# Patient Record
Sex: FEMALE | Race: WHITE | URBAN - METROPOLITAN AREA
[De-identification: names, ages, dates, MRNs, and addresses within clinical notes are randomized per-mention and may not be internally consistent; named-entity substitution may affect disease eponyms.]

---

## 2017-06-05 ENCOUNTER — INPATIENT (INPATIENT)
Facility: HOSPITAL | Age: 82
LOS: 3 days | Discharge: NURSING, REHAB OR LONG-TERM CARE | End: 2017-06-09
Attending: SPECIALIST

## 2017-06-05 DIAGNOSIS — R07.9 CHEST PAIN, UNSPECIFIED: ICD-10-CM

## 2017-06-05 DIAGNOSIS — S72.90XA UNSPECIFIED FRACTURE OF UNSPECIFIED FEMUR, INITIAL ENCOUNTER FOR CLOSED FRACTURE: ICD-10-CM

## 2017-06-09 ENCOUNTER — INPATIENT (INPATIENT)
Facility: HOSPITAL | Age: 82
LOS: 11 days | Discharge: HOME HEALTH | End: 2017-06-21
Attending: PHYSICAL MEDICINE & REHABILITATION

## 2017-06-09 DIAGNOSIS — S72.90XA UNSPECIFIED FRACTURE OF UNSPECIFIED FEMUR, INITIAL ENCOUNTER FOR CLOSED FRACTURE: ICD-10-CM

## 2017-06-09 DIAGNOSIS — R07.9 CHEST PAIN, UNSPECIFIED: ICD-10-CM

## 2017-06-28 DIAGNOSIS — H40.9 UNSPECIFIED GLAUCOMA: ICD-10-CM

## 2017-06-28 DIAGNOSIS — E03.9 HYPOTHYROIDISM, UNSPECIFIED: ICD-10-CM

## 2017-06-28 DIAGNOSIS — D62 ACUTE POSTHEMORRHAGIC ANEMIA: ICD-10-CM
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
## 2017-06-28 DIAGNOSIS — I12.9 HYPERTENSIVE CHRONIC KIDNEY DISEASE WITH STAGE 1 THROUGH STAGE 4 CHRONIC KIDNEY DISEASE, OR UNSPECIFIED CHRONIC KIDNEY DISEASE: ICD-10-CM

## 2017-06-28 DIAGNOSIS — R50.82 POSTPROCEDURAL FEVER: ICD-10-CM
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
## 2017-06-28 DIAGNOSIS — G89.11 ACUTE PAIN DUE TO TRAUMA: ICD-10-CM

## 2017-06-28 DIAGNOSIS — K21.9 GASTRO-ESOPHAGEAL REFLUX DISEASE WITHOUT ESOPHAGITIS: ICD-10-CM

## 2017-06-28 DIAGNOSIS — I35.0 NONRHEUMATIC AORTIC (VALVE) STENOSIS: ICD-10-CM
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
## 2017-06-28 DIAGNOSIS — S72.041D DISPLACED FRACTURE OF BASE OF NECK OF RIGHT FEMUR, SUBSEQUENT ENCOUNTER FOR CLOSED FRACTURE WITH ROUTINE HEALING: ICD-10-CM

## 2017-06-28 DIAGNOSIS — S72.041S: ICD-10-CM

## 2017-06-28 DIAGNOSIS — S72.141A DISPLACED INTERTROCHANTERIC FRACTURE OF RIGHT FEMUR, INITIAL ENCOUNTER FOR CLOSED FRACTURE: ICD-10-CM

## 2017-06-28 DIAGNOSIS — E78.5 HYPERLIPIDEMIA, UNSPECIFIED: ICD-10-CM

## 2017-06-28 DIAGNOSIS — Z85.3 PERSONAL HISTORY OF MALIGNANT NEOPLASM OF BREAST: ICD-10-CM

## 2017-06-28 DIAGNOSIS — T84.194A OTHER MECHANICAL COMPLICATION OF INTERNAL FIXATION DEVICE OF RIGHT FEMUR, INITIAL ENCOUNTER: ICD-10-CM

## 2017-06-28 DIAGNOSIS — Z96.641 PRESENCE OF RIGHT ARTIFICIAL HIP JOINT: ICD-10-CM

## 2017-06-28 DIAGNOSIS — E83.52 HYPERCALCEMIA: ICD-10-CM

## 2017-06-28 DIAGNOSIS — D64.9 ANEMIA, UNSPECIFIED: ICD-10-CM

## 2017-06-28 DIAGNOSIS — R00.2 PALPITATIONS: ICD-10-CM

## 2017-06-28 DIAGNOSIS — Z51.5 ENCOUNTER FOR PALLIATIVE CARE: ICD-10-CM

## 2017-06-28 DIAGNOSIS — H91.90 UNSPECIFIED HEARING LOSS, UNSPECIFIED EAR: ICD-10-CM

## 2017-06-28 DIAGNOSIS — N18.9 CHRONIC KIDNEY DISEASE, UNSPECIFIED: ICD-10-CM

## 2017-06-28 DIAGNOSIS — Z51.89 ENCOUNTER FOR OTHER SPECIFIED AFTERCARE: ICD-10-CM

## 2017-06-28 DIAGNOSIS — N17.9 ACUTE KIDNEY FAILURE, UNSPECIFIED: ICD-10-CM

## 2017-06-28 DIAGNOSIS — K59.00 CONSTIPATION, UNSPECIFIED: ICD-10-CM
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
## 2018-02-07 ENCOUNTER — EMERGENCY (EMERGENCY)
Facility: HOSPITAL | Age: 83
LOS: 1 days | Discharge: HOME | End: 2018-02-09
Attending: EMERGENCY MEDICINE | Admitting: SPECIALIST

## 2018-02-07 ENCOUNTER — TRANSCRIPTION ENCOUNTER (OUTPATIENT)
Age: 83
End: 2018-02-07

## 2018-02-07 VITALS
SYSTOLIC BLOOD PRESSURE: 134 MMHG | OXYGEN SATURATION: 97 % | DIASTOLIC BLOOD PRESSURE: 60 MMHG | RESPIRATION RATE: 20 BRPM | HEART RATE: 62 BPM | TEMPERATURE: 99 F

## 2018-02-07 DIAGNOSIS — Z96.641 PRESENCE OF RIGHT ARTIFICIAL HIP JOINT: Chronic | ICD-10-CM

## 2018-02-07 LAB
ALBUMIN SERPL ELPH-MCNC: 3.6 G/DL — SIGNIFICANT CHANGE UP (ref 3–5.5)
ALP SERPL-CCNC: 61 U/L — SIGNIFICANT CHANGE UP (ref 30–115)
ALT FLD-CCNC: 10 U/L — SIGNIFICANT CHANGE UP (ref 0–41)
ANION GAP SERPL CALC-SCNC: 9 MMOL/L — SIGNIFICANT CHANGE UP (ref 7–14)
APTT BLD: 28.8 SEC — SIGNIFICANT CHANGE UP (ref 27–39.2)
AST SERPL-CCNC: 19 U/L — SIGNIFICANT CHANGE UP (ref 0–41)
BASOPHILS # BLD AUTO: 0.05 K/UL — SIGNIFICANT CHANGE UP (ref 0–0.2)
BASOPHILS NFR BLD AUTO: 0.9 % — SIGNIFICANT CHANGE UP (ref 0–1)
BILIRUB SERPL-MCNC: 0.6 MG/DL — SIGNIFICANT CHANGE UP (ref 0.2–1.2)
BUN SERPL-MCNC: 31 MG/DL — HIGH (ref 10–20)
CALCIUM SERPL-MCNC: 9.9 MG/DL — SIGNIFICANT CHANGE UP (ref 8.5–10.1)
CHLORIDE SERPL-SCNC: 110 MMOL/L — SIGNIFICANT CHANGE UP (ref 98–110)
CK MB BLD-MCNC: 2 % — SIGNIFICANT CHANGE UP (ref 0–4)
CK MB CFR SERPL CALC: 1.6 NG/ML — SIGNIFICANT CHANGE UP (ref 0.6–6.3)
CK SERPL-CCNC: 73 U/L — SIGNIFICANT CHANGE UP (ref 0–225)
CO2 SERPL-SCNC: 24 MMOL/L — SIGNIFICANT CHANGE UP (ref 17–32)
CREAT SERPL-MCNC: 1.5 MG/DL — SIGNIFICANT CHANGE UP (ref 0.7–1.5)
EOSINOPHIL # BLD AUTO: 0.37 K/UL — SIGNIFICANT CHANGE UP (ref 0–0.7)
EOSINOPHIL NFR BLD AUTO: 6.5 % — SIGNIFICANT CHANGE UP (ref 0–8)
GLUCOSE SERPL-MCNC: 131 MG/DL — HIGH (ref 70–110)
HCT VFR BLD CALC: 31 % — LOW (ref 37–47)
HGB BLD-MCNC: 10.1 G/DL — LOW (ref 14–18)
IMM GRANULOCYTES NFR BLD AUTO: 0.5 % — HIGH (ref 0.1–0.3)
INR BLD: 1.15 RATIO — SIGNIFICANT CHANGE UP (ref 0.65–1.3)
LYMPHOCYTES # BLD AUTO: 1.66 K/UL — SIGNIFICANT CHANGE UP (ref 1.2–3.4)
LYMPHOCYTES # BLD AUTO: 29 % — SIGNIFICANT CHANGE UP (ref 20.5–51.1)
MCHC RBC-ENTMCNC: 28.8 PG — SIGNIFICANT CHANGE UP (ref 27–31)
MCHC RBC-ENTMCNC: 32.6 G/DL — SIGNIFICANT CHANGE UP (ref 32–37)
MCV RBC AUTO: 88.3 FL — SIGNIFICANT CHANGE UP (ref 81–91)
MONOCYTES # BLD AUTO: 0.38 K/UL — SIGNIFICANT CHANGE UP (ref 0.1–0.6)
MONOCYTES NFR BLD AUTO: 6.6 % — SIGNIFICANT CHANGE UP (ref 1.7–9.3)
NEUTROPHILS # BLD AUTO: 3.24 K/UL — SIGNIFICANT CHANGE UP (ref 1.4–6.5)
NEUTROPHILS NFR BLD AUTO: 56.5 % — SIGNIFICANT CHANGE UP (ref 42.2–75.2)
NRBC # BLD: 0 /100 WBCS — SIGNIFICANT CHANGE UP (ref 0–0)
PLATELET # BLD AUTO: 157 K/UL — SIGNIFICANT CHANGE UP (ref 130–400)
POTASSIUM SERPL-MCNC: 4.5 MMOL/L — SIGNIFICANT CHANGE UP (ref 3.5–5)
POTASSIUM SERPL-SCNC: 4.5 MMOL/L — SIGNIFICANT CHANGE UP (ref 3.5–5)
PROT SERPL-MCNC: 6.8 G/DL — SIGNIFICANT CHANGE UP (ref 6–8)
PROTHROM AB SERPL-ACNC: 12.5 SEC — SIGNIFICANT CHANGE UP (ref 9.95–12.87)
RBC # BLD: 3.51 M/UL — LOW (ref 4.2–5.4)
RBC # FLD: 13.8 % — SIGNIFICANT CHANGE UP (ref 11.5–14.5)
SODIUM SERPL-SCNC: 143 MMOL/L — SIGNIFICANT CHANGE UP (ref 135–146)
TROPONIN I SERPL-MCNC: <0.02 NG/ML — SIGNIFICANT CHANGE UP (ref 0–0.05)
WBC # BLD: 5.73 K/UL — SIGNIFICANT CHANGE UP (ref 4.8–10.8)
WBC # FLD AUTO: 5.73 K/UL — SIGNIFICANT CHANGE UP (ref 4.8–10.8)

## 2018-02-07 RX ORDER — SODIUM CHLORIDE 9 MG/ML
3 INJECTION INTRAMUSCULAR; INTRAVENOUS; SUBCUTANEOUS ONCE
Qty: 0 | Refills: 0 | Status: COMPLETED | OUTPATIENT
Start: 2018-02-07 | End: 2018-02-07

## 2018-02-07 RX ORDER — ASPIRIN/CALCIUM CARB/MAGNESIUM 324 MG
325 TABLET ORAL ONCE
Qty: 0 | Refills: 0 | Status: COMPLETED | OUTPATIENT
Start: 2018-02-07 | End: 2018-02-07

## 2018-02-07 RX ADMIN — SODIUM CHLORIDE 3 MILLILITER(S): 9 INJECTION INTRAMUSCULAR; INTRAVENOUS; SUBCUTANEOUS at 18:30

## 2018-02-07 RX ADMIN — Medication 325 MILLIGRAM(S): at 18:30

## 2018-02-07 NOTE — ED CDU PROVIDER INITIAL DAY NOTE - ATTENDING CONTRIBUTION TO CARE
seen with PA agree with above, lungs- clear, abdomen- soft no tenderness to any region, neuro- non focal, s1s2 no gallops or murmurs, full workup pending will continue to re-evaluate

## 2018-02-07 NOTE — ED PROVIDER NOTE - PMH
Chronic primary angle-closure glaucoma of left eye, severe stage    Exudative age-related macular degeneration of left eye with inactive choroidal neovascularization    High cholesterol    Hypertension, unspecified type

## 2018-02-07 NOTE — ED PROVIDER NOTE - OBJECTIVE STATEMENT
89 yo female with PMH HTN, HLD, hypothyroidism, breast CA, glaucoma presents c/o intermittent left sided chest pain . Pt denies any radiation of sx.  No SOB, palpitations, dizziness or weakness. Denies any N/V/D or abdominal pain.  Sx not worse with palpation or inspiration. No fevers, chill, cough or URI sx.

## 2018-02-07 NOTE — ED PROVIDER NOTE - PHYSICAL EXAMINATION
VITAL SIGNS: noted  CONSTITUTIONAL: Well-developed; well-nourished; in no acute distress  HEAD: Normocephalic; atraumatic  EYES: PERRL, EOM intact; conjunctiva and sclera clear  ENT: No nasal discharge; airway clear. MMM  NECK: Supple; non tender. No anterior cervical lymphadenopathy noted  CARD: S1, S2 normal; no murmurs, gallops, or rubs. Regular rate and rhythm  CHEST: no chest wall tenderness  RESP: CTAB/L, no wheezes, rales or rhonchi  ABD: Normal bowel sounds; soft; non-distended; non-tender; no hepatosplenomegaly. No CVA tenderness  EXT: Normal ROM. No clubbing, cyanosis or edema. Distal pulses intact  NEURO: Alert, oriented. Grossly unremarkable. No focal deficits  SKIN: Skin exam is warm and dry, no acute rash  MS: No midline spinal tenderness

## 2018-02-07 NOTE — ED CDU PROVIDER INITIAL DAY NOTE - OBJECTIVE STATEMENT
89y/o female with pmh of htn, hld, hypothyroid, mvp, pt. c/o mild left sided cp since yesterday. cp is intermittent. denies fever, chills, cough, vomiting. pmd dr. Cooper, cardio dr. Bruno

## 2018-02-07 NOTE — ED CDU PROVIDER INITIAL DAY NOTE - PROGRESS NOTE DETAILS
trop x 2 negative, will continue to reassess. pt resting in obs without complaints; pt with ce/ekg wnl x 2; pending cardiology consult in am with Dr. Bruno;

## 2018-02-07 NOTE — ED PROVIDER NOTE - MEDICAL DECISION MAKING DETAILS
labs and cxr result discussed, given CP and risk factors recommended EDOU for serial enzymes and further monitoring and workup and pt/family agreed

## 2018-02-08 LAB
CK MB CFR SERPL CALC: 1.8 NG/ML — SIGNIFICANT CHANGE UP (ref 0.6–6.3)
CK SERPL-CCNC: 55 U/L — SIGNIFICANT CHANGE UP (ref 0–225)
TROPONIN I SERPL-MCNC: <0.02 NG/ML — SIGNIFICANT CHANGE UP (ref 0–0.05)

## 2018-02-08 RX ORDER — LATANOPROST 0.05 MG/ML
1 SOLUTION/ DROPS OPHTHALMIC; TOPICAL AT BEDTIME
Qty: 0 | Refills: 0 | Status: DISCONTINUED | OUTPATIENT
Start: 2018-02-08 | End: 2018-02-09

## 2018-02-08 NOTE — ED CDU PROVIDER SUBSEQUENT DAY NOTE - ATTENDING CONTRIBUTION TO CARE
See clinical course and progress notes
Zena with PA agree with above, lungs, clear , abdomen- soft no tenderness to any region, neuor- non focal, full workup in progress will continue to re-evaluate
seen with PA , remains stable will continue to re-evaluate
Remains in no distress with stable vitals full workup pending will continue to re-evaluate
See progress notes and clinical course

## 2018-02-08 NOTE — ED CDU PROVIDER SUBSEQUENT DAY NOTE - PROGRESS NOTE DETAILS
received signout from Ranjeet Morataya pt in obs for chest pain - ce/ekg negative x 2; pending cardiology consult Dr. Bruno in am;

## 2018-02-08 NOTE — ED CDU PROVIDER SUBSEQUENT DAY NOTE - MEDICAL DECISION MAKING DETAILS
Full workup pending will continue to re-evaluate
See clinical course and progress notes
full workup pending will continue to re-evaluate
See progress notes and clinical course
full workup in progress will continue to re-evaluate

## 2018-02-08 NOTE — ED CDU PROVIDER SUBSEQUENT DAY NOTE - HISTORY
Pt seen at bedside, NAD. Arrived overnight, received from CRYSTAL Teresa. Pt presenting for left sided chest pain. Cardiologist - Dr Bruno. Cardiac enzymes negative x 2.. Will contact Dr Bruno for further management/disposition.

## 2018-02-08 NOTE — ED CDU PROVIDER SUBSEQUENT DAY NOTE - NS ED ATTENDING STATEMENT MOD
I have personally performed a face to face diagnostic evaluation on this patient. I have reviewed the ACP note and agree with the history, exam and plan of care, except as noted.

## 2018-02-08 NOTE — ED CDU PROVIDER SUBSEQUENT DAY NOTE - NS_EDPROVIDERDISPOUSERTYPE_ED_A_ED
Attending Attestation (For Attendings USE Only)...

## 2018-02-08 NOTE — ED CDU PROVIDER SUBSEQUENT DAY NOTE - PROGRESS NOTE DETAILS
unable to get in touch with Kusum Bruno, still gets intermittent chest pains, will admitt to telemetry

## 2018-02-09 VITALS
RESPIRATION RATE: 18 BRPM | HEART RATE: 69 BPM | SYSTOLIC BLOOD PRESSURE: 131 MMHG | OXYGEN SATURATION: 98 % | DIASTOLIC BLOOD PRESSURE: 60 MMHG | TEMPERATURE: 96 F

## 2018-02-09 RX ADMIN — LATANOPROST 1 DROP(S): 0.05 SOLUTION/ DROPS OPHTHALMIC; TOPICAL at 01:34

## 2018-02-09 NOTE — ED ADULT NURSE REASSESSMENT NOTE - NS ED NURSE REASSESS COMMENT FT1
Pt assessed A.O times 4 Vs stable  denies no chest pain did eat 100% no SOB no N.V ambulate steady ,pt is seen evaluate by Ed attending clear to go home NAD noted at this time .
Patient report received from previous RN, patient at this time is resting in bed with no acute distress, waiting for further disposition from provider, currently in NSR, safety and comfort measure maintained, will continue to watch and assess.
Pt remains stable with no complaint, needs anticipated & assistance rendered
Pt assessed at 0800 AM A/O times 4 Vs stable denies no chest pain no SOB no N.V ambulate steady with assistance , safety precaution on progress   pt is seen evaluate by Ed attending NAD noted did eat 70% of lunch on going nursing observation

## 2018-02-09 NOTE — ED CDU PROVIDER DISPOSITION NOTE - CLINICAL COURSE
89yo woman h/o HTN, HLD was placed in CDU due to an episode of chest pain PTA. Pt has been under a lot of stress lately due to family issues; this episode was brief, pressurelike and nonexertional. Pt was eval in the ED with EKG, labs with cardiac enzymes, and CXR which were unremarkable. She was monitored in CDU without incident, repeat EKG and enzymes ok. VS, exam as noted, pt well appearing and comfortable on my eval, lungs CTA, CVS1S2 RRR abd soft, NT, no peripheral edema. Ambulatory around ED without difficulty and without sx. Spoke with Dr Starr, covering for Dr Bruno; pt to f/u with him within a few days. Pt and family amenable to plan.

## 2018-02-09 NOTE — ED ADULT NURSE REASSESSMENT NOTE - COMFORT CARE
assisted to bathroom
assisted to bathroom
assisted to bathroom/repositioned
side rails up/plan of care explained/warm blanket provided/wait time explained/assisted to bathroom/darkened lights

## 2018-02-16 DIAGNOSIS — R07.9 CHEST PAIN, UNSPECIFIED: ICD-10-CM

## 2018-02-16 DIAGNOSIS — I10 ESSENTIAL (PRIMARY) HYPERTENSION: ICD-10-CM

## 2018-02-16 DIAGNOSIS — R07.89 OTHER CHEST PAIN: ICD-10-CM

## 2018-02-16 DIAGNOSIS — E78.00 PURE HYPERCHOLESTEROLEMIA, UNSPECIFIED: ICD-10-CM

## 2018-04-16 ENCOUNTER — TRANSCRIPTION ENCOUNTER (OUTPATIENT)
Age: 83
End: 2018-04-16

## 2018-06-19 ENCOUNTER — TRANSCRIPTION ENCOUNTER (OUTPATIENT)
Age: 83
End: 2018-06-19

## 2018-12-19 ENCOUNTER — EMERGENCY (EMERGENCY)
Facility: HOSPITAL | Age: 83
LOS: 0 days | Discharge: HOME | End: 2018-12-19
Attending: EMERGENCY MEDICINE | Admitting: EMERGENCY MEDICINE

## 2018-12-19 VITALS
DIASTOLIC BLOOD PRESSURE: 67 MMHG | SYSTOLIC BLOOD PRESSURE: 154 MMHG | TEMPERATURE: 97 F | OXYGEN SATURATION: 97 % | RESPIRATION RATE: 18 BRPM | HEART RATE: 60 BPM

## 2018-12-19 VITALS
HEART RATE: 70 BPM | OXYGEN SATURATION: 98 % | RESPIRATION RATE: 18 BRPM | TEMPERATURE: 97 F | DIASTOLIC BLOOD PRESSURE: 64 MMHG | SYSTOLIC BLOOD PRESSURE: 139 MMHG

## 2018-12-19 DIAGNOSIS — I10 ESSENTIAL (PRIMARY) HYPERTENSION: ICD-10-CM

## 2018-12-19 DIAGNOSIS — Z79.52 LONG TERM (CURRENT) USE OF SYSTEMIC STEROIDS: ICD-10-CM

## 2018-12-19 DIAGNOSIS — S09.90XA UNSPECIFIED INJURY OF HEAD, INITIAL ENCOUNTER: ICD-10-CM
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
## 2018-12-19 DIAGNOSIS — E03.9 HYPOTHYROIDISM, UNSPECIFIED: ICD-10-CM
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
## 2018-12-19 DIAGNOSIS — Z79.891 LONG TERM (CURRENT) USE OF OPIATE ANALGESIC: ICD-10-CM

## 2018-12-19 DIAGNOSIS — R07.81 PLEURODYNIA: ICD-10-CM

## 2018-12-19 DIAGNOSIS — Z79.84 LONG TERM (CURRENT) USE OF ORAL HYPOGLYCEMIC DRUGS: ICD-10-CM

## 2018-12-19 DIAGNOSIS — Z96.641 PRESENCE OF RIGHT ARTIFICIAL HIP JOINT: Chronic | ICD-10-CM

## 2018-12-19 PROBLEM — H40.2223 CHRONIC ANGLE-CLOSURE GLAUCOMA, LEFT EYE, SEVERE STAGE: Chronic | Status: ACTIVE | Noted: 2018-02-07

## 2018-12-19 PROBLEM — H35.3222 EXUDATIVE AGE-RELATED MACULAR DEGENERATION, LEFT EYE, WITH INACTIVE CHOROIDAL NEOVASCULARIZATION: Chronic | Status: ACTIVE | Noted: 2018-02-07

## 2018-12-19 PROBLEM — E78.00 PURE HYPERCHOLESTEROLEMIA, UNSPECIFIED: Chronic | Status: ACTIVE | Noted: 2018-02-07

## 2018-12-19 LAB
ALBUMIN SERPL ELPH-MCNC: 4.3 G/DL — SIGNIFICANT CHANGE UP (ref 3.5–5.2)
ALP SERPL-CCNC: 67 U/L — SIGNIFICANT CHANGE UP (ref 30–115)
ALT FLD-CCNC: 8 U/L — SIGNIFICANT CHANGE UP (ref 0–41)
ANION GAP SERPL CALC-SCNC: 15 MMOL/L — HIGH (ref 7–14)
AST SERPL-CCNC: 22 U/L — SIGNIFICANT CHANGE UP (ref 0–41)
BASOPHILS # BLD AUTO: 0.06 K/UL — SIGNIFICANT CHANGE UP (ref 0–0.2)
BASOPHILS NFR BLD AUTO: 1 % — SIGNIFICANT CHANGE UP (ref 0–1)
BILIRUB SERPL-MCNC: 0.4 MG/DL — SIGNIFICANT CHANGE UP (ref 0.2–1.2)
BUN SERPL-MCNC: 31 MG/DL — HIGH (ref 10–20)
CALCIUM SERPL-MCNC: 10 MG/DL — SIGNIFICANT CHANGE UP (ref 8.5–10.1)
CHLORIDE SERPL-SCNC: 101 MMOL/L — SIGNIFICANT CHANGE UP (ref 98–110)
CO2 SERPL-SCNC: 24 MMOL/L — SIGNIFICANT CHANGE UP (ref 17–32)
CREAT SERPL-MCNC: 1.5 MG/DL — SIGNIFICANT CHANGE UP (ref 0.7–1.5)
EOSINOPHIL # BLD AUTO: 0.28 K/UL — SIGNIFICANT CHANGE UP (ref 0–0.7)
EOSINOPHIL NFR BLD AUTO: 4.6 % — SIGNIFICANT CHANGE UP (ref 0–8)
GLUCOSE SERPL-MCNC: 113 MG/DL — HIGH (ref 70–99)
HCT VFR BLD CALC: 33.6 % — LOW (ref 37–47)
HGB BLD-MCNC: 10.9 G/DL — LOW (ref 12–16)
IMM GRANULOCYTES NFR BLD AUTO: 0.3 % — SIGNIFICANT CHANGE UP (ref 0.1–0.3)
LYMPHOCYTES # BLD AUTO: 1.32 K/UL — SIGNIFICANT CHANGE UP (ref 1.2–3.4)
LYMPHOCYTES # BLD AUTO: 21.5 % — SIGNIFICANT CHANGE UP (ref 20.5–51.1)
MCHC RBC-ENTMCNC: 28.6 PG — SIGNIFICANT CHANGE UP (ref 27–31)
MCHC RBC-ENTMCNC: 32.4 G/DL — SIGNIFICANT CHANGE UP (ref 32–37)
MCV RBC AUTO: 88.2 FL — SIGNIFICANT CHANGE UP (ref 81–99)
MONOCYTES # BLD AUTO: 0.45 K/UL — SIGNIFICANT CHANGE UP (ref 0.1–0.6)
MONOCYTES NFR BLD AUTO: 7.3 % — SIGNIFICANT CHANGE UP (ref 1.7–9.3)
NEUTROPHILS # BLD AUTO: 4 K/UL — SIGNIFICANT CHANGE UP (ref 1.4–6.5)
NEUTROPHILS NFR BLD AUTO: 65.3 % — SIGNIFICANT CHANGE UP (ref 42.2–75.2)
NRBC # BLD: 0 /100 WBCS — SIGNIFICANT CHANGE UP (ref 0–0)
PLATELET # BLD AUTO: 162 K/UL — SIGNIFICANT CHANGE UP (ref 130–400)
POTASSIUM SERPL-MCNC: 4.6 MMOL/L — SIGNIFICANT CHANGE UP (ref 3.5–5)
POTASSIUM SERPL-SCNC: 4.6 MMOL/L — SIGNIFICANT CHANGE UP (ref 3.5–5)
PROT SERPL-MCNC: 7.7 G/DL — SIGNIFICANT CHANGE UP (ref 6–8)
RBC # BLD: 3.81 M/UL — LOW (ref 4.2–5.4)
RBC # FLD: 13.8 % — SIGNIFICANT CHANGE UP (ref 11.5–14.5)
SODIUM SERPL-SCNC: 140 MMOL/L — SIGNIFICANT CHANGE UP (ref 135–146)
WBC # BLD: 6.13 K/UL — SIGNIFICANT CHANGE UP (ref 4.8–10.8)
WBC # FLD AUTO: 6.13 K/UL — SIGNIFICANT CHANGE UP (ref 4.8–10.8)

## 2018-12-19 NOTE — ED ADULT NURSE NOTE - NSIMPLEMENTINTERV_GEN_ALL_ED
Implemented All Fall with Harm Risk Interventions:  [place] to call system. Call bell, personal items and telephone within reach. Instruct patient to call for assistance. Room bathroom lighting operational. Non-slip footwear when patient is off stretcher. Physically safe environment: no spills, clutter or unnecessary equipment. Stretcher in lowest position, wheels locked, appropriate side rails in place. Provide visual cue, wrist band, yellow gown, etc. Monitor gait and stability. Monitor for mental status changes and reorient to person, place, and time. Review medications for side effects contributing to fall risk. Reinforce activity limits and safety measures with patient and family. Provide visual clues: red socks.

## 2018-12-19 NOTE — ED ADULT NURSE REASSESSMENT NOTE - NS ED NURSE REASSESS COMMENT FT1
pt axoxo4. grandaughter at bedside. pt has right sided hematoma. pt denying pain at this time. awaiting ct results rn will continue to monitor.

## 2018-12-19 NOTE — ED PROVIDER NOTE - PHYSICAL EXAMINATION
Physical Exam    Vital Signs: I have reviewed the initial vital signs.  Constitutional: well-nourished, appears stated age, no acute distress  Eyes: Conjunctiva pink, Sclera clear, PERRLA, EOMI. EOM without pain  Cardiovascular: S1 and S2, regular rate, regular rhythm, well-perfused extremities, radial pulses equal and 2+  Respiratory: unlabored respiratory effort, clear to auscultation bilaterally no wheezing, rales and rhonchi  Gastrointestinal: soft, non-tender abdomen, no pulsatile mass, normal bowl sounds  Musculoskeletal: supple neck, no lower extremity edema, no midline tenderness, from of extremites x 4, pelvis is stable, no point ttp over b/l ribs, no ac joint ttp b/l, no cspine ttp, neck from.   Integumentary: warm, dry, no rash. Ecchymosis and swelling to right eyebrow without laceration.   Neurologic: awake, alert, cranial nerves II-XII grossly intact, extremities’ motor and sensory functions grossly intact. Normal gait.

## 2018-12-19 NOTE — ED PROVIDER NOTE - ATTENDING CONTRIBUTION TO CARE
89 y/o female with h/o htn, hld, hypothryoidism, in ER for eval s/p fall. Pt states she slipped while going out to get mail, fell onto R side, hit R side of head, R chest wall.  no LOC. pt ambulatory at scene.  [place] [facility] [place] to [place] for eval.  + ha.  + swelling/ecchymosis to R jennifer-orbital area.  no change in vision.  no neck pain.  no sob.  + pain to R lat chest wall.  no abd pain.  no back pain.  no extremity injury.  pe - nad, nc, + R periorbital ecchymosis, no bony tenderness, eomi, perrl, op - clear, no c-spine tenderness, cta b/l, no w/r/r, rrr, + tenderness to R lat chest wall, no crepitations, abd- soft, nt/nd, nabs, pelvis stable and nt, from x 4, A&O x 3, no focal neuro deficits.  -to check ct scans and re-eval.

## 2018-12-19 NOTE — ED PROVIDER NOTE - NS ED ROS FT
Constitutional: (-) fever, (-) chills  Eyes: (-) visual changes  Cardiovascular: (-) chest pain, (-) syncope  Respiratory: (-) cough, (-) shortness of breath, (-) dyspnea,   Gastrointestinal: (-) vomiting, (-) diarrhea, (-)nausea,  Musculoskeletal: (-) neck pain, (-) back pain, (-) joint pain, (+) right side of rib pain  Integumentary: (-) rash, (+) edema, (+) bruises,  Neurological: (-) headache (-) dizziness, (-) tingling, (-)numbness, (-) LOC  Peripheral Vascular: (-) pain while walking, (-) leg swelling,

## 2018-12-19 NOTE — ED PROVIDER NOTE - MEDICAL DECISION MAKING DETAILS
pt in ER for eval after fall - R periorbital swelling, R later CW pain.  CT head/c-spine/facial bones/chest/abd  - no acute traumatic injury.  pt d/c'd home and to f/u with pmd.  told to return to ER if she feels worse or for any other new/concerning symptoms.  pt understands and agrees with plan.

## 2018-12-19 NOTE — ED ADULT NURSE NOTE - OBJECTIVE STATEMENT
patient states she tripped and fell while walking to [employer]. hit right side of head by eye. denies any dizziness. denies loc.

## 2018-12-19 NOTE — ED PROVIDER NOTE - OBJECTIVE STATEMENT
89 yo female, HTN, HLD, hypothyroidism, 89 yo female, HTN, HLD, hypothyroidism, presents to the ED for evaluation of fall. Pt reports she was walking to get mail, had mechanical trip and fall, landed on right side of thorax and hit right side of head. Pt reports pain and swelling to right eyebrow and right rib pain. [place] [facility] [place] 89 [place], HTN, HLD, hypothyroidism, presents to the ED for evaluation of fall. Pt reports she was walking to get mail, had mechanical trip and fall, landed on right side of thorax and hit right side of head. Pt reports pain and swelling to right eyebrow and right rib pain. [place] [facility] [place] and was advised to come to ED for further evaluation. Denies fever, chills, chest pain, sob, cough, dyspnea, lower extremity pain or swelling, nvd, loc, visual changes, abd pain, pleuritic pain, inability to ambulate.

## 2018-12-22 ENCOUNTER — TRANSCRIPTION ENCOUNTER (OUTPATIENT)
Age: 83
End: 2018-12-22

## 2019-04-29 ENCOUNTER — TRANSCRIPTION ENCOUNTER (OUTPATIENT)
Age: 84
End: 2019-04-29

## 2019-05-21 ENCOUNTER — APPOINTMENT (OUTPATIENT)
Dept: VASCULAR SURGERY | Facility: CLINIC | Age: 84
End: 2019-05-21
Payer: MEDICARE

## 2019-05-21 VITALS
WEIGHT: 142 LBS | SYSTOLIC BLOOD PRESSURE: 140 MMHG | DIASTOLIC BLOOD PRESSURE: 70 MMHG | HEIGHT: 58 IN | BODY MASS INDEX: 29.81 KG/M2

## 2019-05-21 DIAGNOSIS — H35.30 UNSPECIFIED MACULAR DEGENERATION: ICD-10-CM

## 2019-05-21 DIAGNOSIS — Z85.3 PERSONAL HISTORY OF MALIGNANT NEOPLASM OF BREAST: ICD-10-CM

## 2019-05-21 DIAGNOSIS — H40.9 UNSPECIFIED GLAUCOMA: ICD-10-CM

## 2019-05-21 DIAGNOSIS — K21.9 GASTRO-ESOPHAGEAL REFLUX DISEASE W/OUT ESOPHAGITIS: ICD-10-CM

## 2019-05-21 DIAGNOSIS — I65.23 OCCLUSION AND STENOSIS OF BILATERAL CAROTID ARTERIES: ICD-10-CM

## 2019-05-21 PROCEDURE — 99213 OFFICE O/P EST LOW 20 MIN: CPT

## 2019-05-21 PROCEDURE — 99203 OFFICE O/P NEW LOW 30 MIN: CPT

## 2019-05-21 PROCEDURE — 93880 EXTRACRANIAL BILAT STUDY: CPT

## 2019-05-21 NOTE — DATA REVIEWED
[FreeTextEntry1] : I performed a carotid duplex which was medically necessary to evaluate for carotid artery stenosis. It showed 50% bilateral ICA stenosis.\par

## 2019-05-21 NOTE — ASSESSMENT
[FreeTextEntry1] : 89 y/o female with h/o carotid artery disease presents for evaluation, denies any CVA or TIA symptoms. I performed a carotid duplex which was medically necessary to evaluate for carotid artery stenosis. It showed 50% bilateral ICA stenosis.\par I have informed her of the test results and reassured her. No vascular surgical intervention is needed.

## 2019-05-21 NOTE — HISTORY OF PRESENT ILLNESS
[FreeTextEntry1] : 89 y/o female with h/o carotid artery disease presents for evaluation, denies any CVA or TIA symptoms.

## 2019-07-25 ENCOUNTER — INPATIENT (INPATIENT)
Facility: HOSPITAL | Age: 84
LOS: 4 days | Discharge: NURSING, REHAB OR LONG-TERM CARE | End: 2019-07-30
Attending: SPECIALIST | Admitting: SPECIALIST
Payer: MEDICARE

## 2019-07-25 VITALS
DIASTOLIC BLOOD PRESSURE: 71 MMHG | RESPIRATION RATE: 19 BRPM | HEART RATE: 77 BPM | TEMPERATURE: 98 F | SYSTOLIC BLOOD PRESSURE: 164 MMHG | OXYGEN SATURATION: 96 %

## 2019-07-25 DIAGNOSIS — Z96.641 PRESENCE OF RIGHT ARTIFICIAL HIP JOINT: Chronic | ICD-10-CM

## 2019-07-25 LAB
ALBUMIN SERPL ELPH-MCNC: 4.3 G/DL — SIGNIFICANT CHANGE UP (ref 3.5–5.2)
ALP SERPL-CCNC: 61 U/L — SIGNIFICANT CHANGE UP (ref 30–115)
ALT FLD-CCNC: 9 U/L — SIGNIFICANT CHANGE UP (ref 0–41)
ANION GAP SERPL CALC-SCNC: 14 MMOL/L — SIGNIFICANT CHANGE UP (ref 7–14)
APPEARANCE UR: CLEAR — SIGNIFICANT CHANGE UP
APTT BLD: 32.2 SEC — SIGNIFICANT CHANGE UP (ref 27–39.2)
AST SERPL-CCNC: 23 U/L — SIGNIFICANT CHANGE UP (ref 0–41)
BACTERIA # UR AUTO: NEGATIVE — SIGNIFICANT CHANGE UP
BASOPHILS # BLD AUTO: 0.04 K/UL — SIGNIFICANT CHANGE UP (ref 0–0.2)
BASOPHILS NFR BLD AUTO: 0.7 % — SIGNIFICANT CHANGE UP (ref 0–1)
BILIRUB SERPL-MCNC: 0.6 MG/DL — SIGNIFICANT CHANGE UP (ref 0.2–1.2)
BILIRUB UR-MCNC: NEGATIVE — SIGNIFICANT CHANGE UP
BUN SERPL-MCNC: 27 MG/DL — HIGH (ref 10–20)
CALCIUM SERPL-MCNC: 10.3 MG/DL — HIGH (ref 8.5–10.1)
CHLORIDE SERPL-SCNC: 103 MMOL/L — SIGNIFICANT CHANGE UP (ref 98–110)
CO2 SERPL-SCNC: 22 MMOL/L — SIGNIFICANT CHANGE UP (ref 17–32)
COLOR SPEC: SIGNIFICANT CHANGE UP
CREAT SERPL-MCNC: 1.3 MG/DL — SIGNIFICANT CHANGE UP (ref 0.7–1.5)
DIFF PNL FLD: ABNORMAL
EOSINOPHIL # BLD AUTO: 0.23 K/UL — SIGNIFICANT CHANGE UP (ref 0–0.7)
EOSINOPHIL NFR BLD AUTO: 4.1 % — SIGNIFICANT CHANGE UP (ref 0–8)
EPI CELLS # UR: 1 /HPF — SIGNIFICANT CHANGE UP (ref 0–5)
GLUCOSE SERPL-MCNC: 103 MG/DL — HIGH (ref 70–99)
GLUCOSE UR QL: NEGATIVE — SIGNIFICANT CHANGE UP
HCT VFR BLD CALC: 33.2 % — LOW (ref 37–47)
HCT VFR BLD CALC: 34.2 % — LOW (ref 37–47)
HGB BLD-MCNC: 10.9 G/DL — LOW (ref 12–16)
HGB BLD-MCNC: 11.1 G/DL — LOW (ref 12–16)
HYALINE CASTS # UR AUTO: 1 /LPF — SIGNIFICANT CHANGE UP (ref 0–7)
IMM GRANULOCYTES NFR BLD AUTO: 1.4 % — HIGH (ref 0.1–0.3)
INR BLD: 1.15 RATIO — SIGNIFICANT CHANGE UP (ref 0.65–1.3)
KETONES UR-MCNC: NEGATIVE — SIGNIFICANT CHANGE UP
LACTATE SERPL-SCNC: 0.7 MMOL/L — SIGNIFICANT CHANGE UP (ref 0.5–2.2)
LEUKOCYTE ESTERASE UR-ACNC: NEGATIVE — SIGNIFICANT CHANGE UP
LIDOCAIN IGE QN: 36 U/L — SIGNIFICANT CHANGE UP (ref 7–60)
LYMPHOCYTES # BLD AUTO: 0.99 K/UL — LOW (ref 1.2–3.4)
LYMPHOCYTES # BLD AUTO: 17.7 % — LOW (ref 20.5–51.1)
MCHC RBC-ENTMCNC: 28.8 PG — SIGNIFICANT CHANGE UP (ref 27–31)
MCHC RBC-ENTMCNC: 28.8 PG — SIGNIFICANT CHANGE UP (ref 27–31)
MCHC RBC-ENTMCNC: 32.5 G/DL — SIGNIFICANT CHANGE UP (ref 32–37)
MCHC RBC-ENTMCNC: 32.8 G/DL — SIGNIFICANT CHANGE UP (ref 32–37)
MCV RBC AUTO: 87.8 FL — SIGNIFICANT CHANGE UP (ref 81–99)
MCV RBC AUTO: 88.8 FL — SIGNIFICANT CHANGE UP (ref 81–99)
MONOCYTES # BLD AUTO: 0.37 K/UL — SIGNIFICANT CHANGE UP (ref 0.1–0.6)
MONOCYTES NFR BLD AUTO: 6.6 % — SIGNIFICANT CHANGE UP (ref 1.7–9.3)
NEUTROPHILS # BLD AUTO: 3.87 K/UL — SIGNIFICANT CHANGE UP (ref 1.4–6.5)
NEUTROPHILS NFR BLD AUTO: 69.5 % — SIGNIFICANT CHANGE UP (ref 42.2–75.2)
NITRITE UR-MCNC: NEGATIVE — SIGNIFICANT CHANGE UP
NRBC # BLD: 0 /100 WBCS — SIGNIFICANT CHANGE UP (ref 0–0)
NRBC # BLD: 0 /100 WBCS — SIGNIFICANT CHANGE UP (ref 0–0)
PH UR: 6 — SIGNIFICANT CHANGE UP (ref 5–8)
PLATELET # BLD AUTO: 133 K/UL — SIGNIFICANT CHANGE UP (ref 130–400)
PLATELET # BLD AUTO: 134 K/UL — SIGNIFICANT CHANGE UP (ref 130–400)
POTASSIUM SERPL-MCNC: 4.5 MMOL/L — SIGNIFICANT CHANGE UP (ref 3.5–5)
POTASSIUM SERPL-SCNC: 4.5 MMOL/L — SIGNIFICANT CHANGE UP (ref 3.5–5)
PROT SERPL-MCNC: 8 G/DL — SIGNIFICANT CHANGE UP (ref 6–8)
PROT UR-MCNC: SIGNIFICANT CHANGE UP
PROTHROM AB SERPL-ACNC: 13.2 SEC — HIGH (ref 9.95–12.87)
RBC # BLD: 3.78 M/UL — LOW (ref 4.2–5.4)
RBC # BLD: 3.85 M/UL — LOW (ref 4.2–5.4)
RBC # FLD: 14 % — SIGNIFICANT CHANGE UP (ref 11.5–14.5)
RBC # FLD: 14 % — SIGNIFICANT CHANGE UP (ref 11.5–14.5)
RBC CASTS # UR COMP ASSIST: 49 /HPF — HIGH (ref 0–4)
SODIUM SERPL-SCNC: 139 MMOL/L — SIGNIFICANT CHANGE UP (ref 135–146)
SP GR SPEC: 1.01 — SIGNIFICANT CHANGE UP (ref 1.01–1.02)
UROBILINOGEN FLD QL: SIGNIFICANT CHANGE UP
WBC # BLD: 5.58 K/UL — SIGNIFICANT CHANGE UP (ref 4.8–10.8)
WBC # BLD: 7.77 K/UL — SIGNIFICANT CHANGE UP (ref 4.8–10.8)
WBC # FLD AUTO: 5.58 K/UL — SIGNIFICANT CHANGE UP (ref 4.8–10.8)
WBC # FLD AUTO: 7.77 K/UL — SIGNIFICANT CHANGE UP (ref 4.8–10.8)
WBC UR QL: 1 /HPF — SIGNIFICANT CHANGE UP (ref 0–5)

## 2019-07-25 PROCEDURE — 74176 CT ABD & PELVIS W/O CONTRAST: CPT | Mod: 26,59

## 2019-07-25 PROCEDURE — 73070 X-RAY EXAM OF ELBOW: CPT | Mod: 26,LT

## 2019-07-25 PROCEDURE — 99285 EMERGENCY DEPT VISIT HI MDM: CPT

## 2019-07-25 PROCEDURE — 73030 X-RAY EXAM OF SHOULDER: CPT | Mod: 26,LT

## 2019-07-25 PROCEDURE — 72125 CT NECK SPINE W/O DYE: CPT | Mod: 26

## 2019-07-25 PROCEDURE — 99223 1ST HOSP IP/OBS HIGH 75: CPT

## 2019-07-25 PROCEDURE — 71260 CT THORAX DX C+: CPT | Mod: 26

## 2019-07-25 PROCEDURE — 72170 X-RAY EXAM OF PELVIS: CPT | Mod: 26

## 2019-07-25 PROCEDURE — 73110 X-RAY EXAM OF WRIST: CPT | Mod: 26,LT

## 2019-07-25 PROCEDURE — 73130 X-RAY EXAM OF HAND: CPT | Mod: 26,LT

## 2019-07-25 PROCEDURE — 74177 CT ABD & PELVIS W/CONTRAST: CPT | Mod: 26

## 2019-07-25 PROCEDURE — 71045 X-RAY EXAM CHEST 1 VIEW: CPT | Mod: 26

## 2019-07-25 PROCEDURE — 93010 ELECTROCARDIOGRAM REPORT: CPT

## 2019-07-25 PROCEDURE — 70450 CT HEAD/BRAIN W/O DYE: CPT | Mod: 26

## 2019-07-25 PROCEDURE — 73590 X-RAY EXAM OF LOWER LEG: CPT | Mod: 26,LT

## 2019-07-25 RX ORDER — OXYCODONE HYDROCHLORIDE 5 MG/1
5 TABLET ORAL EVERY 6 HOURS
Refills: 0 | Status: DISCONTINUED | OUTPATIENT
Start: 2019-07-25 | End: 2019-07-30

## 2019-07-25 RX ORDER — AMLODIPINE BESYLATE 2.5 MG/1
5 TABLET ORAL DAILY
Refills: 0 | Status: DISCONTINUED | OUTPATIENT
Start: 2019-07-25 | End: 2019-07-30

## 2019-07-25 RX ORDER — SIMVASTATIN 20 MG/1
20 TABLET, FILM COATED ORAL AT BEDTIME
Refills: 0 | Status: DISCONTINUED | OUTPATIENT
Start: 2019-07-25 | End: 2019-07-30

## 2019-07-25 RX ORDER — LISINOPRIL 2.5 MG/1
20 TABLET ORAL DAILY
Refills: 0 | Status: DISCONTINUED | OUTPATIENT
Start: 2019-07-25 | End: 2019-07-30

## 2019-07-25 RX ORDER — FLUOROMETHOLONE 1 MG/ML
1 SOLUTION/ DROPS OPHTHALMIC
Refills: 0 | Status: DISCONTINUED | OUTPATIENT
Start: 2019-07-25 | End: 2019-07-26

## 2019-07-25 RX ORDER — CHLORHEXIDINE GLUCONATE 213 G/1000ML
1 SOLUTION TOPICAL
Refills: 0 | Status: DISCONTINUED | OUTPATIENT
Start: 2019-07-25 | End: 2019-07-30

## 2019-07-25 RX ORDER — SODIUM CHLORIDE 9 MG/ML
1000 INJECTION INTRAMUSCULAR; INTRAVENOUS; SUBCUTANEOUS
Refills: 0 | Status: DISCONTINUED | OUTPATIENT
Start: 2019-07-25 | End: 2019-07-26

## 2019-07-25 RX ORDER — TETANUS TOXOID, REDUCED DIPHTHERIA TOXOID AND ACELLULAR PERTUSSIS VACCINE, ADSORBED 5; 2.5; 8; 8; 2.5 [IU]/.5ML; [IU]/.5ML; UG/.5ML; UG/.5ML; UG/.5ML
0.5 SUSPENSION INTRAMUSCULAR ONCE
Refills: 0 | Status: COMPLETED | OUTPATIENT
Start: 2019-07-25 | End: 2019-07-25

## 2019-07-25 RX ORDER — LATANOPROST 0.05 MG/ML
1 SOLUTION/ DROPS OPHTHALMIC; TOPICAL AT BEDTIME
Refills: 0 | Status: DISCONTINUED | OUTPATIENT
Start: 2019-07-25 | End: 2019-07-30

## 2019-07-25 RX ORDER — LEVOTHYROXINE SODIUM 125 MCG
75 TABLET ORAL DAILY
Refills: 0 | Status: DISCONTINUED | OUTPATIENT
Start: 2019-07-25 | End: 2019-07-30

## 2019-07-25 RX ORDER — ACETAMINOPHEN 500 MG
650 TABLET ORAL EVERY 6 HOURS
Refills: 0 | Status: DISCONTINUED | OUTPATIENT
Start: 2019-07-25 | End: 2019-07-30

## 2019-07-25 RX ORDER — PANTOPRAZOLE SODIUM 20 MG/1
40 TABLET, DELAYED RELEASE ORAL
Refills: 0 | Status: DISCONTINUED | OUTPATIENT
Start: 2019-07-25 | End: 2019-07-30

## 2019-07-25 RX ADMIN — TETANUS TOXOID, REDUCED DIPHTHERIA TOXOID AND ACELLULAR PERTUSSIS VACCINE, ADSORBED 0.5 MILLILITER(S): 5; 2.5; 8; 8; 2.5 SUSPENSION INTRAMUSCULAR at 16:09

## 2019-07-25 NOTE — H&P ADULT - NSHPPHYSICALEXAM_GEN_ALL_CORE
Primary Survey:    A - airway intact  B - bilateral breath sounds and good chest rise  C - palpable pulses in all extremities  D - GCS 15 on arrival, MICHAEL  Exposure obtained    Vital Signs Last 24 Hrs  T(C): 36.8 (25 Jul 2019 13:54), Max: 36.8 (25 Jul 2019 13:54)  T(F): 98.2 (25 Jul 2019 13:54), Max: 98.2 (25 Jul 2019 13:54)  HR: 77 (25 Jul 2019 13:54) (77 - 77)  BP: 164/71 (25 Jul 2019 13:54) (164/71 - 164/71)  BP(mean): --  RR: 19 (25 Jul 2019 13:54) (19 - 19)  SpO2: 96% (25 Jul 2019 13:54) (96% - 96%)    Secondary Survey:   General: NAD  HEENT: Normocephalic, atraumatic, EOMI, PEERLA. no scalp lacerations   Neck: Soft, midline trachea. no cspine tenderness  Chest: No chest wall tenderness. or subq  emphysema   Cardiac: S1, S2, RRR  Respiratory: Bilateral breath sounds, clear and equal bilaterally  Abdomen: Soft, non-distended, non-tender, no rebound,   Groin: Normal appearing, pelvis stable   Ext: palp radial b/l UE, b/l DP palp in Lower Extrem, TTP over L hip. Abrasions to L elbow.   Back: no TTP, no palpable runoff/stepoff/deformity

## 2019-07-25 NOTE — ED PROVIDER NOTE - NS ED ROS FT
CONSTITUTIONAL: (-) fevers, (-) chills  EYES: (-) vision changes, (-) blurry vision, (-) double vision, (-) eye pain, (-) eye redness  NECK: (-) neck pain, (-) neck stiffness  CARDIO: (-) chest pain, (-) palpitations, (-) edema  PULM: (-) cough, (-) sputum, (-) shortness of breath, (-) wheezing, (-) hemoptysis, (-) stridor  GI: see HPI, (-) nausea, (-) vomiting, (-) diarrhea, (-) constipation  : (-) hematuria, (-) incontinence, (-) difficulty urinating, (-) urinary retention, (-) flank pain  MSK: see HPI  SKIN: +abrasion to left forearm, (-) rashes, (-) pallor  NEURO: see HPI    *all other systems negative except as documented above and in the HPI*

## 2019-07-25 NOTE — H&P ADULT - NSHPLABSRESULTS_GEN_ALL_CORE
LABS  CBC (07-25 @ 14:55)                              11.1<L>                         5.58    )----------------(  133        69.5  % Neutrophils, 17.7<L>% Lymphocytes, ANC: 3.87                                34.2<L>    BMP (07-25 @ 14:55)             139     |  103     |  27<H> 		Ca++ --      Ca 10.3<H>             ---------------------------------( 103<H>		Mg --                 4.5     |  22      |  1.3   			Ph --        LFTs (07-25 @ 14:55)      TPro 8.0 / Alb 4.3 / TBili 0.6 / DBili -- / AST 23 / ALT 9 / AlkPhos 61    Coags (07-25 @ 14:55)  aPTT 32.2 / INR 1.15 / PT 13.20<H>      ABG (07-25 @ 14:55)      /  /  /  /  / %     Lactate:  0.7    --------------------------------------------------------------------------------------------  IMAGING:   CT Abdomen and Pelvis w/ IV Cont (07.25.19 @ 15:59) >  IMPRESSION:  1.  Comminuted displaced fracture of the superior pubic ramus.   Nondisplaced fracture of the inferior pubic ramus.   2.  Early focal hematoma along the left inner pelvic sidewall with   surrounding fat stranding and thickening of the left obturator internus.  3.  Thickening of the left urinary bladder wall. CT cystogram is   recommended to assess for bladder rupture.  4.  No acute traumatic abnormality within the chest.    CT Cervical Spine No Cont (07.25.19 @ 15:59) >  Impression:  Diffuse osteopenia. No evidence of acute cervical spine injury.    CT Head No Cont (07.25.19 @ 15:58) >  Impression:    No mass effect or intracranial hemorrhage.   Chronic microvascular ischemic changes. LABS  CBC (07-25 @ 14:55)                              11.1<L>                         5.58    )----------------(  133        69.5  % Neutrophils, 17.7<L>% Lymphocytes, ANC: 3.87                                34.2<L>    BMP (07-25 @ 14:55)             139     |  103     |  27<H> 		Ca++ --      Ca 10.3<H>             ---------------------------------( 103<H>		Mg --                 4.5     |  22      |  1.3   			Ph --        LFTs (07-25 @ 14:55)      TPro 8.0 / Alb 4.3 / TBili 0.6 / DBili -- / AST 23 / ALT 9 / AlkPhos 61    Coags (07-25 @ 14:55)  aPTT 32.2 / INR 1.15 / PT 13.20<H>      ABG (07-25 @ 14:55)      /  /  /  /  / %     Lactate:  0.7    --------------------------------------------------------------------------------------------  IMAGING:   CT Abdomen and Pelvis w/ IV Cont (07.25.19 @ 15:59) >  IMPRESSION:  1.  Comminuted displaced fracture of the superior pubic ramus.   Nondisplaced fracture of the inferior pubic ramus.   2.  Early focal hematoma along the left inner pelvic sidewall with   surrounding fat stranding and thickening of the left obturator internus.  3.  Thickening of the left urinary bladder wall. CT cystogram is   recommended to assess for bladder rupture.  4.  No acute traumatic abnormality within the chest.    CT Cervical Spine No Cont (07.25.19 @ 15:59) >  Impression:  Diffuse osteopenia. No evidence of acute cervical spine injury.    CT Head No Cont (07.25.19 @ 15:58) >  Impression:    No mass effect or intracranial hemorrhage.   Chronic microvascular ischemic changes.    Xray Wrist 3 Views, Left (07.25.19 @ 15:35) >  Interpretation:  No evidence of acute fracture or dislocation.   Degenerative changes involving first metacarpal carpal joint.   Additional degenerative changes involving the radiocarpal joint is seen.  Soft tissues are unremarkable.    Xray Pelvis AP only (07.25.19 @ 15:34) >  impression:  1.  Acute mildly displaced left superior and inferior pubic ramus   fracture. Limited evaluation of the sacroiliac joints.  2.  Stable findings of hardware overlying right proximal femur.  3.  Degenerative changes of bilateral hip joints.  4.  Vascular calcifications.  5.  Degenerative changes of lower lumbar spine.    cxr  Impression:    No radiographic evidence of acute cardiopulmonary disease. LABS  CBC (07-25 @ 14:55)                              11.1<L>                         5.58    )----------------(  133        69.5  % Neutrophils, 17.7<L>% Lymphocytes, ANC: 3.87                                34.2<L>    BMP (07-25 @ 14:55)             139     |  103     |  27<H> 		Ca++ --      Ca 10.3<H>             ---------------------------------( 103<H>		Mg --                 4.5     |  22      |  1.3   			Ph --        LFTs (07-25 @ 14:55)      TPro 8.0 / Alb 4.3 / TBili 0.6 / DBili -- / AST 23 / ALT 9 / AlkPhos 61    Coags (07-25 @ 14:55)  aPTT 32.2 / INR 1.15 / PT 13.20<H>      ABG (07-25 @ 14:55)      /  /  /  /  / %     Lactate:  0.7    --------------------------------------------------------------------------------------------  IMAGING:   CT Abdomen and Pelvis w/ IV Cont (07.25.19 @ 15:59) >  IMPRESSION:  1.  Comminuted displaced fracture of the superior pubic ramus.   Nondisplaced fracture of the inferior pubic ramus.   2.  Early focal hematoma along the left inner pelvic sidewall with   surrounding fat stranding and thickening of the left obturator internus.  3.  Thickening of the left urinary bladder wall. CT cystogram is   recommended to assess for bladder rupture.  4.  No acute traumatic abnormality within the chest.    CT Cervical Spine No Cont (07.25.19 @ 15:59) >  Impression:  Diffuse osteopenia. No evidence of acute cervical spine injury.    CT Head No Cont (07.25.19 @ 15:58) >  Impression:    No mass effect or intracranial hemorrhage.   Chronic microvascular ischemic changes.    Xray Wrist 3 Views, Left (07.25.19 @ 15:35) >  Interpretation:  No evidence of acute fracture or dislocation.   Degenerative changes involving first metacarpal carpal joint.   Additional degenerative changes involving the radiocarpal joint is seen.  Soft tissues are unremarkable.    Xray Pelvis AP only (07.25.19 @ 15:34) >  impression:  1.  Acute mildly displaced left superior and inferior pubic ramus   fracture. Limited evaluation of the sacroiliac joints.  2.  Stable findings of hardware overlying right proximal femur.  3.  Degenerative changes of bilateral hip joints.  4.  Vascular calcifications.  5.  Degenerative changes of lower lumbar spine.    cxr  Impression:    No radiographic evidence of acute cardiopulmonary disease.    CT Abdomen and Pelvis No Cont (07.25.19 @ 19:54) >  IMPRESSION:  Contrast distends the urinary bladder adequately. No extraluminal   contrast to suggest bladder rupture.  No change in size of hematoma along the left inner pelvic sidewall.  Redemonstrated comminuted fractures of the left superior and inferior   pubic rami.  The remainder of the radiologic findings are unchanged.

## 2019-07-25 NOTE — ED PROVIDER NOTE - CARE PLAN
Principal Discharge DX:	Fracture of pubic ramus  Secondary Diagnosis:	Fall  Secondary Diagnosis:	Hematoma Principal Discharge DX:	Fracture of pubic ramus  Secondary Diagnosis:	Fall  Secondary Diagnosis:	Hematoma  Secondary Diagnosis:	Fracture of humerus, lateral condyle, closed

## 2019-07-25 NOTE — H&P ADULT - NSICDXPASTMEDICALHX_GEN_ALL_CORE_FT
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

## 2019-07-25 NOTE — ED PROVIDER NOTE - CLINICAL SUMMARY MEDICAL DECISION MAKING FREE TEXT BOX
89 y/o f PMHx  presents to ED s/p tripped and fall on the street, falling onto her L side. - HT, -LOC,  + ASA. Patient was a trauma alert.  Patient was found to have a left superior and inferior pubic ramus fracture, along with a focal hematoma along the left inner pelvic sidewall and Thickening of the left urinary bladder wall. ED work up reviewed.  Patient had a CT cystogram performed to evaluate for bladder injury.  Patient was evaluated multiple times by trauma.  Will admit to trauma floor pam Dr. Bobby. 89 y/o f PMHx  presents to ED s/p tripped and fall on the street, falling onto her L side. - HT, -LOC,  + ASA. Patient was a trauma alert.  Patient was found to have a left superior and inferior pubic ramus fracture, along with a focal hematoma along the left inner pelvic sidewall and Thickening of the left urinary bladder wall. ED work up reviewed.  Patient had a CT cystogram performed to evaluate for bladder injury, which did not show any bladder injury.  Patient was evaluated multiple times by trauma.  X-ray of elbow shows non-displaced lateral epicondyle fracture. Other x-rays negative for fracture other than pelvis. Will admit to trauma floor under Dr. Bobby.

## 2019-07-25 NOTE — CONSULT NOTE ADULT - ASSESSMENT
ASSESSMENT:  89F, s/p mechanical trip and fall, -HT, -LOC, on ASA. Presents w/ L arm pain w/ abrasion, and L hip pain.   - PAN scan   - full set of labs   - CXR  - Pelvic xray   - LUE xray

## 2019-07-25 NOTE — H&P ADULT - ASSESSMENT
89 y/o f PMHx  presents to ED s/p starla and fall on the street, falling onto her L side. - HT, -LOC,  + ASA. found to have superior and inferior pubic ramus fracture, focal hematoma along the left inner pelvic sidewall with and Thickening of the left urinary bladder wall.     Plan:   f/u ct cystogram 89 y/o f PMHx  presents to ED s/p starla and fall on the street, falling onto her L side. - HT, -LOC,  + ASA. found to have left superior and inferior pubic ramus fracture, focal hematoma along the left inner pelvic sidewall and Thickening of the left urinary bladder wall.     Plan:   f/u ct cystogram 89 y/o f PMHx  presents to ED s/p starla and fall on the street, falling onto her L side. - HT, -LOC,  + ASA. found to have left superior and inferior pubic ramus fracture, focal hematoma along the left inner pelvic sidewall and Thickening of the left urinary bladder wall.     Plan:   f/u orthopedics consult   trend hemoglobin  Admit to trauma surgery, possible SICU  Pt/rehab  Incentive spirometry 89 y/o f PMHx  presents to ED s/p tripped and fall on the street, falling onto her L side. - HT, -LOC,  + ASA. found to have left superior and inferior pubic ramus fracture, focal hematoma along the left inner pelvic sidewall and Thickening of the left urinary bladder wall.     Plan:   f/u orthopedics consult   trend hemoglobin  Admit to trauma surgery, possible SICU  Pt/rehab  Incentive spirometry      Senior Trauma Resident Note  Airway intact  Bilateral Breath Sounds  Palpable pulses in 4 ext  GCS 15, PERRL, MICHAEL  hemodynamically stable  No Subq emphysema, abdominal tenderness,  or pelvic instability   Ct findings as above  admit to trauma team, repeat Hb stable 11.1 -> 10.9, CT cysto show no bladder injury  pain control, PT/ Rehab  Plan as above d/w Dr. Bobby

## 2019-07-25 NOTE — CONSULT NOTE ADULT - SUBJECTIVE AND OBJECTIVE BOX
TRAUMA ACTIVATION LEVEL:  TRAUMA ALERT    MECHANISM OF INJURY:      [] Blunt  	[] MVC	[X] Fall	[] Pedestrian Struck	[] Motorcycle   [] Assault   [] Bicycle collision  [] Sports injury     [] Penetrating  	[] Gun Shot Wound 		[] Stab Wound    GCS: 15 	E: 4	V: 5	M: 6    HPI:  89F, PMHx hypothyroidism, hyperlipidemia, Osteoporosis, HTN, presents to ED s/p fall. Pt states she tripped and fell on the street, falling onto her L side. Denies HT, denies LOC, only on ASA. Pt presents w/ L arm, L hip/leg pain. Otherwise, no CP, SOB, HA, neck pain, abdominal pain.     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  Chronic primary angle-closure glaucoma of left eye, severe stage  High cholesterol  Hypertension, unspecified type  History of hip replacement, total, right      Allergies    No Known Allergies    Intolerances        Home Medications:  amlodipine-benazepril 5 mg-20 mg oral capsule: 1 cap(s) orally once a day (08 Feb 2018 19:39)  Combigan 0.2%-0.5% ophthalmic solution: 2 drop(s) to left eye every 12 hours (08 Feb 2018 19:39)  fluorometholone 0.1% ophthalmic suspension: 1 drop(s) to each affected eye 4 times a day (08 Feb 2018 19:39)  latanoprost 0.005% ophthalmic solution: 1 drop(s) to each affected eye once a day (in the evening) (08 Feb 2018 19:39)  levothyroxine 75 mcg (0.075 mg) oral tablet: 1 tab(s) orally once a day (08 Feb 2018 19:39)  pantoprazole 40 mg oral delayed release tablet: 1 tab(s) orally once a day (08 Feb 2018 19:39)  simvastatin 20 mg oral tablet: 1 tab(s) orally once a day (at bedtime) (08 Feb 2018 19:39)      ROS: 10-system review is otherwise negative except HPI above.      Primary Survey:    A - airway intact  B - bilateral breath sounds and good chest rise  C - palpable pulses in all extremities  D - GCS 15 on arrival, MICHAEL  Exposure obtained    Vital Signs Last 24 Hrs  T(C): 36.8 (25 Jul 2019 13:54), Max: 36.8 (25 Jul 2019 13:54)  T(F): 98.2 (25 Jul 2019 13:54), Max: 98.2 (25 Jul 2019 13:54)  HR: 77 (25 Jul 2019 13:54) (77 - 77)  BP: 164/71 (25 Jul 2019 13:54) (164/71 - 164/71)  BP(mean): --  RR: 19 (25 Jul 2019 13:54) (19 - 19)  SpO2: 96% (25 Jul 2019 13:54) (96% - 96%)    Secondary Survey:   General: NAD  HEENT: Normocephalic, atraumatic, EOMI, PEERLA. no scalp lacerations   Neck: Soft, midline trachea. no cspine tenderness  Chest: No chest wall tenderness. or subq  emphysema   Cardiac: S1, S2, RRR  Respiratory: Bilateral breath sounds, clear and equal bilaterally  Abdomen: Soft, non-distended, non-tender, no rebound,   Groin: Normal appearing, pelvis stable   Ext: palp radial b/l UE, b/l DP palp in Lower Extrem, TTP over L hip. Abrasions to L elbow.   Back: no TTP, no palpable runoff/stepoff/deformity      FAST    Procedures:    LABS:  Labs:  CAPILLARY BLOOD GLUCOSE                              11.1   5.58  )-----------( 133      ( 25 Jul 2019 14:55 )             34.2       Auto Immature Granulocyte %: 1.4 % (07-25-19 @ 14:55)  Auto Neutrophil %: 69.5 % (07-25-19 @ 14:55)            LFTs:         Coags:                RADIOLOGY & ADDITIONAL STUDIES:    pending     ---------------------------------------------------------------------------------------

## 2019-07-25 NOTE — ED ADULT NURSE REASSESSMENT NOTE - NS ED NURSE REASSESS COMMENT FT1
Pt received from CT scan in no acute distress, A&Ox4, breathing unlabored, breath sounds clear bilateral. Byrnes in place and urine sent to lab. Pt denies chest pain, sob, nausea, and vomiting. Reports pain to left leg 3/10 while resting and reports more pain with movement. Extremity pulse present, cap refill wnl, and pt able to move all extremities. Pt seen by ortho, pending x-rays and disposition. Will continue to monitor.

## 2019-07-25 NOTE — ED PROVIDER NOTE - ATTENDING CONTRIBUTION TO CARE
89 y/o F PMHx Hypothyroidism, Hyperlipidemia, Osteoporosis, HTN, macular degeneration and glaucoma of the left eye presents to ED s/p mechanical trip and fall on the street.  Patient fell onto her L side and denies any head trauma.  No LOC,  Patient takes aspirin daily. She is c/o L arm, L hip/leg pain. Denies any CP, SOB, HA, neck pain, abdominal pain.     On exam, VS reviewed.  Trauma alert called by ED team on arrival.  Patient seen and evaluated.  Primary survey intact. GCS 15. VS reviewed. Large bore IV placed. Portable CXR shows no acute traumatic pathology. Pelvis x-rays show pubic ramus fractures.  Secondary survey shows abrasions and tenderness to elbow, hand, leg.  Will send appropriate labs and getting appropriate trauma imaging.  Will follow up work up and recommendations from trauma.

## 2019-07-25 NOTE — ED PROVIDER NOTE - PHYSICAL EXAMINATION
VITALS:  I have reviewed the initial vital signs.  GENERAL: Well-developed, well-nourished, in no acute distress. Accompanied by son.  HEENT: NC/AT. Sclera clear. No conjunctival injection. EOMI, PERRLA. Mucous membranes moist.  NECK: supple w FROM. No paraspinal muscle ttp. No midline cervical spinous tenderness, step offs, or deformity.  CARDIO: RRR, nl S1 and S2. No murmurs, rubs, or gallops. No peripheral edema. 2+ radial pulses bilaterally.  PULM: Normal effort. No tachypnea or retractions. CTA b/l without wheezes, rales, or rhonchi.  MSK: +left lateral rib ttp along ribs 10-12. No step off or deformity. No flail chest. No midline thoracic or lumbar spinous tenderness, step offs, or deformity. +left hip ttp. pelvis stable. +ecchymosis to dorsal aspect of left hand with +snuff box ttp.   GI: Abdomen soft and non-distended. +left abdominal ttp. No rebound or guarding.  SKIN: Warm, dry. Capillary refill <2 seconds.  NEURO: A&Ox3. Speech clear. CN II-XII intact. 5/5 strength to upper and lower extremities b/l. Sensation intact and equal throughout.

## 2019-07-25 NOTE — CONSULT NOTE ADULT - ATTENDING COMMENTS
pelvic fracture - mode get ct cysto   Assessment and plan above were modified and discussed with residents, physician assistants, and nurses.

## 2019-07-25 NOTE — ED PROVIDER NOTE - PROGRESS NOTE DETAILS
AFBIO: trauma alert called, trauma at bedside evalauting patient. FABIO: trauma alert called, trauma at bedside evaluating patient. FABIO: call to ortho regarding +left inferior/superior pubic rami fx on xr. they will come evaluate patient in the ED. trauma aware of pelvic fx. AS PER RADIOLOGY CT CYSTOGRAM RECOMMENDED, HEMATOMA AROUND BLADDER. FABIO: patient resting comfortably, wakes easily to voice. abd soft, nondistended, nontender throughout. informed of results, plan to obtain ct cystogram. PT SIGNED OUT TO DR. MORAN, FOLLOW UP CT CYSTOGRAM, TRAUMA CONSULTATION, REASSESS AND DISPO. Pt stable, pending CT reads

## 2019-07-25 NOTE — ED PROVIDER NOTE - OBJECTIVE STATEMENT
89 year old female w hx of HTN, HLD, hypothyroidism presents to the ED with son for evaluation of constant, moderate left sided rib and hip pain s/p ground level mechanical fall around 12:30 89 year old female w hx of HTN, HLD, hypothyroidism, NOT on anticoagulation presents to the ED with son for evaluation of constant, moderate left sided rib and hip pain s/p ground level mechanical fall around 12:30 PM today. Patient was walking with son on street, tripped on uneven sidewalk and fell to the ground. +head injury. No LOC. 89 year old female w hx of HTN, HLD, hypothyroidism, NOT on anticoagulation presents to the ED with son for evaluation of constant, moderate left sided rib and hip pain s/p ground level mechanical fall around 12:30 PM today. Patient was walking with son on street, tripped on uneven sidewalk and fell to the ground. +head injury. No LOC. Has been unable to ambulate or bear weight on left leg since fall. Also endorses pain to left hand, left ribs, and left abdomen. Denies headaches, vision changes, speech changes, neck pain/stiffness, chest pain, shortness of breath, n/v, extremity weakness/paresthesias/numbness.

## 2019-07-25 NOTE — H&P ADULT - HISTORY OF PRESENT ILLNESS
89F, PMHx hypothyroidism, hyperlipidemia, Osteoporosis, HTN, macular degeneration and glaucoma of the left eye presents to ED s/p fall. She states she tripped and fell on the street, falling onto her L side. - HT, -LOC,  + ASA. She is c/o L arm, L hip/leg pain. Denies any CP, SOB, HA, neck pain, abdominal pain.

## 2019-07-26 LAB
ANION GAP SERPL CALC-SCNC: 13 MMOL/L — SIGNIFICANT CHANGE UP (ref 7–14)
BUN SERPL-MCNC: 29 MG/DL — HIGH (ref 10–20)
CALCIUM SERPL-MCNC: 9.2 MG/DL — SIGNIFICANT CHANGE UP (ref 8.5–10.1)
CHLORIDE SERPL-SCNC: 107 MMOL/L — SIGNIFICANT CHANGE UP (ref 98–110)
CO2 SERPL-SCNC: 22 MMOL/L — SIGNIFICANT CHANGE UP (ref 17–32)
CREAT SERPL-MCNC: 1.8 MG/DL — HIGH (ref 0.7–1.5)
GLUCOSE SERPL-MCNC: 109 MG/DL — HIGH (ref 70–99)
HCT VFR BLD CALC: 27.9 % — LOW (ref 37–47)
HCT VFR BLD CALC: 28.7 % — LOW (ref 37–47)
HCT VFR BLD CALC: 29 % — LOW (ref 37–47)
HGB BLD-MCNC: 8.9 G/DL — LOW (ref 12–16)
HGB BLD-MCNC: 9.3 G/DL — LOW (ref 12–16)
HGB BLD-MCNC: 9.5 G/DL — LOW (ref 12–16)
MAGNESIUM SERPL-MCNC: 1.8 MG/DL — SIGNIFICANT CHANGE UP (ref 1.8–2.4)
MCHC RBC-ENTMCNC: 28.5 PG — SIGNIFICANT CHANGE UP (ref 27–31)
MCHC RBC-ENTMCNC: 28.6 PG — SIGNIFICANT CHANGE UP (ref 27–31)
MCHC RBC-ENTMCNC: 29.2 PG — SIGNIFICANT CHANGE UP (ref 27–31)
MCHC RBC-ENTMCNC: 31.9 G/DL — LOW (ref 32–37)
MCHC RBC-ENTMCNC: 32.1 G/DL — SIGNIFICANT CHANGE UP (ref 32–37)
MCHC RBC-ENTMCNC: 33.1 G/DL — SIGNIFICANT CHANGE UP (ref 32–37)
MCV RBC AUTO: 88.3 FL — SIGNIFICANT CHANGE UP (ref 81–99)
MCV RBC AUTO: 89 FL — SIGNIFICANT CHANGE UP (ref 81–99)
MCV RBC AUTO: 89.7 FL — SIGNIFICANT CHANGE UP (ref 81–99)
NRBC # BLD: 0 /100 WBCS — SIGNIFICANT CHANGE UP (ref 0–0)
PHOSPHATE SERPL-MCNC: 3.7 MG/DL — SIGNIFICANT CHANGE UP (ref 2.1–4.9)
PLATELET # BLD AUTO: 126 K/UL — LOW (ref 130–400)
PLATELET # BLD AUTO: 127 K/UL — LOW (ref 130–400)
PLATELET # BLD AUTO: 139 K/UL — SIGNIFICANT CHANGE UP (ref 130–400)
POTASSIUM SERPL-MCNC: 4.3 MMOL/L — SIGNIFICANT CHANGE UP (ref 3.5–5)
POTASSIUM SERPL-SCNC: 4.3 MMOL/L — SIGNIFICANT CHANGE UP (ref 3.5–5)
RBC # BLD: 3.11 M/UL — LOW (ref 4.2–5.4)
RBC # BLD: 3.25 M/UL — LOW (ref 4.2–5.4)
RBC # BLD: 3.26 M/UL — LOW (ref 4.2–5.4)
RBC # FLD: 14 % — SIGNIFICANT CHANGE UP (ref 11.5–14.5)
RBC # FLD: 14 % — SIGNIFICANT CHANGE UP (ref 11.5–14.5)
RBC # FLD: 14.2 % — SIGNIFICANT CHANGE UP (ref 11.5–14.5)
SODIUM SERPL-SCNC: 142 MMOL/L — SIGNIFICANT CHANGE UP (ref 135–146)
WBC # BLD: 5.74 K/UL — SIGNIFICANT CHANGE UP (ref 4.8–10.8)
WBC # BLD: 5.78 K/UL — SIGNIFICANT CHANGE UP (ref 4.8–10.8)
WBC # BLD: 6 K/UL — SIGNIFICANT CHANGE UP (ref 4.8–10.8)
WBC # FLD AUTO: 5.74 K/UL — SIGNIFICANT CHANGE UP (ref 4.8–10.8)
WBC # FLD AUTO: 5.78 K/UL — SIGNIFICANT CHANGE UP (ref 4.8–10.8)
WBC # FLD AUTO: 6 K/UL — SIGNIFICANT CHANGE UP (ref 4.8–10.8)

## 2019-07-26 PROCEDURE — 73562 X-RAY EXAM OF KNEE 3: CPT | Mod: 26,50

## 2019-07-26 PROCEDURE — 99232 SBSQ HOSP IP/OBS MODERATE 35: CPT

## 2019-07-26 PROCEDURE — 73552 X-RAY EXAM OF FEMUR 2/>: CPT | Mod: 26,RT

## 2019-07-26 RX ORDER — HEPARIN SODIUM 5000 [USP'U]/ML
5000 INJECTION INTRAVENOUS; SUBCUTANEOUS EVERY 8 HOURS
Refills: 0 | Status: DISCONTINUED | OUTPATIENT
Start: 2019-07-26 | End: 2019-07-30

## 2019-07-26 RX ORDER — FLUOROMETHOLONE 1 MG/ML
1 SOLUTION/ DROPS OPHTHALMIC DAILY
Refills: 0 | Status: DISCONTINUED | OUTPATIENT
Start: 2019-07-26 | End: 2019-07-30

## 2019-07-26 RX ADMIN — SIMVASTATIN 20 MILLIGRAM(S): 20 TABLET, FILM COATED ORAL at 21:28

## 2019-07-26 RX ADMIN — Medication 650 MILLIGRAM(S): at 13:12

## 2019-07-26 RX ADMIN — HEPARIN SODIUM 5000 UNIT(S): 5000 INJECTION INTRAVENOUS; SUBCUTANEOUS at 13:12

## 2019-07-26 RX ADMIN — Medication 650 MILLIGRAM(S): at 23:43

## 2019-07-26 RX ADMIN — SODIUM CHLORIDE 100 MILLILITER(S): 9 INJECTION INTRAMUSCULAR; INTRAVENOUS; SUBCUTANEOUS at 01:40

## 2019-07-26 RX ADMIN — FLUOROMETHOLONE 1 DROP(S): 1 SOLUTION/ DROPS OPHTHALMIC at 06:20

## 2019-07-26 RX ADMIN — PANTOPRAZOLE SODIUM 40 MILLIGRAM(S): 20 TABLET, DELAYED RELEASE ORAL at 06:20

## 2019-07-26 RX ADMIN — Medication 650 MILLIGRAM(S): at 17:51

## 2019-07-26 RX ADMIN — AMLODIPINE BESYLATE 5 MILLIGRAM(S): 2.5 TABLET ORAL at 06:21

## 2019-07-26 RX ADMIN — OXYCODONE HYDROCHLORIDE 5 MILLIGRAM(S): 5 TABLET ORAL at 01:37

## 2019-07-26 RX ADMIN — LATANOPROST 1 DROP(S): 0.05 SOLUTION/ DROPS OPHTHALMIC; TOPICAL at 21:28

## 2019-07-26 RX ADMIN — Medication 75 MICROGRAM(S): at 06:21

## 2019-07-26 RX ADMIN — FLUOROMETHOLONE 1 DROP(S): 1 SOLUTION/ DROPS OPHTHALMIC at 13:11

## 2019-07-26 RX ADMIN — Medication 650 MILLIGRAM(S): at 06:21

## 2019-07-26 RX ADMIN — HEPARIN SODIUM 5000 UNIT(S): 5000 INJECTION INTRAVENOUS; SUBCUTANEOUS at 21:28

## 2019-07-26 RX ADMIN — LISINOPRIL 20 MILLIGRAM(S): 2.5 TABLET ORAL at 06:20

## 2019-07-26 RX ADMIN — CHLORHEXIDINE GLUCONATE 1 APPLICATION(S): 213 SOLUTION TOPICAL at 06:22

## 2019-07-26 NOTE — CONSULT NOTE ADULT - SUBJECTIVE AND OBJECTIVE BOX
ORTHO ED CONSULT  ZONIA ELE    89F community ambulator with cane presents with deep pelvic pain. The patient states she was walking outside when she fell onto her left side. Complaining of pain main localized to her pelvis, and left elbow. Denies pain or injury to rest of extremity. Denies head trauma or LOC.    PMHx + PSx:  Hypothyroidism, HLD, Osteoporosis, HTN, Macular degeneration   R Femur Fx - 2013, treated with IMN, Periprosthetic IT Fx- Revision IMN- 2017  L Prox Humerus Fx- 2013  Endorses frequent falls    ALL :  NKDA    Social Hx:  Denies alcohol or tobacco use    PE :   LUE :  Abrasion, superficial elbow, posterior   Mild TTP elbow  NTTP rest of extremity  FAROM elbow and wrist without pain, mild pain with left shoulder ROM  AIN PIN U motor intact  SILT R U M Ax  2+ radial pulse    RUE :   No open skin or wounds  NTTP extremity  FAROM extremity  AIN PIN U motor intact  SILT R U M Ax  2+ Radial pulse    B/L LE:  No open skin; left hip, femur incisions healed  No pain with log roll or axial loading  TTP pelvis  TTP left knee and proximal tibia  NTTP rest of extremity  Able to SLR  EHL TA GS motor intact  SILT distally  Foot warm and perfused    Imaging reviewed: L superior and inferior pubic rami fx, healed L proximal humerus fx; pending right femur, left knee/femur XRs    A/P  89F with left pubic rami fractures:  - No acute ortho intervention at this time  - F/U B/L Femur, L Knee XRs  - Bed rest for now, final weight bearing recommendations to follow after imaging  - Medical management per primary team  - Ortho to continue following ORTHO ED CONSULT  LEANDROLUDWIN ELE    89F community ambulator with cane presents with deep pelvic pain. The patient states she was walking outside when she fell onto her left side. Complaining of pain main localized to her pelvis, and left elbow. Denies pain or injury to rest of extremity. Denies head trauma or LOC.    PMHx + PSx:  Hypothyroidism, HLD, Osteoporosis, HTN, Macular degeneration   R Femur Fx - 2013, treated with IMN, Periprosthetic IT Fx- Revision IMN- 2017  L Prox Humerus Fx- 2013  Endorses frequent falls    ALL :  NKDA    Social Hx:  Denies alcohol or tobacco use    PE :   LUE :  Abrasion, superficial elbow, posterior   Mild TTP elbow  NTTP rest of extremity  FAROM elbow and wrist without pain, mild pain with left shoulder ROM  AIN PIN U motor intact  SILT R U M Ax  2+ radial pulse    RUE :   No open skin or wounds  NTTP extremity  FAROM extremity  AIN PIN U motor intact  SILT R U M Ax  2+ Radial pulse    B/L LE:  No open skin; left hip, femur incisions healed  No pain with log roll or axial loading  TTP pelvis  TTP left knee and proximal tibia  NTTP rest of extremity  Able to SLR  EHL TA GS motor intact  SILT distally  Foot warm and perfused    Imaging reviewed: L superior and inferior pubic rami fx, healed L proximal humerus fx; pending right femur, left knee/femur XRs    A/P  89F with left pubic rami fractures:  - No acute ortho intervention at this time  - F/U B/L Femur, L Knee XRs  - Bed rest for now, final weight bearing recommendations to follow after imaging  - Medical management per primary team  - Ortho to continue following   pt seen and examined   with team  some groin tenderness and tender on left elbow  xrays pelvis   left sup/inf rami fxs    left elbow fx to medial epicondlye  both non op   ace wrap to elbow   WBAT both UE  WBAT to pelvic fx with walker  follow as out patient

## 2019-07-27 LAB
ANION GAP SERPL CALC-SCNC: 10 MMOL/L — SIGNIFICANT CHANGE UP (ref 7–14)
ANION GAP SERPL CALC-SCNC: 12 MMOL/L — SIGNIFICANT CHANGE UP (ref 7–14)
BASOPHILS # BLD AUTO: 0.04 K/UL — SIGNIFICANT CHANGE UP (ref 0–0.2)
BASOPHILS NFR BLD AUTO: 0.6 % — SIGNIFICANT CHANGE UP (ref 0–1)
BUN SERPL-MCNC: 30 MG/DL — HIGH (ref 10–20)
BUN SERPL-MCNC: 33 MG/DL — HIGH (ref 10–20)
CALCIUM SERPL-MCNC: 8.5 MG/DL — SIGNIFICANT CHANGE UP (ref 8.5–10.1)
CALCIUM SERPL-MCNC: 9.1 MG/DL — SIGNIFICANT CHANGE UP (ref 8.5–10.1)
CHLORIDE SERPL-SCNC: 103 MMOL/L — SIGNIFICANT CHANGE UP (ref 98–110)
CHLORIDE SERPL-SCNC: 106 MMOL/L — SIGNIFICANT CHANGE UP (ref 98–110)
CO2 SERPL-SCNC: 23 MMOL/L — SIGNIFICANT CHANGE UP (ref 17–32)
CO2 SERPL-SCNC: 23 MMOL/L — SIGNIFICANT CHANGE UP (ref 17–32)
CREAT SERPL-MCNC: 1.6 MG/DL — HIGH (ref 0.7–1.5)
CREAT SERPL-MCNC: 1.8 MG/DL — HIGH (ref 0.7–1.5)
EOSINOPHIL # BLD AUTO: 0.34 K/UL — SIGNIFICANT CHANGE UP (ref 0–0.7)
EOSINOPHIL NFR BLD AUTO: 4.8 % — SIGNIFICANT CHANGE UP (ref 0–8)
GLUCOSE SERPL-MCNC: 115 MG/DL — HIGH (ref 70–99)
GLUCOSE SERPL-MCNC: 166 MG/DL — HIGH (ref 70–99)
HCT VFR BLD CALC: 26.2 % — LOW (ref 37–47)
HGB BLD-MCNC: 8.6 G/DL — LOW (ref 12–16)
IMM GRANULOCYTES NFR BLD AUTO: 0.4 % — HIGH (ref 0.1–0.3)
LYMPHOCYTES # BLD AUTO: 1.29 K/UL — SIGNIFICANT CHANGE UP (ref 1.2–3.4)
LYMPHOCYTES # BLD AUTO: 18.1 % — LOW (ref 20.5–51.1)
MAGNESIUM SERPL-MCNC: 1.6 MG/DL — LOW (ref 1.8–2.4)
MCHC RBC-ENTMCNC: 29.3 PG — SIGNIFICANT CHANGE UP (ref 27–31)
MCHC RBC-ENTMCNC: 32.8 G/DL — SIGNIFICANT CHANGE UP (ref 32–37)
MCV RBC AUTO: 89.1 FL — SIGNIFICANT CHANGE UP (ref 81–99)
MONOCYTES # BLD AUTO: 0.64 K/UL — HIGH (ref 0.1–0.6)
MONOCYTES NFR BLD AUTO: 9 % — SIGNIFICANT CHANGE UP (ref 1.7–9.3)
NEUTROPHILS # BLD AUTO: 4.8 K/UL — SIGNIFICANT CHANGE UP (ref 1.4–6.5)
NEUTROPHILS NFR BLD AUTO: 67.1 % — SIGNIFICANT CHANGE UP (ref 42.2–75.2)
NRBC # BLD: 0 /100 WBCS — SIGNIFICANT CHANGE UP (ref 0–0)
PHOSPHATE SERPL-MCNC: 2.8 MG/DL — SIGNIFICANT CHANGE UP (ref 2.1–4.9)
PLATELET # BLD AUTO: 122 K/UL — LOW (ref 130–400)
POTASSIUM SERPL-MCNC: 3.6 MMOL/L — SIGNIFICANT CHANGE UP (ref 3.5–5)
POTASSIUM SERPL-MCNC: 3.9 MMOL/L — SIGNIFICANT CHANGE UP (ref 3.5–5)
POTASSIUM SERPL-SCNC: 3.6 MMOL/L — SIGNIFICANT CHANGE UP (ref 3.5–5)
POTASSIUM SERPL-SCNC: 3.9 MMOL/L — SIGNIFICANT CHANGE UP (ref 3.5–5)
RBC # BLD: 2.94 M/UL — LOW (ref 4.2–5.4)
RBC # FLD: 14.2 % — SIGNIFICANT CHANGE UP (ref 11.5–14.5)
SODIUM SERPL-SCNC: 138 MMOL/L — SIGNIFICANT CHANGE UP (ref 135–146)
SODIUM SERPL-SCNC: 139 MMOL/L — SIGNIFICANT CHANGE UP (ref 135–146)
WBC # BLD: 7.14 K/UL — SIGNIFICANT CHANGE UP (ref 4.8–10.8)
WBC # FLD AUTO: 7.14 K/UL — SIGNIFICANT CHANGE UP (ref 4.8–10.8)

## 2019-07-27 PROCEDURE — 99233 SBSQ HOSP IP/OBS HIGH 50: CPT

## 2019-07-27 RX ORDER — SODIUM CHLORIDE 9 MG/ML
1000 INJECTION, SOLUTION INTRAVENOUS
Refills: 0 | Status: DISCONTINUED | OUTPATIENT
Start: 2019-07-27 | End: 2019-07-28

## 2019-07-27 RX ORDER — SODIUM CHLORIDE 9 MG/ML
500 INJECTION, SOLUTION INTRAVENOUS ONCE
Refills: 0 | Status: COMPLETED | OUTPATIENT
Start: 2019-07-27 | End: 2019-07-27

## 2019-07-27 RX ORDER — DOCUSATE SODIUM 100 MG
100 CAPSULE ORAL DAILY
Refills: 0 | Status: DISCONTINUED | OUTPATIENT
Start: 2019-07-27 | End: 2019-07-30

## 2019-07-27 RX ORDER — SENNA PLUS 8.6 MG/1
2 TABLET ORAL AT BEDTIME
Refills: 0 | Status: DISCONTINUED | OUTPATIENT
Start: 2019-07-27 | End: 2019-07-30

## 2019-07-27 RX ADMIN — SENNA PLUS 2 TABLET(S): 8.6 TABLET ORAL at 21:13

## 2019-07-27 RX ADMIN — Medication 650 MILLIGRAM(S): at 05:26

## 2019-07-27 RX ADMIN — Medication 650 MILLIGRAM(S): at 23:48

## 2019-07-27 RX ADMIN — FLUOROMETHOLONE 1 DROP(S): 1 SOLUTION/ DROPS OPHTHALMIC at 12:45

## 2019-07-27 RX ADMIN — HEPARIN SODIUM 5000 UNIT(S): 5000 INJECTION INTRAVENOUS; SUBCUTANEOUS at 14:09

## 2019-07-27 RX ADMIN — HEPARIN SODIUM 5000 UNIT(S): 5000 INJECTION INTRAVENOUS; SUBCUTANEOUS at 21:13

## 2019-07-27 RX ADMIN — PANTOPRAZOLE SODIUM 40 MILLIGRAM(S): 20 TABLET, DELAYED RELEASE ORAL at 05:25

## 2019-07-27 RX ADMIN — SODIUM CHLORIDE 75 MILLILITER(S): 9 INJECTION, SOLUTION INTRAVENOUS at 12:42

## 2019-07-27 RX ADMIN — LATANOPROST 1 DROP(S): 0.05 SOLUTION/ DROPS OPHTHALMIC; TOPICAL at 21:13

## 2019-07-27 RX ADMIN — Medication 75 MICROGRAM(S): at 05:25

## 2019-07-27 RX ADMIN — LISINOPRIL 20 MILLIGRAM(S): 2.5 TABLET ORAL at 05:25

## 2019-07-27 RX ADMIN — CHLORHEXIDINE GLUCONATE 1 APPLICATION(S): 213 SOLUTION TOPICAL at 05:25

## 2019-07-27 RX ADMIN — Medication 650 MILLIGRAM(S): at 12:45

## 2019-07-27 RX ADMIN — SODIUM CHLORIDE 500 MILLILITER(S): 9 INJECTION, SOLUTION INTRAVENOUS at 12:44

## 2019-07-27 RX ADMIN — Medication 650 MILLIGRAM(S): at 17:06

## 2019-07-27 RX ADMIN — HEPARIN SODIUM 5000 UNIT(S): 5000 INJECTION INTRAVENOUS; SUBCUTANEOUS at 05:25

## 2019-07-27 RX ADMIN — AMLODIPINE BESYLATE 5 MILLIGRAM(S): 2.5 TABLET ORAL at 05:25

## 2019-07-27 RX ADMIN — SIMVASTATIN 20 MILLIGRAM(S): 20 TABLET, FILM COATED ORAL at 21:13

## 2019-07-27 RX ADMIN — Medication 650 MILLIGRAM(S): at 05:25

## 2019-07-27 RX ADMIN — Medication 650 MILLIGRAM(S): at 13:10

## 2019-07-27 RX ADMIN — Medication 650 MILLIGRAM(S): at 17:09

## 2019-07-27 RX ADMIN — Medication 100 MILLIGRAM(S): at 12:45

## 2019-07-28 LAB
ANION GAP SERPL CALC-SCNC: 13 MMOL/L — SIGNIFICANT CHANGE UP (ref 7–14)
ANION GAP SERPL CALC-SCNC: 9 MMOL/L — SIGNIFICANT CHANGE UP (ref 7–14)
BASOPHILS # BLD AUTO: 0.04 K/UL — SIGNIFICANT CHANGE UP (ref 0–0.2)
BASOPHILS # BLD AUTO: 0.04 K/UL — SIGNIFICANT CHANGE UP (ref 0–0.2)
BASOPHILS NFR BLD AUTO: 0.6 % — SIGNIFICANT CHANGE UP (ref 0–1)
BASOPHILS NFR BLD AUTO: 0.6 % — SIGNIFICANT CHANGE UP (ref 0–1)
BUN SERPL-MCNC: 24 MG/DL — HIGH (ref 10–20)
BUN SERPL-MCNC: 27 MG/DL — HIGH (ref 10–20)
CALCIUM SERPL-MCNC: 8.9 MG/DL — SIGNIFICANT CHANGE UP (ref 8.5–10.1)
CALCIUM SERPL-MCNC: 9 MG/DL — SIGNIFICANT CHANGE UP (ref 8.5–10.1)
CHLORIDE SERPL-SCNC: 104 MMOL/L — SIGNIFICANT CHANGE UP (ref 98–110)
CHLORIDE SERPL-SCNC: 105 MMOL/L — SIGNIFICANT CHANGE UP (ref 98–110)
CO2 SERPL-SCNC: 23 MMOL/L — SIGNIFICANT CHANGE UP (ref 17–32)
CO2 SERPL-SCNC: 25 MMOL/L — SIGNIFICANT CHANGE UP (ref 17–32)
CREAT SERPL-MCNC: 1.4 MG/DL — SIGNIFICANT CHANGE UP (ref 0.7–1.5)
CREAT SERPL-MCNC: 1.6 MG/DL — HIGH (ref 0.7–1.5)
EOSINOPHIL # BLD AUTO: 0.27 K/UL — SIGNIFICANT CHANGE UP (ref 0–0.7)
EOSINOPHIL # BLD AUTO: 0.31 K/UL — SIGNIFICANT CHANGE UP (ref 0–0.7)
EOSINOPHIL NFR BLD AUTO: 4.2 % — SIGNIFICANT CHANGE UP (ref 0–8)
EOSINOPHIL NFR BLD AUTO: 4.3 % — SIGNIFICANT CHANGE UP (ref 0–8)
GLUCOSE SERPL-MCNC: 119 MG/DL — HIGH (ref 70–99)
GLUCOSE SERPL-MCNC: 135 MG/DL — HIGH (ref 70–99)
HCT VFR BLD CALC: 27 % — LOW (ref 37–47)
HCT VFR BLD CALC: 27.4 % — LOW (ref 37–47)
HGB BLD-MCNC: 8.8 G/DL — LOW (ref 12–16)
HGB BLD-MCNC: 8.8 G/DL — LOW (ref 12–16)
IMM GRANULOCYTES NFR BLD AUTO: 0.6 % — HIGH (ref 0.1–0.3)
IMM GRANULOCYTES NFR BLD AUTO: 0.8 % — HIGH (ref 0.1–0.3)
LYMPHOCYTES # BLD AUTO: 1.04 K/UL — LOW (ref 1.2–3.4)
LYMPHOCYTES # BLD AUTO: 1.08 K/UL — LOW (ref 1.2–3.4)
LYMPHOCYTES # BLD AUTO: 14.3 % — LOW (ref 20.5–51.1)
LYMPHOCYTES # BLD AUTO: 16.8 % — LOW (ref 20.5–51.1)
MAGNESIUM SERPL-MCNC: 2.2 MG/DL — SIGNIFICANT CHANGE UP (ref 1.8–2.4)
MCHC RBC-ENTMCNC: 28.8 PG — SIGNIFICANT CHANGE UP (ref 27–31)
MCHC RBC-ENTMCNC: 28.9 PG — SIGNIFICANT CHANGE UP (ref 27–31)
MCHC RBC-ENTMCNC: 32.1 G/DL — SIGNIFICANT CHANGE UP (ref 32–37)
MCHC RBC-ENTMCNC: 32.6 G/DL — SIGNIFICANT CHANGE UP (ref 32–37)
MCV RBC AUTO: 88.8 FL — SIGNIFICANT CHANGE UP (ref 81–99)
MCV RBC AUTO: 89.5 FL — SIGNIFICANT CHANGE UP (ref 81–99)
MONOCYTES # BLD AUTO: 0.54 K/UL — SIGNIFICANT CHANGE UP (ref 0.1–0.6)
MONOCYTES # BLD AUTO: 0.66 K/UL — HIGH (ref 0.1–0.6)
MONOCYTES NFR BLD AUTO: 8.4 % — SIGNIFICANT CHANGE UP (ref 1.7–9.3)
MONOCYTES NFR BLD AUTO: 9.1 % — SIGNIFICANT CHANGE UP (ref 1.7–9.3)
NEUTROPHILS # BLD AUTO: 4.46 K/UL — SIGNIFICANT CHANGE UP (ref 1.4–6.5)
NEUTROPHILS # BLD AUTO: 5.17 K/UL — SIGNIFICANT CHANGE UP (ref 1.4–6.5)
NEUTROPHILS NFR BLD AUTO: 69.2 % — SIGNIFICANT CHANGE UP (ref 42.2–75.2)
NEUTROPHILS NFR BLD AUTO: 71.1 % — SIGNIFICANT CHANGE UP (ref 42.2–75.2)
NRBC # BLD: 0 /100 WBCS — SIGNIFICANT CHANGE UP (ref 0–0)
NRBC # BLD: 0 /100 WBCS — SIGNIFICANT CHANGE UP (ref 0–0)
PHOSPHATE SERPL-MCNC: 2.9 MG/DL — SIGNIFICANT CHANGE UP (ref 2.1–4.9)
PLATELET # BLD AUTO: 123 K/UL — LOW (ref 130–400)
PLATELET # BLD AUTO: 130 K/UL — SIGNIFICANT CHANGE UP (ref 130–400)
POTASSIUM SERPL-MCNC: 3.6 MMOL/L — SIGNIFICANT CHANGE UP (ref 3.5–5)
POTASSIUM SERPL-MCNC: 4.1 MMOL/L — SIGNIFICANT CHANGE UP (ref 3.5–5)
POTASSIUM SERPL-SCNC: 3.6 MMOL/L — SIGNIFICANT CHANGE UP (ref 3.5–5)
POTASSIUM SERPL-SCNC: 4.1 MMOL/L — SIGNIFICANT CHANGE UP (ref 3.5–5)
RBC # BLD: 3.04 M/UL — LOW (ref 4.2–5.4)
RBC # BLD: 3.06 M/UL — LOW (ref 4.2–5.4)
RBC # FLD: 14.1 % — SIGNIFICANT CHANGE UP (ref 11.5–14.5)
RBC # FLD: 14.3 % — SIGNIFICANT CHANGE UP (ref 11.5–14.5)
SODIUM SERPL-SCNC: 139 MMOL/L — SIGNIFICANT CHANGE UP (ref 135–146)
SODIUM SERPL-SCNC: 140 MMOL/L — SIGNIFICANT CHANGE UP (ref 135–146)
WBC # BLD: 6.44 K/UL — SIGNIFICANT CHANGE UP (ref 4.8–10.8)
WBC # BLD: 7.26 K/UL — SIGNIFICANT CHANGE UP (ref 4.8–10.8)
WBC # FLD AUTO: 6.44 K/UL — SIGNIFICANT CHANGE UP (ref 4.8–10.8)
WBC # FLD AUTO: 7.26 K/UL — SIGNIFICANT CHANGE UP (ref 4.8–10.8)

## 2019-07-28 PROCEDURE — 99233 SBSQ HOSP IP/OBS HIGH 50: CPT

## 2019-07-28 RX ORDER — LANOLIN ALCOHOL/MO/W.PET/CERES
1 CREAM (GRAM) TOPICAL AT BEDTIME
Refills: 0 | Status: DISCONTINUED | OUTPATIENT
Start: 2019-07-28 | End: 2019-07-30

## 2019-07-28 RX ORDER — MAGNESIUM SULFATE 500 MG/ML
2 VIAL (ML) INJECTION ONCE
Refills: 0 | Status: COMPLETED | OUTPATIENT
Start: 2019-07-28 | End: 2019-07-28

## 2019-07-28 RX ADMIN — Medication 75 MICROGRAM(S): at 05:27

## 2019-07-28 RX ADMIN — Medication 650 MILLIGRAM(S): at 17:03

## 2019-07-28 RX ADMIN — SENNA PLUS 2 TABLET(S): 8.6 TABLET ORAL at 21:11

## 2019-07-28 RX ADMIN — HEPARIN SODIUM 5000 UNIT(S): 5000 INJECTION INTRAVENOUS; SUBCUTANEOUS at 21:10

## 2019-07-28 RX ADMIN — Medication 650 MILLIGRAM(S): at 05:27

## 2019-07-28 RX ADMIN — FLUOROMETHOLONE 1 DROP(S): 1 SOLUTION/ DROPS OPHTHALMIC at 12:45

## 2019-07-28 RX ADMIN — Medication 100 MILLIGRAM(S): at 12:55

## 2019-07-28 RX ADMIN — AMLODIPINE BESYLATE 5 MILLIGRAM(S): 2.5 TABLET ORAL at 05:27

## 2019-07-28 RX ADMIN — Medication 50 GRAM(S): at 05:38

## 2019-07-28 RX ADMIN — Medication 650 MILLIGRAM(S): at 06:40

## 2019-07-28 RX ADMIN — Medication 650 MILLIGRAM(S): at 23:19

## 2019-07-28 RX ADMIN — Medication 650 MILLIGRAM(S): at 00:30

## 2019-07-28 RX ADMIN — Medication 650 MILLIGRAM(S): at 17:04

## 2019-07-28 RX ADMIN — PANTOPRAZOLE SODIUM 40 MILLIGRAM(S): 20 TABLET, DELAYED RELEASE ORAL at 05:27

## 2019-07-28 RX ADMIN — LISINOPRIL 20 MILLIGRAM(S): 2.5 TABLET ORAL at 05:27

## 2019-07-28 RX ADMIN — LATANOPROST 1 DROP(S): 0.05 SOLUTION/ DROPS OPHTHALMIC; TOPICAL at 21:10

## 2019-07-28 RX ADMIN — SIMVASTATIN 20 MILLIGRAM(S): 20 TABLET, FILM COATED ORAL at 21:11

## 2019-07-28 RX ADMIN — Medication 650 MILLIGRAM(S): at 12:55

## 2019-07-28 RX ADMIN — Medication 650 MILLIGRAM(S): at 12:54

## 2019-07-28 RX ADMIN — HEPARIN SODIUM 5000 UNIT(S): 5000 INJECTION INTRAVENOUS; SUBCUTANEOUS at 13:29

## 2019-07-28 RX ADMIN — HEPARIN SODIUM 5000 UNIT(S): 5000 INJECTION INTRAVENOUS; SUBCUTANEOUS at 05:29

## 2019-07-28 RX ADMIN — Medication 1 MILLIGRAM(S): at 21:11

## 2019-07-29 PROCEDURE — 99233 SBSQ HOSP IP/OBS HIGH 50: CPT

## 2019-07-29 RX ORDER — SODIUM CHLORIDE 9 MG/ML
500 INJECTION, SOLUTION INTRAVENOUS ONCE
Refills: 0 | Status: COMPLETED | OUTPATIENT
Start: 2019-07-29 | End: 2019-07-29

## 2019-07-29 RX ADMIN — LATANOPROST 1 DROP(S): 0.05 SOLUTION/ DROPS OPHTHALMIC; TOPICAL at 21:23

## 2019-07-29 RX ADMIN — HEPARIN SODIUM 5000 UNIT(S): 5000 INJECTION INTRAVENOUS; SUBCUTANEOUS at 21:23

## 2019-07-29 RX ADMIN — Medication 650 MILLIGRAM(S): at 13:33

## 2019-07-29 RX ADMIN — Medication 1 MILLIGRAM(S): at 21:23

## 2019-07-29 RX ADMIN — LISINOPRIL 20 MILLIGRAM(S): 2.5 TABLET ORAL at 05:32

## 2019-07-29 RX ADMIN — HEPARIN SODIUM 5000 UNIT(S): 5000 INJECTION INTRAVENOUS; SUBCUTANEOUS at 13:34

## 2019-07-29 RX ADMIN — AMLODIPINE BESYLATE 5 MILLIGRAM(S): 2.5 TABLET ORAL at 05:32

## 2019-07-29 RX ADMIN — Medication 75 MICROGRAM(S): at 05:32

## 2019-07-29 RX ADMIN — Medication 100 MILLIGRAM(S): at 13:35

## 2019-07-29 RX ADMIN — Medication 650 MILLIGRAM(S): at 05:32

## 2019-07-29 RX ADMIN — FLUOROMETHOLONE 1 DROP(S): 1 SOLUTION/ DROPS OPHTHALMIC at 13:36

## 2019-07-29 RX ADMIN — Medication 650 MILLIGRAM(S): at 13:36

## 2019-07-29 RX ADMIN — SODIUM CHLORIDE 500 MILLILITER(S): 9 INJECTION, SOLUTION INTRAVENOUS at 13:39

## 2019-07-29 RX ADMIN — HEPARIN SODIUM 5000 UNIT(S): 5000 INJECTION INTRAVENOUS; SUBCUTANEOUS at 05:33

## 2019-07-29 RX ADMIN — SIMVASTATIN 20 MILLIGRAM(S): 20 TABLET, FILM COATED ORAL at 21:23

## 2019-07-29 RX ADMIN — Medication 650 MILLIGRAM(S): at 19:13

## 2019-07-29 RX ADMIN — SENNA PLUS 2 TABLET(S): 8.6 TABLET ORAL at 21:23

## 2019-07-29 RX ADMIN — PANTOPRAZOLE SODIUM 40 MILLIGRAM(S): 20 TABLET, DELAYED RELEASE ORAL at 05:33

## 2019-07-29 NOTE — CONSULT NOTE ADULT - ASSESSMENT
IMPRESSION: Rehab of multitrauma - left sup/inf pubic rami fx                                                         left pelvic hematoma                                                         left lateral epicondyle fx    PRECAUTIONS: [  ] Cardiac  [  ] Respiratory  [  ] Seizures [  ] Contact Isolation  [  ] Droplet Isolation  [  ] Other    Weight Bearing Status: wbat     RECOMMENDATION:    Out of Bed to Chair     DVT/Decubiti Prophylaxis    REHAB PLAN:     [ x  ] Bedside P/T 3-5 times a week   [   ]   Bedside O/T  2-3 times a week             [   ] No Rehab Therapy Indicated                   [   ]  Speech Therapy   Conditioning/ROM                                    ADL  Bed Mobility                                               Conditioning/ROM  Transfers                                                     Bed Mobility  Sitting /Standing Balance                         Transfers                                        Gait Training                                               Sitting/Standing Balance  Stair Training [   ]Applicable                    Home equipment Eval                                                                        Splinting  [   ] Only      GOALS:   ADL   [   ]   Independent                    Transfers  [ x  ] Independent                          Ambulation  [ x  ] Independent     [  x  ] With device                            [   ]  CG                                                         [   ]  CG                                                                  [   ] CG                            [    ] Min A                                                   [   ] Min A                                                              [   ] Min  A          DISCHARGE PLAN:   [   ]  Good candidate for Intensive Rehabilitation/Hospital based                                             Will tolerate 3hrs Intensive Rehab Daily                                       [    ]  Short Term Rehab in Skilled Nursing Facility                                       [    ]  Home with Outpatient or  services                                         [ x   ]  Possible Candidate for Intensive Hospital based Rehab

## 2019-07-29 NOTE — CONSULT NOTE ADULT - SUBJECTIVE AND OBJECTIVE BOX
HPI:  89F, PMHx hypothyroidism, hyperlipidemia, Osteoporosis, HTN, macular degeneration and glaucoma of the left eye presents to ED s/p fall. She states she tripped and fell on the street, falling onto her L side. - HT, -LOC,  + ASA. She is c/o L arm, L hip/leg pain. Denies any CP, SOB, HA, neck pain, abdominal pain. (25 Jul 2019 19:02). 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 650 milliGRAM(s) Oral every 6 hours  amLODIPine   Tablet 5 milliGRAM(s) Oral daily  chlorhexidine 4% Liquid 1 Application(s) Topical <User Schedule>  docusate sodium 100 milliGRAM(s) Oral daily  fluorometholone 0.1% Suspension 1 Drop(s) Both EYES daily  heparin  Injectable 5000 Unit(s) SubCutaneous every 8 hours  latanoprost 0.005% Ophthalmic Solution 1 Drop(s) Both EYES at bedtime  levothyroxine 75 MICROGram(s) Oral daily  lisinopril 20 milliGRAM(s) Oral daily  melatonin 1 milliGRAM(s) Oral at bedtime  pantoprazole    Tablet 40 milliGRAM(s) Oral before breakfast  senna 2 Tablet(s) Oral at bedtime  simvastatin 20 milliGRAM(s) Oral at bedtime    MEDICATIONS  (PRN):  oxyCODONE    IR 5 milliGRAM(s) Oral every 6 hours PRN Moderate Pain (4 - 6)      FAMILY HISTORY:      Allergies    No Known Allergies    Intolerances        SOCIAL HISTORY:    [  ] Etoh  [  ] Smoking  [  ] Substance abuse     Home Environment:  [  ] Home Alone  [ x ] Lives with Family  [  ] Home Health Aid    Dwelling:  [  ] Apartment  [x  ] Private House  [  ] Adult Home  [  ] Skilled Nursing Facility      [  ] Short Term  [  ] Long Term  [x  ] Stairs       Elevator [  ]    FUNCTIONAL STATUS PTA: (Check all that apply)  Ambulation: [ x  ]Independent    [  ] Dependent     [  ] Non-Ambulatory  Assistive Device: [ x ] SA Cane  [  ]  Q Cane  [  ] Walker  [  ]  Wheelchair  ADL : [x  ] Independent  [  ]  Dependent       Vital Signs Last 24 Hrs  T(C): 35.8 (29 Jul 2019 07:30), Max: 37.1 (28 Jul 2019 15:39)  T(F): 96.4 (29 Jul 2019 07:30), Max: 98.8 (28 Jul 2019 15:39)  HR: 75 (29 Jul 2019 07:30) (70 - 75)  BP: 118/53 (29 Jul 2019 07:30) (102/50 - 120/59)  BP(mean): --  RR: 18 (29 Jul 2019 07:30) (18 - 18)  SpO2: --      PHYSICAL EXAM: Alert & Oriented X3  GENERAL: NAD, well-groomed, well-developed  HEAD:  Atraumatic, Normocephalic  CHEST/LUNG: Clear   HEART: S1S2+  ABDOMEN: Soft, Nontender  EXTREMITIES:  no calf tenderness    NERVOUS SYSTEM:  Cranial Nerves 2-12 intact [  ] Abnormal  [  ]  ROM: WFL all extremities [  ]  Abnormal [ x ]limited lle  Motor Strength: WFL all extremities  [  ]  Abnormal [x  ]limited lle  Sensation: intact to light touch [x  ] Abnormal [  ]  Reflexes: Symmetric [  ]  Abnormal [  ]    FUNCTIONAL STATUS:  Bed Mobility: Independent [  ]  Supervision [  ]  Needs Assistance [ x ]  N/A [  ]  Transfers: Independent [  ]  Supervision [  ]  Needs Assistance [x  ]  N/A [  ]   Ambulation: Independent [  ]  Supervision [  ]  Needs Assistance [  ]  N/A [  ]  ADL: Independent [  ] Requires Assistance [  ] N/A [  ]      LABS:                        8.8    7.26  )-----------( 130      ( 28 Jul 2019 22:00 )             27.4     07-28    140  |  104  |  27<H>  ----------------------------<  135<H>  4.1   |  23  |  1.6<H>    Ca    8.9      28 Jul 2019 22:00  Phos  2.9     07-28  Mg     2.2     07-28            RADIOLOGY & ADDITIONAL STUDIES:    Assesment:

## 2019-07-29 NOTE — PROGRESS NOTE ADULT - ATTENDING COMMENTS
awaiting ortho eval   discharge planning
ortho eval for pelvic fracture  Assessment and plan above were modified and discussed with residents, physician assistants, and nurses.
pelvic fracture   awaiting ortho mobility recommendations  pain control   discharge planning
stable pelvic fracture   upper extremity fracture   stable overnight awaiting discharge planning , ortho and PT input
slow trending Hb   rising creat   continue gentle hydration   pain control   trend cbc   discharge planning

## 2019-07-29 NOTE — PROGRESS NOTE ADULT - ASSESSMENT
89 y/o female s/p mechanical fall - HT, -LOC, +ASA with negative PAN scan     Plan:  Bed rest for now, final weight bearing recommendations to follow after imaging  ORTHO: ace wrap to elbow   WBAT both UE  WBAT to pelvic fx with walker  follow as out patient
89 y/o female s/p mechanical fall - HT, -LOC, +ASA with negative PAN scan     Plan:   - f/u final reads of extremity x-rays   - f/u orto recommendations
Assessment:  89 y/o female s/p mechanical fall - HT, -LOC, +ASA L sup/inf pubic rami fx, L inner pelvis hematoma, L lateral epicondyle fx    Plan:  -f/u home eye drops  -trend Cr  -500cc bolus  -consult medicine  -f/u PT/rehab
Assessment:  89 y/o female s/p mechanical fall - HT, -LOC, +ASA L sup/inf pubic rami fx, L inner pelvis hematoma, L lateral epicondyle fx    Plan:  -trend Cr  -f/u PT/rehab  -f/u SW
Assessment:  89 y/o female s/p mechanical fall - HT, -LOC, +ASA L sup/inf pubic rami fx, L inner pelvis hematoma, L lateral epicondyle fx.    Plan:  -trend Cr: 1.8>1.6>1.4>1.6  -trend Hb: 8.6>8.8>8.8  -f/u PT/rehab, SW

## 2019-07-29 NOTE — PHYSICAL THERAPY INITIAL EVALUATION ADULT - GENERAL OBSERVATIONS, REHAB EVAL
9:30-9:55; Pt encountered in b/s chair; GI Hill present to assist pt to bathroom. Primafit removed. Pt agreeable to PT.

## 2019-07-30 ENCOUNTER — TRANSCRIPTION ENCOUNTER (OUTPATIENT)
Age: 84
End: 2019-07-30

## 2019-07-30 ENCOUNTER — INPATIENT (INPATIENT)
Facility: HOSPITAL | Age: 84
LOS: 15 days | Discharge: HOME HEALTH | End: 2019-08-15
Attending: PHYSICAL MEDICINE & REHABILITATION | Admitting: PHYSICAL MEDICINE & REHABILITATION
Payer: MEDICARE

## 2019-07-30 VITALS
RESPIRATION RATE: 18 BRPM | DIASTOLIC BLOOD PRESSURE: 57 MMHG | HEART RATE: 94 BPM | SYSTOLIC BLOOD PRESSURE: 118 MMHG | TEMPERATURE: 98 F

## 2019-07-30 DIAGNOSIS — Z96.641 PRESENCE OF RIGHT ARTIFICIAL HIP JOINT: Chronic | ICD-10-CM

## 2019-07-30 LAB
APPEARANCE UR: ABNORMAL
BACTERIA # UR AUTO: ABNORMAL
BILIRUB UR-MCNC: NEGATIVE — SIGNIFICANT CHANGE UP
COLOR SPEC: ABNORMAL
CREAT ?TM UR-MCNC: 181 MG/DL — SIGNIFICANT CHANGE UP
DIFF PNL FLD: NEGATIVE — SIGNIFICANT CHANGE UP
EPI CELLS # UR: 1 /HPF — SIGNIFICANT CHANGE UP (ref 0–5)
GLUCOSE UR QL: NEGATIVE — SIGNIFICANT CHANGE UP
HYALINE CASTS # UR AUTO: 0 /LPF — SIGNIFICANT CHANGE UP (ref 0–7)
KETONES UR-MCNC: NEGATIVE — SIGNIFICANT CHANGE UP
LEUKOCYTE ESTERASE UR-ACNC: ABNORMAL
NITRITE UR-MCNC: NEGATIVE — SIGNIFICANT CHANGE UP
PH UR: 8 — SIGNIFICANT CHANGE UP (ref 5–8)
PROT UR-MCNC: ABNORMAL
RBC CASTS # UR COMP ASSIST: 1 /HPF — SIGNIFICANT CHANGE UP (ref 0–4)
SODIUM UR-SCNC: 58 MMOL/L — SIGNIFICANT CHANGE UP
SP GR SPEC: 1.02 — SIGNIFICANT CHANGE UP (ref 1.01–1.02)
UROBILINOGEN FLD QL: SIGNIFICANT CHANGE UP
WBC UR QL: 119 /HPF — HIGH (ref 0–5)

## 2019-07-30 PROCEDURE — 93010 ELECTROCARDIOGRAM REPORT: CPT

## 2019-07-30 RX ORDER — TRAMADOL HYDROCHLORIDE 50 MG/1
50 TABLET ORAL EVERY 6 HOURS
Refills: 0 | Status: DISCONTINUED | OUTPATIENT
Start: 2019-07-30 | End: 2019-08-03

## 2019-07-30 RX ORDER — ACETAMINOPHEN 500 MG
650 TABLET ORAL EVERY 6 HOURS
Refills: 0 | Status: DISCONTINUED | OUTPATIENT
Start: 2019-07-30 | End: 2019-08-02

## 2019-07-30 RX ORDER — FLUOROMETHOLONE 1 MG/ML
1 SOLUTION/ DROPS OPHTHALMIC
Refills: 0 | Status: DISCONTINUED | OUTPATIENT
Start: 2019-07-30 | End: 2019-07-31

## 2019-07-30 RX ORDER — MAGNESIUM HYDROXIDE 400 MG/1
30 TABLET, CHEWABLE ORAL DAILY
Refills: 0 | Status: DISCONTINUED | OUTPATIENT
Start: 2019-07-30 | End: 2019-08-15

## 2019-07-30 RX ORDER — PANTOPRAZOLE SODIUM 20 MG/1
40 TABLET, DELAYED RELEASE ORAL
Refills: 0 | Status: DISCONTINUED | OUTPATIENT
Start: 2019-07-30 | End: 2019-08-15

## 2019-07-30 RX ORDER — TIMOLOL 0.5 %
1 DROPS OPHTHALMIC (EYE) EVERY 12 HOURS
Refills: 0 | Status: DISCONTINUED | OUTPATIENT
Start: 2019-07-30 | End: 2019-07-30

## 2019-07-30 RX ORDER — HEPARIN SODIUM 5000 [USP'U]/ML
5000 INJECTION INTRAVENOUS; SUBCUTANEOUS EVERY 8 HOURS
Refills: 0 | Status: DISCONTINUED | OUTPATIENT
Start: 2019-07-30 | End: 2019-08-15

## 2019-07-30 RX ORDER — AMLODIPINE BESYLATE 2.5 MG/1
5 TABLET ORAL DAILY
Refills: 0 | Status: DISCONTINUED | OUTPATIENT
Start: 2019-07-30 | End: 2019-08-15

## 2019-07-30 RX ORDER — LATANOPROST 0.05 MG/ML
1 SOLUTION/ DROPS OPHTHALMIC; TOPICAL AT BEDTIME
Refills: 0 | Status: DISCONTINUED | OUTPATIENT
Start: 2019-07-30 | End: 2019-08-15

## 2019-07-30 RX ORDER — SENNA PLUS 8.6 MG/1
1 TABLET ORAL DAILY
Refills: 0 | Status: DISCONTINUED | OUTPATIENT
Start: 2019-07-30 | End: 2019-07-31

## 2019-07-30 RX ORDER — DOCUSATE SODIUM 100 MG
100 CAPSULE ORAL THREE TIMES A DAY
Refills: 0 | Status: DISCONTINUED | OUTPATIENT
Start: 2019-07-30 | End: 2019-07-31

## 2019-07-30 RX ORDER — SIMVASTATIN 20 MG/1
20 TABLET, FILM COATED ORAL AT BEDTIME
Refills: 0 | Status: DISCONTINUED | OUTPATIENT
Start: 2019-07-30 | End: 2019-08-15

## 2019-07-30 RX ORDER — LISINOPRIL 2.5 MG/1
20 TABLET ORAL DAILY
Refills: 0 | Status: DISCONTINUED | OUTPATIENT
Start: 2019-07-30 | End: 2019-08-15

## 2019-07-30 RX ORDER — LANOLIN ALCOHOL/MO/W.PET/CERES
1 CREAM (GRAM) TOPICAL AT BEDTIME
Refills: 0 | Status: DISCONTINUED | OUTPATIENT
Start: 2019-07-30 | End: 2019-08-15

## 2019-07-30 RX ORDER — LEVOTHYROXINE SODIUM 125 MCG
75 TABLET ORAL DAILY
Refills: 0 | Status: DISCONTINUED | OUTPATIENT
Start: 2019-07-30 | End: 2019-08-15

## 2019-07-30 RX ADMIN — FLUOROMETHOLONE 1 DROP(S): 1 SOLUTION/ DROPS OPHTHALMIC at 07:22

## 2019-07-30 RX ADMIN — SIMVASTATIN 20 MILLIGRAM(S): 20 TABLET, FILM COATED ORAL at 21:20

## 2019-07-30 RX ADMIN — HEPARIN SODIUM 5000 UNIT(S): 5000 INJECTION INTRAVENOUS; SUBCUTANEOUS at 14:09

## 2019-07-30 RX ADMIN — LISINOPRIL 20 MILLIGRAM(S): 2.5 TABLET ORAL at 06:07

## 2019-07-30 RX ADMIN — LATANOPROST 1 DROP(S): 0.05 SOLUTION/ DROPS OPHTHALMIC; TOPICAL at 21:21

## 2019-07-30 RX ADMIN — FLUOROMETHOLONE 1 DROP(S): 1 SOLUTION/ DROPS OPHTHALMIC at 19:35

## 2019-07-30 RX ADMIN — AMLODIPINE BESYLATE 5 MILLIGRAM(S): 2.5 TABLET ORAL at 06:07

## 2019-07-30 RX ADMIN — Medication 100 MILLIGRAM(S): at 14:09

## 2019-07-30 RX ADMIN — Medication 100 MILLIGRAM(S): at 21:20

## 2019-07-30 RX ADMIN — HEPARIN SODIUM 5000 UNIT(S): 5000 INJECTION INTRAVENOUS; SUBCUTANEOUS at 06:07

## 2019-07-30 RX ADMIN — Medication 75 MICROGRAM(S): at 06:07

## 2019-07-30 RX ADMIN — HEPARIN SODIUM 5000 UNIT(S): 5000 INJECTION INTRAVENOUS; SUBCUTANEOUS at 21:20

## 2019-07-30 RX ADMIN — Medication 650 MILLIGRAM(S): at 14:11

## 2019-07-30 RX ADMIN — Medication 650 MILLIGRAM(S): at 09:10

## 2019-07-30 RX ADMIN — Medication 1 MILLIGRAM(S): at 21:20

## 2019-07-30 RX ADMIN — Medication 650 MILLIGRAM(S): at 14:08

## 2019-07-30 RX ADMIN — PANTOPRAZOLE SODIUM 40 MILLIGRAM(S): 20 TABLET, DELAYED RELEASE ORAL at 06:07

## 2019-07-30 RX ADMIN — Medication 650 MILLIGRAM(S): at 06:07

## 2019-07-30 NOTE — PROGRESS NOTE ADULT - SUBJECTIVE AND OBJECTIVE BOX
ELE BRAR  89y  Female      Patient is a 89y old  Female who presents with a chief complaint of pubic rami fx (29 Jul 2019 11:43).  Pt sustained pubic rami fx when fell on sidewalk stating she lost her balance and fell on uneven surface.  No head trauma.  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     Renal insufficiency    PAST SURGICAL HISTORY:  History of hip replacement, total, right.        Vital Signs Last 24 Hrs  T(C): 35.4 (29 Jul 2019 23:46), Max: 36.5 (29 Jul 2019 15:00)  T(F): 95.8 (29 Jul 2019 23:46), Max: 97.7 (29 Jul 2019 15:00)  HR: 85 (30 Jul 2019 06:04) (68 - 85)  BP: 125/56 (30 Jul 2019 06:04) (116/61 - 144/64)  BP(mean): --  RR: 18 (29 Jul 2019 23:46) (18 - 18)  SpO2: --    acetaminophen   Tablet .. 650 milliGRAM(s) Oral every 6 hours  amLODIPine   Tablet 5 milliGRAM(s) Oral daily  chlorhexidine 4% Liquid 1 Application(s) Topical <User Schedule>  docusate sodium 100 milliGRAM(s) Oral daily  fluorometholone 0.1% Suspension 1 Drop(s) Both EYES daily  heparin  Injectable 5000 Unit(s) SubCutaneous every 8 hours  latanoprost 0.005% Ophthalmic Solution 1 Drop(s) Both EYES at bedtime  levothyroxine 75 MICROGram(s) Oral daily  lisinopril 20 milliGRAM(s) Oral daily  melatonin 1 milliGRAM(s) Oral at bedtime  oxyCODONE    IR 5 milliGRAM(s) Oral every 6 hours PRN  pantoprazole    Tablet 40 milliGRAM(s) Oral before breakfast  senna 2 Tablet(s) Oral at bedtime  simvastatin 20 milliGRAM(s) Oral at bedtime      PHYSICAL EXAM:  GENERAL: NAD, well-groomed, well-developed  HEAD:  Atraumatic, Normocephalic  EYES: EOMI, PERRLA, conjunctiva and sclera clear  HEENT: No tonsillar erythema, exudates, or enlargement; Moist mucous membranes, Good dentition, No lesions  NECK: Supple, No JVD, Normal thyroid  NERVOUS SYSTEM:  Alert & Oriented X3, Good concentration; Motor Strength 5/5 B/L upper and lower extremities; DTRs 2+ intact and symmetric  CHEST/LUNG: Clear to percussion bilaterally; No rales, rhonchi, wheezing, or rubs  HEART: Regular rate and rhythm; No murmurs, rubs, or gallops  ABDOMEN: Soft, Nontender, Nondistended; Bowel sounds present  EXTREMITIES:  2+ Peripheral Pulses, No clubbing, cyanosis, or edema  LYMPH: No lymphadenopathy noted  SKIN: No rashes or lesions      LABS                         8.8    7.26  )-----------( 130      ( 28 Jul 2019 22:00 )             27.4     07-28    140  |  104  |  27<H>  ----------------------------<  135<H>  4.1   |  23  |  1.6<H>    Ca    8.9      28 Jul 2019 22:00  Phos  2.9     07-28  Mg     2.2     07-28        RADIOLOGY & ADDITIONAL TESTS:    ASSESSMENT & PLAN:     · Assessment		  Assessment:  89 y/o female s/p mechanical fall - HT, -LOC, +ASA L sup/inf pubic rami fx, L inner pelvis hematoma, L lateral epicondyle fx    Plan:  S/P pubic rami fx - using walker, lives with family but alone all day.  Plan transfer to  rehab, PT consult appreciated  HTN - stable with current meds  CRI - Cr back to baseline 1.6, will monitor, tolerating PO well with adequate fluid intake.  Hypothyroid - on Synthroid  Hyperlipidemia - continue Statin
GENERAL SURGERY PROGRESS NOTE     ELE BRAR  [address] [place] [address] day :2d  POD:  Procedure:   Surgical Attending: Manuel Bobby  Overnight events: No acute overnight events. Byrnes was removed yesterday and passed TOV.    T(F): 98 (19 @ 23:42), Max: 98.8 (19 @ 15:30)  HR: 74 (19 @ 23:42) (58 - 77)  BP: 103/56 (19 @ 23:42) (92/50 - 113/54)  ABP: --  ABP(mean): --  RR: 4 (19 @ 23:42) (4 - 18)  SpO2: --      19 @ 07:01  -  19 @ 07:00  --------------------------------------------------------  IN:  Total IN: 0 mL    OUT:    Indwelling Catheter - Urethral: 450 mL  Total OUT: 450 mL    Total NET: -450 mL      19 @ 07:01  -  19 @ 03:37  --------------------------------------------------------  IN:  Total IN: 0 mL    OUT:    Indwelling Catheter - Urethral: 350 mL    Voided: 200 mL  Total OUT: 550 mL    Total NET: -550 mL        DIET/FLUIDS:   NG:                                                                                DRAINS:     BM:     EMESIS:     URINE:   19 @ 07:01  -  19 @ 07:00  --------------------------------------------------------  OUT: 450 mL       GI proph:  pantoprazole    Tablet 40 milliGRAM(s) Oral before breakfast    AC/ proph: heparin  Injectable 5000 Unit(s) SubCutaneous every 8 hours    ABx:     PHYSICAL EXAM:  GENERAL: NAD, well-appearing  CHEST/LUNG: Clear to auscultation bilaterally  HEART: Regular rate and rhythm  ABDOMEN: Soft, Nontender, Nondistended;   EXTREMITIES:  No clubbing, cyanosis, or edema      LABS  Labs:  CAPILLARY BLOOD GLUCOSE                              8.9    6.00  )-----------( 127      ( 2019 20:21 )             27.9             142  |  107  |  29<H>  ----------------------------<  109<H>  4.3   |  22  |  1.8<H>      Calcium, Total Serum: 9.2 mg/dL (19 @ 20:21)      LFTs:             8.0  | 0.6  | 23       ------------------[61      ( 2019 14:55 )  4.3  | x    | 9           Lipase:36     Amylase:x         Lactate, Blood: 0.7 mmol/L (19 @ 14:55)      Coags:     13.20  ----< 1.15    ( 2019 14:55 )     32.2                Urinalysis Basic - ( 2019 19:20 )    Color: Light Yellow / Appearance: Clear / S.013 / pH: x  Gluc: x / Ketone: Negative  / Bili: Negative / Urobili: <2 mg/dL   Blood: x / Protein: Trace / Nitrite: Negative   Leuk Esterase: Negative / RBC: 49 /HPF / WBC 1 /HPF   Sq Epi: x / Non Sq Epi: 1 /HPF / Bacteria: Negative            RADIOLOGY & ADDITIONAL TESTS:  < from: Xray Knee 3 Views, Bilateral (19 @ 08:05) >    IMPRESSION:    No acute osseous abnormality of the bilateral knees. Diffuse osteopenia.    Partially imaged right femur intramedullary nail with healed fracture   deformity at the mid/distal femoral shaft.    Bilateral knee tricompartmental osteoarthritis, right worse than the left.    < end of copied text >
GENERAL SURGERY PROGRESS NOTE     ELE BRAR  Female  Hospital day :1d  POD:  Procedure:   OVERNIGHT EVENTS: No acute events.     T(F): 96.8 (19 @ 16:13), Max: 98.2 (19 @ 13:54)  HR: 78 (19 @ 20:15) (76 - 78)  BP: 138/65 (19 @ 20:15) (138/65 - 164/71)  ABP: --  ABP(mean): --  RR: 16 (19 @ 20:15) (16 - 19)  SpO2: 96% (19 @ 20:15) (96% - 97%)    DIET/FLUIDS: sodium chloride 0.9%. 1000 milliLiter(s) IV Continuous <Continuous>    URINE:  Indwelling Urethral Catheter:     Connect To:  Straight Drainage/Gravity    Indication:  Urine Output Monitoring in Critically Ill (19 @ 18:16)    GI proph:  pantoprazole    Tablet 40 milliGRAM(s) Oral before breakfast    AC/ proph:   ABx:     PHYSICAL EXAM:  GENERAL: NAD, well-appearing  CHEST/LUNG: Clear to auscultation bilaterally  HEART: Regular rate and rhythm  ABDOMEN: Soft, Nontender, Nondistended;   EXTREMITIES:  No clubbing, cyanosis, or edema      LABS  Labs:  CAPILLARY BLOOD GLUCOSE                              10.9   7.77  )-----------( 134      ( 2019 21:23 )             33.2       Auto Immature Granulocyte %: 1.4 % (19 @ 14:55)  Auto Neutrophil %: 69.5 % (19 @ 14:55)        139  |  103  |  27<H>  ----------------------------<  103<H>  4.5   |  22  |  1.3      Calcium, Total Serum: 10.3 mg/dL (19 @ 14:55)      8.0  | 0.6  | 23       ------------------[61      ( 2019 14:55 )  4.3  | x    | 9           Lipase:36     Amylase:x         Lactate, Blood: 0.7 mmol/L (19 @ 14:55)      Coags:     13.20  ----< 1.15    ( 2019 14:55 )     32.2        Urinalysis Basic - ( 2019 19:20 )    Color: Light Yellow / Appearance: Clear / S.013 / pH: x  Gluc: x / Ketone: Negative  / Bili: Negative / Urobili: <2 mg/dL   Blood: x / Protein: Trace / Nitrite: Negative   Leuk Esterase: Negative / RBC: 49 /HPF / WBC 1 /HPF   Sq Epi: x / Non Sq Epi: 1 /HPF / Bacteria: Negative            RADIOLOGY & ADDITIONAL TESTS:  < from: CT Chest w/ IV Cont (19 @ 15:59) >  IMPRESSION:    1.  Comminuted displaced fracture of the superior pubic ramus.   Nondisplaced fracture of the inferior pubic ramus.     2.  Early focal hematoma along the left inner pelvic sidewall with   surrounding fat stranding and thickening of the left obturator internus.    3.  Thickening of the left urinary bladder wall. CT cystogram is   recommended to assess for bladder rupture.    4.  No acute traumatic abnormality within the chest.    < end of copied text >
GENERAL SURGERY PROGRESS NOTE     ELE BRAR  Female  Hospital day :4d  POD:  Procedure:     OVERNIGHT EVENTS:  No acute overnight events.    T(F): 98.8 (07-28-19 @ 15:39), Max: 98.8 (07-28-19 @ 15:39)  HR: 71 (07-28-19 @ 15:39) (71 - 95)  BP: 102/50 (07-28-19 @ 15:39) (102/50 - 163/91)  ABP: --  ABP(mean): --  RR: 18 (07-28-19 @ 15:39) (18 - 18)  SpO2: --     GI proph:  pantoprazole    Tablet 40 milliGRAM(s) Oral before breakfast    AC/ proph: heparin  Injectable 5000 Unit(s) SubCutaneous every 8 hours    PHYSICAL EXAM:  GENERAL: NAD, well-appearing  CHEST/LUNG: Clear to auscultation bilaterally  HEART: Regular rate and rhythm  ABDOMEN: Soft, Nontender, Nondistended;   EXTREMITIES:  No clubbing, cyanosis, or edema      LABS  Labs:  CAPILLARY BLOOD GLUCOSE                              8.8    7.26  )-----------( 130      ( 28 Jul 2019 22:00 )             27.4       Auto Neutrophil %: 71.1 % (07-28-19 @ 22:00)  Auto Immature Granulocyte %: 0.6 % (07-28-19 @ 22:00)  Auto Neutrophil %: 69.2 % (07-28-19 @ 12:20)  Auto Immature Granulocyte %: 0.8 % (07-28-19 @ 12:20)    07-28    140  |  104  |  27<H>  ----------------------------<  135<H>  4.1   |  23  |  1.6<H>      Calcium, Total Serum: 8.9 mg/dL (07-28-19 @ 22:00)
GENERAL SURGERY PROGRESS NOTE     ELE BRAR  Female  Hospital day: 4d    OVERNIGHT EVENTS: no acute events overnight, patient's Cr went to 1.8 yesterday and she received a 500cc bolus and was started on LR@75 for 1 day. Patient requested melatonin as sleep aid    T(F): 97.4 (07-27-19 @ 23:00), Max: 98.6 (07-27-19 @ 15:36)  HR: 71 (07-28-19 @ 05:34) (66 - 76)  BP: 144/69 (07-28-19 @ 05:34) (110/54 - 144/69)  RR: 18 (07-27-19 @ 23:00) (18 - 18)    DIET/FLUIDS: regular diet/lactated ringers. 1000 milliLiter(s) IV Continuous <Continuous>    GI proph:  pantoprazole    Tablet 40 milliGRAM(s) Oral before breakfast    AC/ proph: heparin  Injectable 5000 Unit(s) SubCutaneous every 8 hours    PHYSICAL EXAM:  GENERAL: NAD, well-appearing  CHEST/LUNG: Clear to auscultation bilaterally  HEART: Regular rate and rhythm  ABDOMEN: Soft, Nontender, Nondistended;   EXTREMITIES:  No clubbing, cyanosis, or edema    Labs:                     8.6    7.14  )-----------( 122      ( 27 Jul 2019 22:51 )             26.2       Auto Immature Granulocyte %: 0.4 % (07-27-19 @ 22:51)  Auto Neutrophil %: 67.1 % (07-27-19 @ 22:51)    07-27    139  |  106  |  30<H>  ----------------------------<  115<H>  3.9   |  23  |  1.6<H>    Calcium, Total Serum: 8.5 mg/dL (07-27-19 @ 22:51)    Lactate, Blood: 0.7 mmol/L (07-25-19 @ 14:55)
GENERAL SURGERY PROGRESS NOTE     ELE BRAR  Female  Hospital day: 6d    OVERNIGHT EVENTS: no acute events overnight    T(F): 96.4 (07-29-19 @ 07:30), Max: 98.8 (07-28-19 @ 15:39)  HR: 75 (07-29-19 @ 07:30) (70 - 75)  BP: 118/53 (07-29-19 @ 07:30) (102/50 - 120/59)  RR: 18 (07-29-19 @ 07:30) (18 - 18)    DIET/FLUIDS: lactated ringers Bolus 500 milliLiter(s) IV Bolus once     GI proph:  pantoprazole    Tablet 40 milliGRAM(s) Oral before breakfast    AC/ proph: heparin  Injectable 5000 Unit(s) SubCutaneous every 8 hours    PHYSICAL EXAM:  GENERAL: NAD, well-appearing  CHEST/LUNG: Clear to auscultation bilaterally  HEART: Regular rate and rhythm  ABDOMEN: Soft, Nontender, Nondistended;   EXTREMITIES:  No clubbing, cyanosis, or edema, pelvic tenderness    Labs:                      8.8    7.26  )-----------( 130      ( 28 Jul 2019 22:00 )             27.4       Auto Neutrophil %: 71.1 % (07-28-19 @ 22:00)  Auto Immature Granulocyte %: 0.6 % (07-28-19 @ 22:00)  Auto Neutrophil %: 69.2 % (07-28-19 @ 12:20)  Auto Immature Granulocyte %: 0.8 % (07-28-19 @ 12:20)    07-28    140  |  104  |  27<H>  ----------------------------<  135<H>  4.1   |  23  |  1.6<H>    Calcium, Total Serum: 8.9 mg/dL (07-28-19 @ 22:00)

## 2019-07-30 NOTE — DISCHARGE NOTE PROVIDER - NSDCCPCAREPLAN_GEN_ALL_CORE_FT
PRINCIPAL DISCHARGE DIAGNOSIS  Diagnosis: Fracture of pubic ramus  Assessment and Plan of Treatment: fracture due to fall, please follow up with orthopedic surgery on outpatient basis      SECONDARY DISCHARGE DIAGNOSES  Diagnosis: Fracture of humerus, lateral condyle, closed  Assessment and Plan of Treatment: fracture due to fall, please follow up with orthopedic surgery on outpatient basis

## 2019-07-30 NOTE — DISCHARGE NOTE PROVIDER - NSFOLLOWUPCLINICS_GEN_ALL_ED_FT
[facility]  Orthpedic  [address] [place], [place]   Phone: [phone]  Fax:   Follow Up Time:

## 2019-07-30 NOTE — DISCHARGE NOTE PROVIDER - CARE PROVIDER_API CALL
Silverio Mathew (MD)  Orthopaedic Surgery  [address] [place] [address]  Phone: [phone]  Fax: [phone]  Follow Up Time:     Koffi Cooper)  Internal Medicine  [address] [place] [address]  Phone: [phone]  Fax: [phone]  Follow Up Time:

## 2019-07-30 NOTE — CDI QUERY NOTE - NSCDIOTHERTXTBX_GEN_ALL_CORE_HH
Documentation indicated that this patient has CKD (CRI):      eGFR if Non African American: 28 mL/min/1.73M2 (07-28-19 @ 22:00)  eGFR if Non African American: 33 mL/min/1.73M2 (07-28-19 @ 12:20)  eGFR if Non African American: 28 mL/min/1.73M2 (07-27-19 @ 22:51)  eGFR if Non African American: 24 mL/min/1.73M2 (07-27-19 @ 12:05)  eGFR if Non African American: 24 mL/min/1.73M2 (07-26-19 @ 20:21)  eGFR if Non African American: 36 mL/min/1.73M2 (07-25-19 @ 14:55)     Creatinine, Serum: 1.6 mg/dL (07-28-19 @ 22:00)  Creatinine, Serum: 1.4 mg/dL (07-28-19 @ 12:20)  Creatinine, Serum: 1.6 mg/dL (07-27-19 @ 22:51)  Creatinine, Serum: 1.8 mg/dL (07-27-19 @ 12:05)  Creatinine, Serum: 1.8 mg/dL (07-26-19 @ 20:21)  Creatinine, Serum: 1.3 mg/dL (07-25-19 @ 14:55)       In order to accurately capture the diagnosis to the greatest degree of specificity. The documentation in this patient’s medical record requires additional clarification regarding the stage of CKD.  Please include more specific diagnosis in your Progress Note and/or Discharge Summary.    • CKD stage ------------  • Other please specify.  • Unable to determine.    For guidance, CKD classification based on GFR is as follow:                    GFR                                   With                                 Without  	   (ml/min/1.73 m2)           Kidney Damage                Kidney Damage  	      >= 90                                Stage 1                             Normal  	      60-89                                Stage 2                            Decreased GFR  	      30-59                                Stage 3                            Stage 3  	      15-29                                Stage 4                             Stage 4  	      < 15                                   Stage 5                            Stage 5    Present on Admission:  Was the severity of the condition present on admission?  If so, please document in the chart that “(the condition) was present on admission.”    In responding to this request, please exercise your independent professional judgment.  The fact that a question is asked does not imply that any particular answer is desired or expected.    Documentation clarification is required for compliance, accuracy in coding and billing, and reporting severity of illness, quality data   and risk of mortality.

## 2019-07-30 NOTE — DISCHARGE NOTE NURSING/CASE MANAGEMENT/SOCIAL WORK - NSDCDPATPORTLINK_GEN_ALL_CORE
You can access the [facility] Patient Portal, offered by [facility], by registering with the following website: [email or web address]

## 2019-07-30 NOTE — CHART NOTE - NSCHARTNOTEFT_GEN_A_CORE
<<<RESIDENT DISCHARGE NOTE>>>     ELE BRAR  MRN-[id number]    VITAL SIGNS:  T(F): 97.5 (07-30-19 @ 15:00), Max: 97.8 (07-30-19 @ 07:55)  HR: 94 (07-30-19 @ 15:00)  BP: 118/57 (07-30-19 @ 15:00)        PHYSICAL EXAMINATION:  General: A&Ox3, NAD, resting comfortably in chair  Head & Neck: normocephalic, no LNs appreciated, full ROM  Pulmonary: CTA b/l  Cardiovascular: RRR, no murmurs/rubs/gallops  Gastrointestinal/Abdomen & Pelvis: adbomen soft, nontender, nondistended  Neurologic/Motor: no numbness appreciated, no atrophy noted, ambulates with walker    TEST RESULTS:                        8.8    7.26  )-----------( 130      ( 28 Jul 2019 22:00 )             27.4       07-28    140  |  104  |  27<H>  ----------------------------<  135<H>  4.1   |  23  |  1.6<H>    Ca    8.9      28 Jul 2019 22:00  Phos  2.9     07-28  Mg     2.2     07-28        FINAL DISCHARGE INTERVIEW:  Resident(s) Present: (Name: Roby Kaufman DO)    DISCHARGE MEDICATION RECONCILIATION  reviewed with Attending (Name: Koffi Cooper MD)    DISPOSITION:   [  ] Home,    [  ] Home with Visiting Nursing Services,   [    ]  SNF/ NH,    [X] Acute Rehab (4A),   [   ] Other (Specify:_________)

## 2019-07-30 NOTE — DISCHARGE NOTE PROVIDER - HOSPITAL COURSE
Patient is a 89y old  Female with a history of HTN, HLD, hypothyroidism, who presented with a chief complaint of L rib and hip pain after falling on the street. Imaging showed a L pubic rami fracture, L humoral neck chronic deformity, L lateral epicondyle fracture, no bladder rupture. Orthopedic surgery team gave ace wrap to elbow, recommended weight bearing as tolerated for upper and lower extremities, with outpatient follow-up. She was transferred to the medical service for Her creatinine went from 1.8 and 1.6 on 7/27 down to 1.4, then back to baseline1.6 on 7/28. She is tolerating PO well with adequate fluid intake. Using walker for ambulation, still with pain but doing OK. She is being transferred to 4A rehab.

## 2019-07-31 LAB
24R-OH-CALCIDIOL SERPL-MCNC: 84 NG/ML — HIGH (ref 30–80)
ALBUMIN SERPL ELPH-MCNC: 3.7 G/DL — SIGNIFICANT CHANGE UP (ref 3.5–5.2)
ALP SERPL-CCNC: 78 U/L — SIGNIFICANT CHANGE UP (ref 30–115)
ALT FLD-CCNC: 48 U/L — HIGH (ref 0–41)
ANION GAP SERPL CALC-SCNC: 12 MMOL/L — SIGNIFICANT CHANGE UP (ref 7–14)
AST SERPL-CCNC: 77 U/L — HIGH (ref 0–41)
BILIRUB SERPL-MCNC: 0.7 MG/DL — SIGNIFICANT CHANGE UP (ref 0.2–1.2)
BUN SERPL-MCNC: 26 MG/DL — HIGH (ref 10–20)
CALCIUM SERPL-MCNC: 9.3 MG/DL — SIGNIFICANT CHANGE UP (ref 8.5–10.1)
CHLORIDE SERPL-SCNC: 106 MMOL/L — SIGNIFICANT CHANGE UP (ref 98–110)
CO2 SERPL-SCNC: 23 MMOL/L — SIGNIFICANT CHANGE UP (ref 17–32)
CREAT SERPL-MCNC: 1.5 MG/DL — SIGNIFICANT CHANGE UP (ref 0.7–1.5)
FOLATE SERPL-MCNC: >20 NG/ML — SIGNIFICANT CHANGE UP
GLUCOSE SERPL-MCNC: 127 MG/DL — HIGH (ref 70–99)
HCT VFR BLD CALC: 28.4 % — LOW (ref 37–47)
HGB BLD-MCNC: 8.9 G/DL — LOW (ref 12–16)
MAGNESIUM SERPL-MCNC: 2 MG/DL — SIGNIFICANT CHANGE UP (ref 1.8–2.4)
MCHC RBC-ENTMCNC: 28.7 PG — SIGNIFICANT CHANGE UP (ref 27–31)
MCHC RBC-ENTMCNC: 31.3 G/DL — LOW (ref 32–37)
MCV RBC AUTO: 91.6 FL — SIGNIFICANT CHANGE UP (ref 81–99)
NRBC # BLD: 0 /100 WBCS — SIGNIFICANT CHANGE UP (ref 0–0)
PHOSPHATE SERPL-MCNC: 3.4 MG/DL — SIGNIFICANT CHANGE UP (ref 2.1–4.9)
PLATELET # BLD AUTO: 174 K/UL — SIGNIFICANT CHANGE UP (ref 130–400)
POTASSIUM SERPL-MCNC: 4.5 MMOL/L — SIGNIFICANT CHANGE UP (ref 3.5–5)
POTASSIUM SERPL-SCNC: 4.5 MMOL/L — SIGNIFICANT CHANGE UP (ref 3.5–5)
PROT SERPL-MCNC: 6.9 G/DL — SIGNIFICANT CHANGE UP (ref 6–8)
RBC # BLD: 3.1 M/UL — LOW (ref 4.2–5.4)
RBC # FLD: 14.6 % — HIGH (ref 11.5–14.5)
SODIUM SERPL-SCNC: 141 MMOL/L — SIGNIFICANT CHANGE UP (ref 135–146)
VIT B12 SERPL-MCNC: 608 PG/ML — SIGNIFICANT CHANGE UP (ref 232–1245)
WBC # BLD: 6.37 K/UL — SIGNIFICANT CHANGE UP (ref 4.8–10.8)
WBC # FLD AUTO: 6.37 K/UL — SIGNIFICANT CHANGE UP (ref 4.8–10.8)

## 2019-07-31 RX ORDER — FLUOROMETHOLONE 1 MG/ML
1 SOLUTION/ DROPS OPHTHALMIC DAILY
Refills: 0 | Status: DISCONTINUED | OUTPATIENT
Start: 2019-07-31 | End: 2019-08-15

## 2019-07-31 RX ADMIN — LISINOPRIL 20 MILLIGRAM(S): 2.5 TABLET ORAL at 06:16

## 2019-07-31 RX ADMIN — Medication 75 MICROGRAM(S): at 06:16

## 2019-07-31 RX ADMIN — PANTOPRAZOLE SODIUM 40 MILLIGRAM(S): 20 TABLET, DELAYED RELEASE ORAL at 06:16

## 2019-07-31 RX ADMIN — LATANOPROST 1 DROP(S): 0.05 SOLUTION/ DROPS OPHTHALMIC; TOPICAL at 21:44

## 2019-07-31 RX ADMIN — HEPARIN SODIUM 5000 UNIT(S): 5000 INJECTION INTRAVENOUS; SUBCUTANEOUS at 06:16

## 2019-07-31 RX ADMIN — HEPARIN SODIUM 5000 UNIT(S): 5000 INJECTION INTRAVENOUS; SUBCUTANEOUS at 13:45

## 2019-07-31 RX ADMIN — FLUOROMETHOLONE 1 DROP(S): 1 SOLUTION/ DROPS OPHTHALMIC at 13:45

## 2019-07-31 RX ADMIN — Medication 1 MILLIGRAM(S): at 21:44

## 2019-07-31 RX ADMIN — Medication 100 MILLIGRAM(S): at 06:16

## 2019-07-31 RX ADMIN — SIMVASTATIN 20 MILLIGRAM(S): 20 TABLET, FILM COATED ORAL at 21:44

## 2019-07-31 RX ADMIN — AMLODIPINE BESYLATE 5 MILLIGRAM(S): 2.5 TABLET ORAL at 06:16

## 2019-07-31 RX ADMIN — HEPARIN SODIUM 5000 UNIT(S): 5000 INJECTION INTRAVENOUS; SUBCUTANEOUS at 21:44

## 2019-08-01 RX ADMIN — HEPARIN SODIUM 5000 UNIT(S): 5000 INJECTION INTRAVENOUS; SUBCUTANEOUS at 21:30

## 2019-08-01 RX ADMIN — HEPARIN SODIUM 5000 UNIT(S): 5000 INJECTION INTRAVENOUS; SUBCUTANEOUS at 06:00

## 2019-08-01 RX ADMIN — FLUOROMETHOLONE 1 DROP(S): 1 SOLUTION/ DROPS OPHTHALMIC at 14:07

## 2019-08-01 RX ADMIN — LISINOPRIL 20 MILLIGRAM(S): 2.5 TABLET ORAL at 06:01

## 2019-08-01 RX ADMIN — PANTOPRAZOLE SODIUM 40 MILLIGRAM(S): 20 TABLET, DELAYED RELEASE ORAL at 06:00

## 2019-08-01 RX ADMIN — Medication 650 MILLIGRAM(S): at 13:16

## 2019-08-01 RX ADMIN — HEPARIN SODIUM 5000 UNIT(S): 5000 INJECTION INTRAVENOUS; SUBCUTANEOUS at 14:06

## 2019-08-01 RX ADMIN — Medication 1 MILLIGRAM(S): at 21:30

## 2019-08-01 RX ADMIN — LATANOPROST 1 DROP(S): 0.05 SOLUTION/ DROPS OPHTHALMIC; TOPICAL at 21:30

## 2019-08-01 RX ADMIN — SIMVASTATIN 20 MILLIGRAM(S): 20 TABLET, FILM COATED ORAL at 21:30

## 2019-08-01 RX ADMIN — AMLODIPINE BESYLATE 5 MILLIGRAM(S): 2.5 TABLET ORAL at 06:01

## 2019-08-01 RX ADMIN — Medication 650 MILLIGRAM(S): at 12:47

## 2019-08-01 RX ADMIN — Medication 75 MICROGRAM(S): at 06:01

## 2019-08-02 RX ORDER — ACETAMINOPHEN 500 MG
650 TABLET ORAL EVERY 8 HOURS
Refills: 0 | Status: DISCONTINUED | OUTPATIENT
Start: 2019-08-02 | End: 2019-08-15

## 2019-08-02 RX ORDER — TIMOLOL 0.5 %
1 DROPS OPHTHALMIC (EYE)
Refills: 0 | Status: DISCONTINUED | OUTPATIENT
Start: 2019-08-02 | End: 2019-08-15

## 2019-08-02 RX ADMIN — PANTOPRAZOLE SODIUM 40 MILLIGRAM(S): 20 TABLET, DELAYED RELEASE ORAL at 05:55

## 2019-08-02 RX ADMIN — Medication 650 MILLIGRAM(S): at 18:59

## 2019-08-02 RX ADMIN — Medication 650 MILLIGRAM(S): at 13:08

## 2019-08-02 RX ADMIN — Medication 1 MILLIGRAM(S): at 21:40

## 2019-08-02 RX ADMIN — HEPARIN SODIUM 5000 UNIT(S): 5000 INJECTION INTRAVENOUS; SUBCUTANEOUS at 21:40

## 2019-08-02 RX ADMIN — FLUOROMETHOLONE 1 DROP(S): 1 SOLUTION/ DROPS OPHTHALMIC at 12:12

## 2019-08-02 RX ADMIN — Medication 1 DROP(S): at 12:11

## 2019-08-02 RX ADMIN — Medication 650 MILLIGRAM(S): at 12:15

## 2019-08-02 RX ADMIN — LATANOPROST 1 DROP(S): 0.05 SOLUTION/ DROPS OPHTHALMIC; TOPICAL at 21:40

## 2019-08-02 RX ADMIN — Medication 650 MILLIGRAM(S): at 21:40

## 2019-08-02 RX ADMIN — HEPARIN SODIUM 5000 UNIT(S): 5000 INJECTION INTRAVENOUS; SUBCUTANEOUS at 13:07

## 2019-08-02 RX ADMIN — HEPARIN SODIUM 5000 UNIT(S): 5000 INJECTION INTRAVENOUS; SUBCUTANEOUS at 05:55

## 2019-08-02 RX ADMIN — LISINOPRIL 20 MILLIGRAM(S): 2.5 TABLET ORAL at 05:55

## 2019-08-02 RX ADMIN — SIMVASTATIN 20 MILLIGRAM(S): 20 TABLET, FILM COATED ORAL at 21:40

## 2019-08-02 RX ADMIN — AMLODIPINE BESYLATE 5 MILLIGRAM(S): 2.5 TABLET ORAL at 05:55

## 2019-08-02 RX ADMIN — Medication 75 MICROGRAM(S): at 05:55

## 2019-08-03 RX ADMIN — AMLODIPINE BESYLATE 5 MILLIGRAM(S): 2.5 TABLET ORAL at 05:56

## 2019-08-03 RX ADMIN — HEPARIN SODIUM 5000 UNIT(S): 5000 INJECTION INTRAVENOUS; SUBCUTANEOUS at 05:56

## 2019-08-03 RX ADMIN — LISINOPRIL 20 MILLIGRAM(S): 2.5 TABLET ORAL at 05:56

## 2019-08-03 RX ADMIN — Medication 75 MICROGRAM(S): at 05:56

## 2019-08-03 RX ADMIN — LATANOPROST 1 DROP(S): 0.05 SOLUTION/ DROPS OPHTHALMIC; TOPICAL at 21:27

## 2019-08-03 RX ADMIN — Medication 650 MILLIGRAM(S): at 13:15

## 2019-08-03 RX ADMIN — HEPARIN SODIUM 5000 UNIT(S): 5000 INJECTION INTRAVENOUS; SUBCUTANEOUS at 21:27

## 2019-08-03 RX ADMIN — Medication 650 MILLIGRAM(S): at 05:56

## 2019-08-03 RX ADMIN — Medication 1 DROP(S): at 07:52

## 2019-08-03 RX ADMIN — TRAMADOL HYDROCHLORIDE 50 MILLIGRAM(S): 50 TABLET ORAL at 23:56

## 2019-08-03 RX ADMIN — HEPARIN SODIUM 5000 UNIT(S): 5000 INJECTION INTRAVENOUS; SUBCUTANEOUS at 13:15

## 2019-08-03 RX ADMIN — Medication 1 MILLIGRAM(S): at 21:27

## 2019-08-03 RX ADMIN — SIMVASTATIN 20 MILLIGRAM(S): 20 TABLET, FILM COATED ORAL at 21:27

## 2019-08-03 RX ADMIN — Medication 650 MILLIGRAM(S): at 07:52

## 2019-08-03 RX ADMIN — Medication 650 MILLIGRAM(S): at 17:47

## 2019-08-03 RX ADMIN — PANTOPRAZOLE SODIUM 40 MILLIGRAM(S): 20 TABLET, DELAYED RELEASE ORAL at 05:56

## 2019-08-03 RX ADMIN — Medication 650 MILLIGRAM(S): at 21:27

## 2019-08-03 RX ADMIN — FLUOROMETHOLONE 1 DROP(S): 1 SOLUTION/ DROPS OPHTHALMIC at 12:18

## 2019-08-04 RX ADMIN — Medication 650 MILLIGRAM(S): at 08:53

## 2019-08-04 RX ADMIN — PANTOPRAZOLE SODIUM 40 MILLIGRAM(S): 20 TABLET, DELAYED RELEASE ORAL at 06:12

## 2019-08-04 RX ADMIN — Medication 1 DROP(S): at 08:55

## 2019-08-04 RX ADMIN — HEPARIN SODIUM 5000 UNIT(S): 5000 INJECTION INTRAVENOUS; SUBCUTANEOUS at 13:31

## 2019-08-04 RX ADMIN — Medication 650 MILLIGRAM(S): at 13:32

## 2019-08-04 RX ADMIN — Medication 650 MILLIGRAM(S): at 21:27

## 2019-08-04 RX ADMIN — HEPARIN SODIUM 5000 UNIT(S): 5000 INJECTION INTRAVENOUS; SUBCUTANEOUS at 05:41

## 2019-08-04 RX ADMIN — HEPARIN SODIUM 5000 UNIT(S): 5000 INJECTION INTRAVENOUS; SUBCUTANEOUS at 21:26

## 2019-08-04 RX ADMIN — LISINOPRIL 20 MILLIGRAM(S): 2.5 TABLET ORAL at 05:40

## 2019-08-04 RX ADMIN — AMLODIPINE BESYLATE 5 MILLIGRAM(S): 2.5 TABLET ORAL at 05:40

## 2019-08-04 RX ADMIN — LATANOPROST 1 DROP(S): 0.05 SOLUTION/ DROPS OPHTHALMIC; TOPICAL at 21:27

## 2019-08-04 RX ADMIN — Medication 75 MICROGRAM(S): at 05:40

## 2019-08-04 RX ADMIN — FLUOROMETHOLONE 1 DROP(S): 1 SOLUTION/ DROPS OPHTHALMIC at 11:42

## 2019-08-04 RX ADMIN — Medication 650 MILLIGRAM(S): at 21:25

## 2019-08-04 RX ADMIN — SIMVASTATIN 20 MILLIGRAM(S): 20 TABLET, FILM COATED ORAL at 21:25

## 2019-08-04 RX ADMIN — Medication 1 MILLIGRAM(S): at 21:25

## 2019-08-04 RX ADMIN — Medication 650 MILLIGRAM(S): at 13:31

## 2019-08-04 RX ADMIN — Medication 650 MILLIGRAM(S): at 05:40

## 2019-08-05 LAB
ALBUMIN SERPL ELPH-MCNC: 3.6 G/DL — SIGNIFICANT CHANGE UP (ref 3.5–5.2)
ALP SERPL-CCNC: 106 U/L — SIGNIFICANT CHANGE UP (ref 30–115)
ALT FLD-CCNC: 25 U/L — SIGNIFICANT CHANGE UP (ref 0–41)
ANION GAP SERPL CALC-SCNC: 14 MMOL/L — SIGNIFICANT CHANGE UP (ref 7–14)
APPEARANCE UR: ABNORMAL
AST SERPL-CCNC: 29 U/L — SIGNIFICANT CHANGE UP (ref 0–41)
BACTERIA # UR AUTO: ABNORMAL
BILIRUB SERPL-MCNC: 0.4 MG/DL — SIGNIFICANT CHANGE UP (ref 0.2–1.2)
BILIRUB UR-MCNC: NEGATIVE — SIGNIFICANT CHANGE UP
BUN SERPL-MCNC: 47 MG/DL — HIGH (ref 10–20)
CALCIUM SERPL-MCNC: 9.7 MG/DL — SIGNIFICANT CHANGE UP (ref 8.5–10.1)
CHLORIDE SERPL-SCNC: 105 MMOL/L — SIGNIFICANT CHANGE UP (ref 98–110)
CO2 SERPL-SCNC: 21 MMOL/L — SIGNIFICANT CHANGE UP (ref 17–32)
COLOR SPEC: SIGNIFICANT CHANGE UP
CREAT SERPL-MCNC: 1.5 MG/DL — SIGNIFICANT CHANGE UP (ref 0.7–1.5)
DIFF PNL FLD: SIGNIFICANT CHANGE UP
EPI CELLS # UR: 0 /HPF — SIGNIFICANT CHANGE UP (ref 0–5)
GLUCOSE SERPL-MCNC: 98 MG/DL — SIGNIFICANT CHANGE UP (ref 70–99)
GLUCOSE UR QL: NEGATIVE — SIGNIFICANT CHANGE UP
HCT VFR BLD CALC: 26 % — LOW (ref 37–47)
HGB BLD-MCNC: 8.3 G/DL — LOW (ref 12–16)
HYALINE CASTS # UR AUTO: 0 /LPF — SIGNIFICANT CHANGE UP (ref 0–7)
KETONES UR-MCNC: NEGATIVE — SIGNIFICANT CHANGE UP
LEUKOCYTE ESTERASE UR-ACNC: ABNORMAL
MCHC RBC-ENTMCNC: 29.1 PG — SIGNIFICANT CHANGE UP (ref 27–31)
MCHC RBC-ENTMCNC: 31.9 G/DL — LOW (ref 32–37)
MCV RBC AUTO: 91.2 FL — SIGNIFICANT CHANGE UP (ref 81–99)
NITRITE UR-MCNC: NEGATIVE — SIGNIFICANT CHANGE UP
NRBC # BLD: 0 /100 WBCS — SIGNIFICANT CHANGE UP (ref 0–0)
PH UR: 6 — SIGNIFICANT CHANGE UP (ref 5–8)
PLATELET # BLD AUTO: 230 K/UL — SIGNIFICANT CHANGE UP (ref 130–400)
POTASSIUM SERPL-MCNC: 5 MMOL/L — SIGNIFICANT CHANGE UP (ref 3.5–5)
POTASSIUM SERPL-SCNC: 5 MMOL/L — SIGNIFICANT CHANGE UP (ref 3.5–5)
PROT SERPL-MCNC: 7.1 G/DL — SIGNIFICANT CHANGE UP (ref 6–8)
PROT UR-MCNC: SIGNIFICANT CHANGE UP
RBC # BLD: 2.85 M/UL — LOW (ref 4.2–5.4)
RBC # FLD: 15 % — HIGH (ref 11.5–14.5)
RBC CASTS # UR COMP ASSIST: 2 /HPF — SIGNIFICANT CHANGE UP (ref 0–4)
SODIUM SERPL-SCNC: 140 MMOL/L — SIGNIFICANT CHANGE UP (ref 135–146)
SP GR SPEC: 1.01 — SIGNIFICANT CHANGE UP (ref 1.01–1.02)
UROBILINOGEN FLD QL: SIGNIFICANT CHANGE UP
WBC # BLD: 6.75 K/UL — SIGNIFICANT CHANGE UP (ref 4.8–10.8)
WBC # FLD AUTO: 6.75 K/UL — SIGNIFICANT CHANGE UP (ref 4.8–10.8)
WBC UR QL: 189 /HPF — HIGH (ref 0–5)

## 2019-08-05 RX ADMIN — LATANOPROST 1 DROP(S): 0.05 SOLUTION/ DROPS OPHTHALMIC; TOPICAL at 21:10

## 2019-08-05 RX ADMIN — AMLODIPINE BESYLATE 5 MILLIGRAM(S): 2.5 TABLET ORAL at 05:11

## 2019-08-05 RX ADMIN — Medication 650 MILLIGRAM(S): at 21:10

## 2019-08-05 RX ADMIN — Medication 650 MILLIGRAM(S): at 17:49

## 2019-08-05 RX ADMIN — Medication 650 MILLIGRAM(S): at 05:13

## 2019-08-05 RX ADMIN — Medication 1 MILLIGRAM(S): at 21:10

## 2019-08-05 RX ADMIN — HEPARIN SODIUM 5000 UNIT(S): 5000 INJECTION INTRAVENOUS; SUBCUTANEOUS at 05:12

## 2019-08-05 RX ADMIN — HEPARIN SODIUM 5000 UNIT(S): 5000 INJECTION INTRAVENOUS; SUBCUTANEOUS at 13:37

## 2019-08-05 RX ADMIN — Medication 1 DROP(S): at 07:57

## 2019-08-05 RX ADMIN — Medication 650 MILLIGRAM(S): at 05:11

## 2019-08-05 RX ADMIN — FLUOROMETHOLONE 1 DROP(S): 1 SOLUTION/ DROPS OPHTHALMIC at 11:32

## 2019-08-05 RX ADMIN — HEPARIN SODIUM 5000 UNIT(S): 5000 INJECTION INTRAVENOUS; SUBCUTANEOUS at 21:11

## 2019-08-05 RX ADMIN — PANTOPRAZOLE SODIUM 40 MILLIGRAM(S): 20 TABLET, DELAYED RELEASE ORAL at 05:12

## 2019-08-05 RX ADMIN — LISINOPRIL 20 MILLIGRAM(S): 2.5 TABLET ORAL at 05:11

## 2019-08-05 RX ADMIN — Medication 650 MILLIGRAM(S): at 13:37

## 2019-08-05 RX ADMIN — SIMVASTATIN 20 MILLIGRAM(S): 20 TABLET, FILM COATED ORAL at 21:10

## 2019-08-05 RX ADMIN — Medication 75 MICROGRAM(S): at 05:11

## 2019-08-06 DIAGNOSIS — M81.0 AGE-RELATED OSTEOPOROSIS WITHOUT CURRENT PATHOLOGICAL FRACTURE: ICD-10-CM

## 2019-08-06 DIAGNOSIS — S32.592A OTHER SPECIFIED FRACTURE OF LEFT PUBIS, INITIAL ENCOUNTER FOR CLOSED FRACTURE: ICD-10-CM

## 2019-08-06 DIAGNOSIS — S60.222A CONTUSION OF LEFT HAND, INITIAL ENCOUNTER: ICD-10-CM

## 2019-08-06 DIAGNOSIS — I10 ESSENTIAL (PRIMARY) HYPERTENSION: ICD-10-CM

## 2019-08-06 DIAGNOSIS — E03.9 HYPOTHYROIDISM, UNSPECIFIED: ICD-10-CM

## 2019-08-06 DIAGNOSIS — Z96.641 PRESENCE OF RIGHT ARTIFICIAL HIP JOINT: ICD-10-CM

## 2019-08-06 DIAGNOSIS — H35.3221 EXUDATIVE AGE-RELATED MACULAR DEGENERATION, LEFT EYE, WITH ACTIVE CHOROIDAL NEOVASCULARIZATION: ICD-10-CM
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
## 2019-08-06 DIAGNOSIS — E78.5 HYPERLIPIDEMIA, UNSPECIFIED: ICD-10-CM

## 2019-08-06 DIAGNOSIS — S37.892A CONTUSION OF OTHER URINARY AND PELVIC ORGAN, INITIAL ENCOUNTER: ICD-10-CM
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
## 2019-08-06 DIAGNOSIS — R29.6 REPEATED FALLS: ICD-10-CM
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
## 2019-08-06 DIAGNOSIS — S42.452A DISPLACED FRACTURE OF LATERAL CONDYLE OF LEFT HUMERUS, INITIAL ENCOUNTER FOR CLOSED FRACTURE: ICD-10-CM

## 2019-08-06 DIAGNOSIS — S09.90XA UNSPECIFIED INJURY OF HEAD, INITIAL ENCOUNTER: ICD-10-CM
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
## 2019-08-06 DIAGNOSIS — H40.1423 CAPSULAR GLAUCOMA WITH PSEUDOEXFOLIATION OF LENS, LEFT EYE, SEVERE STAGE: ICD-10-CM

## 2019-08-06 RX ADMIN — HEPARIN SODIUM 5000 UNIT(S): 5000 INJECTION INTRAVENOUS; SUBCUTANEOUS at 05:21

## 2019-08-06 RX ADMIN — FLUOROMETHOLONE 1 DROP(S): 1 SOLUTION/ DROPS OPHTHALMIC at 11:43

## 2019-08-06 RX ADMIN — Medication 650 MILLIGRAM(S): at 22:54

## 2019-08-06 RX ADMIN — Medication 1 DROP(S): at 07:38

## 2019-08-06 RX ADMIN — PANTOPRAZOLE SODIUM 40 MILLIGRAM(S): 20 TABLET, DELAYED RELEASE ORAL at 06:22

## 2019-08-06 RX ADMIN — Medication 650 MILLIGRAM(S): at 16:11

## 2019-08-06 RX ADMIN — Medication 650 MILLIGRAM(S): at 05:19

## 2019-08-06 RX ADMIN — SIMVASTATIN 20 MILLIGRAM(S): 20 TABLET, FILM COATED ORAL at 22:55

## 2019-08-06 RX ADMIN — HEPARIN SODIUM 5000 UNIT(S): 5000 INJECTION INTRAVENOUS; SUBCUTANEOUS at 22:55

## 2019-08-06 RX ADMIN — Medication 650 MILLIGRAM(S): at 23:53

## 2019-08-06 RX ADMIN — AMLODIPINE BESYLATE 5 MILLIGRAM(S): 2.5 TABLET ORAL at 05:21

## 2019-08-06 RX ADMIN — Medication 1 MILLIGRAM(S): at 22:55

## 2019-08-06 RX ADMIN — LISINOPRIL 20 MILLIGRAM(S): 2.5 TABLET ORAL at 05:20

## 2019-08-06 RX ADMIN — LATANOPROST 1 DROP(S): 0.05 SOLUTION/ DROPS OPHTHALMIC; TOPICAL at 22:55

## 2019-08-06 RX ADMIN — Medication 650 MILLIGRAM(S): at 14:47

## 2019-08-06 RX ADMIN — HEPARIN SODIUM 5000 UNIT(S): 5000 INJECTION INTRAVENOUS; SUBCUTANEOUS at 14:47

## 2019-08-06 RX ADMIN — Medication 75 MICROGRAM(S): at 05:20

## 2019-08-07 LAB
-  AMIKACIN: SIGNIFICANT CHANGE UP
-  AMPICILLIN/SULBACTAM: SIGNIFICANT CHANGE UP
-  AMPICILLIN: SIGNIFICANT CHANGE UP
-  AZTREONAM: SIGNIFICANT CHANGE UP
-  CEFAZOLIN: SIGNIFICANT CHANGE UP
-  CEFEPIME: SIGNIFICANT CHANGE UP
-  CEFOXITIN: SIGNIFICANT CHANGE UP
-  CEFTRIAXONE: SIGNIFICANT CHANGE UP
-  CIPROFLOXACIN: SIGNIFICANT CHANGE UP
-  GENTAMICIN: SIGNIFICANT CHANGE UP
-  LEVOFLOXACIN: SIGNIFICANT CHANGE UP
-  MEROPENEM: SIGNIFICANT CHANGE UP
-  NITROFURANTOIN: SIGNIFICANT CHANGE UP
-  PIPERACILLIN/TAZOBACTAM: SIGNIFICANT CHANGE UP
-  TOBRAMYCIN: SIGNIFICANT CHANGE UP
-  TRIMETHOPRIM/SULFAMETHOXAZOLE: SIGNIFICANT CHANGE UP
CULTURE RESULTS: SIGNIFICANT CHANGE UP
METHOD TYPE: SIGNIFICANT CHANGE UP
ORGANISM # SPEC MICROSCOPIC CNT: SIGNIFICANT CHANGE UP
ORGANISM # SPEC MICROSCOPIC CNT: SIGNIFICANT CHANGE UP
SPECIMEN SOURCE: SIGNIFICANT CHANGE UP

## 2019-08-07 RX ORDER — NITROFURANTOIN MACROCRYSTAL 50 MG
100 CAPSULE ORAL
Refills: 0 | Status: DISCONTINUED | OUTPATIENT
Start: 2019-08-07 | End: 2019-08-07

## 2019-08-07 RX ORDER — CIPROFLOXACIN LACTATE 400MG/40ML
250 VIAL (ML) INTRAVENOUS
Refills: 0 | Status: DISCONTINUED | OUTPATIENT
Start: 2019-08-07 | End: 2019-08-13

## 2019-08-07 RX ADMIN — Medication 650 MILLIGRAM(S): at 23:14

## 2019-08-07 RX ADMIN — HEPARIN SODIUM 5000 UNIT(S): 5000 INJECTION INTRAVENOUS; SUBCUTANEOUS at 23:15

## 2019-08-07 RX ADMIN — Medication 650 MILLIGRAM(S): at 17:11

## 2019-08-07 RX ADMIN — HEPARIN SODIUM 5000 UNIT(S): 5000 INJECTION INTRAVENOUS; SUBCUTANEOUS at 06:42

## 2019-08-07 RX ADMIN — Medication 1 DROP(S): at 07:59

## 2019-08-07 RX ADMIN — LATANOPROST 1 DROP(S): 0.05 SOLUTION/ DROPS OPHTHALMIC; TOPICAL at 23:17

## 2019-08-07 RX ADMIN — Medication 250 MILLIGRAM(S): at 17:15

## 2019-08-07 RX ADMIN — Medication 650 MILLIGRAM(S): at 11:53

## 2019-08-07 RX ADMIN — PANTOPRAZOLE SODIUM 40 MILLIGRAM(S): 20 TABLET, DELAYED RELEASE ORAL at 06:42

## 2019-08-07 RX ADMIN — Medication 1 MILLIGRAM(S): at 23:14

## 2019-08-07 RX ADMIN — Medication 650 MILLIGRAM(S): at 07:57

## 2019-08-07 RX ADMIN — SIMVASTATIN 20 MILLIGRAM(S): 20 TABLET, FILM COATED ORAL at 23:17

## 2019-08-07 RX ADMIN — AMLODIPINE BESYLATE 5 MILLIGRAM(S): 2.5 TABLET ORAL at 06:42

## 2019-08-07 RX ADMIN — Medication 75 MICROGRAM(S): at 06:42

## 2019-08-07 RX ADMIN — HEPARIN SODIUM 5000 UNIT(S): 5000 INJECTION INTRAVENOUS; SUBCUTANEOUS at 11:52

## 2019-08-07 RX ADMIN — FLUOROMETHOLONE 1 DROP(S): 1 SOLUTION/ DROPS OPHTHALMIC at 11:53

## 2019-08-07 RX ADMIN — LISINOPRIL 20 MILLIGRAM(S): 2.5 TABLET ORAL at 06:42

## 2019-08-07 RX ADMIN — Medication 650 MILLIGRAM(S): at 06:42

## 2019-08-08 LAB — BLD GP AB SCN SERPL QL: SIGNIFICANT CHANGE UP

## 2019-08-08 RX ORDER — FERROUS SULFATE 325(65) MG
325 TABLET ORAL EVERY 12 HOURS
Refills: 0 | Status: DISCONTINUED | OUTPATIENT
Start: 2019-08-08 | End: 2019-08-15

## 2019-08-08 RX ADMIN — Medication 250 MILLIGRAM(S): at 06:51

## 2019-08-08 RX ADMIN — Medication 650 MILLIGRAM(S): at 21:49

## 2019-08-08 RX ADMIN — Medication 650 MILLIGRAM(S): at 06:51

## 2019-08-08 RX ADMIN — PANTOPRAZOLE SODIUM 40 MILLIGRAM(S): 20 TABLET, DELAYED RELEASE ORAL at 06:51

## 2019-08-08 RX ADMIN — Medication 650 MILLIGRAM(S): at 13:05

## 2019-08-08 RX ADMIN — Medication 75 MICROGRAM(S): at 06:50

## 2019-08-08 RX ADMIN — LISINOPRIL 20 MILLIGRAM(S): 2.5 TABLET ORAL at 06:51

## 2019-08-08 RX ADMIN — HEPARIN SODIUM 5000 UNIT(S): 5000 INJECTION INTRAVENOUS; SUBCUTANEOUS at 06:53

## 2019-08-08 RX ADMIN — AMLODIPINE BESYLATE 5 MILLIGRAM(S): 2.5 TABLET ORAL at 06:50

## 2019-08-08 RX ADMIN — HEPARIN SODIUM 5000 UNIT(S): 5000 INJECTION INTRAVENOUS; SUBCUTANEOUS at 21:42

## 2019-08-08 RX ADMIN — Medication 650 MILLIGRAM(S): at 21:42

## 2019-08-08 RX ADMIN — SIMVASTATIN 20 MILLIGRAM(S): 20 TABLET, FILM COATED ORAL at 21:42

## 2019-08-08 RX ADMIN — Medication 250 MILLIGRAM(S): at 17:13

## 2019-08-08 RX ADMIN — Medication 650 MILLIGRAM(S): at 07:06

## 2019-08-08 RX ADMIN — LATANOPROST 1 DROP(S): 0.05 SOLUTION/ DROPS OPHTHALMIC; TOPICAL at 21:43

## 2019-08-08 RX ADMIN — Medication 1 MILLIGRAM(S): at 21:42

## 2019-08-08 RX ADMIN — HEPARIN SODIUM 5000 UNIT(S): 5000 INJECTION INTRAVENOUS; SUBCUTANEOUS at 13:05

## 2019-08-08 RX ADMIN — FLUOROMETHOLONE 1 DROP(S): 1 SOLUTION/ DROPS OPHTHALMIC at 12:15

## 2019-08-08 RX ADMIN — Medication 325 MILLIGRAM(S): at 17:13

## 2019-08-08 RX ADMIN — Medication 1 DROP(S): at 07:53

## 2019-08-09 LAB
HCT VFR BLD CALC: 28.9 % — LOW (ref 37–47)
HGB BLD-MCNC: 9.1 G/DL — LOW (ref 12–16)
MCHC RBC-ENTMCNC: 29.4 PG — SIGNIFICANT CHANGE UP (ref 27–31)
MCHC RBC-ENTMCNC: 31.5 G/DL — LOW (ref 32–37)
MCV RBC AUTO: 93.2 FL — SIGNIFICANT CHANGE UP (ref 81–99)
NRBC # BLD: 0 /100 WBCS — SIGNIFICANT CHANGE UP (ref 0–0)
PLATELET # BLD AUTO: 233 K/UL — SIGNIFICANT CHANGE UP (ref 130–400)
RBC # BLD: 3.1 M/UL — LOW (ref 4.2–5.4)
RBC # FLD: 15.3 % — HIGH (ref 11.5–14.5)
WBC # BLD: 6.42 K/UL — SIGNIFICANT CHANGE UP (ref 4.8–10.8)
WBC # FLD AUTO: 6.42 K/UL — SIGNIFICANT CHANGE UP (ref 4.8–10.8)

## 2019-08-09 RX ADMIN — Medication 650 MILLIGRAM(S): at 06:06

## 2019-08-09 RX ADMIN — LISINOPRIL 20 MILLIGRAM(S): 2.5 TABLET ORAL at 06:32

## 2019-08-09 RX ADMIN — Medication 650 MILLIGRAM(S): at 13:49

## 2019-08-09 RX ADMIN — LATANOPROST 1 DROP(S): 0.05 SOLUTION/ DROPS OPHTHALMIC; TOPICAL at 21:44

## 2019-08-09 RX ADMIN — Medication 325 MILLIGRAM(S): at 05:59

## 2019-08-09 RX ADMIN — HEPARIN SODIUM 5000 UNIT(S): 5000 INJECTION INTRAVENOUS; SUBCUTANEOUS at 21:46

## 2019-08-09 RX ADMIN — Medication 1 DROP(S): at 07:44

## 2019-08-09 RX ADMIN — Medication 1 MILLIGRAM(S): at 21:45

## 2019-08-09 RX ADMIN — FLUOROMETHOLONE 1 DROP(S): 1 SOLUTION/ DROPS OPHTHALMIC at 12:10

## 2019-08-09 RX ADMIN — Medication 250 MILLIGRAM(S): at 17:06

## 2019-08-09 RX ADMIN — Medication 325 MILLIGRAM(S): at 17:06

## 2019-08-09 RX ADMIN — Medication 75 MICROGRAM(S): at 06:00

## 2019-08-09 RX ADMIN — Medication 250 MILLIGRAM(S): at 05:58

## 2019-08-09 RX ADMIN — Medication 650 MILLIGRAM(S): at 21:45

## 2019-08-09 RX ADMIN — Medication 650 MILLIGRAM(S): at 21:49

## 2019-08-09 RX ADMIN — SIMVASTATIN 20 MILLIGRAM(S): 20 TABLET, FILM COATED ORAL at 21:45

## 2019-08-09 RX ADMIN — Medication 650 MILLIGRAM(S): at 05:58

## 2019-08-09 RX ADMIN — PANTOPRAZOLE SODIUM 40 MILLIGRAM(S): 20 TABLET, DELAYED RELEASE ORAL at 06:05

## 2019-08-09 RX ADMIN — AMLODIPINE BESYLATE 5 MILLIGRAM(S): 2.5 TABLET ORAL at 06:00

## 2019-08-09 RX ADMIN — Medication 650 MILLIGRAM(S): at 13:51

## 2019-08-09 RX ADMIN — HEPARIN SODIUM 5000 UNIT(S): 5000 INJECTION INTRAVENOUS; SUBCUTANEOUS at 05:59

## 2019-08-09 RX ADMIN — HEPARIN SODIUM 5000 UNIT(S): 5000 INJECTION INTRAVENOUS; SUBCUTANEOUS at 13:49

## 2019-08-10 RX ADMIN — LATANOPROST 1 DROP(S): 0.05 SOLUTION/ DROPS OPHTHALMIC; TOPICAL at 21:34

## 2019-08-10 RX ADMIN — AMLODIPINE BESYLATE 5 MILLIGRAM(S): 2.5 TABLET ORAL at 06:04

## 2019-08-10 RX ADMIN — Medication 325 MILLIGRAM(S): at 17:17

## 2019-08-10 RX ADMIN — Medication 1 MILLIGRAM(S): at 21:35

## 2019-08-10 RX ADMIN — Medication 75 MICROGRAM(S): at 06:04

## 2019-08-10 RX ADMIN — Medication 250 MILLIGRAM(S): at 06:04

## 2019-08-10 RX ADMIN — Medication 1 DROP(S): at 07:41

## 2019-08-10 RX ADMIN — Medication 650 MILLIGRAM(S): at 06:09

## 2019-08-10 RX ADMIN — Medication 250 MILLIGRAM(S): at 17:17

## 2019-08-10 RX ADMIN — HEPARIN SODIUM 5000 UNIT(S): 5000 INJECTION INTRAVENOUS; SUBCUTANEOUS at 21:34

## 2019-08-10 RX ADMIN — HEPARIN SODIUM 5000 UNIT(S): 5000 INJECTION INTRAVENOUS; SUBCUTANEOUS at 14:40

## 2019-08-10 RX ADMIN — SIMVASTATIN 20 MILLIGRAM(S): 20 TABLET, FILM COATED ORAL at 21:35

## 2019-08-10 RX ADMIN — Medication 650 MILLIGRAM(S): at 21:35

## 2019-08-10 RX ADMIN — PANTOPRAZOLE SODIUM 40 MILLIGRAM(S): 20 TABLET, DELAYED RELEASE ORAL at 06:04

## 2019-08-10 RX ADMIN — FLUOROMETHOLONE 1 DROP(S): 1 SOLUTION/ DROPS OPHTHALMIC at 11:41

## 2019-08-10 RX ADMIN — LISINOPRIL 20 MILLIGRAM(S): 2.5 TABLET ORAL at 06:04

## 2019-08-10 RX ADMIN — HEPARIN SODIUM 5000 UNIT(S): 5000 INJECTION INTRAVENOUS; SUBCUTANEOUS at 06:06

## 2019-08-10 RX ADMIN — Medication 650 MILLIGRAM(S): at 06:04

## 2019-08-10 RX ADMIN — Medication 325 MILLIGRAM(S): at 06:04

## 2019-08-11 RX ADMIN — LISINOPRIL 20 MILLIGRAM(S): 2.5 TABLET ORAL at 06:31

## 2019-08-11 RX ADMIN — Medication 1 DROP(S): at 12:49

## 2019-08-11 RX ADMIN — HEPARIN SODIUM 5000 UNIT(S): 5000 INJECTION INTRAVENOUS; SUBCUTANEOUS at 06:29

## 2019-08-11 RX ADMIN — HEPARIN SODIUM 5000 UNIT(S): 5000 INJECTION INTRAVENOUS; SUBCUTANEOUS at 21:12

## 2019-08-11 RX ADMIN — Medication 650 MILLIGRAM(S): at 21:12

## 2019-08-11 RX ADMIN — Medication 325 MILLIGRAM(S): at 06:31

## 2019-08-11 RX ADMIN — Medication 650 MILLIGRAM(S): at 06:30

## 2019-08-11 RX ADMIN — Medication 1 MILLIGRAM(S): at 21:12

## 2019-08-11 RX ADMIN — SIMVASTATIN 20 MILLIGRAM(S): 20 TABLET, FILM COATED ORAL at 21:12

## 2019-08-11 RX ADMIN — Medication 650 MILLIGRAM(S): at 14:46

## 2019-08-11 RX ADMIN — Medication 650 MILLIGRAM(S): at 13:16

## 2019-08-11 RX ADMIN — Medication 325 MILLIGRAM(S): at 17:23

## 2019-08-11 RX ADMIN — HEPARIN SODIUM 5000 UNIT(S): 5000 INJECTION INTRAVENOUS; SUBCUTANEOUS at 13:17

## 2019-08-11 RX ADMIN — Medication 75 MICROGRAM(S): at 06:31

## 2019-08-11 RX ADMIN — PANTOPRAZOLE SODIUM 40 MILLIGRAM(S): 20 TABLET, DELAYED RELEASE ORAL at 06:31

## 2019-08-11 RX ADMIN — AMLODIPINE BESYLATE 5 MILLIGRAM(S): 2.5 TABLET ORAL at 06:30

## 2019-08-11 RX ADMIN — Medication 250 MILLIGRAM(S): at 17:23

## 2019-08-11 RX ADMIN — Medication 250 MILLIGRAM(S): at 06:31

## 2019-08-11 RX ADMIN — FLUOROMETHOLONE 1 DROP(S): 1 SOLUTION/ DROPS OPHTHALMIC at 13:17

## 2019-08-11 RX ADMIN — LATANOPROST 1 DROP(S): 0.05 SOLUTION/ DROPS OPHTHALMIC; TOPICAL at 21:12

## 2019-08-12 LAB
ALBUMIN SERPL ELPH-MCNC: 4 G/DL — SIGNIFICANT CHANGE UP (ref 3.5–5.2)
ALP SERPL-CCNC: 134 U/L — HIGH (ref 30–115)
ALT FLD-CCNC: 9 U/L — SIGNIFICANT CHANGE UP (ref 0–41)
ANION GAP SERPL CALC-SCNC: 11 MMOL/L — SIGNIFICANT CHANGE UP (ref 7–14)
AST SERPL-CCNC: 19 U/L — SIGNIFICANT CHANGE UP (ref 0–41)
BILIRUB SERPL-MCNC: 0.4 MG/DL — SIGNIFICANT CHANGE UP (ref 0.2–1.2)
BUN SERPL-MCNC: 49 MG/DL — HIGH (ref 10–20)
CALCIUM SERPL-MCNC: 9.6 MG/DL — SIGNIFICANT CHANGE UP (ref 8.5–10.1)
CHLORIDE SERPL-SCNC: 105 MMOL/L — SIGNIFICANT CHANGE UP (ref 98–110)
CO2 SERPL-SCNC: 22 MMOL/L — SIGNIFICANT CHANGE UP (ref 17–32)
CREAT SERPL-MCNC: 1.5 MG/DL — SIGNIFICANT CHANGE UP (ref 0.7–1.5)
GLUCOSE SERPL-MCNC: 91 MG/DL — SIGNIFICANT CHANGE UP (ref 70–99)
HCT VFR BLD CALC: 27.7 % — LOW (ref 37–47)
HGB BLD-MCNC: 8.7 G/DL — LOW (ref 12–16)
MCHC RBC-ENTMCNC: 29.1 PG — SIGNIFICANT CHANGE UP (ref 27–31)
MCHC RBC-ENTMCNC: 31.4 G/DL — LOW (ref 32–37)
MCV RBC AUTO: 92.6 FL — SIGNIFICANT CHANGE UP (ref 81–99)
NRBC # BLD: 0 /100 WBCS — SIGNIFICANT CHANGE UP (ref 0–0)
PLATELET # BLD AUTO: 217 K/UL — SIGNIFICANT CHANGE UP (ref 130–400)
POTASSIUM SERPL-MCNC: 5.1 MMOL/L — HIGH (ref 3.5–5)
POTASSIUM SERPL-SCNC: 5.1 MMOL/L — HIGH (ref 3.5–5)
PROT SERPL-MCNC: 7.2 G/DL — SIGNIFICANT CHANGE UP (ref 6–8)
RBC # BLD: 2.99 M/UL — LOW (ref 4.2–5.4)
RBC # FLD: 15.5 % — HIGH (ref 11.5–14.5)
SODIUM SERPL-SCNC: 138 MMOL/L — SIGNIFICANT CHANGE UP (ref 135–146)
WBC # BLD: 5.74 K/UL — SIGNIFICANT CHANGE UP (ref 4.8–10.8)
WBC # FLD AUTO: 5.74 K/UL — SIGNIFICANT CHANGE UP (ref 4.8–10.8)

## 2019-08-12 RX ADMIN — Medication 650 MILLIGRAM(S): at 15:55

## 2019-08-12 RX ADMIN — Medication 650 MILLIGRAM(S): at 06:35

## 2019-08-12 RX ADMIN — FLUOROMETHOLONE 1 DROP(S): 1 SOLUTION/ DROPS OPHTHALMIC at 15:55

## 2019-08-12 RX ADMIN — Medication 325 MILLIGRAM(S): at 06:36

## 2019-08-12 RX ADMIN — Medication 325 MILLIGRAM(S): at 17:20

## 2019-08-12 RX ADMIN — Medication 75 MICROGRAM(S): at 06:35

## 2019-08-12 RX ADMIN — AMLODIPINE BESYLATE 5 MILLIGRAM(S): 2.5 TABLET ORAL at 06:36

## 2019-08-12 RX ADMIN — Medication 250 MILLIGRAM(S): at 17:20

## 2019-08-12 RX ADMIN — LISINOPRIL 20 MILLIGRAM(S): 2.5 TABLET ORAL at 06:35

## 2019-08-12 RX ADMIN — Medication 250 MILLIGRAM(S): at 06:36

## 2019-08-12 RX ADMIN — Medication 1 DROP(S): at 07:39

## 2019-08-12 RX ADMIN — HEPARIN SODIUM 5000 UNIT(S): 5000 INJECTION INTRAVENOUS; SUBCUTANEOUS at 15:56

## 2019-08-12 RX ADMIN — LATANOPROST 1 DROP(S): 0.05 SOLUTION/ DROPS OPHTHALMIC; TOPICAL at 21:33

## 2019-08-12 RX ADMIN — PANTOPRAZOLE SODIUM 40 MILLIGRAM(S): 20 TABLET, DELAYED RELEASE ORAL at 06:36

## 2019-08-12 RX ADMIN — Medication 650 MILLIGRAM(S): at 17:20

## 2019-08-12 RX ADMIN — Medication 650 MILLIGRAM(S): at 21:32

## 2019-08-12 RX ADMIN — SIMVASTATIN 20 MILLIGRAM(S): 20 TABLET, FILM COATED ORAL at 21:31

## 2019-08-12 RX ADMIN — HEPARIN SODIUM 5000 UNIT(S): 5000 INJECTION INTRAVENOUS; SUBCUTANEOUS at 06:36

## 2019-08-12 RX ADMIN — HEPARIN SODIUM 5000 UNIT(S): 5000 INJECTION INTRAVENOUS; SUBCUTANEOUS at 21:32

## 2019-08-12 RX ADMIN — Medication 1 MILLIGRAM(S): at 21:31

## 2019-08-13 RX ADMIN — Medication 75 MICROGRAM(S): at 05:43

## 2019-08-13 RX ADMIN — AMLODIPINE BESYLATE 5 MILLIGRAM(S): 2.5 TABLET ORAL at 05:43

## 2019-08-13 RX ADMIN — Medication 650 MILLIGRAM(S): at 21:56

## 2019-08-13 RX ADMIN — Medication 325 MILLIGRAM(S): at 05:43

## 2019-08-13 RX ADMIN — Medication 650 MILLIGRAM(S): at 14:34

## 2019-08-13 RX ADMIN — Medication 1 MILLIGRAM(S): at 21:55

## 2019-08-13 RX ADMIN — LISINOPRIL 20 MILLIGRAM(S): 2.5 TABLET ORAL at 05:43

## 2019-08-13 RX ADMIN — Medication 325 MILLIGRAM(S): at 18:34

## 2019-08-13 RX ADMIN — FLUOROMETHOLONE 1 DROP(S): 1 SOLUTION/ DROPS OPHTHALMIC at 12:06

## 2019-08-13 RX ADMIN — Medication 650 MILLIGRAM(S): at 21:55

## 2019-08-13 RX ADMIN — HEPARIN SODIUM 5000 UNIT(S): 5000 INJECTION INTRAVENOUS; SUBCUTANEOUS at 21:55

## 2019-08-13 RX ADMIN — HEPARIN SODIUM 5000 UNIT(S): 5000 INJECTION INTRAVENOUS; SUBCUTANEOUS at 14:34

## 2019-08-13 RX ADMIN — Medication 1 DROP(S): at 07:56

## 2019-08-13 RX ADMIN — LATANOPROST 1 DROP(S): 0.05 SOLUTION/ DROPS OPHTHALMIC; TOPICAL at 21:55

## 2019-08-13 RX ADMIN — Medication 250 MILLIGRAM(S): at 05:43

## 2019-08-13 RX ADMIN — Medication 650 MILLIGRAM(S): at 05:43

## 2019-08-13 RX ADMIN — SIMVASTATIN 20 MILLIGRAM(S): 20 TABLET, FILM COATED ORAL at 21:55

## 2019-08-13 RX ADMIN — PANTOPRAZOLE SODIUM 40 MILLIGRAM(S): 20 TABLET, DELAYED RELEASE ORAL at 06:10

## 2019-08-13 RX ADMIN — HEPARIN SODIUM 5000 UNIT(S): 5000 INJECTION INTRAVENOUS; SUBCUTANEOUS at 05:44

## 2019-08-14 ENCOUNTER — TRANSCRIPTION ENCOUNTER (OUTPATIENT)
Age: 84
End: 2019-08-14

## 2019-08-14 RX ORDER — PANTOPRAZOLE SODIUM 20 MG/1
1 TABLET, DELAYED RELEASE ORAL
Qty: 0 | Refills: 0 | DISCHARGE
Start: 2019-08-14

## 2019-08-14 RX ORDER — TIMOLOL 0.5 %
1 DROPS OPHTHALMIC (EYE)
Qty: 0 | Refills: 0 | DISCHARGE
Start: 2019-08-14

## 2019-08-14 RX ORDER — LEVOTHYROXINE SODIUM 125 MCG
1 TABLET ORAL
Qty: 0 | Refills: 0 | DISCHARGE

## 2019-08-14 RX ORDER — SIMVASTATIN 20 MG/1
1 TABLET, FILM COATED ORAL
Qty: 0 | Refills: 0 | DISCHARGE
Start: 2019-08-14

## 2019-08-14 RX ORDER — FLUOROMETHOLONE 1 MG/ML
1 SOLUTION/ DROPS OPHTHALMIC
Qty: 0 | Refills: 0 | DISCHARGE
Start: 2019-08-14

## 2019-08-14 RX ORDER — PANTOPRAZOLE SODIUM 20 MG/1
1 TABLET, DELAYED RELEASE ORAL
Qty: 0 | Refills: 0 | DISCHARGE

## 2019-08-14 RX ORDER — LEVOTHYROXINE SODIUM 125 MCG
1 TABLET ORAL
Qty: 0 | Refills: 0 | DISCHARGE
Start: 2019-08-14

## 2019-08-14 RX ORDER — SIMVASTATIN 20 MG/1
1 TABLET, FILM COATED ORAL
Qty: 0 | Refills: 0 | DISCHARGE

## 2019-08-14 RX ORDER — FERROUS SULFATE 325(65) MG
1 TABLET ORAL
Qty: 30 | Refills: 0
Start: 2019-08-14 | End: 2019-09-12

## 2019-08-14 RX ORDER — LATANOPROST 0.05 MG/ML
1 SOLUTION/ DROPS OPHTHALMIC; TOPICAL
Qty: 0 | Refills: 0 | DISCHARGE

## 2019-08-14 RX ORDER — ACETAMINOPHEN 500 MG
2 TABLET ORAL
Qty: 0 | Refills: 0 | DISCHARGE
Start: 2019-08-14

## 2019-08-14 RX ORDER — LATANOPROST 0.05 MG/ML
1 SOLUTION/ DROPS OPHTHALMIC; TOPICAL
Qty: 0 | Refills: 0 | DISCHARGE
Start: 2019-08-14

## 2019-08-14 RX ORDER — FLUOROMETHOLONE 1 MG/ML
1 SOLUTION/ DROPS OPHTHALMIC
Qty: 0 | Refills: 0 | DISCHARGE

## 2019-08-14 RX ORDER — BRIMONIDINE TARTRATE, TIMOLOL MALEATE 2; 5 MG/ML; MG/ML
1 SOLUTION/ DROPS OPHTHALMIC
Qty: 0 | Refills: 0 | DISCHARGE

## 2019-08-14 RX ORDER — LANOLIN ALCOHOL/MO/W.PET/CERES
1 CREAM (GRAM) TOPICAL
Qty: 0 | Refills: 0 | DISCHARGE
Start: 2019-08-14

## 2019-08-14 RX ADMIN — Medication 325 MILLIGRAM(S): at 17:37

## 2019-08-14 RX ADMIN — SIMVASTATIN 20 MILLIGRAM(S): 20 TABLET, FILM COATED ORAL at 21:50

## 2019-08-14 RX ADMIN — Medication 650 MILLIGRAM(S): at 14:48

## 2019-08-14 RX ADMIN — Medication 1 MILLIGRAM(S): at 21:50

## 2019-08-14 RX ADMIN — HEPARIN SODIUM 5000 UNIT(S): 5000 INJECTION INTRAVENOUS; SUBCUTANEOUS at 14:48

## 2019-08-14 RX ADMIN — Medication 650 MILLIGRAM(S): at 06:21

## 2019-08-14 RX ADMIN — PANTOPRAZOLE SODIUM 40 MILLIGRAM(S): 20 TABLET, DELAYED RELEASE ORAL at 06:21

## 2019-08-14 RX ADMIN — Medication 650 MILLIGRAM(S): at 21:51

## 2019-08-14 RX ADMIN — Medication 650 MILLIGRAM(S): at 14:52

## 2019-08-14 RX ADMIN — Medication 1 DROP(S): at 09:05

## 2019-08-14 RX ADMIN — Medication 75 MICROGRAM(S): at 06:21

## 2019-08-14 RX ADMIN — Medication 650 MILLIGRAM(S): at 21:50

## 2019-08-14 RX ADMIN — Medication 650 MILLIGRAM(S): at 06:22

## 2019-08-14 RX ADMIN — Medication 325 MILLIGRAM(S): at 09:06

## 2019-08-14 RX ADMIN — LATANOPROST 1 DROP(S): 0.05 SOLUTION/ DROPS OPHTHALMIC; TOPICAL at 21:50

## 2019-08-14 RX ADMIN — LISINOPRIL 20 MILLIGRAM(S): 2.5 TABLET ORAL at 06:22

## 2019-08-14 RX ADMIN — HEPARIN SODIUM 5000 UNIT(S): 5000 INJECTION INTRAVENOUS; SUBCUTANEOUS at 21:50

## 2019-08-14 RX ADMIN — HEPARIN SODIUM 5000 UNIT(S): 5000 INJECTION INTRAVENOUS; SUBCUTANEOUS at 06:22

## 2019-08-14 RX ADMIN — AMLODIPINE BESYLATE 5 MILLIGRAM(S): 2.5 TABLET ORAL at 06:22

## 2019-08-14 NOTE — DISCHARGE NOTE PROVIDER - NSDCCPCAREPLAN_GEN_ALL_CORE_FT
PRINCIPAL DISCHARGE DIAGNOSIS  Diagnosis: Closed fracture of multiple pubic rami, initial encounter  Assessment and Plan of Treatment:       SECONDARY DISCHARGE DIAGNOSES  Diagnosis: Hypothyroidism  Assessment and Plan of Treatment:     Diagnosis: Hyperlipidemia  Assessment and Plan of Treatment:     Diagnosis: Hypertension  Assessment and Plan of Treatment:     Diagnosis: Closed nondisplaced fracture of lateral epicondyle of left humerus  Assessment and Plan of Treatment:

## 2019-08-14 NOTE — DISCHARGE NOTE PROVIDER - CARE PROVIDER_API CALL
Koffi Cooper (MD)  Internal Medicine  [address] [place] [address]  Phone: [phone]  Fax: [phone]  Follow Up Time:     Silverio Mathew)  Orthopaedic Surgery  [address] [place] [address]  Phone: [phone]  Fax: [phone]  Follow Up Time: Koffi Cooper (MD)  Internal Medicine  [address] [place] [address]  Phone: [phone]  Fax: [phone]  Follow Up Time: 2 weeks    Silverio Mathew)  Orthopaedic Surgery  [address] [place] [address]  Phone: [phone]  Fax: [phone]  Follow Up Time: 2 weeks

## 2019-08-14 NOTE — DISCHARGE NOTE PROVIDER - NSDCHHENCOUNTER_GEN_ALL_CORE
----- Message from Cheri Carmen sent at 12/12/2018  2:16 PM CST -----  Contact: Pt  Pt is requesting a earlier appointment , stated PCP will be sending a referral Call back: [phone]    
Returned patient's call. No answer; left message to call back.   
14-Aug-2019

## 2019-08-14 NOTE — DISCHARGE NOTE PROVIDER - HOSPITAL COURSE
HPI: 89 y/o female s/p mechanical fall - HT, -LOC, +ASA L sup/inf pubic rami fx, L inner pelvis hematoma, L lateral epicondyle fx    Patient is a 89y old Female with a history of HTN, HLD, hypothyroidism, who presented with a chief complaint of L rib and hip pain after falling on the street. Imaging showed a L pubic rami fracture, L humoral neck chronic deformity, L lateral epicondyle fracture, no bladder rupture. Orthopedic surgery team gave ace wrap to elbow, recommended weight bearing as tolerated for upper and lower extremities, with outpatient follow-up. She was transferred to the medical service for Her creatinine went from 1.8 and 1.6 on 7/27 down to 1.4, then back to baseline1.6 on 7/28. She is tolerating PO well with adequate fluid intake. Using walker for ambulation, still with pain but doing OK. She is being transferred to  rehab.            Prior functional status:    independent without device        Admission Physical Exam:    Vital signs stable. Afebrile    Gen: female, alert, NAD    Cardio: RRR    Lungs: clear    Abd: soft, NT, nd    Extremities: without edema. PROM wfl.    Motor Power: WNL except L hip flexor 2 degrees to full    Sensation: intact        Hospital Course: The patient was admitted to the acute inpatient rehab unit presenting with a decline in functional status. The patient participated in three hours of multidisciplinary therapy 5-6 days per week. The patient was continued on all home medications or equivalent alternatives as deemed appropriate. The patient received prophylactic anticoagulation medication and was monitored closely with no complications. During the stay on the inpatient unit, the patient showed as much progress as had been anticipated and was cleared for discharge by a multidisciplinary team.    Daily interval hx:    8/5 urinary tract infection with urine culture growing GN Rods (pending sensitivity): started on Cipro 250mg BID for 5 days.    8/13 finished 7day course of cipro for UTI    8/14 repeated duplex u/s for L leg, was negative. pt stable for d/c home today.        Discharge functional status:    PT: ambulated 150 feet with CS using RW    OT: functional mobility requires supervision setup with RW, mat transfer needs supervision setup with RW, Mat mobility requires supervision setup        Discharge disposition:    home with home care

## 2019-08-14 NOTE — DISCHARGE NOTE PROVIDER - PROVIDER TOKENS
PROVIDER:[TOKEN:[[id number]]],PROVIDER:[TOKEN:[[id number]]] PROVIDER:[TOKEN:[[id number]],FOLLOWUP:[2 weeks]],PROVIDER:[TOKEN:[[id number]],FOLLOWUP:[2 weeks]]

## 2019-08-14 NOTE — DISCHARGE NOTE PROVIDER - NSFOLLOWUPCLINICS_GEN_ALL_ED_FT
[facility]  Orthpedic  [address] [place], [place]   Phone: [phone]  Fax:   Follow Up Time:

## 2019-08-15 ENCOUNTER — TRANSCRIPTION ENCOUNTER (OUTPATIENT)
Age: 84
End: 2019-08-15

## 2019-08-15 RX ORDER — FERROUS SULFATE 325(65) MG
1 TABLET ORAL
Qty: 0 | Refills: 0 | DISCHARGE
Start: 2019-08-15

## 2019-08-15 RX ORDER — BRIMONIDINE TARTRATE, TIMOLOL MALEATE 2; 5 MG/ML; MG/ML
1 SOLUTION/ DROPS OPHTHALMIC
Qty: 0 | Refills: 0 | DISCHARGE
Start: 2019-08-15

## 2019-08-15 RX ADMIN — Medication 325 MILLIGRAM(S): at 06:30

## 2019-08-15 RX ADMIN — HEPARIN SODIUM 5000 UNIT(S): 5000 INJECTION INTRAVENOUS; SUBCUTANEOUS at 06:30

## 2019-08-15 RX ADMIN — FLUOROMETHOLONE 1 DROP(S): 1 SOLUTION/ DROPS OPHTHALMIC at 12:32

## 2019-08-15 RX ADMIN — Medication 650 MILLIGRAM(S): at 06:29

## 2019-08-15 RX ADMIN — Medication 650 MILLIGRAM(S): at 06:30

## 2019-08-15 RX ADMIN — Medication 75 MICROGRAM(S): at 06:30

## 2019-08-15 RX ADMIN — LISINOPRIL 20 MILLIGRAM(S): 2.5 TABLET ORAL at 06:30

## 2019-08-15 RX ADMIN — AMLODIPINE BESYLATE 5 MILLIGRAM(S): 2.5 TABLET ORAL at 06:30

## 2019-08-15 RX ADMIN — PANTOPRAZOLE SODIUM 40 MILLIGRAM(S): 20 TABLET, DELAYED RELEASE ORAL at 06:29

## 2019-08-15 RX ADMIN — Medication 1 DROP(S): at 07:35

## 2019-08-15 NOTE — DISCHARGE NOTE NURSING/CASE MANAGEMENT/SOCIAL WORK - NSDCDPATPORTLINK_GEN_ALL_CORE
You can access the [facility] Patient Portal, offered by [facility], by registering with the following website: [email or web address]

## 2019-08-26 DIAGNOSIS — S30.0XXD CONTUSION OF LOWER BACK AND PELVIS, SUBSEQUENT ENCOUNTER: ICD-10-CM

## 2019-08-26 DIAGNOSIS — N39.0 URINARY TRACT INFECTION, SITE NOT SPECIFIED: ICD-10-CM

## 2019-08-26 DIAGNOSIS — S32.82XD MULTIPLE FRACTURES OF PELVIS WITHOUT DISRUPTION OF PELVIC RING, SUBSEQUENT ENCOUNTER FOR FRACTURE WITH ROUTINE HEALING: ICD-10-CM

## 2019-08-26 DIAGNOSIS — H35.30 UNSPECIFIED MACULAR DEGENERATION: ICD-10-CM

## 2019-08-26 DIAGNOSIS — B96.4 PROTEUS (MIRABILIS) (MORGANII) AS THE CAUSE OF DISEASES CLASSIFIED ELSEWHERE: ICD-10-CM

## 2019-08-26 DIAGNOSIS — Z96.641 PRESENCE OF RIGHT ARTIFICIAL HIP JOINT: ICD-10-CM

## 2019-08-26 DIAGNOSIS — N18.9 CHRONIC KIDNEY DISEASE, UNSPECIFIED: ICD-10-CM

## 2019-08-26 DIAGNOSIS — M81.0 AGE-RELATED OSTEOPOROSIS WITHOUT CURRENT PATHOLOGICAL FRACTURE: ICD-10-CM

## 2019-08-26 DIAGNOSIS — S42.431D: ICD-10-CM

## 2019-08-26 DIAGNOSIS — E78.5 HYPERLIPIDEMIA, UNSPECIFIED: ICD-10-CM

## 2019-08-26 DIAGNOSIS — L60.8 OTHER NAIL DISORDERS: ICD-10-CM
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
## 2019-08-26 DIAGNOSIS — D64.9 ANEMIA, UNSPECIFIED: ICD-10-CM
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
## 2019-08-26 DIAGNOSIS — E03.9 HYPOTHYROIDISM, UNSPECIFIED: ICD-10-CM

## 2019-08-26 DIAGNOSIS — I12.9 HYPERTENSIVE CHRONIC KIDNEY DISEASE WITH STAGE 1 THROUGH STAGE 4 CHRONIC KIDNEY DISEASE, OR UNSPECIFIED CHRONIC KIDNEY DISEASE: ICD-10-CM

## 2019-12-09 ENCOUNTER — INPATIENT (INPATIENT)
Facility: HOSPITAL | Age: 84
LOS: 7 days | Discharge: NURSING, REHAB OR LONG-TERM CARE | End: 2019-12-17
Attending: INTERNAL MEDICINE | Admitting: INTERNAL MEDICINE
Payer: MEDICARE

## 2019-12-09 VITALS
SYSTOLIC BLOOD PRESSURE: 147 MMHG | OXYGEN SATURATION: 98 % | TEMPERATURE: 98 F | HEART RATE: 69 BPM | DIASTOLIC BLOOD PRESSURE: 65 MMHG | RESPIRATION RATE: 20 BRPM

## 2019-12-09 DIAGNOSIS — Z96.641 PRESENCE OF RIGHT ARTIFICIAL HIP JOINT: Chronic | ICD-10-CM

## 2019-12-09 LAB
ALBUMIN SERPL ELPH-MCNC: 4.5 G/DL — SIGNIFICANT CHANGE UP (ref 3.5–5.2)
ALP SERPL-CCNC: 76 U/L — SIGNIFICANT CHANGE UP (ref 30–115)
ALT FLD-CCNC: 9 U/L — SIGNIFICANT CHANGE UP (ref 0–41)
ANION GAP SERPL CALC-SCNC: 14 MMOL/L — SIGNIFICANT CHANGE UP (ref 7–14)
APTT BLD: 29.6 SEC — SIGNIFICANT CHANGE UP (ref 27–39.2)
AST SERPL-CCNC: 23 U/L — SIGNIFICANT CHANGE UP (ref 0–41)
BASOPHILS # BLD AUTO: 0.05 K/UL — SIGNIFICANT CHANGE UP (ref 0–0.2)
BASOPHILS NFR BLD AUTO: 0.6 % — SIGNIFICANT CHANGE UP (ref 0–1)
BILIRUB SERPL-MCNC: 0.4 MG/DL — SIGNIFICANT CHANGE UP (ref 0.2–1.2)
BLD GP AB SCN SERPL QL: SIGNIFICANT CHANGE UP
BUN SERPL-MCNC: 42 MG/DL — HIGH (ref 10–20)
CALCIUM SERPL-MCNC: 9.9 MG/DL — SIGNIFICANT CHANGE UP (ref 8.5–10.1)
CHLORIDE SERPL-SCNC: 104 MMOL/L — SIGNIFICANT CHANGE UP (ref 98–110)
CO2 SERPL-SCNC: 20 MMOL/L — SIGNIFICANT CHANGE UP (ref 17–32)
CREAT SERPL-MCNC: 1.7 MG/DL — HIGH (ref 0.7–1.5)
EOSINOPHIL # BLD AUTO: 0.17 K/UL — SIGNIFICANT CHANGE UP (ref 0–0.7)
EOSINOPHIL NFR BLD AUTO: 2 % — SIGNIFICANT CHANGE UP (ref 0–8)
GLUCOSE SERPL-MCNC: 123 MG/DL — HIGH (ref 70–99)
HCT VFR BLD CALC: 33.3 % — LOW (ref 37–47)
HGB BLD-MCNC: 10.6 G/DL — LOW (ref 12–16)
IMM GRANULOCYTES NFR BLD AUTO: 1.1 % — HIGH (ref 0.1–0.3)
LYMPHOCYTES # BLD AUTO: 1.28 K/UL — SIGNIFICANT CHANGE UP (ref 1.2–3.4)
LYMPHOCYTES # BLD AUTO: 15 % — LOW (ref 20.5–51.1)
MCHC RBC-ENTMCNC: 29.4 PG — SIGNIFICANT CHANGE UP (ref 27–31)
MCHC RBC-ENTMCNC: 31.8 G/DL — LOW (ref 32–37)
MCV RBC AUTO: 92.5 FL — SIGNIFICANT CHANGE UP (ref 81–99)
MONOCYTES # BLD AUTO: 0.49 K/UL — SIGNIFICANT CHANGE UP (ref 0.1–0.6)
MONOCYTES NFR BLD AUTO: 5.8 % — SIGNIFICANT CHANGE UP (ref 1.7–9.3)
NEUTROPHILS # BLD AUTO: 6.43 K/UL — SIGNIFICANT CHANGE UP (ref 1.4–6.5)
NEUTROPHILS NFR BLD AUTO: 75.5 % — HIGH (ref 42.2–75.2)
NRBC # BLD: 0 /100 WBCS — SIGNIFICANT CHANGE UP (ref 0–0)
PLATELET # BLD AUTO: 157 K/UL — SIGNIFICANT CHANGE UP (ref 130–400)
POTASSIUM SERPL-MCNC: 5.3 MMOL/L — HIGH (ref 3.5–5)
POTASSIUM SERPL-SCNC: 5.3 MMOL/L — HIGH (ref 3.5–5)
PROT SERPL-MCNC: 8 G/DL — SIGNIFICANT CHANGE UP (ref 6–8)
RBC # BLD: 3.6 M/UL — LOW (ref 4.2–5.4)
RBC # FLD: 14.2 % — SIGNIFICANT CHANGE UP (ref 11.5–14.5)
SODIUM SERPL-SCNC: 138 MMOL/L — SIGNIFICANT CHANGE UP (ref 135–146)
WBC # BLD: 8.51 K/UL — SIGNIFICANT CHANGE UP (ref 4.8–10.8)
WBC # FLD AUTO: 8.51 K/UL — SIGNIFICANT CHANGE UP (ref 4.8–10.8)

## 2019-12-09 PROCEDURE — 99222 1ST HOSP IP/OBS MODERATE 55: CPT

## 2019-12-09 PROCEDURE — 72125 CT NECK SPINE W/O DYE: CPT | Mod: 26

## 2019-12-09 PROCEDURE — 71260 CT THORAX DX C+: CPT | Mod: 26

## 2019-12-09 PROCEDURE — 71045 X-RAY EXAM CHEST 1 VIEW: CPT | Mod: 26

## 2019-12-09 PROCEDURE — 70450 CT HEAD/BRAIN W/O DYE: CPT | Mod: 26

## 2019-12-09 PROCEDURE — 73552 X-RAY EXAM OF FEMUR 2/>: CPT | Mod: 26,LT

## 2019-12-09 PROCEDURE — 93010 ELECTROCARDIOGRAM REPORT: CPT

## 2019-12-09 PROCEDURE — 74177 CT ABD & PELVIS W/CONTRAST: CPT | Mod: 26

## 2019-12-09 PROCEDURE — 99285 EMERGENCY DEPT VISIT HI MDM: CPT | Mod: GC

## 2019-12-09 PROCEDURE — 73502 X-RAY EXAM HIP UNI 2-3 VIEWS: CPT | Mod: 26,LT

## 2019-12-09 PROCEDURE — 73560 X-RAY EXAM OF KNEE 1 OR 2: CPT | Mod: 26,LT

## 2019-12-09 RX ORDER — HEPARIN SODIUM 5000 [USP'U]/ML
5000 INJECTION INTRAVENOUS; SUBCUTANEOUS EVERY 8 HOURS
Refills: 0 | Status: DISCONTINUED | OUTPATIENT
Start: 2019-12-09 | End: 2019-12-11

## 2019-12-09 RX ORDER — FLUOROMETHOLONE 1 MG/ML
1 SOLUTION/ DROPS OPHTHALMIC DAILY
Refills: 0 | Status: DISCONTINUED | OUTPATIENT
Start: 2019-12-09 | End: 2019-12-11

## 2019-12-09 RX ORDER — TIMOLOL 0.5 %
1 DROPS OPHTHALMIC (EYE) DAILY
Refills: 0 | Status: DISCONTINUED | OUTPATIENT
Start: 2019-12-09 | End: 2019-12-11

## 2019-12-09 RX ORDER — SIMVASTATIN 20 MG/1
20 TABLET, FILM COATED ORAL AT BEDTIME
Refills: 0 | Status: DISCONTINUED | OUTPATIENT
Start: 2019-12-09 | End: 2019-12-11

## 2019-12-09 RX ORDER — SODIUM CHLORIDE 9 MG/ML
1000 INJECTION INTRAMUSCULAR; INTRAVENOUS; SUBCUTANEOUS
Refills: 0 | Status: DISCONTINUED | OUTPATIENT
Start: 2019-12-09 | End: 2019-12-11

## 2019-12-09 RX ORDER — OXYCODONE AND ACETAMINOPHEN 5; 325 MG/1; MG/1
1 TABLET ORAL EVERY 6 HOURS
Refills: 0 | Status: DISCONTINUED | OUTPATIENT
Start: 2019-12-09 | End: 2019-12-10

## 2019-12-09 RX ORDER — MORPHINE SULFATE 50 MG/1
4 CAPSULE, EXTENDED RELEASE ORAL ONCE
Refills: 0 | Status: DISCONTINUED | OUTPATIENT
Start: 2019-12-09 | End: 2019-12-09

## 2019-12-09 RX ORDER — LISINOPRIL 2.5 MG/1
10 TABLET ORAL DAILY
Refills: 0 | Status: DISCONTINUED | OUTPATIENT
Start: 2019-12-09 | End: 2019-12-10

## 2019-12-09 RX ORDER — PANTOPRAZOLE SODIUM 20 MG/1
40 TABLET, DELAYED RELEASE ORAL
Refills: 0 | Status: DISCONTINUED | OUTPATIENT
Start: 2019-12-09 | End: 2019-12-11

## 2019-12-09 RX ORDER — LEVOTHYROXINE SODIUM 125 MCG
75 TABLET ORAL DAILY
Refills: 0 | Status: DISCONTINUED | OUTPATIENT
Start: 2019-12-09 | End: 2019-12-11

## 2019-12-09 RX ORDER — CHLORHEXIDINE GLUCONATE 213 G/1000ML
1 SOLUTION TOPICAL DAILY
Refills: 0 | Status: DISCONTINUED | OUTPATIENT
Start: 2019-12-09 | End: 2019-12-11

## 2019-12-09 RX ORDER — SODIUM CHLORIDE 9 MG/ML
1000 INJECTION, SOLUTION INTRAVENOUS ONCE
Refills: 0 | Status: COMPLETED | OUTPATIENT
Start: 2019-12-09 | End: 2019-12-09

## 2019-12-09 RX ORDER — AMLODIPINE BESYLATE AND BENAZEPRIL HYDROCHLORIDE 10; 20 MG/1; MG/1
1 CAPSULE ORAL
Qty: 0 | Refills: 0 | DISCHARGE

## 2019-12-09 RX ORDER — AMLODIPINE BESYLATE 2.5 MG/1
5 TABLET ORAL DAILY
Refills: 0 | Status: DISCONTINUED | OUTPATIENT
Start: 2019-12-09 | End: 2019-12-11

## 2019-12-09 RX ORDER — ONDANSETRON 8 MG/1
4 TABLET, FILM COATED ORAL ONCE
Refills: 0 | Status: COMPLETED | OUTPATIENT
Start: 2019-12-09 | End: 2019-12-09

## 2019-12-09 RX ORDER — ACETAMINOPHEN 500 MG
650 TABLET ORAL EVERY 6 HOURS
Refills: 0 | Status: DISCONTINUED | OUTPATIENT
Start: 2019-12-09 | End: 2019-12-11

## 2019-12-09 RX ORDER — FERROUS SULFATE 325(65) MG
325 TABLET ORAL DAILY
Refills: 0 | Status: DISCONTINUED | OUTPATIENT
Start: 2019-12-09 | End: 2019-12-11

## 2019-12-09 RX ADMIN — MORPHINE SULFATE 4 MILLIGRAM(S): 50 CAPSULE, EXTENDED RELEASE ORAL at 18:23

## 2019-12-09 RX ADMIN — OXYCODONE AND ACETAMINOPHEN 1 TABLET(S): 5; 325 TABLET ORAL at 23:58

## 2019-12-09 RX ADMIN — SIMVASTATIN 20 MILLIGRAM(S): 20 TABLET, FILM COATED ORAL at 22:41

## 2019-12-09 RX ADMIN — ONDANSETRON 4 MILLIGRAM(S): 8 TABLET, FILM COATED ORAL at 15:34

## 2019-12-09 RX ADMIN — MORPHINE SULFATE 4 MILLIGRAM(S): 50 CAPSULE, EXTENDED RELEASE ORAL at 15:35

## 2019-12-09 RX ADMIN — SODIUM CHLORIDE 1000 MILLILITER(S): 9 INJECTION, SOLUTION INTRAVENOUS at 16:37

## 2019-12-09 NOTE — CONSULT NOTE ADULT - SUBJECTIVE AND OBJECTIVE BOX
89 y o F presents with left hip and thigh pain due to subtroch. femur fx  s/p fall today. H/o left hip fx, ORIF in 2013 and 2017.     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  Chronic primary angle-closure glaucoma of left eye, severe stage  High cholesterol  Hypertension, unspecified type  Hypothyroid    Home Medications:  acetaminophen 325 mg oral tablet: 2 tab(s) orally every 6 hours, As Needed for pain (15 Aug 2019 10:23)  amlodipine-benazepril 5 mg-20 mg oral capsule: 1 cap(s) orally once a day (25 Jul 2019 18:58)  brimonidine-timolol 0.2%-0.5% ophthalmic solution: 1 drop(s) to each affected eye every 12 hours (15 Aug 2019 10:23)  ferrous sulfate 325 mg (65 mg elemental iron) oral tablet: 1 tab(s) orally once a day (15 Aug 2019 10:23)  fluorometholone 0.1% ophthalmic suspension: 1 drop(s) to each affected eye once a day (14 Aug 2019 11:22)  levothyroxine 75 mcg (0.075 mg) oral tablet: 1 tab(s) orally once a day (14 Aug 2019 11:22)  simvastatin 20 mg oral tablet: 1 tab(s) orally once a day (at bedtime) (14 Aug 2019 11:22)      Allergies    No Known Allergies    Intolerances      Pt s/e    NAD    Vital Signs Last 24 Hrs  T(C): 36.6 (09 Dec 2019 14:14), Max: 36.6 (09 Dec 2019 14:14)  T(F): 97.8 (09 Dec 2019 14:14), Max: 97.8 (09 Dec 2019 14:14)  HR: 69 (09 Dec 2019 14:14) (69 - 69)  BP: 147/65 (09 Dec 2019 14:14) (147/65 - 147/65)  BP(mean): --  RR: 20 (09 Dec 2019 14:14) (20 - 20)  SpO2: 98% (09 Dec 2019 14:14) (98% - 98%)    PE: LLE -ttp left groin, upper thigh, skin intact, pain on leg rolling, ankle/foot - motor function intact, sensation grossly intact, foot wwp, palpable pulses                          10.6   8.51  )-----------( 157      ( 09 Dec 2019 14:40 )             33.3     12-09    138  |  104  |  42<H>  ----------------------------<  123<H>  5.3<H>   |  20  |  1.7<H>    Ca    9.9      09 Dec 2019 14:40    TPro  8.0  /  Alb  4.5  /  TBili  0.4  /  DBili  x   /  AST  23  /  ALT  9   /  AlkPhos  76  12-09      PTT - ( 09 Dec 2019 14:40 )  PTT:29.6 sec    < from: Xray Femur 2 Views, Left (12.09.19 @ 16:22) >    Acute subtrochanteric spiral fracture of the proximal left femur.   Associated fractures of the left superior and inferior pubic rami.    Status post open reduction internal fixation of a right hip fracture.    Further evaluation by CT would be helpful in further delineating the   fractures.    < end of copied text >

## 2019-12-09 NOTE — ED PROVIDER NOTE - OBJECTIVE STATEMENT
89 yo F with hx of HTN, HLD, hypothyroidism, no on ac, presents for evaluation of fall, onset today, associated with left hip pain. No fever, no chills, no headache, no nausea, no vomiting, no chest pain, no back pain, no abdominal pain. Pt states that she was at home when she fell down and might have hit her head. No other symptoms reported.

## 2019-12-09 NOTE — H&P ADULT - NSHPLABSRESULTS_GEN_ALL_CORE
LABS  CBC (12-09 @ 14:40)                              10.6<L>                         8.51    )----------------(  157        75.5<H>% Neutrophils, 15.0<L>% Lymphocytes, ANC: 6.43                                33.3<L>    BMP (12-09 @ 14:40)             138     |  104     |  42<H> 		Ca++ --      Ca 9.9                ---------------------------------( 123<H>		Mg --                 5.3<H>  |  20      |  1.7<H>			Ph --        LFTs (12-09 @ 14:40)      TPro 8.0 / Alb 4.5 / TBili 0.4 / DBili -- / AST 23 / ALT 9 / AlkPhos 76    Coags (12-09 @ 14:40)  aPTT 29.6 / INR -- / PT --        IMAGING:     < from: CT Abdomen and Pelvis w/ IV Cont (12.09.19 @ 18:02) >    IMPRESSION:    Acute subtrochanteric spiral fracture of the proximal left femur. Left   thigh hematoma, incompletely imaged.    Additional chronic findings as above.    < end of copied text >    < from: CT Cervical Spine No Cont (12.09.19 @ 18:01) >    IMPRESSION:    No evidence of acute cervical spine fracture or subluxation.     < end of copied text >  < from: CT Head No Cont (12.09.19 @ 18:01) >    IMPRESSION:    No acute intracranial hemorrhage, mass-effect or midline shift.    < end of copied text >    < from: Xray Femur 2 Views, Left (12.09.19 @ 16:22) >      IMPRESSION:    Acute subtrochanteric spiral fracture of the proximal left femur.   Associated fractures of the left superior and inferior pubic rami.    Status post open reduction internal fixation of a right hip fracture.    Further evaluation by CT would be helpful in further delineating the   fractures.    < end of copied text >

## 2019-12-09 NOTE — H&P ADULT - ATTENDING COMMENTS
I examined the patient with the resident and pa and discussed my plan with them    the patient has a left proximal femur fx with associated hematoma after fall.  the patient will be seen by ortho to discuss operative fixation, with medicine for pre-operative risk assessment and optimization

## 2019-12-09 NOTE — ED PROVIDER NOTE - CLINICAL SUMMARY MEDICAL DECISION MAKING FREE TEXT BOX
89 Y/O F PMHX AS DOCUMENTED S/P FALL, C/O L HIP PAIN. PT WITH L FEMUR FX. DIAGNOSTIC TESTING WITH NO ADDITIONAL TRAUMATIC INJURIES. ORTHO AND TRAUMA CONSULTED. PT ADMITTED TO TRAUMA SERVICE.

## 2019-12-09 NOTE — ED ADULT NURSE NOTE - PMH
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

## 2019-12-09 NOTE — ED PROVIDER NOTE - ATTENDING CONTRIBUTION TO CARE
I personally evaluated the patient. I reviewed the Resident’s or Physician Assistant’s note (as assigned above), and agree with the findings and plan except as documented in my note.   89 Y/O F HTN, DLD, HYPOTHYROIDISM, ANEMIA, S/P FALL AT HOME TODAY. PT C/O R HIP PAIN. UNKNOWN HEAD TRAUMA. NO LOC. NO NECK OR BACK PAIN. NO CP, SOB. NO ABD PAIN. PT NOT ON AC. PT S/P R HIP ORIF BY DR. FLOWER. VITALS NOTED. ALERT OX3 NAD GCS-15. NCAT. PERRL, EOMI. NO MIDLINE C SPINE TENDERNESS. LUNGS CLEAR B/L. CHEST NONTENDER, NO CREPITUS. RRR. ABD- SOFT NONTENDER. PELVIS STABLE NONTENDER. BACK NONTENDER. NO SPINE TENDERNESS. NEURO EXAM NONFOCAL. + RLE SHORTENING AND EXTERNAL ROTATION. + R THIGH DEFORMITY. + TTP. THIGH SUPPLE. SKIN INTACT. 2+ PEDAL PULSES B/L.

## 2019-12-09 NOTE — ED PROVIDER NOTE - MUSCULOSKELETAL, MLM
Spine appears normal, range of motion is not limited, L LE hip tender to palpation, shortened and externally rotated, 2 + DP and PT sensation intact

## 2019-12-09 NOTE — H&P ADULT - HISTORY OF PRESENT ILLNESS
89 y/o Female with history of prior falls and recent ORIF R femur with Dr Frye, HTN, HLD, hypothyroidism, anemia, presents from home after a trip and fall from standing earlier today onto her L side. ?ht, -loc. Patient states that she usually ambulates with her walker unless she is using the restroom and on this particular occasion she fell without the use of her walker. She is complaining of left hip pain

## 2019-12-09 NOTE — CONSULT NOTE ADULT - ATTENDING COMMENTS
hg 8,k 5.7 need correction ,with blood transfusion, and repeat k level
see admission H&P of same date

## 2019-12-09 NOTE — H&P ADULT - NSHPPHYSICALEXAM_GEN_ALL_CORE
T(C): 36.6 (09 Dec 2019 14:14), Max: 36.6 (09 Dec 2019 14:14)  T(F): 97.8 (09 Dec 2019 14:14), Max: 97.8 (09 Dec 2019 14:14)  HR: 69 (09 Dec 2019 14:14) (69 - 69)  BP: 147/65 (09 Dec 2019 14:14) (147/65 - 147/65)  RR: 20 (09 Dec 2019 14:14) (20 - 20)  SpO2: 98% (09 Dec 2019 14:14) (98% - 98%)  --------------------------------------------------------------------------------------  PHYSICAL EXAM:     Primary Survey:    A - airway intact  B - bilateral breath sounds and good chest rise  C - palpable pulses in all extremities  D - GCS 15 on arrival  Exposure obtained    Secondary Survey:  General: NAD  HEENT: Normocephalic, atraumatic  Neck: Soft, midline trachea.  Chest: No chest wall tenderness.   Cardiac: S1, S2, RRR  Respiratory: Bilateral breath sounds, clear and equal bilaterally  Abdomen: Soft, non-distended, non-tender, no rebound, no guarding, no masses palpated  Groin: Normal appearing  Ext: left hip tenderness. palp radial b/l UE, b/l DP palp in Lower Extrem, motor and sensory grossly intact in all 4 extremities  Back: no TTP, no palpable runoff/stepoff/deformity

## 2019-12-09 NOTE — ED ADULT NURSE NOTE - INTERVENTIONS DEFINITIONS
Stretcher in lowest position, wheels locked, appropriate side rails in place/Instruct patient to call for assistance/Physically safe environment: no spills, clutter or unnecessary equipment/Non-slip footwear when patient is off stretcher/Room bathroom lighting operational/Monitor for mental status changes and reorient to person, place, and time/Reinforce activity limits and safety measures with patient and family/Monitor gait and stability/Review medications for side effects contributing to fall risk

## 2019-12-09 NOTE — H&P ADULT - ASSESSMENT
89 y/o Female with history of prior falls and recent ORIF R femur with Dr Frye, HTN, HLD, hypothyroidism, anemia, presents from home after a trip and fall from standing earlier today onto her L side. ?ht, -loc. She is complaining of left hip pain  She was found to have Acute subtrochanteric spiral fracture of the proximal left femur.     Plan:   per orthopedics possible  ORIF tomorrow,  medicine evaluation, optimization for possible surgery on add on schedule  NPO after MN  pain control  DVT ppx  bedrest, NWB LLE  repeat labs in am, transfuse prn  2 units PRBC on hold for OR

## 2019-12-09 NOTE — CONSULT NOTE ADULT - ASSESSMENT
89 y o F with left subtroch femur fx s/p fall    - medicine evaluation, optimization for possible surgery ORIF tomorrow, on add on schedule  - NPO after MN  -pain control  -DVT ppx  -bedrest, NWB LLE  - repeat labs in am, transfuse prn  - 2 units PRBC on hold for OR  -w/d with att, will follow

## 2019-12-09 NOTE — CONSULT NOTE ADULT - ASSESSMENT
ASSESSMENT:  89y Female patient presents as a Trauma Code / Alert / Consult, S/P MECHANISM*** , presents with GCS 15, AAOx3, MICHAEL, complaining of *** , external signs of trauma include *** .  Injuries:  - L sub trochanteric femur fracture    PLAN:   Trauma Labs pending...  Trauma Imaging pending as below...  - CXR  - XR Pelvis  - CTH  - CT C-spine  - CT Chest  - CT Ab/Pelvis    Additional studies:  Extremity films: L hip, L femur  EKG  UA    Additional Consultations:  - Orthopedics consult for L femur frx  - PT/Rehab/SW  - Hospitalist/PMD    Disposition pending results of above labs and imaging  Will clear from trauma if above work-up (-) for acute traumatic injury    Above plan discussed with Trauma attending, Dr. Bobby, patient, and ED team  --------------------------------------------------------------------------------------  12-09-19 @ 15:36 ASSESSMENT:  89y Female patient presents as a Trauma Consult, S/P mechanical fall from standing, presents with GCS 15, AAOx3, MICHAEL, complaining of pain in the L hip and thich, external signs of trauma include a shortened, externally rotated LLE.  Injuries:  - L sub-trochteric/trochanteric femur fracture    PLAN:   Trauma Labs pending...  Trauma Imaging pending as below...  - CXR  - XR Pelvis  - CTH  - CT C-spine  - CT Chest  - CT Ab/Pelvis    Additional studies:  Extremity films: L hip, L femur  EKG  UA    Additional Consultations:  - Orthopedics consult for L femur frx  - PT/Rehab/SW  - Hospitalist/PMD    Disposition pending results of above labs and imaging  Will clear from trauma if above work-up (-) for acute traumatic injury    Above plan discussed with Trauma attending, Dr. Bobby, patient, and ED team  --------------------------------------------------------------------------------------  12-09-19 @ 15:36

## 2019-12-09 NOTE — H&P ADULT - NSICDXPASTMEDICALHX_GEN_ALL_CORE_FT
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

## 2019-12-09 NOTE — CONSULT NOTE ADULT - SUBJECTIVE AND OBJECTIVE BOX
Trauma Consultation Note  =====================================================  TRAUMA ACTIVATION LEVEL:  Trauma Consult   MECHANISM OF INJURY: Blunt Trauma S/P fall from standing  	  GCS: 	E: 4     V: 5     M: 6      =      15/15    HPI:   89y Female with history of prior falls and recent ORIF R femur, presents from home after a trip and fall from standing earlier today onto her L side. Patient states that she usually ambulates with her walker unless she is using the restroom and on this particular occasion she fell without the use of her walker.    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  Chronic primary angle-closure glaucoma of left eye, severe stage  High cholesterol  Hypertension, unspecified type  History of hip replacement, total, right    Home Meds: Home Medications:  acetaminophen 325 mg oral tablet: 2 tab(s) orally every 6 hours, As Needed for pain (15 Aug 2019 10:23)  amlodipine-benazepril 5 mg-20 mg oral capsule: 1 cap(s) orally once a day (25 Jul 2019 18:58)  brimonidine-timolol 0.2%-0.5% ophthalmic solution: 1 drop(s) to each affected eye every 12 hours (15 Aug 2019 10:23)  ferrous sulfate 325 mg (65 mg elemental iron) oral tablet: 1 tab(s) orally once a day (15 Aug 2019 10:23)  fluorometholone 0.1% ophthalmic suspension: 1 drop(s) to each affected eye once a day (14 Aug 2019 11:22)  levothyroxine 75 mcg (0.075 mg) oral tablet: 1 tab(s) orally once a day (14 Aug 2019 11:22)  simvastatin 20 mg oral tablet: 1 tab(s) orally once a day (at bedtime) (14 Aug 2019 11:22)    Allergies: Allergies  No Known Allergies  Intolerances    Soc:   Denies Tobacco/EtOH/Substance use  Advanced Directives: Presumed Full Code     ROS:    REVIEW OF SYSTEMS  [ x ] A ten-point review of systems was otherwise negative except as noted.  [ ] Due to altered mental status/intubation, subjective information were not able to be obtained from the patient. History was obtained, to the extent possible, from review of the chart and collateral sources of information.  --------------------------------------------------------------------------------------  VITAL SIGNS, INS/OUTS (last 24 hours):  --------------------------------------------------------------------------------------  ICU Vital Signs Last 24 Hrs  T(C): 36.6 (09 Dec 2019 14:14), Max: 36.6 (09 Dec 2019 14:14)  T(F): 97.8 (09 Dec 2019 14:14), Max: 97.8 (09 Dec 2019 14:14)  HR: 69 (09 Dec 2019 14:14) (69 - 69)  BP: 147/65 (09 Dec 2019 14:14) (147/65 - 147/65)  RR: 20 (09 Dec 2019 14:14) (20 - 20)  SpO2: 98% (09 Dec 2019 14:14) (98% - 98%)  --------------------------------------------------------------------------------------  PHYSICAL EXAM  General: NAD, AAOx3, calm and cooperative  HEENT: NCAT, VILMA, EOMI, Trachea ML, Neck supple  Cardiac: RRR S1, S2, no Murmurs, rubs or gallops  Respiratory: CTAB, normal respiratory effort, breath sounds equal BL, no wheeze, rhonchi or crackles  No chest wall, clavicular, or sternal tenderenss  Abdomen: Soft, non-distended, non-tender, +bowel sounds  Rectal: Good tone, +stool, no blood, no jennifer-anal masses/lesions, no fistulas, fissures, hemorrhoids  Musculoskeletal: Strength 5/5 BL UE/LE, ROM intact, compartments soft  Spine: No tenderness in C spine , T spine , L spine , no step offs/deformities  Neuro: Sensation grossly intact and equal throughout, CN II-XII intact, no focal deficits  Vascular: Pulses 2+ throughout, extremities well perfused  Skin: Warm/dry, normal color, no lacerations, ecchymosis, or abrasions    LABS  --------------------------------------------------------------------------------------  Trauma labs pending...  ***  Labs:  CAPILLARY BLOOD GLUCOSE  --------------------------------------------------------------------------------------  IMAGING RESULTS  Trauma imaging pending...  ***  --------------------------------------------------------------------------------------- Trauma Consultation Note  =====================================================  TRAUMA ACTIVATION LEVEL:  Trauma Consult   MECHANISM OF INJURY: Blunt Trauma S/P fall from standing  	  GCS: 	E: 4     V: 5     M: 6      =      15/15    HPI:   89y Female with history of prior falls and recent ORIF R femur, presents from home after a trip and fall from standing earlier today onto her L side. Patient states that she usually ambulates with her walker unless she is using the restroom and on this particular occasion she fell without the use of her walker.    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  Chronic primary angle-closure glaucoma of left eye, severe stage  High cholesterol  Hypertension, unspecified type  History of hip replacement, total, right    Home Meds: Home Medications:  acetaminophen 325 mg oral tablet: 2 tab(s) orally every 6 hours, As Needed for pain (15 Aug 2019 10:23)  amlodipine-benazepril 5 mg-20 mg oral capsule: 1 cap(s) orally once a day (25 Jul 2019 18:58)  brimonidine-timolol 0.2%-0.5% ophthalmic solution: 1 drop(s) to each affected eye every 12 hours (15 Aug 2019 10:23)  ferrous sulfate 325 mg (65 mg elemental iron) oral tablet: 1 tab(s) orally once a day (15 Aug 2019 10:23)  fluorometholone 0.1% ophthalmic suspension: 1 drop(s) to each affected eye once a day (14 Aug 2019 11:22)  levothyroxine 75 mcg (0.075 mg) oral tablet: 1 tab(s) orally once a day (14 Aug 2019 11:22)  simvastatin 20 mg oral tablet: 1 tab(s) orally once a day (at bedtime) (14 Aug 2019 11:22)    Allergies: Allergies  No Known Allergies  Intolerances    Soc:   Denies Tobacco/EtOH/Substance use  Advanced Directives: Presumed Full Code     ROS:    REVIEW OF SYSTEMS  [ x ] A ten-point review of systems was otherwise negative except as noted.  [ ] Due to altered mental status/intubation, subjective information were not able to be obtained from the patient. History was obtained, to the extent possible, from review of the chart and collateral sources of information.  --------------------------------------------------------------------------------------  VITAL SIGNS, INS/OUTS (last 24 hours):  --------------------------------------------------------------------------------------  ICU Vital Signs Last 24 Hrs  T(C): 36.6 (09 Dec 2019 14:14), Max: 36.6 (09 Dec 2019 14:14)  T(F): 97.8 (09 Dec 2019 14:14), Max: 97.8 (09 Dec 2019 14:14)  HR: 69 (09 Dec 2019 14:14) (69 - 69)  BP: 147/65 (09 Dec 2019 14:14) (147/65 - 147/65)  RR: 20 (09 Dec 2019 14:14) (20 - 20)  SpO2: 98% (09 Dec 2019 14:14) (98% - 98%)  --------------------------------------------------------------------------------------  PHYSICAL EXAM  General: NAD, AAOx3, calm and cooperative  HEENT: NCAT, VILMA, EOMI, Trachea ML, Neck supple  Cardiac: RRR S1, S2, no Murmurs, rubs or gallops  Respiratory: CTAB, normal respiratory effort, breath sounds equal BL, no wheeze, rhonchi or crackles  No chest wall, clavicular, or sternal tenderenss  Abdomen: Soft, non-distended, non-tender, +bowel sounds  Rectal: Good tone, +stool, no blood, no jennifer-anal masses/lesions, no fistulas, fissures, hemorrhoids  Musculoskeletal: Strength 5/5 BL UE/LE, ROM intact, compartments soft  Spine: No tenderness in C spine , T spine , L spine , no step offs/deformities  Neuro: Sensation grossly intact and equal throughout, CN II-XII intact, no focal deficits  Vascular: Pulses 2+ throughout, extremities well perfused  Skin: Warm/dry, normal color, no lacerations, ecchymosis, or abrasions    LABS  --------------------------------------------------------------------------------------  CAPILLARY BLOOD GLUCOSE                       10.6   8.51  )-----------( 157      ( 09 Dec 2019 14:40 )             33.3     12-09    138  |  104  |  42<H>  ----------------------------<  123<H>  5.3<H>   |  20  |  1.7<H>    Ca    9.9      09 Dec 2019 14:40    TPro  8.0  /  Alb  4.5  /  TBili  0.4  /  DBili  x   /  AST  23  /  ALT  9   /  AlkPhos  76  12-09    PTT - ( 09 Dec 2019 14:40 )  PTT:29.6 sec    --------------------------------------------------------------------------------------  IMAGING RESULTS  Additional trauma labs pending...    < from: Xray Chest 1 View-PORTABLE IMMEDIATE (12.09.19 @ 15:03) >  Impression:    No radiographic evidence of acute cardiopulmonary disease.  < end of copied text >  ---------------------------------------------------------------------------------------

## 2019-12-09 NOTE — ED PROVIDER NOTE - PROGRESS NOTE DETAILS
+ FEMUR FX, TRAUMA CONSULTED. Ortho consulted ORTHO PA AT PTS BEDSIDE. Pt s/o to Dr. Delatorre for continued care

## 2019-12-10 LAB
ANION GAP SERPL CALC-SCNC: 13 MMOL/L — SIGNIFICANT CHANGE UP (ref 7–14)
ANION GAP SERPL CALC-SCNC: 13 MMOL/L — SIGNIFICANT CHANGE UP (ref 7–14)
APTT BLD: 30.7 SEC — SIGNIFICANT CHANGE UP (ref 27–39.2)
BASOPHILS # BLD AUTO: 0.03 K/UL — SIGNIFICANT CHANGE UP (ref 0–0.2)
BASOPHILS # BLD AUTO: 0.04 K/UL — SIGNIFICANT CHANGE UP (ref 0–0.2)
BASOPHILS NFR BLD AUTO: 0.4 % — SIGNIFICANT CHANGE UP (ref 0–1)
BASOPHILS NFR BLD AUTO: 0.6 % — SIGNIFICANT CHANGE UP (ref 0–1)
BUN SERPL-MCNC: 34 MG/DL — HIGH (ref 10–20)
BUN SERPL-MCNC: 35 MG/DL — HIGH (ref 10–20)
CALCIUM SERPL-MCNC: 9.1 MG/DL — SIGNIFICANT CHANGE UP (ref 8.5–10.1)
CALCIUM SERPL-MCNC: 9.2 MG/DL — SIGNIFICANT CHANGE UP (ref 8.5–10.1)
CHLORIDE SERPL-SCNC: 101 MMOL/L — SIGNIFICANT CHANGE UP (ref 98–110)
CHLORIDE SERPL-SCNC: 103 MMOL/L — SIGNIFICANT CHANGE UP (ref 98–110)
CO2 SERPL-SCNC: 19 MMOL/L — SIGNIFICANT CHANGE UP (ref 17–32)
CO2 SERPL-SCNC: 20 MMOL/L — SIGNIFICANT CHANGE UP (ref 17–32)
CREAT SERPL-MCNC: 1.5 MG/DL — SIGNIFICANT CHANGE UP (ref 0.7–1.5)
CREAT SERPL-MCNC: 1.6 MG/DL — HIGH (ref 0.7–1.5)
EOSINOPHIL # BLD AUTO: 0.01 K/UL — SIGNIFICANT CHANGE UP (ref 0–0.7)
EOSINOPHIL # BLD AUTO: 0.03 K/UL — SIGNIFICANT CHANGE UP (ref 0–0.7)
EOSINOPHIL NFR BLD AUTO: 0.1 % — SIGNIFICANT CHANGE UP (ref 0–8)
EOSINOPHIL NFR BLD AUTO: 0.4 % — SIGNIFICANT CHANGE UP (ref 0–8)
GLUCOSE BLDC GLUCOMTR-MCNC: 111 MG/DL — HIGH (ref 70–99)
GLUCOSE BLDC GLUCOMTR-MCNC: 197 MG/DL — HIGH (ref 70–99)
GLUCOSE SERPL-MCNC: 107 MG/DL — HIGH (ref 70–99)
GLUCOSE SERPL-MCNC: 122 MG/DL — HIGH (ref 70–99)
HCT VFR BLD CALC: 26.2 % — LOW (ref 37–47)
HCT VFR BLD CALC: 27 % — LOW (ref 37–47)
HCT VFR BLD CALC: 30.7 % — LOW (ref 37–47)
HGB BLD-MCNC: 8.3 G/DL — LOW (ref 12–16)
HGB BLD-MCNC: 8.5 G/DL — LOW (ref 12–16)
HGB BLD-MCNC: 9.8 G/DL — LOW (ref 12–16)
IMM GRANULOCYTES NFR BLD AUTO: 0.6 % — HIGH (ref 0.1–0.3)
IMM GRANULOCYTES NFR BLD AUTO: 0.7 % — HIGH (ref 0.1–0.3)
INR BLD: 1.22 RATIO — SIGNIFICANT CHANGE UP (ref 0.65–1.3)
LYMPHOCYTES # BLD AUTO: 1.08 K/UL — LOW (ref 1.2–3.4)
LYMPHOCYTES # BLD AUTO: 1.08 K/UL — LOW (ref 1.2–3.4)
LYMPHOCYTES # BLD AUTO: 14.4 % — LOW (ref 20.5–51.1)
LYMPHOCYTES # BLD AUTO: 15.9 % — LOW (ref 20.5–51.1)
MAGNESIUM SERPL-MCNC: 1.8 MG/DL — SIGNIFICANT CHANGE UP (ref 1.8–2.4)
MAGNESIUM SERPL-MCNC: 1.8 MG/DL — SIGNIFICANT CHANGE UP (ref 1.8–2.4)
MCHC RBC-ENTMCNC: 28.7 PG — SIGNIFICANT CHANGE UP (ref 27–31)
MCHC RBC-ENTMCNC: 28.9 PG — SIGNIFICANT CHANGE UP (ref 27–31)
MCHC RBC-ENTMCNC: 29.2 PG — SIGNIFICANT CHANGE UP (ref 27–31)
MCHC RBC-ENTMCNC: 31.5 G/DL — LOW (ref 32–37)
MCHC RBC-ENTMCNC: 31.7 G/DL — LOW (ref 32–37)
MCHC RBC-ENTMCNC: 31.9 G/DL — LOW (ref 32–37)
MCV RBC AUTO: 90 FL — SIGNIFICANT CHANGE UP (ref 81–99)
MCV RBC AUTO: 91.8 FL — SIGNIFICANT CHANGE UP (ref 81–99)
MCV RBC AUTO: 92.3 FL — SIGNIFICANT CHANGE UP (ref 81–99)
MONOCYTES # BLD AUTO: 0.42 K/UL — SIGNIFICANT CHANGE UP (ref 0.1–0.6)
MONOCYTES # BLD AUTO: 0.59 K/UL — SIGNIFICANT CHANGE UP (ref 0.1–0.6)
MONOCYTES NFR BLD AUTO: 5.6 % — SIGNIFICANT CHANGE UP (ref 1.7–9.3)
MONOCYTES NFR BLD AUTO: 8.7 % — SIGNIFICANT CHANGE UP (ref 1.7–9.3)
NEUTROPHILS # BLD AUTO: 5.03 K/UL — SIGNIFICANT CHANGE UP (ref 1.4–6.5)
NEUTROPHILS # BLD AUTO: 5.9 K/UL — SIGNIFICANT CHANGE UP (ref 1.4–6.5)
NEUTROPHILS NFR BLD AUTO: 73.8 % — SIGNIFICANT CHANGE UP (ref 42.2–75.2)
NEUTROPHILS NFR BLD AUTO: 78.8 % — HIGH (ref 42.2–75.2)
NRBC # BLD: 0 /100 WBCS — SIGNIFICANT CHANGE UP (ref 0–0)
PHOSPHATE SERPL-MCNC: 3.2 MG/DL — SIGNIFICANT CHANGE UP (ref 2.1–4.9)
PHOSPHATE SERPL-MCNC: 3.3 MG/DL — SIGNIFICANT CHANGE UP (ref 2.1–4.9)
PLATELET # BLD AUTO: 137 K/UL — SIGNIFICANT CHANGE UP (ref 130–400)
PLATELET # BLD AUTO: 137 K/UL — SIGNIFICANT CHANGE UP (ref 130–400)
PLATELET # BLD AUTO: 140 K/UL — SIGNIFICANT CHANGE UP (ref 130–400)
POTASSIUM SERPL-MCNC: 4.8 MMOL/L — SIGNIFICANT CHANGE UP (ref 3.5–5)
POTASSIUM SERPL-MCNC: 5.7 MMOL/L — HIGH (ref 3.5–5)
POTASSIUM SERPL-SCNC: 4.8 MMOL/L — SIGNIFICANT CHANGE UP (ref 3.5–5)
POTASSIUM SERPL-SCNC: 5.7 MMOL/L — HIGH (ref 3.5–5)
PROTHROM AB SERPL-ACNC: 14 SEC — HIGH (ref 9.95–12.87)
RBC # BLD: 2.84 M/UL — LOW (ref 4.2–5.4)
RBC # BLD: 2.94 M/UL — LOW (ref 4.2–5.4)
RBC # BLD: 3.41 M/UL — LOW (ref 4.2–5.4)
RBC # FLD: 14.1 % — SIGNIFICANT CHANGE UP (ref 11.5–14.5)
RBC # FLD: 14.4 % — SIGNIFICANT CHANGE UP (ref 11.5–14.5)
RBC # FLD: 14.6 % — HIGH (ref 11.5–14.5)
SODIUM SERPL-SCNC: 133 MMOL/L — LOW (ref 135–146)
SODIUM SERPL-SCNC: 136 MMOL/L — SIGNIFICANT CHANGE UP (ref 135–146)
WBC # BLD: 6.81 K/UL — SIGNIFICANT CHANGE UP (ref 4.8–10.8)
WBC # BLD: 7.17 K/UL — SIGNIFICANT CHANGE UP (ref 4.8–10.8)
WBC # BLD: 7.49 K/UL — SIGNIFICANT CHANGE UP (ref 4.8–10.8)
WBC # FLD AUTO: 6.81 K/UL — SIGNIFICANT CHANGE UP (ref 4.8–10.8)
WBC # FLD AUTO: 7.17 K/UL — SIGNIFICANT CHANGE UP (ref 4.8–10.8)
WBC # FLD AUTO: 7.49 K/UL — SIGNIFICANT CHANGE UP (ref 4.8–10.8)

## 2019-12-10 PROCEDURE — 99233 SBSQ HOSP IP/OBS HIGH 50: CPT

## 2019-12-10 PROCEDURE — 93010 ELECTROCARDIOGRAM REPORT: CPT

## 2019-12-10 PROCEDURE — 99231 SBSQ HOSP IP/OBS SF/LOW 25: CPT

## 2019-12-10 RX ORDER — DEXTROSE 50 % IN WATER 50 %
50 SYRINGE (ML) INTRAVENOUS ONCE
Refills: 0 | Status: DISCONTINUED | OUTPATIENT
Start: 2019-12-10 | End: 2019-12-10

## 2019-12-10 RX ORDER — SODIUM POLYSTYRENE SULFONATE 4.1 MEQ/G
15 POWDER, FOR SUSPENSION ORAL ONCE
Refills: 0 | Status: COMPLETED | OUTPATIENT
Start: 2019-12-10 | End: 2019-12-10

## 2019-12-10 RX ORDER — INSULIN HUMAN 100 [IU]/ML
10 INJECTION, SOLUTION SUBCUTANEOUS ONCE
Refills: 0 | Status: COMPLETED | OUTPATIENT
Start: 2019-12-10 | End: 2019-12-10

## 2019-12-10 RX ORDER — OXYCODONE HYDROCHLORIDE 5 MG/1
5 TABLET ORAL EVERY 4 HOURS
Refills: 0 | Status: DISCONTINUED | OUTPATIENT
Start: 2019-12-10 | End: 2019-12-11

## 2019-12-10 RX ORDER — DEXTROSE 10 % IN WATER 10 %
250 INTRAVENOUS SOLUTION INTRAVENOUS ONCE
Refills: 0 | Status: COMPLETED | OUTPATIENT
Start: 2019-12-10 | End: 2019-12-10

## 2019-12-10 RX ORDER — MORPHINE SULFATE 50 MG/1
2 CAPSULE, EXTENDED RELEASE ORAL ONCE
Refills: 0 | Status: DISCONTINUED | OUTPATIENT
Start: 2019-12-10 | End: 2019-12-10

## 2019-12-10 RX ADMIN — HEPARIN SODIUM 5000 UNIT(S): 5000 INJECTION INTRAVENOUS; SUBCUTANEOUS at 21:37

## 2019-12-10 RX ADMIN — HEPARIN SODIUM 5000 UNIT(S): 5000 INJECTION INTRAVENOUS; SUBCUTANEOUS at 05:44

## 2019-12-10 RX ADMIN — SIMVASTATIN 20 MILLIGRAM(S): 20 TABLET, FILM COATED ORAL at 21:37

## 2019-12-10 RX ADMIN — Medication 650 MILLIGRAM(S): at 05:43

## 2019-12-10 RX ADMIN — Medication 1 DROP(S): at 21:37

## 2019-12-10 RX ADMIN — AMLODIPINE BESYLATE 5 MILLIGRAM(S): 2.5 TABLET ORAL at 05:44

## 2019-12-10 RX ADMIN — SODIUM CHLORIDE 100 MILLILITER(S): 9 INJECTION INTRAMUSCULAR; INTRAVENOUS; SUBCUTANEOUS at 02:46

## 2019-12-10 RX ADMIN — OXYCODONE AND ACETAMINOPHEN 1 TABLET(S): 5; 325 TABLET ORAL at 00:30

## 2019-12-10 RX ADMIN — INSULIN HUMAN 10 UNIT(S): 100 INJECTION, SOLUTION SUBCUTANEOUS at 03:27

## 2019-12-10 RX ADMIN — OXYCODONE AND ACETAMINOPHEN 1 TABLET(S): 5; 325 TABLET ORAL at 05:47

## 2019-12-10 RX ADMIN — FLUOROMETHOLONE 1 DROP(S): 1 SOLUTION/ DROPS OPHTHALMIC at 21:37

## 2019-12-10 RX ADMIN — Medication 75 MICROGRAM(S): at 05:44

## 2019-12-10 RX ADMIN — OXYCODONE AND ACETAMINOPHEN 1 TABLET(S): 5; 325 TABLET ORAL at 06:20

## 2019-12-10 RX ADMIN — Medication 325 MILLIGRAM(S): at 12:26

## 2019-12-10 RX ADMIN — Medication 650 MILLIGRAM(S): at 18:38

## 2019-12-10 RX ADMIN — SODIUM CHLORIDE 100 MILLILITER(S): 9 INJECTION INTRAMUSCULAR; INTRAVENOUS; SUBCUTANEOUS at 08:40

## 2019-12-10 RX ADMIN — Medication 1500 MILLILITER(S): at 03:27

## 2019-12-10 RX ADMIN — SODIUM POLYSTYRENE SULFONATE 15 GRAM(S): 4.1 POWDER, FOR SUSPENSION ORAL at 03:28

## 2019-12-10 RX ADMIN — Medication 650 MILLIGRAM(S): at 18:37

## 2019-12-10 NOTE — PROGRESS NOTE ADULT - ASSESSMENT
A/P:  ELE BRAR is a 89yFemale HD 1 s/p fall.     Plan:   Transfuse 1u pre op  2 u PRBC on hold to or   Medical Clearance    Fitz Burr H/H

## 2019-12-10 NOTE — CONSULT NOTE ADULT - ASSESSMENT
89 y/o Female with history of prior falls and recent ORIF R femur with Dr Frye, HTN, HLD, hypothyroidism, anemia, presents from home after a trip and fall from standing earlier today onto her L side. ?ht, -loc. She is complaining of left hip pain  She was found to have Acute subtrochanteric spiral fracture of the proximal left femur.       # Lt femur fracture-- pending ORIF in AM  # Anemia at baseline with hb of around 8-9 normocytic anemia-- now after fall she has a hematoma at fracture site 6.4x10.4 cm-- plan for transfusion -- 1unit given and one more pending-- hb improved to 9.8 now-- avoid overloading.  #  CKD4 and got iv contrast in ER--monitor creatinine in am. Is getting hydration change to 75ml /hr -- may be 100cc/hr is too much for her.  EKG- NSR rate 72/min  CXr is clear and does not have hx of smoking   She is s/p rt hip fracture and internal fixation--6/2018-- tolerated anesthesia.  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

## 2019-12-10 NOTE — PROGRESS NOTE ADULT - SUBJECTIVE AND OBJECTIVE BOX
GENERAL SURGERY PROGRESS NOTE     ELE BRAR  [address] [place] [address] day :1d  POD:  Procedure:   Surgical Attending: Manuel Bobby  Overnight events: Possible OR today with orthopedics, need medical clearance.  Byrnes Inserted. Trending Hg to monitor hematoma.     T(F): 96.7 (12-10-19 @ 10:04), Max: 98 (12-10-19 @ 00:37)  HR: 65 (12-10-19 @ 10:04) (64 - 103)  BP: 118/63 (12-10-19 @ 10:04) (118/63 - 179/77)  ABP: --  ABP(mean): --  RR: 18 (12-10-19 @ 10:04) (18 - 20)  SpO2: 99% (12-10-19 @ 10:04) (98% - 99%)      DIET/FLUIDS: ferrous sulfate Oral Tab/Cap - Peds 325 milliGRAM(s) Oral daily  sodium chloride 0.9%. 1000 milliLiter(s) IV Continuous <Continuous>    URINE:  Indwelling Urethral Catheter:     Connect To:  Straight Drainage/Gravity    Indication:  Improved Comfort Care (12-10-19 @ 06:00)    GI proph:  pantoprazole    Tablet 40 milliGRAM(s) Oral before breakfast    AC/ proph: heparin  Injectable 5000 Unit(s) SubCutaneous every 8 hours    ABx:     PHYSICAL EXAM:  GENERAL: NAD, well-appearing  CHEST/LUNG: Clear to auscultation bilaterally  HEART: Regular rate and rhythm  ABDOMEN: Soft, Nontender, Nondistended;   EXTREMITIES:  No clubbing, cyanosis, or edema, left thigh hematoma, left hip pain .       LABS  Labs:  CAPILLARY BLOOD GLUCOSE  POCT Blood Glucose.: 111 mg/dL (10 Dec 2019 05:52)  POCT Blood Glucose.: 197 mg/dL (10 Dec 2019 04:03)                          8.3    6.81  )-----------( 140      ( 10 Dec 2019 07:47 )             26.2       Auto Neutrophil %: 73.8 % (12-10-19 @ 07:47)  Auto Immature Granulocyte %: 0.6 % (12-10-19 @ 07:47)  Auto Neutrophil %: 78.8 % (12-10-19 @ 00:36)  Auto Immature Granulocyte %: 0.7 % (12-10-19 @ 00:36)  Auto Neutrophil %: 75.5 % (12-09-19 @ 14:40)  Auto Immature Granulocyte %: 1.1 % (12-09-19 @ 14:40)    12-10    136  |  103  |  35<H>  ----------------------------<  107<H>  4.8   |  20  |  1.6<H>      Calcium, Total Serum: 9.1 mg/dL (12-10-19 @ 07:47)      LFTs:             8.0  | 0.4  | 23       ------------------[76      ( 09 Dec 2019 14:40 )  4.5  | x    | 9           Lipase:x      Amylase:x             Coags:     14.00  ----< 1.22    ( 10 Dec 2019 00:36 )     30.7

## 2019-12-10 NOTE — CONSULT NOTE ADULT - SUBJECTIVE AND OBJECTIVE BOX
SUBJECTIVE:    Patient is a 89y old Female who presents with a chief complaint of left femur fracture (10 Dec 2019 12:10)    Currently admitted to medicine with the primary diagnosis of Femur fracture, left     Today is hospital day 1d.     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 650 milliGRAM(s) Oral every 6 hours  amLODIPine   Tablet 5 milliGRAM(s) Oral daily  chlorhexidine 4% Liquid 1 Application(s) Topical daily  ferrous sulfate Oral Tab/Cap - Peds 325 milliGRAM(s) Oral daily  fluorometholone 0.1% Suspension 1 Drop(s) Both EYES daily  heparin  Injectable 5000 Unit(s) SubCutaneous every 8 hours  levothyroxine 75 MICROGram(s) Oral daily  pantoprazole    Tablet 40 milliGRAM(s) Oral before breakfast  simvastatin 20 milliGRAM(s) Oral at bedtime  sodium chloride 0.9%. 1000 milliLiter(s) IV Continuous <Continuous>  timolol 0.25% Solution 1 Drop(s) Both EYES daily    PRN MEDICATIONS  oxyCODONE    IR 5 milliGRAM(s) Oral every 4 hours PRN    VITALS:   T(F): 98.8  HR: 77  BP: 169/75  RR: 18  SpO2: 99%    LABS:                        9.8    7.17  )-----------( 137      ( 10 Dec 2019 16:19 )             30.7     12-10    136  |  103  |  35<H>  ----------------------------<  107<H>  4.8   |  20  |  1.6<H>    Ca    9.1      10 Dec 2019 07:47  Phos  3.2     12-10  Mg     1.8     12-10    TPro  8.0  /  Alb  4.5  /  TBili  0.4  /  DBili  x   /  AST  23  /  ALT  9   /  AlkPhos  76  12-09    PT/INR - ( 10 Dec 2019 00:36 )   PT: 14.00 sec;   INR: 1.22 ratio         PTT - ( 10 Dec 2019 00:36 )  PTT:30.7 sec              RADIOLOGY:    PHYSICAL EXAM:  GEN: No acute distress  LUNGS: Clear to auscultation bilaterally   HEART: S1/S2 present. RRR.   ABD/ GI: Soft, non-tender, non-distended. Bowel sounds present  EXT: lt hip pain  NEURO: AAOX3

## 2019-12-11 LAB
ANION GAP SERPL CALC-SCNC: 12 MMOL/L — SIGNIFICANT CHANGE UP (ref 7–14)
ANION GAP SERPL CALC-SCNC: 13 MMOL/L — SIGNIFICANT CHANGE UP (ref 7–14)
BASOPHILS # BLD AUTO: 0.04 K/UL — SIGNIFICANT CHANGE UP (ref 0–0.2)
BASOPHILS NFR BLD AUTO: 0.6 % — SIGNIFICANT CHANGE UP (ref 0–1)
BLD GP AB SCN SERPL QL: SIGNIFICANT CHANGE UP
BUN SERPL-MCNC: 20 MG/DL — SIGNIFICANT CHANGE UP (ref 10–20)
BUN SERPL-MCNC: 26 MG/DL — HIGH (ref 10–20)
CALCIUM SERPL-MCNC: 8 MG/DL — LOW (ref 8.5–10.1)
CALCIUM SERPL-MCNC: 8.8 MG/DL — SIGNIFICANT CHANGE UP (ref 8.5–10.1)
CHLORIDE SERPL-SCNC: 109 MMOL/L — SIGNIFICANT CHANGE UP (ref 98–110)
CHLORIDE SERPL-SCNC: 109 MMOL/L — SIGNIFICANT CHANGE UP (ref 98–110)
CO2 SERPL-SCNC: 17 MMOL/L — SIGNIFICANT CHANGE UP (ref 17–32)
CO2 SERPL-SCNC: 20 MMOL/L — SIGNIFICANT CHANGE UP (ref 17–32)
CREAT SERPL-MCNC: 1.2 MG/DL — SIGNIFICANT CHANGE UP (ref 0.7–1.5)
CREAT SERPL-MCNC: 1.4 MG/DL — SIGNIFICANT CHANGE UP (ref 0.7–1.5)
EOSINOPHIL # BLD AUTO: 0.15 K/UL — SIGNIFICANT CHANGE UP (ref 0–0.7)
EOSINOPHIL NFR BLD AUTO: 2.4 % — SIGNIFICANT CHANGE UP (ref 0–8)
GLUCOSE SERPL-MCNC: 110 MG/DL — HIGH (ref 70–99)
GLUCOSE SERPL-MCNC: 127 MG/DL — HIGH (ref 70–99)
HCT VFR BLD CALC: 27.4 % — LOW (ref 37–47)
HCT VFR BLD CALC: 27.5 % — LOW (ref 37–47)
HGB BLD-MCNC: 8.5 G/DL — LOW (ref 12–16)
HGB BLD-MCNC: 8.8 G/DL — LOW (ref 12–16)
IMM GRANULOCYTES NFR BLD AUTO: 0.6 % — HIGH (ref 0.1–0.3)
LYMPHOCYTES # BLD AUTO: 1.43 K/UL — SIGNIFICANT CHANGE UP (ref 1.2–3.4)
LYMPHOCYTES # BLD AUTO: 22.6 % — SIGNIFICANT CHANGE UP (ref 20.5–51.1)
MAGNESIUM SERPL-MCNC: 1.8 MG/DL — SIGNIFICANT CHANGE UP (ref 1.8–2.4)
MCHC RBC-ENTMCNC: 28.4 PG — SIGNIFICANT CHANGE UP (ref 27–31)
MCHC RBC-ENTMCNC: 28.9 PG — SIGNIFICANT CHANGE UP (ref 27–31)
MCHC RBC-ENTMCNC: 31 G/DL — LOW (ref 32–37)
MCHC RBC-ENTMCNC: 32 G/DL — SIGNIFICANT CHANGE UP (ref 32–37)
MCV RBC AUTO: 90.2 FL — SIGNIFICANT CHANGE UP (ref 81–99)
MCV RBC AUTO: 91.6 FL — SIGNIFICANT CHANGE UP (ref 81–99)
MONOCYTES # BLD AUTO: 0.66 K/UL — HIGH (ref 0.1–0.6)
MONOCYTES NFR BLD AUTO: 10.4 % — HIGH (ref 1.7–9.3)
NEUTROPHILS # BLD AUTO: 4.02 K/UL — SIGNIFICANT CHANGE UP (ref 1.4–6.5)
NEUTROPHILS NFR BLD AUTO: 63.4 % — SIGNIFICANT CHANGE UP (ref 42.2–75.2)
NRBC # BLD: 0 /100 WBCS — SIGNIFICANT CHANGE UP (ref 0–0)
NRBC # BLD: 0 /100 WBCS — SIGNIFICANT CHANGE UP (ref 0–0)
PHOSPHATE SERPL-MCNC: 2.9 MG/DL — SIGNIFICANT CHANGE UP (ref 2.1–4.9)
PLATELET # BLD AUTO: 119 K/UL — LOW (ref 130–400)
PLATELET # BLD AUTO: 122 K/UL — LOW (ref 130–400)
POTASSIUM SERPL-MCNC: 3.9 MMOL/L — SIGNIFICANT CHANGE UP (ref 3.5–5)
POTASSIUM SERPL-MCNC: 4.2 MMOL/L — SIGNIFICANT CHANGE UP (ref 3.5–5)
POTASSIUM SERPL-SCNC: 3.9 MMOL/L — SIGNIFICANT CHANGE UP (ref 3.5–5)
POTASSIUM SERPL-SCNC: 4.2 MMOL/L — SIGNIFICANT CHANGE UP (ref 3.5–5)
RBC # BLD: 2.99 M/UL — LOW (ref 4.2–5.4)
RBC # BLD: 3.05 M/UL — LOW (ref 4.2–5.4)
RBC # FLD: 15.3 % — HIGH (ref 11.5–14.5)
RBC # FLD: 15.6 % — HIGH (ref 11.5–14.5)
SODIUM SERPL-SCNC: 139 MMOL/L — SIGNIFICANT CHANGE UP (ref 135–146)
SODIUM SERPL-SCNC: 141 MMOL/L — SIGNIFICANT CHANGE UP (ref 135–146)
WBC # BLD: 6.34 K/UL — SIGNIFICANT CHANGE UP (ref 4.8–10.8)
WBC # BLD: 9.9 K/UL — SIGNIFICANT CHANGE UP (ref 4.8–10.8)
WBC # FLD AUTO: 6.34 K/UL — SIGNIFICANT CHANGE UP (ref 4.8–10.8)
WBC # FLD AUTO: 9.9 K/UL — SIGNIFICANT CHANGE UP (ref 4.8–10.8)

## 2019-12-11 PROCEDURE — 99231 SBSQ HOSP IP/OBS SF/LOW 25: CPT

## 2019-12-11 RX ORDER — CEFAZOLIN SODIUM 1 G
2000 VIAL (EA) INJECTION EVERY 8 HOURS
Refills: 0 | Status: COMPLETED | OUTPATIENT
Start: 2019-12-12 | End: 2019-12-12

## 2019-12-11 RX ORDER — OXYCODONE HYDROCHLORIDE 5 MG/1
5 TABLET ORAL EVERY 4 HOURS
Refills: 0 | Status: DISCONTINUED | OUTPATIENT
Start: 2019-12-11 | End: 2019-12-17

## 2019-12-11 RX ORDER — MORPHINE SULFATE 50 MG/1
2 CAPSULE, EXTENDED RELEASE ORAL
Refills: 0 | Status: DISCONTINUED | OUTPATIENT
Start: 2019-12-11 | End: 2019-12-17

## 2019-12-11 RX ORDER — MAGNESIUM HYDROXIDE 400 MG/1
30 TABLET, CHEWABLE ORAL DAILY
Refills: 0 | Status: DISCONTINUED | OUTPATIENT
Start: 2019-12-11 | End: 2019-12-17

## 2019-12-11 RX ORDER — SENNA PLUS 8.6 MG/1
2 TABLET ORAL AT BEDTIME
Refills: 0 | Status: DISCONTINUED | OUTPATIENT
Start: 2019-12-11 | End: 2019-12-17

## 2019-12-11 RX ORDER — HYDROMORPHONE HYDROCHLORIDE 2 MG/ML
0.5 INJECTION INTRAMUSCULAR; INTRAVENOUS; SUBCUTANEOUS
Refills: 0 | Status: DISCONTINUED | OUTPATIENT
Start: 2019-12-11 | End: 2019-12-11

## 2019-12-11 RX ORDER — MAGNESIUM SULFATE 500 MG/ML
1 VIAL (ML) INJECTION ONCE
Refills: 0 | Status: COMPLETED | OUTPATIENT
Start: 2019-12-11 | End: 2019-12-11

## 2019-12-11 RX ORDER — SODIUM CHLORIDE 9 MG/ML
1000 INJECTION INTRAMUSCULAR; INTRAVENOUS; SUBCUTANEOUS
Refills: 0 | Status: DISCONTINUED | OUTPATIENT
Start: 2019-12-11 | End: 2019-12-11

## 2019-12-11 RX ORDER — ONDANSETRON 8 MG/1
4 TABLET, FILM COATED ORAL ONCE
Refills: 0 | Status: DISCONTINUED | OUTPATIENT
Start: 2019-12-11 | End: 2019-12-11

## 2019-12-11 RX ORDER — ONDANSETRON 8 MG/1
4 TABLET, FILM COATED ORAL EVERY 6 HOURS
Refills: 0 | Status: DISCONTINUED | OUTPATIENT
Start: 2019-12-11 | End: 2019-12-17

## 2019-12-11 RX ORDER — POLYETHYLENE GLYCOL 3350 17 G/17G
17 POWDER, FOR SOLUTION ORAL DAILY
Refills: 0 | Status: DISCONTINUED | OUTPATIENT
Start: 2019-12-11 | End: 2019-12-17

## 2019-12-11 RX ORDER — HYDROMORPHONE HYDROCHLORIDE 2 MG/ML
1 INJECTION INTRAMUSCULAR; INTRAVENOUS; SUBCUTANEOUS
Refills: 0 | Status: DISCONTINUED | OUTPATIENT
Start: 2019-12-11 | End: 2019-12-11

## 2019-12-11 RX ORDER — PANTOPRAZOLE SODIUM 20 MG/1
40 TABLET, DELAYED RELEASE ORAL
Refills: 0 | Status: DISCONTINUED | OUTPATIENT
Start: 2019-12-11 | End: 2019-12-17

## 2019-12-11 RX ORDER — ACETAMINOPHEN 500 MG
650 TABLET ORAL EVERY 6 HOURS
Refills: 0 | Status: COMPLETED | OUTPATIENT
Start: 2019-12-11 | End: 2019-12-14

## 2019-12-11 RX ADMIN — SODIUM CHLORIDE 100 MILLILITER(S): 9 INJECTION INTRAMUSCULAR; INTRAVENOUS; SUBCUTANEOUS at 20:34

## 2019-12-11 RX ADMIN — FLUOROMETHOLONE 1 DROP(S): 1 SOLUTION/ DROPS OPHTHALMIC at 14:00

## 2019-12-11 RX ADMIN — HEPARIN SODIUM 5000 UNIT(S): 5000 INJECTION INTRAVENOUS; SUBCUTANEOUS at 14:05

## 2019-12-11 RX ADMIN — Medication 100 GRAM(S): at 15:28

## 2019-12-11 RX ADMIN — Medication 75 MICROGRAM(S): at 05:19

## 2019-12-11 RX ADMIN — PANTOPRAZOLE SODIUM 40 MILLIGRAM(S): 20 TABLET, DELAYED RELEASE ORAL at 05:20

## 2019-12-11 RX ADMIN — Medication 325 MILLIGRAM(S): at 14:01

## 2019-12-11 RX ADMIN — Medication 650 MILLIGRAM(S): at 05:20

## 2019-12-11 RX ADMIN — Medication 650 MILLIGRAM(S): at 14:02

## 2019-12-11 RX ADMIN — AMLODIPINE BESYLATE 5 MILLIGRAM(S): 2.5 TABLET ORAL at 05:19

## 2019-12-11 RX ADMIN — HEPARIN SODIUM 5000 UNIT(S): 5000 INJECTION INTRAVENOUS; SUBCUTANEOUS at 05:19

## 2019-12-11 RX ADMIN — Medication 650 MILLIGRAM(S): at 01:34

## 2019-12-11 RX ADMIN — Medication 1 DROP(S): at 14:04

## 2019-12-11 RX ADMIN — Medication 650 MILLIGRAM(S): at 14:07

## 2019-12-11 RX ADMIN — CHLORHEXIDINE GLUCONATE 1 APPLICATION(S): 213 SOLUTION TOPICAL at 14:07

## 2019-12-11 NOTE — PROGRESS NOTE ADULT - SUBJECTIVE AND OBJECTIVE BOX
Progress Note: Surgery  Patient: ELE BRAR , 89y (01-Dec-1927)Female   MRN: [id number]  Location: [place] [address] A  Visit: 12-09-19 Inpatient  Date: 12-11-19 @ 03:25    Procedure/Diagnosis: s/p fall, found to have left hip fracture  Events over 24h: No acute event overnight. No new complaint.     Vitals: T(F): 97.9 (12-11-19 @ 00:00), Max: 99 (12-10-19 @ 17:42)  HR: 81 (12-11-19 @ 00:00)  BP: 146/67 (12-11-19 @ 00:00) (118/63 - 169/75)  RR: 16 (12-11-19 @ 00:00)  SpO2: 99% (12-10-19 @ 17:42)      Diet: Diet, DASH/TLC:   Sodium & Cholesterol Restricted (12-09-19 @ 22:18)  Diet, NPO after Midnight:      NPO Start Date: 10-Dec-2019,   NPO Start Time: 23:59 (12-10-19 @ 17:11)    IV Fluid: ferrous sulfate Oral Tab/Cap - Peds 325 milliGRAM(s) Oral daily  sodium chloride 0.9%. 1000 milliLiter(s) (100 mL/Hr) IV Continuous <Continuous>      In:   12-10-19 @ 07:01  -  12-11-19 @ 03:25  --------------------------------------------------------  IN: 0 mL      Out:   12-10-19 @ 07:01  -  12-11-19 @ 03:25  --------------------------------------------------------  OUT:    Indwelling Catheter - Urethral: 1400 mL  Total OUT: 1400 mL        Net:   12-10-19 @ 07:01  -  12-11-19 @ 03:25  --------------------------------------------------------  NET: -1400 mL        Physical Examination:  General Appearance: NAD, alert and cooperative  HEENT: NCAT, WNL  Heart: S1 and S2. No murmurs. Rhythm is regular.  Lungs: Clear to auscultation BL without rales, rhonchi, wheezing, crackles or diminished breath sounds.  Abdomen: Positive bowel sounds. Soft, nondistended,  non tender    Medications: [Standing]  acetaminophen   Tablet .. 650 milliGRAM(s) Oral every 6 hours  amLODIPine   Tablet 5 milliGRAM(s) Oral daily  chlorhexidine 4% Liquid 1 Application(s) Topical daily  ferrous sulfate Oral Tab/Cap - Peds 325 milliGRAM(s) Oral daily  fluorometholone 0.1% Suspension 1 Drop(s) Both EYES daily  heparin  Injectable 5000 Unit(s) SubCutaneous every 8 hours  levothyroxine 75 MICROGram(s) Oral daily  pantoprazole    Tablet 40 milliGRAM(s) Oral before breakfast  simvastatin 20 milliGRAM(s) Oral at bedtime  sodium chloride 0.9%. 1000 milliLiter(s) (100 mL/Hr) IV Continuous <Continuous>  timolol 0.25% Solution 1 Drop(s) Both EYES daily    Medications:[PRN]  oxyCODONE    IR 5 milliGRAM(s) Oral every 4 hours PRN    Labs:                        9.8    7.17  )-----------( 137      ( 10 Dec 2019 16:19 )             30.7   12-10    136  |  103  |  35<H>  ----------------------------<  107<H>  4.8   |  20  |  1.6<H>    Ca    9.1      10 Dec 2019 07:47  Phos  3.2     12-10  Mg     1.8     12-10    TPro  8.0  /  Alb  4.5  /  TBili  0.4  /  DBili  x   /  AST  23  /  ALT  9   /  AlkPhos  76  12-09  LIVER FUNCTIONS - ( 09 Dec 2019 14:40 )  Alb: 4.5 g/dL / Pro: 8.0 g/dL / ALK PHOS: 76 U/L / ALT: 9 U/L / AST: 23 U/L / GGT: x         PT/INR - ( 10 Dec 2019 00:36 )   PT: 14.00 sec;   INR: 1.22 ratio         PTT - ( 10 Dec 2019 00:36 )  PTT:30.7 sec    Micro/Urine:    Imaging:  None/24h

## 2019-12-11 NOTE — PRE-OP CHECKLIST - WEIGHT IN LBS
History of Present Illness


General


Date patient seen:  Nov 14, 2019


Chief Complaint:  Upper Respiratory Illness





Present Illness


HPI


64 year old male with hx of COPD, emphysema, presented to ER [facility] cc of ongoing 

cough with sputum production. He was at [facility] a few month ago for the same 

problem.    He also has significant amount of thick green sputum production.  

His CXR showed only emphysematous changes. He is admitted for acute 

exacerbation of COPD and purulent Bronchitis. .


Allergies:  


Coded Allergies:  


     Tuna (Verified  Allergy, Unknown, 6/18/19)





Medication History


Scheduled


Diltiazem Hcl* (Cardizem*), Unknown Dose PO DAILY, (Reported)


Fluticasone/Salmeterol (Advair 250-50 Diskus), 1 PUFF INH EVERY 12 HOURS, (

Reported)


Fluticasone/Salmeterol (Advair 100-50 Diskus), 1 PUFF INH EVERY 12 HOURS, (

Reported)


Ipratropium/Albuterol Sulfate (Combivent Respimat Inhal [place]), 4 GM IH TWICE A 

DAY, (Reported)


Methocarbamol* (Robaxin*), 500 MG PO TID


Nicotine 14MG Patch* (Nicoderm Cq 14MG*), 1 EACH TD DAILY, (Reported)


Quetiapine Fumarate* (Seroquel*), 100 MG ORAL QHS, (Reported)


Theophylline (Theodur*), 100 MG ORAL TWICE A DAY, (Reported)


Trazodone Hcl* (Desyrel*), 100 MG ORAL BEDTIME, (Reported)





Scheduled PRN


Cyclobenzaprine Hcl* (Flexeril*), 10 MG ORAL QHS PRN for For Pain, (Reported)


Ibuprofen (Ibuprofen*), 800 MG ORAL THREE TIMES A DAY PRN for For Pain, (

Reported)


Ibuprofen* (Motrin*), 600 MG ORAL Q6H PRN for For Pain


Ipratropium/Albuterol Sulfate (Combivent Respimat Inhal [place]), 1 PUFF IH for 

Bronchospasm, (Reported)


Oxycodone/Acetaminophen 5-325* (Percocet 5-325 Mg Tablet*), 1 TAB ORAL Q6H PRN 

for For Pain





Patient History


Healthcare decision maker





Resuscitation status


Full Code


Advanced Directive on File








Past Medical/Surgical History


Past Medical/Surgical History:  


(1) Emphysema lung


(2) Constipation





Review of Systems


All Other Systems:  negative except mentioned in HPI





Physical Exam


General Appearance:  WD/WN, no apparent distress


Lines, tubes and drains:  peripheral


HEENT:  normocephalic, anicteric


Neck:  non-tender, supple


Respiratory/Chest:  lungs clear, normal breath sounds


Cardiovascular/Chest:  normal peripheral pulses, normal rate


Genitourinary/Rectal:  normal genital exam, normal prostate exam


Extremities:  normal inspection


Neurologic:  CNs II-XII grossly normal





Last 24 Hour Vital Signs








  Date Time  Temp Pulse Resp B/P (MAP) Pulse Ox O2 Delivery O2 Flow Rate FiO2


 


11/14/19 12:00      Nasal Cannula 2.0 


 


11/14/19 12:00 97.8 93 20 125/86 (99) 90   


 


11/14/19 11:43  90      


 


11/14/19 08:00 97.9 130 24 124/88 (100) 90   


 


11/14/19 08:00      Nasal Cannula 2.0 


 


11/14/19 08:00  140      


 


11/14/19 04:00      Nasal Cannula 2.0 


 


11/14/19 04:00 98.1 131 24 132/72 (92) 92   


 


11/14/19 03:48  74      


 


11/14/19 00:00 98.0 72 20 112/54 (73) 89   


 


11/14/19 00:00      Nasal Cannula 2.0 


 


11/13/19 23:38  79      


 


11/13/19 23:07  88 20  93 Nasal Cannula 2.0 28


 


11/13/19 21:52  85      


 


11/13/19 21:00 98.4 84 24 132/75 (94) 92   


 


11/13/19 21:00      Nasal Cannula 2.0 


 


11/13/19 20:15 99.1 92 20 127/60 94 Nasal Cannula 2.0 28


 


11/13/19 19:45 99.1 92 20 127/60 94 Nasal Cannula 2.0 


 


11/13/19 19:15  90 22  95 Nasal Cannula 2.0 28





  93 20  86   


 


11/13/19 19:14 100.8       


 


11/13/19 18:30 99.0 86 21 124/89 98 Room Air  21


 


11/13/19 17:04  93 22  98 Room Air  21





  92 20  93   


 


11/13/19 16:40  86 19   Room Air  


 


11/13/19 16:40 100.8 86 19 117/88 92 Room Air  


 


11/13/19 16:11 99.1 92 20 117/88 (98) 93 Room Air  

















Intake and Output  


 


 11/13/19 11/14/19





 19:00 07:00


 


Intake Total 1050 ml 1200.18 ml


 


Output Total  1300 ml


 


Balance 1050 ml -99.82 ml


 


  


 


Intake Oral  800 ml


 


IV Total 1050 ml 400.18 ml


 


Output Urine Total  1300 ml


 


# Voids  3


 


# Bowel Movements  3











Laboratory Tests








Test


  11/13/19


17:20


 


White Blood Count


  8.1 K/UL


(4.8-10.8)


 


Red Blood Count


  6.16 M/UL


(4.70-6.10)  H


 


Hemoglobin


  17.1 G/DL


(14.2-18.0)


 


Hematocrit


  50.0 %


(42.0-52.0)


 


Mean Corpuscular Volume 81 FL (80-99)  


 


Mean Corpuscular Hemoglobin


  27.8 PG


(27.0-31.0)


 


Mean Corpuscular Hemoglobin


Concent 34.3 G/DL


(32.0-36.0)


 


Red Cell Distribution Width


  10.6 %


(11.6-14.8)  L


 


Platelet Count


  121 K/UL


(150-450)  L


 


Mean Platelet Volume


  7.9 FL


(6.5-10.1)


 


Neutrophils (%) (Auto)


  76.9 %


(45.0-75.0)  H


 


Lymphocytes (%) (Auto)


  10.9 %


(20.0-45.0)  L


 


Monocytes (%) (Auto)


  10.3 %


(1.0-10.0)  H


 


Eosinophils (%) (Auto)


  0.1 %


(0.0-3.0)


 


Basophils (%) (Auto)


  1.8 %


(0.0-2.0)


 


Sodium Level


  133 MMOL/L


(136-145)  L


 


Potassium Level


  3.3 MMOL/L


(3.5-5.1)  L


 


Chloride Level


  91 MMOL/L


()  L


 


Carbon Dioxide Level


  35 MMOL/L


(21-32)  H


 


Anion Gap


  7 mmol/L


(5-15)


 


Blood Urea Nitrogen


  21 mg/dL


(7-18)  H


 


Creatinine


  1.5 MG/DL


(0.55-1.30)  H


 


Estimat Glomerular Filtration


Rate 57.1 mL/min


(>60)


 


Glucose Level


  94 MG/DL


()


 


Calcium Level


  8.7 MG/DL


(8.5-10.1)


 


Total Bilirubin


  0.7 MG/DL


(0.2-1.0)


 


Aspartate Amino Transf


(AST/SGOT) 68 U/L (15-37)


H


 


Alanine Aminotransferase


(ALT/SGPT) 45 U/L (12-78)


 


 


Alkaline Phosphatase


  63 U/L


()


 


Total Protein


  8.2 G/DL


(6.4-8.2)


 


Albumin


  4.0 G/DL


(3.4-5.0)


 


Globulin 4.2 g/dL  


 


Albumin/Globulin Ratio 1.0 (1.0-2.7)  








Height (Feet):  6


Height (Inches):  2.00


Weight (Pounds):  184


Medications





Current Medications








 Medications


  (Trade)  Dose


 Ordered  Sig/Zana


 Route


 PRN Reason  Start Time


 Stop Time Status Last Admin


Dose Admin


 


 Albuterol/


 Ipratropium


  (Albuterol/


 Ipratropium)  3 ml  Q4H  PRN


 HHN


 dyspnea  11/13/19 21:15


 11/18/19 21:14   


 


 


 Cyclobenzaprine


 HCl


  (Flexeril)  10 mg  HSPRN  PRN


 ORAL


 muscle spasm  11/13/19 21:15


 12/13/19 21:14   


 


 


 Dextrose


  (Dextrose 50%)  25 ml  Q30M  PRN


 IV


 Hypoglycemia  11/13/19 21:15


 12/13/19 21:14   


 


 


 Dextrose


  (Dextrose 50%)  50 ml  Q30M  PRN


 IV


 Hypoglycemia  11/13/19 21:15


 12/13/19 21:14   


 


 


 Heparin Sodium


  (Porcine)


  (Heparin 5000


 units/ml)  5,000 units  EVERY 12  HOURS


 SUBQ


   11/14/19 09:00


 12/14/19 08:59  11/14/19 08:24


 


 


 Lorazepam


  (Ativan 2mg/ml


 1ml)  0.5 mg  Q4H  PRN


 IV


 For Anxiety  11/13/19 21:15


 11/20/19 21:14   


 


 


 Methocarbamol


  (Robaxin)  500 mg  TID


 ORAL


   11/14/19 09:00


 12/14/19 08:59  11/14/19 12:38


 


 


 Methylprednisolone


 Sodium Succinate


  (Solu-MEDROL)  60 mg  EVERY 6  HOURS


 IV


   11/14/19 00:00


 12/14/19 00:00  11/14/19 12:39


 


 


 Morphine Sulfate


  (Morphine


 Sulfate)  2 mg  Q4H  PRN


 IVP


 severe pain 7-10  11/13/19 21:15


 11/20/19 21:14   


 


 


 Nitroglycerin


  (Ntg)  0.4 mg  Q5M X 3 DOSES PRN


 SL


 Prn Chest Pain  11/13/19 21:15


 12/13/19 21:14   


 


 


 Ondansetron HCl


  (Zofran)  4 mg  Q6H  PRN


 IVP


 Nausea & Vomiting  11/13/19 21:15


 12/13/19 21:14   


 


 


 Oxycodone/


 Acetaminophen


  (Percocet 5-325)  1 tab  Q6H  PRN


 ORAL


 Moderate Pain (Pain Scale 4-6)  11/13/19 21:15


 11/20/19 21:14  11/14/19 07:53


 


 


 Piperacillin Sod/


 Tazobactam Sod


 3.375 gm/Sodium


 Chloride  110 ml @ 


 27.5 mls/hr  EVERY 8  HOURS


 IVPB


   11/13/19 23:00


 11/18/19 22:59  11/14/19 06:27


 


 


 Promethazine HCl/


 Codeine


  (Phenergan with


 Codeine)  5 ml  Q6H  PRN


 ORAL


 cough  11/13/19 21:15


 12/13/19 21:14  11/14/19 04:55


 


 


 Quetiapine


 Fumarate


  (SEROquel)  100 mg  QHS


 ORAL


   11/13/19 21:45


 12/13/19 21:44  11/13/19 21:47


 


 


 Temazepam


  (Restoril)  15 mg  HSPRN  PRN


 ORAL


 Insomnia  11/13/19 21:15


 11/20/19 21:14   


 


 


 Theophylline


  (Julien-Dur)  100 mg  EVERY 12  HOURS


 ORAL


   11/14/19 09:00


 12/14/19 08:59  11/14/19 08:20


 


 


 Trazodone HCl


  (Desyrel)  100 mg  BEDTIME


 ORAL


   11/13/19 21:45


 12/13/19 21:44  11/13/19 21:47


 











Assessment/Plan


Problem List:  


(1) Acute exacerbation of chronic obstructive pulmonary disease (COPD)


ICD Codes:  J44.1 - Chronic obstructive pulmonary disease with (acute) 

exacerbation


SNOMED:  409094251


(2) ATN (acute tubular necrosis)


ICD Codes:  N17.0 - Acute kidney failure with tubular necrosis


SNOMED:  51645863


(3) Emphysema lung


ICD Codes:  J43.9 - Emphysema, unspecified


SNOMED:  89768656


Assessment/Plan:


check electrolytes


iv fluids


iv steroids + iv abx


check sputum


taper steroids fast


dvt prophylaxis


f/u electrolytes.











Toan Norman MD Nov 14, 2019 13:42 143.9

## 2019-12-11 NOTE — PROGRESS NOTE ADULT - SUBJECTIVE AND OBJECTIVE BOX
Orthopaedics Progress Note    ELE BRAR  [id number]    Patient is a 89y year old Female with left femur fracture  POD#0 from L femur IMN  Patient seen and examined bedside  Pain well controlled  Denies fever/chills/CP/SOB  No other complaints    acetaminophen   Tablet .. 650 milliGRAM(s) Oral every 6 hours  aluminum hydroxide/magnesium hydroxide/simethicone Suspension 30 milliLiter(s) Oral four times a day PRN  HYDROmorphone  Injectable 0.5 milliGRAM(s) IV Push every 10 minutes PRN  HYDROmorphone  Injectable 1 milliGRAM(s) IV Push every 10 minutes PRN  magnesium hydroxide Suspension 30 milliLiter(s) Oral daily PRN  morphine  - Injectable 2 milliGRAM(s) IV Push every 3 hours PRN  ondansetron Injectable 4 milliGRAM(s) IV Push every 6 hours PRN  ondansetron Injectable 4 milliGRAM(s) IV Push once PRN  oxyCODONE    IR 5 milliGRAM(s) Oral every 4 hours PRN  pantoprazole    Tablet 40 milliGRAM(s) Oral before breakfast  polyethylene glycol 3350 17 Gram(s) Oral daily  senna 2 Tablet(s) Oral at bedtime PRN  sodium chloride 0.9%. 1000 milliLiter(s) IV Continuous <Continuous>      T(C): 36.4 (12-11-19 @ 19:40), Max: 36.9 (12-11-19 @ 15:43)  HR: 99 (12-11-19 @ 21:00) (76 - 103)  BP: 110/54 (12-11-19 @ 21:00) (110/54 - 152/71)  RR: 16 (12-11-19 @ 21:00) (16 - 19)  SpO2: 95% (12-11-19 @ 21:00) (95% - 100%)    Physical Exam  NAD, AAOx3  Breathing comfortably on RA  Resting comfortably  LLE  Dressing c/d/i  Motor: TA/EHL/Gastroc intact  Sensory: SP/DP/Altman/Sa intact  Vasc: foot WWP, 2+ DP pulse    Labs                        8.5    9.90  )-----------( 122      ( 11 Dec 2019 20:10 )             27.4     12-11    139  |  109  |  20  ----------------------------<  127<H>  3.9   |  17  |  1.2    Ca    8.0<L>      11 Dec 2019 20:10  Phos  2.9     12-11  Mg     1.8     12-11        PT/INR - ( 10 Dec 2019 00:36 )   PT: 14.00 sec;   INR: 1.22 ratio         PTT - ( 10 Dec 2019 00:36 )  PTT:30.7 sec      A/P: Patient is a 89y year old Female as above    Recommend transfusion of 1 uPRBC now  WBAT on LLE  DVT ppx: SCD, okay for chem DVT ppx in AM  Abx: ancef  Pain control  Home medications: resume  Trend labs  Dispo: Pending PT recommendations

## 2019-12-11 NOTE — PROGRESS NOTE ADULT - ASSESSMENT
Assessment:  89y Female patient admitted S/P fall , with the above physical exam, labs, and imaging findings.    Plan:  -Pain control as needed  -Hemodynamic monitoring as per routine  -2 units on hold  -Transfuse RBC as needed  - OR with Orthopedics today  Date/Time: 12-11-19 @ 03:25

## 2019-12-12 LAB
ANION GAP SERPL CALC-SCNC: 18 MMOL/L — HIGH (ref 7–14)
BASOPHILS # BLD AUTO: 0.01 K/UL — SIGNIFICANT CHANGE UP (ref 0–0.2)
BASOPHILS NFR BLD AUTO: 0.1 % — SIGNIFICANT CHANGE UP (ref 0–1)
BUN SERPL-MCNC: 25 MG/DL — HIGH (ref 10–20)
CALCIUM SERPL-MCNC: 8.1 MG/DL — LOW (ref 8.5–10.1)
CHLORIDE SERPL-SCNC: 105 MMOL/L — SIGNIFICANT CHANGE UP (ref 98–110)
CO2 SERPL-SCNC: 18 MMOL/L — SIGNIFICANT CHANGE UP (ref 17–32)
CREAT SERPL-MCNC: 1.4 MG/DL — SIGNIFICANT CHANGE UP (ref 0.7–1.5)
EOSINOPHIL # BLD AUTO: 0 K/UL — SIGNIFICANT CHANGE UP (ref 0–0.7)
EOSINOPHIL NFR BLD AUTO: 0 % — SIGNIFICANT CHANGE UP (ref 0–8)
GLUCOSE SERPL-MCNC: 161 MG/DL — HIGH (ref 70–99)
HCT VFR BLD CALC: 27.6 % — LOW (ref 37–47)
HGB BLD-MCNC: 9 G/DL — LOW (ref 12–16)
IMM GRANULOCYTES NFR BLD AUTO: 0.6 % — HIGH (ref 0.1–0.3)
LYMPHOCYTES # BLD AUTO: 0.6 K/UL — LOW (ref 1.2–3.4)
LYMPHOCYTES # BLD AUTO: 7.2 % — LOW (ref 20.5–51.1)
MAGNESIUM SERPL-MCNC: 1.8 MG/DL — SIGNIFICANT CHANGE UP (ref 1.8–2.4)
MCHC RBC-ENTMCNC: 29.2 PG — SIGNIFICANT CHANGE UP (ref 27–31)
MCHC RBC-ENTMCNC: 32.6 G/DL — SIGNIFICANT CHANGE UP (ref 32–37)
MCV RBC AUTO: 89.6 FL — SIGNIFICANT CHANGE UP (ref 81–99)
MONOCYTES # BLD AUTO: 0.91 K/UL — HIGH (ref 0.1–0.6)
MONOCYTES NFR BLD AUTO: 10.9 % — HIGH (ref 1.7–9.3)
NEUTROPHILS # BLD AUTO: 6.81 K/UL — HIGH (ref 1.4–6.5)
NEUTROPHILS NFR BLD AUTO: 81.2 % — HIGH (ref 42.2–75.2)
NRBC # BLD: 0 /100 WBCS — SIGNIFICANT CHANGE UP (ref 0–0)
PHOSPHATE SERPL-MCNC: 3.2 MG/DL — SIGNIFICANT CHANGE UP (ref 2.1–4.9)
PLATELET # BLD AUTO: 124 K/UL — LOW (ref 130–400)
POTASSIUM SERPL-MCNC: 4.5 MMOL/L — SIGNIFICANT CHANGE UP (ref 3.5–5)
POTASSIUM SERPL-SCNC: 4.5 MMOL/L — SIGNIFICANT CHANGE UP (ref 3.5–5)
RBC # BLD: 3.08 M/UL — LOW (ref 4.2–5.4)
RBC # FLD: 14.7 % — HIGH (ref 11.5–14.5)
SODIUM SERPL-SCNC: 141 MMOL/L — SIGNIFICANT CHANGE UP (ref 135–146)
WBC # BLD: 8.38 K/UL — SIGNIFICANT CHANGE UP (ref 4.8–10.8)
WBC # FLD AUTO: 8.38 K/UL — SIGNIFICANT CHANGE UP (ref 4.8–10.8)

## 2019-12-12 PROCEDURE — 99233 SBSQ HOSP IP/OBS HIGH 50: CPT

## 2019-12-12 PROCEDURE — 93010 ELECTROCARDIOGRAM REPORT: CPT

## 2019-12-12 RX ORDER — LATANOPROST 0.05 MG/ML
1 SOLUTION/ DROPS OPHTHALMIC; TOPICAL AT BEDTIME
Refills: 0 | Status: DISCONTINUED | OUTPATIENT
Start: 2019-12-12 | End: 2019-12-17

## 2019-12-12 RX ORDER — HEPARIN SODIUM 5000 [USP'U]/ML
5000 INJECTION INTRAVENOUS; SUBCUTANEOUS EVERY 8 HOURS
Refills: 0 | Status: DISCONTINUED | OUTPATIENT
Start: 2019-12-12 | End: 2019-12-17

## 2019-12-12 RX ORDER — ENOXAPARIN SODIUM 100 MG/ML
30 INJECTION SUBCUTANEOUS DAILY
Refills: 0 | Status: DISCONTINUED | OUTPATIENT
Start: 2019-12-12 | End: 2019-12-12

## 2019-12-12 RX ADMIN — Medication 650 MILLIGRAM(S): at 00:22

## 2019-12-12 RX ADMIN — Medication 650 MILLIGRAM(S): at 00:21

## 2019-12-12 RX ADMIN — Medication 650 MILLIGRAM(S): at 14:29

## 2019-12-12 RX ADMIN — Medication 100 MILLIGRAM(S): at 00:20

## 2019-12-12 RX ADMIN — HEPARIN SODIUM 5000 UNIT(S): 5000 INJECTION INTRAVENOUS; SUBCUTANEOUS at 17:02

## 2019-12-12 RX ADMIN — Medication 650 MILLIGRAM(S): at 17:02

## 2019-12-12 RX ADMIN — Medication 650 MILLIGRAM(S): at 05:37

## 2019-12-12 RX ADMIN — POLYETHYLENE GLYCOL 3350 17 GRAM(S): 17 POWDER, FOR SOLUTION ORAL at 12:45

## 2019-12-12 RX ADMIN — Medication 650 MILLIGRAM(S): at 12:44

## 2019-12-12 RX ADMIN — Medication 100 MILLIGRAM(S): at 14:28

## 2019-12-12 RX ADMIN — PANTOPRAZOLE SODIUM 40 MILLIGRAM(S): 20 TABLET, DELAYED RELEASE ORAL at 05:37

## 2019-12-12 RX ADMIN — LATANOPROST 1 DROP(S): 0.05 SOLUTION/ DROPS OPHTHALMIC; TOPICAL at 21:31

## 2019-12-12 NOTE — PROVIDER CONTACT NOTE (OTHER) - SITUATION
Since pt's services were transferred to medicine just recently; I wanted to confirm if pt is supposed to have chaudhari removed in the morning at 7am as per special instructions under order.

## 2019-12-12 NOTE — PROVIDER CONTACT NOTE (OTHER) - SITUATION
RN followed up with Md regarding pts trial to v oid failure by 1600. Bladder scan unable to be performed due to malfunctioning equipment. Md stated to reassess pt for distention at 0000 12/13/19.

## 2019-12-12 NOTE — PHYSICAL THERAPY INITIAL EVALUATION ADULT - TINETTI GAIT TEST, REHAB EVAL
Based on score fall risk is on the tinetti balance test score, this patient is at a high risk for falls.

## 2019-12-12 NOTE — OCCUPATIONAL THERAPY INITIAL EVALUATION ADULT - GENERAL OBSERVATIONS, REHAB EVAL
pt received with PT Amy and PT Student, agreeable to OT evaluation, +IV lock, left seated in b/s chair in NAD, /56, , RN Susie aware pt with increased HR, asymptomatic

## 2019-12-12 NOTE — PROGRESS NOTE ADULT - SUBJECTIVE AND OBJECTIVE BOX
HOSPITALIST ATTENDING NOTE    ZONIA ELE  89y Female  [id number]    INTERVAL HPI/OVERNIGHT EVENTS: transferred to medicine     T(C): 36.8 (12-12-19 @ 11:30), Max: 37.2 (12-12-19 @ 07:30)  HR: 115 (12-12-19 @ 11:30) (66 - 115)  BP: 127/60 (12-12-19 @ 11:30) (94/54 - 152/71)  RR: 16 (12-12-19 @ 11:30) (16 - 21)  SpO2: 95% (12-11-19 @ 22:02) (95% - 100%)  Wt(kg): --    12-11-19 @ 07:01  -  12-12-19 @ 07:00  --------------------------------------------------------  IN: 845 mL / OUT: 1425 mL / NET: -580 mL    12-12-19 @ 07:01  -  12-12-19 @ 15:29  --------------------------------------------------------  IN: 1050 mL / OUT: 50 mL / NET: 1000 mL        PHYSICAL EXAM:  GENERAL: NAD  HEAD:  Atraumatic, Normocephalic  EYES: EOMI, PERRLA, conjunctiva and sclera clear  ENMT: No tonsillar erythema, exudates, or enlargement  NECK: Supple, No JVD, Normal thyroid  NERVOUS SYSTEM:  Alert & Oriented X3, Good concentration; Non-focal- Motor Strength 5/5 B/L upper and lower extremities LLE limited due to sx and mild pain  CHEST/LUNG: Clear to ascultation bilaterally; No rales, rhonchi, wheezing,   HEART: Regular rate and rhythm; No murmurs, rubs, or gallops  ABDOMEN: Soft, Nontender, Nondistended; Bowel sounds present  EXTREMITIES: No clubbing, cyanosis, or edema, Left lateral hip dressing CDI - no signs of large hematoma      Consultant(s) Notes Reviewed:  [x ] YES  [ ] NO  Care Discussed with Consultants/Other Providers/ Housestaff [ x] YES  [ ] NO    LABS:                        9.0    8.38  )-----------( 124      ( 12 Dec 2019 05:58 )             27.6     12-12    141  |  105  |  25<H>  ----------------------------<  161<H>  4.5   |  18  |  1.4    Ca    8.1<L>      12 Dec 2019 05:58  Phos  3.2     12-12  Mg     1.8     12-12            RADIOLOGY & ADDITIONAL TESTS:    Imaging or report Personally Reviewed:  [ ] YES  [ ] NO    Case discussed with resident    Care discussed with pt/family      HEALTH ISSUES - PROBLEM Dx:

## 2019-12-12 NOTE — PROGRESS NOTE ADULT - ASSESSMENT
89 y/o Female with history of prior falls and recent ORIF R femur with Dr Frye, HTN, HLD, hypothyroidism, anemia, presents from home after a trip and fall from standing earlier today onto her L side. ?ht, -loc. She is complaining of left hip pain  She was found to have Acute subtrochanteric spiral fracture of the proximal left femur.       #sp orif of the left subtroch hip fx .12/11  # Anemia at baseline with hb of around 8-9 normocytic anemia-- now after fall she has a hematoma at fracture site 6.4x10.4 cm--  sp prbcs  Hemoglobin: 9.0 g/dL (12-12 @ 05:58)  Hemoglobin: 8.5 g/dL (12-11 @ 20:10)  Hemoglobin: 8.8 g/dL (12-11 @ 06:06)  Hemoglobin: 9.8 g/dL (12-10 @ 16:19)  Hemoglobin: 8.3 g/dL (12-10 @ 07:47)      #  CKD4 stable    Creatinine: 1.4 (12-12 @ 05:58)    Creatinine: 1.2 (12-11 @ 20:10)    Creatinine: 1.4 (12-11 @ 06:06)    Creatinine: 1.6 (12-10 @ 07:47)    Creatinine: 1.5 (12-10 @ 00:36)    Creatinine: 1.7 (12-09 @ 14:40)        #mild sinus tachy - ekg nsr, not hypoxic  no cp, sob  no fever  check UA, cxr  monitor cbc   if remains tachy can give trial of IVF  pain control      #debility - pt/rehab - possible 4A

## 2019-12-12 NOTE — PROGRESS NOTE ADULT - SUBJECTIVE AND OBJECTIVE BOX
POD#1 S/P ORIF HIP  T(C): 35.9 (12-12-19 @ 04:01), Max: 36.9 (12-11-19 @ 15:43)  HR: 108 (12-12-19 @ 04:01) (66 - 114)  BP: 122/57 (12-12-19 @ 04:01) (94/54 - 152/71)  RR: 18 (12-12-19 @ 04:01) (16 - 21)  SpO2: 95% (12-11-19 @ 22:02) (95% - 100%)                        8.5    9.90  )-----------( 122      ( 11 Dec 2019 20:10 )             27.4     12-11    139  |  109  |  20  ----------------------------<  127<H>  3.9   |  17  |  1.2    Ca    8.0<L>      11 Dec 2019 20:10  Phos  2.9     12-11  Mg     1.8     12-11        PTS PAIN IS CONTROLLED   WOUND IS CDI  N/V/I  ABX COMPLETE  DVT PROPHYLAXIS IN PLACE  REHAB IN PROGRESS  D/C PLAN PENDING

## 2019-12-12 NOTE — PROVIDER CONTACT NOTE (OTHER) - SITUATION
Pt worked w/ PT OOB-CH is now tachycardic at 115 (has been in chair after therapy for ~30 minutes). BP WNL. Pt denies pain.

## 2019-12-12 NOTE — CHART NOTE - NSCHARTNOTEFT_GEN_A_CORE
patient is tachy cardic to 108 and an hour later to 107  NAD, no tachypnea or diaphoressis and saturating well    follow up ekg

## 2019-12-12 NOTE — PHYSICAL THERAPY INITIAL EVALUATION ADULT - GENERAL OBSERVATIONS, REHAB EVAL
10:30-11:05. Patient encountered semi torres in bed + IV lock, +prima fit, + L hip bandage I and D, in NAD.

## 2019-12-12 NOTE — PROGRESS NOTE ADULT - ASSESSMENT
89 y/o Female with history of prior falls and recent ORIF R femur with Dr Frye, HTN, HLD, hypothyroidism, anemia, presents from home after a trip and fall from standing earlier today onto her L side. ?ht, -loc. She is complaining of left hip pain. She was found to have Acute subtrochanteric spiral fracture of the proximal left femur. S/p Orif    L hip - Acute subtrochanteric spiral fracture of the proximal left femur  - s/p ORIF pod #1  - pt was  transfused preop 1 unit of prbc and 1 unit post op prbc   - rehab for gait training wbat should begin pod#1  - PT/phys on board  - pending SNF    Confusion  - expected given age and hospital setting  - son is concerned  - will provide frequent re-orientation  - will get UA    Tachycardia  - EKG shows sinus tach  - will continue to monitor  - consider IVF trial if tachy    CKD - Stage IV  - Cr 1.4  - monitor bmp    Microcytic anemia at baseline  -  transfused 2 units on Dec 10 and 11th respectively  -  patient stable currently  -  had hematoma at fracture site    Exudative age-related macular 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

## 2019-12-12 NOTE — CONSULT NOTE ADULT - SUBJECTIVE AND OBJECTIVE BOX
Patient is a 89y old  Female who presents with a chief complaint of left femur fracture (12 Dec 2019 07:00)    HPI:  89 y/o Female with history of prior falls and recent ORIF R femur with Dr Frye, HTN, HLD, hypothyroidism, anemia, presents from home after a trip and fall from standing earlier today onto her L side. ?ht, -loc. Patient states that she usually ambulates with her walker unless she is using the restroom and on this particular occasion she fell without the use of her walker. 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 650 milliGRAM(s) Oral every 6 hours  ceFAZolin   IVPB 2000 milliGRAM(s) IV Intermittent every 8 hours  pantoprazole    Tablet 40 milliGRAM(s) Oral before breakfast  polyethylene glycol 3350 17 Gram(s) Oral daily    MEDICATIONS  (PRN):  aluminum hydroxide/magnesium hydroxide/simethicone Suspension 30 milliLiter(s) Oral four times a day PRN Indigestion  magnesium hydroxide Suspension 30 milliLiter(s) Oral daily PRN Constipation  morphine  - Injectable 2 milliGRAM(s) IV Push every 3 hours PRN Severe Pain (7 - 10)  ondansetron Injectable 4 milliGRAM(s) IV Push every 6 hours PRN Nausea and/or Vomiting  oxyCODONE    IR 5 milliGRAM(s) Oral every 4 hours PRN Moderate Pain (4 - 6)  senna 2 Tablet(s) Oral at bedtime PRN Constipation      FAMILY HISTORY:      Allergies    No Known Allergies    Intolerances        SOCIAL HISTORY:    [    ] Etoh  [    ] Smoking  [    ] Substance abuse     Home Environment:  [    ] Home Alone  [  x  ] Lives with Family  [    ] Home Health Aid    Dwelling:  [    ] Apartment  [   x ] Private House  [    ] Adult Home  [    ] Skilled Nursing Facility      [    ] Short Term  [    ] Long Term  [   x ] Stairs                           [    ] Elevator   STAYS ON FIRST FLOOR  FUNCTIONAL STATUS PTA: (Check all that apply)  Ambulation: [x     ]Independent    [    ] Dependent     [    ] Non-Ambulatory  Assistive Device: [   x ] SA Cane  [    ]  Q Cane  [ x   ] Walker  [    ]  Wheelchair  ADL : [    ] Independent  [    ]  Dependent       Vital Signs Last 24 Hrs  T(C): 37.2 (12 Dec 2019 07:30), Max: 37.2 (12 Dec 2019 07:30)  T(F): 98.9 (12 Dec 2019 07:30), Max: 98.9 (12 Dec 2019 07:30)  HR: 104 (12 Dec 2019 07:30) (66 - 114)  BP: 102/53 (12 Dec 2019 07:30) (94/54 - 152/71)  BP(mean): 76 (11 Dec 2019 20:30) (76 - 76)  RR: 16 (12 Dec 2019 07:30) (16 - 21)  SpO2: 95% (11 Dec 2019 22:02) (95% - 100%)      PHYSICAL EXAM: Alert but confused follows commands  GENERAL: NAD, well-groomed, well-developed  HEAD:  Atraumatic, Normocephalic  EYES: EOMI, PERRLA, conjunctiva and sclera clear  NECK: Supple  CHEST/LUNG: Clear bilaterally  HEART: Regular rate and rhythm  ABDOMEN: Soft, Nontender, Nondistended; Bowel sounds present+Primafit  EXTREMITIES:  no calf tenderness,LLE swollen incision bandaged    NERVOUS SYSTEM:  Cranial Nerves 2-12 intact [ x   ] Abnormal  [    ]  ROM: WFL all extremities [    ]  Abnormal [  x   ]LLE Limited  Motor Strength: WFL all extremities  [    ]  Abnormal [   x ]1/5 Prox 4/5 distally LLE  Sensation: intact to light touch [  x  ] Abnormal [    ]      FUNCTIONAL STATUS:  Bed Mobility: [   ]  Independent [    ]  Supervision [x    ]  Needs Assistance [  ]  N/A  Transfers: [    ]  Independent [    ]  Supervision [    ]  Needs Assistance [  x  ]  N/A    Ambulation:  [    ]  Independent [    ]  Supervision [    ]  Needs Assistance [ x   ]  N/A   ADL:  [    ]   Independent [   x ] Requires Assistance [    ] N/A       LABS:                        9.0    8.38  )-----------( 124      ( 12 Dec 2019 05:58 )             27.6     12-12    141  |  105  |  25<H>  ----------------------------<  161<H>  4.5   |  18  |  1.4    Ca    8.1<L>      12 Dec 2019 05:58  Phos  3.2     12-12  Mg     1.8     12-12            RADIOLOGY & ADDITIONAL STUDIES:

## 2019-12-12 NOTE — OCCUPATIONAL THERAPY INITIAL EVALUATION ADULT - PLANNED THERAPY INTERVENTIONS, OT EVAL
balance training/ADL retraining/bed mobility training/ROM/strengthening/stretching/cognitive, visual perceptual/transfer training

## 2019-12-12 NOTE — PROGRESS NOTE ADULT - SUBJECTIVE AND OBJECTIVE BOX
89 y/o Female with history of prior falls and recent ORIF R femur with Dr Frye, HTN, HLD, hypothyroidism, anemia, presents from home after a trip and fall from standing earlier today onto her L side. ?ht, -loc. Patient states that she usually ambulates with her walker, she fell without the use of her walker. She is complaining of left hip pain  h/h on admission 10.6/33.3  trauma team w/u failed to reveal head, neck  chest or belly trauma.and was cleared from there end .   The pt was seen in consultation by Dr Flynn for medical risk assessment , The pt was clearead by trauma and was given permission to transfer the pt to the medical team post op for close monitoring in view of the pts advance age and preop risk stratification of   "Moderate risk to high  for Lt femur fx surgery due to age."                          9.0    8.38  )-----------( 124      ( 12 Dec 2019 05:58 )             27.6   ALLERGIES:  No Known Allergies    MEDICATIONS:  STANDING MEDICATIONS  acetaminophen   Tablet .. 650 milliGRAM(s) Oral every 6 hours  amLODIPine   Tablet 5 milliGRAM(s) Oral daily  chlorhexidine 4% Liquid 1 Application(s) Topical daily  ferrous sulfate Oral Tab/Cap - Peds 325 milliGRAM(s) Oral daily  fluorometholone 0.1% Suspension 1 Drop(s) Both EYES daily  heparin  Injectable 5000 Unit(s) SubCutaneous every 8 hours  levothyroxine 75 MICROGram(s) Oral daily  pantoprazole    Tablet 40 milliGRAM(s) Oral before breakfast  simvastatin 20 milliGRAM(s) Oral at bedtime  sodium chloride 0.9%. 1000 milliLiter(s) IV Continuous <Continuous>  timolol 0.25% Solution 1 Drop(s) Both EYES daily    PRN MEDICATIONS  oxyCODONE    IR 5 milliGRAM(s) Oral every 4 hours PRN      Vital Signs Last 24 Hrs  T(C): 37.2 (12 Dec 2019 07:30), Max: 37.2 (12 Dec 2019 07:30)  T(F): 98.9 (12 Dec 2019 07:30), Max: 98.9 (12 Dec 2019 07:30)  HR: 104 (12 Dec 2019 07:30) (66 - 114)  BP: 102/53 (12 Dec 2019 07:30) (94/54 - 152/71)  BP(mean): 76 (11 Dec 2019 20:30) (76 - 76)  RR: 16 (12 Dec 2019 07:30) (16 - 21)  SpO2: 95% (11 Dec 2019 22:02) (95% - 100%)    The pt was  transfused preop 1 unit of prbc and 1 unit post op prbc          The pt was taken to the OR by Dr Meyer for orif of the left subtroch hip fx .  the pt tolerated the procedure well.  pre and post procedural abx were administered   post op dvt prophylaxis should start pod#1 lovenox daily until walking greater than 200 feet multiple times daily   rehab for gait training wbat should begin pod#1   the hgb should stabilize by pod #3   d/c plan for str is routine TRANSFER SUMMARY     89 y/o Female with history of prior falls and recent ORIF R femur with Dr Frye, HTN, HLD, hypothyroidism, anemia, presents from home after a trip and fall from standing earlier today onto her L side. ?ht, -loc. Patient states that she usually ambulates with her walker, she fell without the use of her walker. She is complaining of left hip pain  h/h on admission 10.6/33.3  trauma team w/u failed to reveal head, neck  chest or belly trauma.and was cleared from there end .   The pt was seen in consultation by Dr Flynn for medical risk assessment , The pt was clearead by trauma and was given permission to transfer the pt to the medical team post op for close monitoring in view of the pts advance age and preop risk stratification of   "Moderate risk to high  for Lt femur fx surgery due to age."                          9.0    8.38  )-----------( 124      ( 12 Dec 2019 05:58 )             27.6   ALLERGIES:  No Known Allergies    MEDICATIONS:  STANDING MEDICATIONS  acetaminophen   Tablet .. 650 milliGRAM(s) Oral every 6 hours  amLODIPine   Tablet 5 milliGRAM(s) Oral daily  chlorhexidine 4% Liquid 1 Application(s) Topical daily  ferrous sulfate Oral Tab/Cap - Peds 325 milliGRAM(s) Oral daily  fluorometholone 0.1% Suspension 1 Drop(s) Both EYES daily  heparin  Injectable 5000 Unit(s) SubCutaneous every 8 hours  levothyroxine 75 MICROGram(s) Oral daily  pantoprazole    Tablet 40 milliGRAM(s) Oral before breakfast  simvastatin 20 milliGRAM(s) Oral at bedtime  sodium chloride 0.9%. 1000 milliLiter(s) IV Continuous <Continuous>  timolol 0.25% Solution 1 Drop(s) Both EYES daily    PRN MEDICATIONS  oxyCODONE    IR 5 milliGRAM(s) Oral every 4 hours PRN      Vital Signs Last 24 Hrs  T(C): 37.2 (12 Dec 2019 07:30), Max: 37.2 (12 Dec 2019 07:30)  T(F): 98.9 (12 Dec 2019 07:30), Max: 98.9 (12 Dec 2019 07:30)  HR: 104 (12 Dec 2019 07:30) (66 - 114)  BP: 102/53 (12 Dec 2019 07:30) (94/54 - 152/71)  BP(mean): 76 (11 Dec 2019 20:30) (76 - 76)  RR: 16 (12 Dec 2019 07:30) (16 - 21)  SpO2: 95% (11 Dec 2019 22:02) (95% - 100%)    The pt was  transfused preop 1 unit of prbc and 1 unit post op prbc          The pt was taken to the OR by Dr Meyer for orif of the left subtroch hip fx .  the pt tolerated the procedure well.  pre and post procedural abx were administered   post op dvt prophylaxis should start pod#1 lovenox daily until walking greater than 200 feet multiple times daily   rehab for gait training wbat should begin pod#1   the hgb should stabilize by pod #3   d/c plan for str is routine TRANSFER SUMMARY     89 y/o Female with history of prior falls and recent ORIF R femur with Dr Frye, HTN, HLD, hypothyroidism, anemia, presents from home after a trip and fall from standing earlier today onto her L side. ?ht, -loc. Patient states that she usually ambulates with her walker, she fell without the use of her walker. She is complaining of left hip pain  h/h on admission 10.6/33.3  trauma team w/u failed to reveal head, neck  chest or belly trauma.and was cleared from there end .   The pt was seen in consultation by Dr Flynn for medical risk assessment , The pt was clearead by trauma and was given permission to transfer the pt to the medical team post op for close monitoring in view of the pts advance age and preop risk stratification of   "Moderate risk to high  for Lt femur fx surgery due to age."                          9.0    8.38  )-----------( 124      ( 12 Dec 2019 05:58 )             27.6   ALLERGIES:  No Known Allergies    MEDICATIONS:  STANDING MEDICATIONS  acetaminophen   Tablet .. 650 milliGRAM(s) Oral every 6 hours  amLODIPine   Tablet 5 milliGRAM(s) Oral daily  chlorhexidine 4% Liquid 1 Application(s) Topical daily  ferrous sulfate Oral Tab/Cap - Peds 325 milliGRAM(s) Oral daily  fluorometholone 0.1% Suspension 1 Drop(s) Both EYES daily  heparin  Injectable 5000 Unit(s) SubCutaneous every 8 hours  levothyroxine 75 MICROGram(s) Oral daily  pantoprazole    Tablet 40 milliGRAM(s) Oral before breakfast  simvastatin 20 milliGRAM(s) Oral at bedtime  sodium chloride 0.9%. 1000 milliLiter(s) IV Continuous <Continuous>  timolol 0.25% Solution 1 Drop(s) Both EYES daily    PRN MEDICATIONS  oxyCODONE    IR 5 milliGRAM(s) Oral every 4 hours PRN      Vital Signs Last 24 Hrs  T(C): 37.2 (12 Dec 2019 07:30), Max: 37.2 (12 Dec 2019 07:30)  T(F): 98.9 (12 Dec 2019 07:30), Max: 98.9 (12 Dec 2019 07:30)  HR: 104 (12 Dec 2019 07:30) (66 - 114)  BP: 102/53 (12 Dec 2019 07:30) (94/54 - 152/71)  BP(mean): 76 (11 Dec 2019 20:30) (76 - 76)  RR: 16 (12 Dec 2019 07:30) (16 - 21)  SpO2: 95% (11 Dec 2019 22:02) (95% - 100%)    The pt was  transfused preop 1 unit of prbc and 1 unit post op prbc          The pt was taken to the OR by Dr Meyer for orif of the left subtroch hip fx .  the pt tolerated the procedure well.  pre and post procedural abx were administered   post op dvt prophylaxis should start pod#1 lovenox daily until walking greater than 200 feet multiple times daily   rehab for gait training wbat should begin pod#1   the hgb should stabilize by pod #3   d/c plan for str is routine TRANSFER SUMMARY     89 y/o Female with history of prior falls and recent ORIF R femur with Dr Frye, HTN, HLD, hypothyroidism, anemia, presents from home after a trip and fall from standing earlier today onto her L side. ?ht, -loc. Patient states that she usually ambulates with her walker, she fell without the use of her walker. She is complaining of left hip pain  h/h on admission 10.6/33.3  trauma team w/u failed to reveal head, neck  chest or belly trauma.and was cleared from there end .   The pt was seen in consultation by Dr Flynn for medical risk assessment , The pt was clearead by trauma and was given permission to transfer the pt to the medical team post op for close monitoring in view of the pts advance age and preop risk stratification of   "Moderate risk to high  for Lt femur fx surgery due to age."                          9.0    8.38  )-----------( 124      ( 12 Dec 2019 05:58              27.6     12-12    141  |  105  |  25<H>  ----------------------------<  161<H>  4.5   |  18  |  1.4    Ca    8.1<L>      12 Dec 2019 05:58  Phos  3.2     12-12  Mg     1.8     12-12    ekg   Diagnosis Line Normal sinus rhythm  Nonspecific ST and T wave abnormality  Abnormal ECG      ALLERGIES:  No Known Allergies    MEDICATIONS:  STANDING MEDICATIONS  acetaminophen   Tablet .. 650 milliGRAM(s) Oral every 6 hours  amLODIPine   Tablet 5 milliGRAM(s) Oral daily  chlorhexidine 4% Liquid 1 Application(s) Topical daily  ferrous sulfate Oral Tab/Cap - Peds 325 milliGRAM(s) Oral daily  fluorometholone 0.1% Suspension 1 Drop(s) Both EYES daily  heparin  Injectable 5000 Unit(s) SubCutaneous every 8 hours  levothyroxine 75 MICROGram(s) Oral daily  pantoprazole    Tablet 40 milliGRAM(s) Oral before breakfast  simvastatin 20 milliGRAM(s) Oral at bedtime  sodium chloride 0.9%. 1000 milliLiter(s) IV Continuous <Continuous>  timolol 0.25% Solution 1 Drop(s) Both EYES daily    PRN MEDICATIONS  oxyCODONE    IR 5 milliGRAM(s) Oral every 4 hours PRN      Vital Signs Last 24 Hrs  T(C): 37.2 (12 Dec 2019 07:30), Max: 37.2 (12 Dec 2019 07:30)  T(F): 98.9 (12 Dec 2019 07:30), Max: 98.9 (12 Dec 2019 07:30)  HR: 104 (12 Dec 2019 07:30) (66 - 114)  BP: 102/53 (12 Dec 2019 07:30) (94/54 - 152/71)  BP(mean): 76 (11 Dec 2019 20:30) (76 - 76)  RR: 16 (12 Dec 2019 07:30) (16 - 21)  SpO2: 95% (11 Dec 2019 22:02) (95% - 100%)    The pt was  transfused preop 1 unit of prbc and 1 unit post op prbc          The pt was taken to the OR by Dr Meyer for orif of the left subtroch hip fx .12/11  the pt tolerated the procedure well.  pre and post procedural abx were administered   post op dvt prophylaxis should start pod#1 lovenox daily until walking greater than 200 feet multiple times daily   rehab for gait training wbat should begin pod#1   the hgb should stabilize by pod #3   d/c plan for str is routine

## 2019-12-12 NOTE — PROGRESS NOTE ADULT - SUBJECTIVE AND OBJECTIVE BOX
SUBJECTIVE:     Today is hospital day 3d. 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 650 milliGRAM(s) Oral every 6 hours  heparin  Injectable 5000 Unit(s) SubCutaneous every 8 hours  latanoprost 0.005% Ophthalmic Solution 1 Drop(s) Both EYES at bedtime  pantoprazole    Tablet 40 milliGRAM(s) Oral before breakfast  polyethylene glycol 3350 17 Gram(s) Oral daily    PRN MEDICATIONS  aluminum hydroxide/magnesium hydroxide/simethicone Suspension 30 milliLiter(s) Oral four times a day PRN  magnesium hydroxide Suspension 30 milliLiter(s) Oral daily PRN  morphine  - Injectable 2 milliGRAM(s) IV Push every 3 hours PRN  ondansetron Injectable 4 milliGRAM(s) IV Push every 6 hours PRN  oxyCODONE    IR 5 milliGRAM(s) Oral every 4 hours PRN  senna 2 Tablet(s) Oral at bedtime PRN    VITALS:   T(F): 98  HR: 92  BP: 104/53  RR: 18  SpO2: 95%    LABS:                        9.0    8.38  )-----------( 124      ( 12 Dec 2019 05:58 )             27.6     12-12    141  |  105  |  25<H>  ----------------------------<  161<H>  4.5   |  18  |  1.4    Ca    8.1<L>      12 Dec 2019 05:58  Phos  3.2     12-12  Mg     1.8     12-12          PHYSICAL EXAM:  GENERAL: NAD, lying in bed comfortably  HEAD:  Atraumatic, Normocephalic  EYES: conjunctiva and sclera clear  NECK: Supple, No JVD  CHEST/LUNG: Clear to auscultation bilaterally  HEART: tachy; No murmurs, rubs, or gallops  ABDOMEN: Bowel sounds present; Soft, Nontender, Nondistended. No hepatomegally  EXTREMITIES:  2+ Peripheral Pulses, brisk capillary refill. L hid dressing c/d/i  NERVOUS SYSTEM:  Alert & Oriented X2, speech clear, confused but follows commands

## 2019-12-12 NOTE — PHYSICAL THERAPY INITIAL EVALUATION ADULT - RANGE OF MOTION EXAMINATION, REHAB EVAL
Right LE ROM was WFL (within functional limits)/Right UE ROM was WFL (within functional limits)/L Hip 0-30 degrees AROM in supine/Left UE ROM was WFL (within functional limits)

## 2019-12-12 NOTE — OCCUPATIONAL THERAPY INITIAL EVALUATION ADULT - LEVEL OF CONSCIOUSNESS, OT EVAL
alert/confused/pt with intermittent confusion, however grossly intact with cognition. pt's roomate with rapid response during OT evaluation, however pt believes it is her son having rapid response in bed, unable to convince pt that it is not her son

## 2019-12-12 NOTE — CONSULT NOTE ADULT - ASSESSMENT
IMPRESSION: Rehab of L femur Fx ORIF    PRECAUTIONS: [   x ] Cardiac  [    ] Respiratory  [    ] Seizures [    ] Contact Isolation  [    ] Droplet Isolation  [    ] Other    Weight Bearing Status: WBAT LLE    RECOMMENDATION:    Out of Bed to Chair     DVT/Decubiti Prophylaxis    REHAB PLAN:     [ x    ] Bedside P/T 3-5 times a week   [     ] Bedside O/T  2-3 times a week   [     ] No Rehab Therapy Indicated   [     ]  Speech Therapy   Conditioning/ROM                                 ADL  Bed Mobility                                            Conditioning/ROM  Transfers                                                  Bed Mobility  Sitting /Standing Balance                      Transfers                                        Gait Training                                            Sitting/Standing Balance  Stair Training [ x  ]Applicable                 Home equipment Eval                                                                     Splinting  [   ] Only      GOALS:   ADL   [  x  ]   Independent         Transfers  [  x  ] Independent            Ambulation  [ x    ] Independent     [   x  ] With device                            [    ]  CG                                               [    ]  CG                                                    [     ] CG                            [    ] Min A                                          [    ] Min A                                                [     ] Min  A          DISCHARGE PLAN:   [     ]  Good candidate for Intensive Rehabilitation/Hospital based                                             Will tolerate 3hrs Intensive Rehab Daily                                       [      ]  Short Term Rehab in Skilled Nursing Facility                                       [      ]  Home with Outpatient or  services                                         [    x  ]  Possible Candidate for Intensive Hospital based Rehab

## 2019-12-12 NOTE — PHYSICAL THERAPY INITIAL EVALUATION ADULT - GAIT DISTANCE, PT EVAL
2 ft. limited by patient complaint of dizziness and fatigue. vitals taken BP: 86/57, HR: 123. resolved quickly in seated position to BP: 138/ 64 HR: 111. RN made aware.

## 2019-12-13 LAB
ALBUMIN SERPL ELPH-MCNC: 3.1 G/DL — LOW (ref 3.5–5.2)
ALBUMIN SERPL ELPH-MCNC: 3.2 G/DL — LOW (ref 3.5–5.2)
ALP SERPL-CCNC: 42 U/L — SIGNIFICANT CHANGE UP (ref 30–115)
ALP SERPL-CCNC: 45 U/L — SIGNIFICANT CHANGE UP (ref 30–115)
ALT FLD-CCNC: <5 U/L — SIGNIFICANT CHANGE UP (ref 0–41)
ALT FLD-CCNC: <5 U/L — SIGNIFICANT CHANGE UP (ref 0–41)
ANION GAP SERPL CALC-SCNC: 14 MMOL/L — SIGNIFICANT CHANGE UP (ref 7–14)
ANION GAP SERPL CALC-SCNC: 14 MMOL/L — SIGNIFICANT CHANGE UP (ref 7–14)
APPEARANCE UR: CLEAR — SIGNIFICANT CHANGE UP
AST SERPL-CCNC: 21 U/L — SIGNIFICANT CHANGE UP (ref 0–41)
AST SERPL-CCNC: 24 U/L — SIGNIFICANT CHANGE UP (ref 0–41)
BACTERIA # UR AUTO: NEGATIVE — SIGNIFICANT CHANGE UP
BASOPHILS # BLD AUTO: 0.03 K/UL — SIGNIFICANT CHANGE UP (ref 0–0.2)
BASOPHILS NFR BLD AUTO: 0.4 % — SIGNIFICANT CHANGE UP (ref 0–1)
BILIRUB SERPL-MCNC: 0.4 MG/DL — SIGNIFICANT CHANGE UP (ref 0.2–1.2)
BILIRUB SERPL-MCNC: 0.4 MG/DL — SIGNIFICANT CHANGE UP (ref 0.2–1.2)
BILIRUB UR-MCNC: NEGATIVE — SIGNIFICANT CHANGE UP
BUN SERPL-MCNC: 33 MG/DL — HIGH (ref 10–20)
BUN SERPL-MCNC: 33 MG/DL — HIGH (ref 10–20)
CALCIUM SERPL-MCNC: 7.8 MG/DL — LOW (ref 8.5–10.1)
CALCIUM SERPL-MCNC: 7.9 MG/DL — LOW (ref 8.5–10.1)
CHLORIDE SERPL-SCNC: 104 MMOL/L — SIGNIFICANT CHANGE UP (ref 98–110)
CHLORIDE SERPL-SCNC: 105 MMOL/L — SIGNIFICANT CHANGE UP (ref 98–110)
CO2 SERPL-SCNC: 18 MMOL/L — SIGNIFICANT CHANGE UP (ref 17–32)
CO2 SERPL-SCNC: 19 MMOL/L — SIGNIFICANT CHANGE UP (ref 17–32)
COLOR SPEC: YELLOW — SIGNIFICANT CHANGE UP
CREAT SERPL-MCNC: 1.8 MG/DL — HIGH (ref 0.7–1.5)
CREAT SERPL-MCNC: 2.1 MG/DL — HIGH (ref 0.7–1.5)
DIFF PNL FLD: NEGATIVE — SIGNIFICANT CHANGE UP
EOSINOPHIL # BLD AUTO: 0.16 K/UL — SIGNIFICANT CHANGE UP (ref 0–0.7)
EOSINOPHIL NFR BLD AUTO: 1.9 % — SIGNIFICANT CHANGE UP (ref 0–8)
EPI CELLS # UR: 2 /HPF — SIGNIFICANT CHANGE UP (ref 0–5)
GLUCOSE SERPL-MCNC: 125 MG/DL — HIGH (ref 70–99)
GLUCOSE SERPL-MCNC: 167 MG/DL — HIGH (ref 70–99)
GLUCOSE UR QL: NEGATIVE — SIGNIFICANT CHANGE UP
HCT VFR BLD CALC: 21.6 % — LOW (ref 37–47)
HCT VFR BLD CALC: 23.8 % — LOW (ref 37–47)
HGB BLD-MCNC: 6.9 G/DL — CRITICAL LOW (ref 12–16)
HGB BLD-MCNC: 7.8 G/DL — LOW (ref 12–16)
HYALINE CASTS # UR AUTO: 7 /LPF — SIGNIFICANT CHANGE UP (ref 0–7)
IMM GRANULOCYTES NFR BLD AUTO: 0.9 % — HIGH (ref 0.1–0.3)
KETONES UR-MCNC: SIGNIFICANT CHANGE UP
LEUKOCYTE ESTERASE UR-ACNC: NEGATIVE — SIGNIFICANT CHANGE UP
LYMPHOCYTES # BLD AUTO: 1.59 K/UL — SIGNIFICANT CHANGE UP (ref 1.2–3.4)
LYMPHOCYTES # BLD AUTO: 18.8 % — LOW (ref 20.5–51.1)
MAGNESIUM SERPL-MCNC: 1.8 MG/DL — SIGNIFICANT CHANGE UP (ref 1.8–2.4)
MCHC RBC-ENTMCNC: 29.4 PG — SIGNIFICANT CHANGE UP (ref 27–31)
MCHC RBC-ENTMCNC: 29.7 PG — SIGNIFICANT CHANGE UP (ref 27–31)
MCHC RBC-ENTMCNC: 31.9 G/DL — LOW (ref 32–37)
MCHC RBC-ENTMCNC: 32.8 G/DL — SIGNIFICANT CHANGE UP (ref 32–37)
MCV RBC AUTO: 90.5 FL — SIGNIFICANT CHANGE UP (ref 81–99)
MCV RBC AUTO: 91.9 FL — SIGNIFICANT CHANGE UP (ref 81–99)
MONOCYTES # BLD AUTO: 0.93 K/UL — HIGH (ref 0.1–0.6)
MONOCYTES NFR BLD AUTO: 11 % — HIGH (ref 1.7–9.3)
NEUTROPHILS # BLD AUTO: 5.66 K/UL — SIGNIFICANT CHANGE UP (ref 1.4–6.5)
NEUTROPHILS NFR BLD AUTO: 67 % — SIGNIFICANT CHANGE UP (ref 42.2–75.2)
NITRITE UR-MCNC: NEGATIVE — SIGNIFICANT CHANGE UP
NRBC # BLD: 0 /100 WBCS — SIGNIFICANT CHANGE UP (ref 0–0)
NRBC # BLD: 0 /100 WBCS — SIGNIFICANT CHANGE UP (ref 0–0)
PH UR: 6 — SIGNIFICANT CHANGE UP (ref 5–8)
PLATELET # BLD AUTO: 114 K/UL — LOW (ref 130–400)
PLATELET # BLD AUTO: 122 K/UL — LOW (ref 130–400)
POTASSIUM SERPL-MCNC: 3.9 MMOL/L — SIGNIFICANT CHANGE UP (ref 3.5–5)
POTASSIUM SERPL-MCNC: 4.4 MMOL/L — SIGNIFICANT CHANGE UP (ref 3.5–5)
POTASSIUM SERPL-SCNC: 3.9 MMOL/L — SIGNIFICANT CHANGE UP (ref 3.5–5)
POTASSIUM SERPL-SCNC: 4.4 MMOL/L — SIGNIFICANT CHANGE UP (ref 3.5–5)
PROT SERPL-MCNC: 5.5 G/DL — LOW (ref 6–8)
PROT SERPL-MCNC: 5.6 G/DL — LOW (ref 6–8)
PROT UR-MCNC: ABNORMAL
RBC # BLD: 2.35 M/UL — LOW (ref 4.2–5.4)
RBC # BLD: 2.63 M/UL — LOW (ref 4.2–5.4)
RBC # FLD: 15 % — HIGH (ref 11.5–14.5)
RBC # FLD: 15.2 % — HIGH (ref 11.5–14.5)
RBC CASTS # UR COMP ASSIST: 2 /HPF — SIGNIFICANT CHANGE UP (ref 0–4)
SODIUM SERPL-SCNC: 137 MMOL/L — SIGNIFICANT CHANGE UP (ref 135–146)
SODIUM SERPL-SCNC: 137 MMOL/L — SIGNIFICANT CHANGE UP (ref 135–146)
SP GR SPEC: 1.02 — SIGNIFICANT CHANGE UP (ref 1.01–1.02)
UROBILINOGEN FLD QL: SIGNIFICANT CHANGE UP
WBC # BLD: 8.17 K/UL — SIGNIFICANT CHANGE UP (ref 4.8–10.8)
WBC # BLD: 8.45 K/UL — SIGNIFICANT CHANGE UP (ref 4.8–10.8)
WBC # FLD AUTO: 8.17 K/UL — SIGNIFICANT CHANGE UP (ref 4.8–10.8)
WBC # FLD AUTO: 8.45 K/UL — SIGNIFICANT CHANGE UP (ref 4.8–10.8)
WBC UR QL: 5 /HPF — SIGNIFICANT CHANGE UP (ref 0–5)

## 2019-12-13 PROCEDURE — 99233 SBSQ HOSP IP/OBS HIGH 50: CPT

## 2019-12-13 PROCEDURE — 71045 X-RAY EXAM CHEST 1 VIEW: CPT | Mod: 26

## 2019-12-13 RX ORDER — LEVOTHYROXINE SODIUM 125 MCG
75 TABLET ORAL DAILY
Refills: 0 | Status: DISCONTINUED | OUTPATIENT
Start: 2019-12-13 | End: 2019-12-17

## 2019-12-13 RX ORDER — SIMVASTATIN 20 MG/1
20 TABLET, FILM COATED ORAL AT BEDTIME
Refills: 0 | Status: DISCONTINUED | OUTPATIENT
Start: 2019-12-13 | End: 2019-12-17

## 2019-12-13 RX ORDER — SODIUM CHLORIDE 9 MG/ML
1000 INJECTION, SOLUTION INTRAVENOUS
Refills: 0 | Status: DISCONTINUED | OUTPATIENT
Start: 2019-12-13 | End: 2019-12-14

## 2019-12-13 RX ADMIN — Medication 650 MILLIGRAM(S): at 01:14

## 2019-12-13 RX ADMIN — Medication 650 MILLIGRAM(S): at 17:12

## 2019-12-13 RX ADMIN — HEPARIN SODIUM 5000 UNIT(S): 5000 INJECTION INTRAVENOUS; SUBCUTANEOUS at 14:14

## 2019-12-13 RX ADMIN — Medication 650 MILLIGRAM(S): at 11:49

## 2019-12-13 RX ADMIN — PANTOPRAZOLE SODIUM 40 MILLIGRAM(S): 20 TABLET, DELAYED RELEASE ORAL at 06:23

## 2019-12-13 RX ADMIN — Medication 650 MILLIGRAM(S): at 06:22

## 2019-12-13 RX ADMIN — POLYETHYLENE GLYCOL 3350 17 GRAM(S): 17 POWDER, FOR SOLUTION ORAL at 11:04

## 2019-12-13 RX ADMIN — Medication 650 MILLIGRAM(S): at 11:03

## 2019-12-13 RX ADMIN — HEPARIN SODIUM 5000 UNIT(S): 5000 INJECTION INTRAVENOUS; SUBCUTANEOUS at 21:21

## 2019-12-13 RX ADMIN — SIMVASTATIN 20 MILLIGRAM(S): 20 TABLET, FILM COATED ORAL at 21:21

## 2019-12-13 RX ADMIN — Medication 650 MILLIGRAM(S): at 06:23

## 2019-12-13 RX ADMIN — LATANOPROST 1 DROP(S): 0.05 SOLUTION/ DROPS OPHTHALMIC; TOPICAL at 21:21

## 2019-12-13 RX ADMIN — HEPARIN SODIUM 5000 UNIT(S): 5000 INJECTION INTRAVENOUS; SUBCUTANEOUS at 06:23

## 2019-12-13 NOTE — PROGRESS NOTE ADULT - ASSESSMENT
89 y/o Female with history of prior falls and recent ORIF R femur with Dr Frye, HTN, HLD, hypothyroidism, anemia, presents from home after a trip and fall from standing earlier today onto her L side. ?ht, -loc. She is complaining of left hip pain. She was found to have Acute subtrochanteric spiral fracture of the proximal left femur. S/p Orif    L hip - Acute subtrochanteric spiral fracture of the proximal left femur  - s/p ORIF pod #2  - pt was  transfused preop 1 unit of prbc and 1 unit post op prbc   - HB 6.9 today, transfused 1 unit, will repeat cbc/cmp  - rehab for gait training wbat should begin pod#1, postponed given low Hb  - PT/phys on board  - pending SNF    Confusion  - expected given age and hospital setting  - son is concerned  - will provide frequent re-orientation  - UA negative    Tachycardia - resolved  - EKG shows sinus tach  - will continue to monitor  - consider IVF trial if tachy    CKD - Stage IV  - Cr 1.4 -2.1  - encourage fluid intake  - monitor bmp    Microcytic anemia at baseline  -  transfused 2 units on Dec 10 and 11th respectively  -  patient stable currently  -  had hematoma at fracture site    Exudative age-related macular 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

## 2019-12-13 NOTE — PROGRESS NOTE ADULT - ASSESSMENT
89yo F with Past Medical History Recurrent Falls with recent right ORIF by Dr. Mathew, Hypertension, Hypercholesteremia, Hypothyroidism, and chronic anemia admitted status post fall complicated by left hip fracture.  Status post ORIF on 12/11/19.    Fall complicated by left hip fracture: status post ORIF on 12/11/19.  Orthopedics follow-up appreciated.  Maintain WBAT to the LLE.  Follow-up with physical therapy to determine future disposition.  Discontinue IV Morphine.  Continue Senna to prevent constipation for decreased mobility and narcotics.  Tylenol and Oxycodone IR PRN for pain.   Acute blood loss anemia: hemoglobin decreased to 6.9 following surgery.  Status post 1u PRBC transfusion.  Repeat CBC for tomorrow.  Acute kidney injury: creatinine increased to ~2 from 1.2.  This is likely due to hypoperfusion secondary to acute blood and mild hypovolemia.  Continue gentle IVF and monitor electroltyes  Hypertension: the patient has been mildly hypotensive following surgery.  Lisinopril and Norvasc on hold.  Repeat ACEI once SBP consistently >120mm Hg and renal function returns to baseline  Hypercholesteremia: restart Zocor 20mg PO QHS  Hypothyroidism: restart Synthroid 75mcg PO q24  GI/DVT prophylaxis    #Progress Note Handoff    Pending:  Consults: follow-up with PT to assist with future disposition  Tests: CBC in AM following blood loss and transfusion  BMP for SOILA    Future Disposition: To be determined

## 2019-12-13 NOTE — PROGRESS NOTE ADULT - SUBJECTIVE AND OBJECTIVE BOX
SUBJECTIVE:     Today is hospital day 4d. Patient denies any sob, lightheadedness, fevers, chills, nausea, vomiting, diarrhea, bleeding of any kind.    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     ALLERGIES:  No Known Allergies    MEDICATIONS:  STANDING MEDICATIONS  acetaminophen   Tablet .. 650 milliGRAM(s) Oral every 6 hours  heparin  Injectable 5000 Unit(s) SubCutaneous every 8 hours  lactated ringers. 1000 milliLiter(s) IV Continuous <Continuous>  latanoprost 0.005% Ophthalmic Solution 1 Drop(s) Both EYES at bedtime  pantoprazole    Tablet 40 milliGRAM(s) Oral before breakfast  polyethylene glycol 3350 17 Gram(s) Oral daily    PRN MEDICATIONS  aluminum hydroxide/magnesium hydroxide/simethicone Suspension 30 milliLiter(s) Oral four times a day PRN  bisacodyl Suppository 10 milliGRAM(s) Rectal daily PRN  magnesium hydroxide Suspension 30 milliLiter(s) Oral daily PRN  morphine  - Injectable 2 milliGRAM(s) IV Push every 3 hours PRN  ondansetron Injectable 4 milliGRAM(s) IV Push every 6 hours PRN  oxyCODONE    IR 5 milliGRAM(s) Oral every 4 hours PRN  senna 2 Tablet(s) Oral at bedtime PRN    VITALS:   T(F): 97.4  HR: 83  BP: 115/51  RR: 18  SpO2: --    LABS:                        6.9    8.45  )-----------( 122      ( 13 Dec 2019 05:51 )             21.6     12    137  |  105  |  33<H>  ----------------------------<  125<H>  4.4   |  18  |  2.1<H>    Ca    7.8<L>      13 Dec 2019 05:51  Phos  3.2     12-12  Mg     1.8         TPro  5.6<L>  /  Alb  3.2<L>  /  TBili  0.4  /  DBili  x   /  AST  24  /  ALT  <5  /  AlkPhos  42        Urinalysis Basic - ( 13 Dec 2019 08:18 )    Color: Yellow / Appearance: Clear / S.024 / pH: x  Gluc: x / Ketone: Trace  / Bili: Negative / Urobili: <2 mg/dL   Blood: x / Protein: 30 mg/dL / Nitrite: Negative   Leuk Esterase: Negative / RBC: 2 /HPF / WBC 5 /HPF   Sq Epi: x / Non Sq Epi: 2 /HPF / Bacteria: Negative      PHYSICAL EXAM:  GENERAL: NAD, lying in bed comfortably  HEAD:  Atraumatic, Normocephalic  EYES: conjunctiva and sclera clear  NECK: Supple, No JVD  CHEST/LUNG: Clear to auscultation bilaterally  HEART: tachy; No murmurs, rubs, or gallops  ABDOMEN: Bowel sounds present; Soft, Nontender, Nondistended. No hepatomegally  EXTREMITIES:  2+ Peripheral Pulses, brisk capillary refill. L hid dressing c/d/i  NERVOUS SYSTEM:  Alert & Oriented X2, speech clear, confused but follows commands

## 2019-12-13 NOTE — PROGRESS NOTE ADULT - SUBJECTIVE AND OBJECTIVE BOX
Orthopaedics Progress Note    ELE BRAR  [id number]    Patient is a 89y year old Female with left hip fracture  POD#2 from left femur IMN  Patient seen and examined bedside  Pain well controlled  Denies fever/chills/CP/SOB  No other complaints    acetaminophen   Tablet .. 650 milliGRAM(s) Oral every 6 hours  aluminum hydroxide/magnesium hydroxide/simethicone Suspension 30 milliLiter(s) Oral four times a day PRN  bisacodyl Suppository 10 milliGRAM(s) Rectal daily PRN  heparin  Injectable 5000 Unit(s) SubCutaneous every 8 hours  latanoprost 0.005% Ophthalmic Solution 1 Drop(s) Both EYES at bedtime  magnesium hydroxide Suspension 30 milliLiter(s) Oral daily PRN  morphine  - Injectable 2 milliGRAM(s) IV Push every 3 hours PRN  ondansetron Injectable 4 milliGRAM(s) IV Push every 6 hours PRN  oxyCODONE    IR 5 milliGRAM(s) Oral every 4 hours PRN  pantoprazole    Tablet 40 milliGRAM(s) Oral before breakfast  polyethylene glycol 3350 17 Gram(s) Oral daily  senna 2 Tablet(s) Oral at bedtime PRN      T(C): 36.6 (12-13-19 @ 00:00), Max: 37.2 (12-12-19 @ 07:30)  HR: 82 (12-13-19 @ 00:00) (82 - 115)  BP: 125/59 (12-13-19 @ 00:00) (102/53 - 127/60)  RR: 18 (12-13-19 @ 00:00) (16 - 18)  SpO2: --    Physical Exam  NAD, AAOx3  Breathing comfortably on RA  Resting comfortably  LLE  Dressing with scant spotting  Motor: TA/EHL/Gastroc intact  Sensory: SP/DP/Altman/Sa intact  Vasc: foot WWP, 2+ DP pulse    Labs                        6.9    8.45  )-----------( 122      ( 13 Dec 2019 05:51 )             21.6     12-13    137  |  105  |  33<H>  ----------------------------<  125<H>  4.4   |  18  |  2.1<H>    Ca    7.8<L>      13 Dec 2019 05:51  Phos  3.2     12-12  Mg     1.8     12-13    TPro  5.6<L>  /  Alb  3.2<L>  /  TBili  0.4  /  DBili  x   /  AST  24  /  ALT  <5  /  AlkPhos  42  12-13    LIVER FUNCTIONS - ( 13 Dec 2019 05:51 )  Alb: 3.2 g/dL / Pro: 5.6 g/dL / ALK PHOS: 42 U/L / ALT: <5 U/L / AST: 24 U/L / GGT: x               A/P: Patient is a 89y year old Female as above    Agree with transfusion  WBAT on LLE  DVT ppx: SCD, HSQ  Abx: complete  Pain control  Home medications: resume  Trend labs  Dispo: Pending PT recommendations Orthopaedics Progress Note    ELE BRAR  [id number]    Patient is a 89y year old Female with left hip fracture  POD#2 from left femur IMN  Patient seen and examined bedside  Pain well controlled  Denies fever/chills/CP/SOB  No other complaints    acetaminophen   Tablet .. 650 milliGRAM(s) Oral every 6 hours  aluminum hydroxide/magnesium hydroxide/simethicone Suspension 30 milliLiter(s) Oral four times a day PRN  bisacodyl Suppository 10 milliGRAM(s) Rectal daily PRN  heparin  Injectable 5000 Unit(s) SubCutaneous every 8 hours  latanoprost 0.005% Ophthalmic Solution 1 Drop(s) Both EYES at bedtime  magnesium hydroxide Suspension 30 milliLiter(s) Oral daily PRN  morphine  - Injectable 2 milliGRAM(s) IV Push every 3 hours PRN  ondansetron Injectable 4 milliGRAM(s) IV Push every 6 hours PRN  oxyCODONE    IR 5 milliGRAM(s) Oral every 4 hours PRN  pantoprazole    Tablet 40 milliGRAM(s) Oral before breakfast  polyethylene glycol 3350 17 Gram(s) Oral daily  senna 2 Tablet(s) Oral at bedtime PRN      T(C): 36.6 (12-13-19 @ 00:00), Max: 37.2 (12-12-19 @ 07:30)  HR: 82 (12-13-19 @ 00:00) (82 - 115)  BP: 125/59 (12-13-19 @ 00:00) (102/53 - 127/60)  RR: 18 (12-13-19 @ 00:00) (16 - 18)  SpO2: --    Physical Exam  NAD, AAOx3  Breathing comfortably on RA  Resting comfortably  LLE  Dressing with scant spotting  Motor: TA/EHL/Gastroc intact  Sensory: SP/DP/Altman/Sa intact  Vasc: foot WWP, 2+ DP pulse    Labs                        6.9    8.45  )-----------( 122      ( 13 Dec 2019 05:51 )             21.6     12-13    137  |  105  |  33<H>  ----------------------------<  125<H>  4.4   |  18  |  2.1<H>    Ca    7.8<L>      13 Dec 2019 05:51  Phos  3.2     12-12  Mg     1.8     12-13    TPro  5.6<L>  /  Alb  3.2<L>  /  TBili  0.4  /  DBili  x   /  AST  24  /  ALT  <5  /  AlkPhos  42  12-13    LIVER FUNCTIONS - ( 13 Dec 2019 05:51 )  Alb: 3.2 g/dL / Pro: 5.6 g/dL / ALK PHOS: 42 U/L / ALT: <5 U/L / AST: 24 U/L / GGT: x               A/P: Patient is a 89y year old Female as above    Agree with transfusion  WBAT on LLE  DVT ppx: SCD, HSQ  Abx: complete  Pain control  Home medications: resume  Trend labs  Dispo: Pending PT recommendations  pt seen and examined  agree with plan

## 2019-12-13 NOTE — PROGRESS NOTE ADULT - SUBJECTIVE AND OBJECTIVE BOX
ELE BRAR [id number]    Hospitalist Note  89yo F with Past Medical History Recurrent Falls with recent right ORIF by Dr. Mathew, Hypertension, Hypercholesteremia, Hypothyroidism, and chronic anemia admitted status post fall complicated by left hip fracture.  Status post ORIF on 12/11/19.    Overnight events/Updates: Her post-operative course was complicated by acute blood loss anemia with hemoglobin decreasing to 6.9.  Status post 1u PRBC transfusion this AM with improvement in hemoglobin to 7.8.    Vital Signs Last 24 Hrs  T(C): 36.3 (13 Dec 2019 09:59), Max: 36.7 (12 Dec 2019 15:51)  T(F): 97.4 (13 Dec 2019 09:59), Max: 98 (12 Dec 2019 15:51)  HR: 83 (13 Dec 2019 09:59) (82 - 92)  BP: 115/51 (13 Dec 2019 09:59) (104/53 - 144/59)  BP(mean): --  RR: 18 (13 Dec 2019 09:59) (18 - 18)  SpO2: --    Physical Examination:  General: AAO x 3  HEENT: PERRLA, EOMI  CV= S1 & S2 appreciated  Lungs= CTA BL  Abdominal Examination= + BS, Soft, NT/ND  Extremity Examination= dressing to the left hip    ROS: No chest pain, no shortness of breath.  All other systems reviewed and are within normal limits except for the complaints in the HPI.    MEDICATIONS  (STANDING):  acetaminophen   Tablet .. 650 milliGRAM(s) Oral every 6 hours  heparin  Injectable 5000 Unit(s) SubCutaneous every 8 hours  lactated ringers. 1000 milliLiter(s) (75 mL/Hr) IV Continuous <Continuous>  latanoprost 0.005% Ophthalmic Solution 1 Drop(s) Both EYES at bedtime  pantoprazole    Tablet 40 milliGRAM(s) Oral before breakfast  polyethylene glycol 3350 17 Gram(s) Oral daily    MEDICATIONS  (PRN):  aluminum hydroxide/magnesium hydroxide/simethicone Suspension 30 milliLiter(s) Oral four times a day PRN Indigestion  bisacodyl Suppository 10 milliGRAM(s) Rectal daily PRN If no bowel movement by POD#2  magnesium hydroxide Suspension 30 milliLiter(s) Oral daily PRN Constipation  morphine  - Injectable 2 milliGRAM(s) IV Push every 3 hours PRN Severe Pain (7 - 10)  ondansetron Injectable 4 milliGRAM(s) IV Push every 6 hours PRN Nausea and/or Vomiting  oxyCODONE    IR 5 milliGRAM(s) Oral every 4 hours PRN Moderate Pain (4 - 6)  senna 2 Tablet(s) Oral at bedtime PRN Constipation                            7.8    8.17  )-----------( 114      ( 13 Dec 2019 11:32 )             23.8     12-13    137  |  104  |  33<H>  ----------------------------<  167<H>  3.9   |  19  |  1.8<H>    Ca    7.9<L>      13 Dec 2019 11:32  Phos  3.2     12-12  Mg     1.8     12-13    TPro  5.5<L>  /  Alb  3.1<L>  /  TBili  0.4  /  DBili  x   /  AST  21  /  ALT  <5  /  AlkPhos  45  12-13      Case discussed with housesta & family  JESENIA Diaz [phone]

## 2019-12-14 LAB
ALBUMIN SERPL ELPH-MCNC: 2.8 G/DL — LOW (ref 3.5–5.2)
ALBUMIN SERPL ELPH-MCNC: 3 G/DL — LOW (ref 3.5–5.2)
ALP SERPL-CCNC: 43 U/L — SIGNIFICANT CHANGE UP (ref 30–115)
ALP SERPL-CCNC: 46 U/L — SIGNIFICANT CHANGE UP (ref 30–115)
ALT FLD-CCNC: <5 U/L — SIGNIFICANT CHANGE UP (ref 0–41)
ALT FLD-CCNC: <5 U/L — SIGNIFICANT CHANGE UP (ref 0–41)
ANION GAP SERPL CALC-SCNC: 14 MMOL/L — SIGNIFICANT CHANGE UP (ref 7–14)
ANION GAP SERPL CALC-SCNC: 15 MMOL/L — HIGH (ref 7–14)
APPEARANCE UR: CLEAR — SIGNIFICANT CHANGE UP
AST SERPL-CCNC: 21 U/L — SIGNIFICANT CHANGE UP (ref 0–41)
AST SERPL-CCNC: 22 U/L — SIGNIFICANT CHANGE UP (ref 0–41)
BASOPHILS # BLD AUTO: 0.05 K/UL — SIGNIFICANT CHANGE UP (ref 0–0.2)
BASOPHILS NFR BLD AUTO: 0.7 % — SIGNIFICANT CHANGE UP (ref 0–1)
BILIRUB SERPL-MCNC: 0.4 MG/DL — SIGNIFICANT CHANGE UP (ref 0.2–1.2)
BILIRUB SERPL-MCNC: 0.6 MG/DL — SIGNIFICANT CHANGE UP (ref 0.2–1.2)
BILIRUB UR-MCNC: NEGATIVE — SIGNIFICANT CHANGE UP
BLD GP AB SCN SERPL QL: SIGNIFICANT CHANGE UP
BUN SERPL-MCNC: 29 MG/DL — HIGH (ref 10–20)
BUN SERPL-MCNC: 32 MG/DL — HIGH (ref 10–20)
CALCIUM SERPL-MCNC: 7.8 MG/DL — LOW (ref 8.5–10.1)
CALCIUM SERPL-MCNC: 8.3 MG/DL — LOW (ref 8.5–10.1)
CHLORIDE SERPL-SCNC: 106 MMOL/L — SIGNIFICANT CHANGE UP (ref 98–110)
CHLORIDE SERPL-SCNC: 107 MMOL/L — SIGNIFICANT CHANGE UP (ref 98–110)
CO2 SERPL-SCNC: 18 MMOL/L — SIGNIFICANT CHANGE UP (ref 17–32)
CO2 SERPL-SCNC: 19 MMOL/L — SIGNIFICANT CHANGE UP (ref 17–32)
COLOR SPEC: COLORLESS — SIGNIFICANT CHANGE UP
CREAT SERPL-MCNC: 1.5 MG/DL — SIGNIFICANT CHANGE UP (ref 0.7–1.5)
CREAT SERPL-MCNC: 1.6 MG/DL — HIGH (ref 0.7–1.5)
DIFF PNL FLD: NEGATIVE — SIGNIFICANT CHANGE UP
EOSINOPHIL # BLD AUTO: 0.33 K/UL — SIGNIFICANT CHANGE UP (ref 0–0.7)
EOSINOPHIL NFR BLD AUTO: 4.7 % — SIGNIFICANT CHANGE UP (ref 0–8)
GLUCOSE SERPL-MCNC: 109 MG/DL — HIGH (ref 70–99)
GLUCOSE SERPL-MCNC: 118 MG/DL — HIGH (ref 70–99)
GLUCOSE UR QL: NEGATIVE — SIGNIFICANT CHANGE UP
HCT VFR BLD CALC: 23.4 % — LOW (ref 37–47)
HGB BLD-MCNC: 7.6 G/DL — LOW (ref 12–16)
IMM GRANULOCYTES NFR BLD AUTO: 1.8 % — HIGH (ref 0.1–0.3)
KETONES UR-MCNC: NEGATIVE — SIGNIFICANT CHANGE UP
LEUKOCYTE ESTERASE UR-ACNC: NEGATIVE — SIGNIFICANT CHANGE UP
LYMPHOCYTES # BLD AUTO: 1.17 K/UL — LOW (ref 1.2–3.4)
LYMPHOCYTES # BLD AUTO: 16.6 % — LOW (ref 20.5–51.1)
MAGNESIUM SERPL-MCNC: 1.8 MG/DL — SIGNIFICANT CHANGE UP (ref 1.8–2.4)
MCHC RBC-ENTMCNC: 29.6 PG — SIGNIFICANT CHANGE UP (ref 27–31)
MCHC RBC-ENTMCNC: 32.5 G/DL — SIGNIFICANT CHANGE UP (ref 32–37)
MCV RBC AUTO: 91.1 FL — SIGNIFICANT CHANGE UP (ref 81–99)
MONOCYTES # BLD AUTO: 0.71 K/UL — HIGH (ref 0.1–0.6)
MONOCYTES NFR BLD AUTO: 10.1 % — HIGH (ref 1.7–9.3)
NEUTROPHILS # BLD AUTO: 4.67 K/UL — SIGNIFICANT CHANGE UP (ref 1.4–6.5)
NEUTROPHILS NFR BLD AUTO: 66.1 % — SIGNIFICANT CHANGE UP (ref 42.2–75.2)
NITRITE UR-MCNC: NEGATIVE — SIGNIFICANT CHANGE UP
NRBC # BLD: 0 /100 WBCS — SIGNIFICANT CHANGE UP (ref 0–0)
PH UR: 6 — SIGNIFICANT CHANGE UP (ref 5–8)
PLATELET # BLD AUTO: 114 K/UL — LOW (ref 130–400)
POTASSIUM SERPL-MCNC: 4.3 MMOL/L — SIGNIFICANT CHANGE UP (ref 3.5–5)
POTASSIUM SERPL-MCNC: 4.3 MMOL/L — SIGNIFICANT CHANGE UP (ref 3.5–5)
POTASSIUM SERPL-SCNC: 4.3 MMOL/L — SIGNIFICANT CHANGE UP (ref 3.5–5)
POTASSIUM SERPL-SCNC: 4.3 MMOL/L — SIGNIFICANT CHANGE UP (ref 3.5–5)
PROT SERPL-MCNC: 5.1 G/DL — LOW (ref 6–8)
PROT SERPL-MCNC: 5.2 G/DL — LOW (ref 6–8)
PROT UR-MCNC: NEGATIVE — SIGNIFICANT CHANGE UP
RBC # BLD: 2.57 M/UL — LOW (ref 4.2–5.4)
RBC # FLD: 15.2 % — HIGH (ref 11.5–14.5)
SODIUM SERPL-SCNC: 138 MMOL/L — SIGNIFICANT CHANGE UP (ref 135–146)
SODIUM SERPL-SCNC: 141 MMOL/L — SIGNIFICANT CHANGE UP (ref 135–146)
SP GR SPEC: 1.01 — LOW (ref 1.01–1.02)
UROBILINOGEN FLD QL: SIGNIFICANT CHANGE UP
WBC # BLD: 7.06 K/UL — SIGNIFICANT CHANGE UP (ref 4.8–10.8)
WBC # FLD AUTO: 7.06 K/UL — SIGNIFICANT CHANGE UP (ref 4.8–10.8)

## 2019-12-14 PROCEDURE — 99233 SBSQ HOSP IP/OBS HIGH 50: CPT

## 2019-12-14 RX ORDER — TIMOLOL 0.5 %
1 DROPS OPHTHALMIC (EYE) DAILY
Refills: 0 | Status: DISCONTINUED | OUTPATIENT
Start: 2019-12-14 | End: 2019-12-17

## 2019-12-14 RX ADMIN — Medication 650 MILLIGRAM(S): at 05:45

## 2019-12-14 RX ADMIN — LATANOPROST 1 DROP(S): 0.05 SOLUTION/ DROPS OPHTHALMIC; TOPICAL at 21:18

## 2019-12-14 RX ADMIN — HEPARIN SODIUM 5000 UNIT(S): 5000 INJECTION INTRAVENOUS; SUBCUTANEOUS at 21:18

## 2019-12-14 RX ADMIN — Medication 650 MILLIGRAM(S): at 17:02

## 2019-12-14 RX ADMIN — Medication 650 MILLIGRAM(S): at 00:34

## 2019-12-14 RX ADMIN — Medication 650 MILLIGRAM(S): at 05:44

## 2019-12-14 RX ADMIN — HEPARIN SODIUM 5000 UNIT(S): 5000 INJECTION INTRAVENOUS; SUBCUTANEOUS at 05:44

## 2019-12-14 RX ADMIN — Medication 650 MILLIGRAM(S): at 11:37

## 2019-12-14 RX ADMIN — HEPARIN SODIUM 5000 UNIT(S): 5000 INJECTION INTRAVENOUS; SUBCUTANEOUS at 13:46

## 2019-12-14 RX ADMIN — PANTOPRAZOLE SODIUM 40 MILLIGRAM(S): 20 TABLET, DELAYED RELEASE ORAL at 05:44

## 2019-12-14 RX ADMIN — SIMVASTATIN 20 MILLIGRAM(S): 20 TABLET, FILM COATED ORAL at 21:18

## 2019-12-14 RX ADMIN — Medication 75 MICROGRAM(S): at 05:44

## 2019-12-14 RX ADMIN — Medication 650 MILLIGRAM(S): at 00:28

## 2019-12-14 NOTE — PROGRESS NOTE ADULT - SUBJECTIVE AND OBJECTIVE BOX
SUBJECTIVE:     Today is hospital day 5d. This morning she is resting comfortably in bed and reports no new issues or overnight events.     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 650 milliGRAM(s) Oral every 6 hours  heparin  Injectable 5000 Unit(s) SubCutaneous every 8 hours  latanoprost 0.005% Ophthalmic Solution 1 Drop(s) Both EYES at bedtime  levothyroxine 75 MICROGram(s) Oral daily  pantoprazole    Tablet 40 milliGRAM(s) Oral before breakfast  polyethylene glycol 3350 17 Gram(s) Oral daily  simvastatin 20 milliGRAM(s) Oral at bedtime    PRN MEDICATIONS  aluminum hydroxide/magnesium hydroxide/simethicone Suspension 30 milliLiter(s) Oral four times a day PRN  bisacodyl Suppository 10 milliGRAM(s) Rectal daily PRN  magnesium hydroxide Suspension 30 milliLiter(s) Oral daily PRN  morphine  - Injectable 2 milliGRAM(s) IV Push every 3 hours PRN  ondansetron Injectable 4 milliGRAM(s) IV Push every 6 hours PRN  oxyCODONE    IR 5 milliGRAM(s) Oral every 4 hours PRN  senna 2 Tablet(s) Oral at bedtime PRN    VITALS:   T(F): 98.5  HR: 87  BP: 148/67  RR: 18  SpO2: --    LABS:                        7.6    7.06  )-----------( 114      ( 14 Dec 2019 05:59 )             23.4     12-    141  |  107  |  29<H>  ----------------------------<  109<H>  4.3   |  19  |  1.5    Ca    8.3<L>      14 Dec 2019 05:59  Mg     1.8         TPro  5.2<L>  /  Alb  3.0<L>  /  TBili  0.6  /  DBili  x   /  AST  22  /  ALT  <5  /  AlkPhos  46  12-14      Urinalysis Basic - ( 14 Dec 2019 03:00 )    Color: Colorless / Appearance: Clear / S.009 / pH: x  Gluc: x / Ketone: Negative  / Bili: Negative / Urobili: <2 mg/dL   Blood: x / Protein: Negative / Nitrite: Negative   Leuk Esterase: Negative / RBC: x / WBC x   Sq Epi: x / Non Sq Epi: x / Bacteria: x            PHYSICAL EXAM:  GENERAL: NAD, lying in bed comfortably  HEAD:  Atraumatic, Normocephalic  EYES: conjunctiva and sclera clear  NECK: Supple, No JVD  CHEST/LUNG: Clear to auscultation bilaterally  HEART: tachy; No murmurs, rubs, or gallops  ABDOMEN: Bowel sounds present; Soft, Nontender, Nondistended. No hepatomegally  EXTREMITIES:  2+ Peripheral Pulses, brisk capillary refill. L hip dressing c/d/i  NERVOUS SYSTEM:  Alert & Oriented X2, speech clear, confused but follows commands

## 2019-12-14 NOTE — PROGRESS NOTE ADULT - ASSESSMENT
89 y/o Female with history of prior falls and recent ORIF R femur with Dr Frye, HTN, HLD, hypothyroidism, anemia, presents from home after a trip and fall from standing earlier today onto her L side. ?ht, -loc. She is complaining of left hip pain. She was found to have Acute subtrochanteric spiral fracture of the proximal left femur. S/p Orif    L hip - Acute subtrochanteric spiral fracture of the proximal left femur  - s/p ORIF pod #3  - pt was  transfused preop 1 unit of prbc and 1 unit post op prbc   - HB 6.9->, transfused 1 unit-> 7.8-> 7.6 , monitor  - rehab for gait training wbat should begin  - pending SNF    Confusion - resolved  - will provide frequent re-orientation  - UA negative    CKD - Stage IV  - Cr 1.4 -2.1-> 1.5  - encourage fluid intake, will dc IV fluids given aortic stenosis  - monitor bmp    Microcytic anemia at baseline  -  s/p 3 transfusion 1 unit pRBC this time  -  patient stable currently  -  had hematoma at fracture site    Exudative age-related macular 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 ?ht, -loc. She is complaining of left hip pain. She was found to have Acute subtrochanteric spiral fracture of the proximal left femur. S/p Orif    L hip - Acute subtrochanteric spiral fracture of the proximal left femur  - s/p ORIF pod #3  - pt was  transfused preop 1 unit of prbc and 1 unit post op prbc   - HB 6.9->, transfused 1 unit-> 7.8-> 7.6 , monitor  - rehab for gait training wbat should begin  - pending SNF    Confusion - resolved  - will provide frequent re-orientation  - UA negative    CKD - Stage IV  - Cr 1.4 -2.1-> 1.5  - encourage fluid intake, will dc IV fluids given aortic stenosis  - monitor bmp    Microcytic anemia at baseline  -  s/p 3 transfusion 1 unit pRBC this time  -  patient stable currently  -  had hematoma at fracture site    Hypothyroidism  - L-thyroxine     HLD  - resumed statin    Exudative age-related macular degeneration of left eye with inactive choroidal neovascularization    Chronic primary angle-closure glaucoma of left eye, severe stage-  --eye drops oflox both eyes recommended, no visible exudate currently    DVT ppx: Heparin  GI PPX: not indicated  Dispo: SNF

## 2019-12-14 NOTE — PROGRESS NOTE ADULT - SUBJECTIVE AND OBJECTIVE BOX
Orthopaedics Progress Note    ELE BRAR  [id number]    Patient is a 89y year old Female with left femur fracture  POD#3 from left femur ORIF  Patient seen and examined bedside  Pain well controlled  Denies fever/chills/CP/SOB  No other complaints    acetaminophen   Tablet .. 650 milliGRAM(s) Oral every 6 hours  aluminum hydroxide/magnesium hydroxide/simethicone Suspension 30 milliLiter(s) Oral four times a day PRN  bisacodyl Suppository 10 milliGRAM(s) Rectal daily PRN  heparin  Injectable 5000 Unit(s) SubCutaneous every 8 hours  latanoprost 0.005% Ophthalmic Solution 1 Drop(s) Both EYES at bedtime  levothyroxine 75 MICROGram(s) Oral daily  magnesium hydroxide Suspension 30 milliLiter(s) Oral daily PRN  morphine  - Injectable 2 milliGRAM(s) IV Push every 3 hours PRN  ondansetron Injectable 4 milliGRAM(s) IV Push every 6 hours PRN  oxyCODONE    IR 5 milliGRAM(s) Oral every 4 hours PRN  pantoprazole    Tablet 40 milliGRAM(s) Oral before breakfast  polyethylene glycol 3350 17 Gram(s) Oral daily  senna 2 Tablet(s) Oral at bedtime PRN  simvastatin 20 milliGRAM(s) Oral at bedtime      T(C): 36.9 (12-14-19 @ 07:51), Max: 37.3 (12-13-19 @ 15:55)  HR: 87 (12-14-19 @ 07:51) (68 - 91)  BP: 148/67 (12-14-19 @ 07:51) (108/59 - 148/67)  RR: 18 (12-14-19 @ 07:51) (18 - 18)  SpO2: --    Physical Exam  NAD, AAOx3  Breathing comfortably on RA  Resting comfortably  LLE  Dressing with SS drainage noted, removed dressings  Incisions c/d/i with staples in place, no active drainage  Dressings replaced  Motor: TA/EHL/Gastroc intact  Sensory: SP/DP/Altman/Sa intact  Vasc: foot WWP, 2+ DP pulse    Labs                        7.6    7.06  )-----------( 114      ( 14 Dec 2019 05:59 )             23.4     12-14    141  |  107  |  29<H>  ----------------------------<  109<H>  4.3   |  19  |  1.5    Ca    8.3<L>      14 Dec 2019 05:59  Mg     1.8     12-14    TPro  5.2<L>  /  Alb  3.0<L>  /  TBili  0.6  /  DBili  x   /  AST  22  /  ALT  <5  /  AlkPhos  46  12-14    LIVER FUNCTIONS - ( 14 Dec 2019 05:59 )  Alb: 3.0 g/dL / Pro: 5.2 g/dL / ALK PHOS: 46 U/L / ALT: <5 U/L / AST: 22 U/L / GGT: x           A/P: Patient is a 89y year old Female as above    WBAT on LLE  DVT ppx: SCD, HSQ  Abx: complete  Pain control  Home medications: resume  Trend labs  Dispo: Pending PT recommendations

## 2019-12-15 LAB
ALBUMIN SERPL ELPH-MCNC: 2.9 G/DL — LOW (ref 3.5–5.2)
ALP SERPL-CCNC: 50 U/L — SIGNIFICANT CHANGE UP (ref 30–115)
ALT FLD-CCNC: <5 U/L — SIGNIFICANT CHANGE UP (ref 0–41)
ANION GAP SERPL CALC-SCNC: 13 MMOL/L — SIGNIFICANT CHANGE UP (ref 7–14)
AST SERPL-CCNC: 20 U/L — SIGNIFICANT CHANGE UP (ref 0–41)
BASOPHILS # BLD AUTO: 0.05 K/UL — SIGNIFICANT CHANGE UP (ref 0–0.2)
BASOPHILS NFR BLD AUTO: 0.7 % — SIGNIFICANT CHANGE UP (ref 0–1)
BILIRUB SERPL-MCNC: 0.7 MG/DL — SIGNIFICANT CHANGE UP (ref 0.2–1.2)
BUN SERPL-MCNC: 27 MG/DL — HIGH (ref 10–20)
CALCIUM SERPL-MCNC: 8.4 MG/DL — LOW (ref 8.5–10.1)
CHLORIDE SERPL-SCNC: 106 MMOL/L — SIGNIFICANT CHANGE UP (ref 98–110)
CO2 SERPL-SCNC: 20 MMOL/L — SIGNIFICANT CHANGE UP (ref 17–32)
CREAT SERPL-MCNC: 1.4 MG/DL — SIGNIFICANT CHANGE UP (ref 0.7–1.5)
EOSINOPHIL # BLD AUTO: 0.36 K/UL — SIGNIFICANT CHANGE UP (ref 0–0.7)
EOSINOPHIL NFR BLD AUTO: 5 % — SIGNIFICANT CHANGE UP (ref 0–8)
GLUCOSE SERPL-MCNC: 102 MG/DL — HIGH (ref 70–99)
HCT VFR BLD CALC: 21.8 % — LOW (ref 37–47)
HGB BLD-MCNC: 7 G/DL — LOW (ref 12–16)
IMM GRANULOCYTES NFR BLD AUTO: 2.7 % — HIGH (ref 0.1–0.3)
LYMPHOCYTES # BLD AUTO: 1.65 K/UL — SIGNIFICANT CHANGE UP (ref 1.2–3.4)
LYMPHOCYTES # BLD AUTO: 23.1 % — SIGNIFICANT CHANGE UP (ref 20.5–51.1)
MAGNESIUM SERPL-MCNC: 1.8 MG/DL — SIGNIFICANT CHANGE UP (ref 1.8–2.4)
MCHC RBC-ENTMCNC: 29.4 PG — SIGNIFICANT CHANGE UP (ref 27–31)
MCHC RBC-ENTMCNC: 32.1 G/DL — SIGNIFICANT CHANGE UP (ref 32–37)
MCV RBC AUTO: 91.6 FL — SIGNIFICANT CHANGE UP (ref 81–99)
MONOCYTES # BLD AUTO: 0.77 K/UL — HIGH (ref 0.1–0.6)
MONOCYTES NFR BLD AUTO: 10.8 % — HIGH (ref 1.7–9.3)
NEUTROPHILS # BLD AUTO: 4.12 K/UL — SIGNIFICANT CHANGE UP (ref 1.4–6.5)
NEUTROPHILS NFR BLD AUTO: 57.7 % — SIGNIFICANT CHANGE UP (ref 42.2–75.2)
NRBC # BLD: 0 /100 WBCS — SIGNIFICANT CHANGE UP (ref 0–0)
PLATELET # BLD AUTO: 131 K/UL — SIGNIFICANT CHANGE UP (ref 130–400)
POTASSIUM SERPL-MCNC: 4.2 MMOL/L — SIGNIFICANT CHANGE UP (ref 3.5–5)
POTASSIUM SERPL-SCNC: 4.2 MMOL/L — SIGNIFICANT CHANGE UP (ref 3.5–5)
PROT SERPL-MCNC: 5.1 G/DL — LOW (ref 6–8)
RBC # BLD: 2.38 M/UL — LOW (ref 4.2–5.4)
RBC # FLD: 15.4 % — HIGH (ref 11.5–14.5)
SODIUM SERPL-SCNC: 139 MMOL/L — SIGNIFICANT CHANGE UP (ref 135–146)
WBC # BLD: 7.14 K/UL — SIGNIFICANT CHANGE UP (ref 4.8–10.8)
WBC # FLD AUTO: 7.14 K/UL — SIGNIFICANT CHANGE UP (ref 4.8–10.8)

## 2019-12-15 PROCEDURE — 99233 SBSQ HOSP IP/OBS HIGH 50: CPT

## 2019-12-15 RX ORDER — ACETAMINOPHEN 500 MG
650 TABLET ORAL ONCE
Refills: 0 | Status: COMPLETED | OUTPATIENT
Start: 2019-12-15 | End: 2019-12-15

## 2019-12-15 RX ADMIN — HEPARIN SODIUM 5000 UNIT(S): 5000 INJECTION INTRAVENOUS; SUBCUTANEOUS at 13:42

## 2019-12-15 RX ADMIN — LATANOPROST 1 DROP(S): 0.05 SOLUTION/ DROPS OPHTHALMIC; TOPICAL at 21:11

## 2019-12-15 RX ADMIN — HEPARIN SODIUM 5000 UNIT(S): 5000 INJECTION INTRAVENOUS; SUBCUTANEOUS at 05:21

## 2019-12-15 RX ADMIN — Medication 650 MILLIGRAM(S): at 16:07

## 2019-12-15 RX ADMIN — HEPARIN SODIUM 5000 UNIT(S): 5000 INJECTION INTRAVENOUS; SUBCUTANEOUS at 21:11

## 2019-12-15 RX ADMIN — Medication 75 MICROGRAM(S): at 05:20

## 2019-12-15 RX ADMIN — Medication 650 MILLIGRAM(S): at 16:05

## 2019-12-15 RX ADMIN — SIMVASTATIN 20 MILLIGRAM(S): 20 TABLET, FILM COATED ORAL at 21:11

## 2019-12-15 RX ADMIN — PANTOPRAZOLE SODIUM 40 MILLIGRAM(S): 20 TABLET, DELAYED RELEASE ORAL at 05:20

## 2019-12-15 RX ADMIN — Medication 1 DROP(S): at 11:45

## 2019-12-15 NOTE — PROGRESS NOTE ADULT - ASSESSMENT
89 y/o Female with history of prior falls and recent ORIF R femur with Dr Frye, HTN, HLD, hypothyroidism, anemia, presents from home after a trip and fall from standing earlier today onto her L side. ?ht, -loc. She is complaining of left hip pain. She was found to have Acute subtrochanteric spiral fracture of the proximal left femur. S/p Orif    # Left acute subtrochanteric spiral fracture of the proximal left femur. S/p ORIF on 12/11.  - WBAT, out of bed to chair, PT  - incentive spirometry  - pain controlled with Tylenol 650 mg Q 8 hrs PRN  - physiatrist evaluation recommendation: possible candidate for 4A  - thigh and leg very swollen, will do US LE to r/o DVT.    # Acute on chronic anemia due to acute blood loss  - s/p 2 PRBC transfusions  - Hb is 7 today, will transfuse 1 more unit of blood  - wound was oozing yesterday, no other signs of bleeding.   - monitor Hb Q 24 hrs  - keep active T&S    # SOILA on CKD 4 - resolved, creatinine is better  - pt has good UO.     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

## 2019-12-15 NOTE — PROGRESS NOTE ADULT - SUBJECTIVE AND OBJECTIVE BOX
ELE BRAR    Patient is a 89y old  Female who presents with a chief complaint of left femur fracture (15 Dec 2019 09:14)    INTERVAL HPI/OVERNIGHT EVENTS: NO events overnight. Pt does not have complaints, denies SOB, chest pain, has some dizziness when stands up. Pain is controlled.     CONSTITUTIONAL: No weakness, fevers or chills, +dizziness when standing  EYES/ENT: No visual changes;  No vertigo or throat pain   NECK: No pain or stiffness  RESPIRATORY: No cough, wheezing, hemoptysis; No shortness of breath  CARDIOVASCULAR: No chest pain or palpitations  GASTROINTESTINAL: No abdominal or epigastric pain. No nausea, vomiting, or hematemesis; No diarrhea or constipation. No melena or hematochezia.  GENITOURINARY: No dysuria, frequency or hematuria  NEUROLOGICAL: No numbness or weakness  SKIN: No itching, rashes     PHYSICAL EXAM:  T(C): 37.7, Max: 37.7 (12-15-19 @ 07:00)  HR: 84 (71 - 100)  BP: 129/59 (129/59 - 145/63)  RR: 18 (18 - 18)  SpO2: --    GENERAL: NAD  PULMONARY/CHEST: No rales, rhonchi, wheezing  CARDIOVASC: Regular rate and rhythm; soft systolic murmur  GI/ABDOMEN: Soft, Nontender, Nondistended; Bowel sounds present  EXTREMITIES: left hip with clean dressing, thigh and leg more swollen than yesterday. No calf tenderness b/l.  SKIN: pale  NERVOUS SYSTEM:  Alert & Oriented X3, no focal deficit     LABS:                        7.0    7.14  )-----------( 131      ( 15 Dec 2019 05:32 )             21.8     12-15    139  |  106  |  27<H>  ----------------------------<  102<H>  4.2   |  20  |  1.4    Ca    8.4<L>      15 Dec 2019 05:32  Mg     1.8     12-15    TPro  5.1<L>  /  Alb  2.9<L>  /  TBili  0.7  /  DBili  x   /  AST  20  /  ALT  <5  /  AlkPhos  50  12-15      Urinalysis Basic - ( 14 Dec 2019 03:00 )    Color: Colorless / Appearance: Clear / S.009 / pH: x  Gluc: x / Ketone: Negative  / Bili: Negative / Urobili: <2 mg/dL   Blood: x / Protein: Negative / Nitrite: Negative   Leuk Esterase: Negative / RBC: x / WBC x   Sq Epi: x / Non Sq Epi: x / Bacteria: x      MEDICATIONS  (STANDING):  heparin  Injectable 5000 Unit(s) SubCutaneous every 8 hours  latanoprost 0.005% Ophthalmic Solution 1 Drop(s) Both EYES at bedtime  levothyroxine 75 MICROGram(s) Oral daily  pantoprazole    Tablet 40 milliGRAM(s) Oral before breakfast  polyethylene glycol 3350 17 Gram(s) Oral daily  simvastatin 20 milliGRAM(s) Oral at bedtime  timolol 0.25% Solution 1 Drop(s) Both EYES daily    MEDICATIONS  (PRN):  aluminum hydroxide/magnesium hydroxide/simethicone Suspension 30 milliLiter(s) Oral four times a day PRN Indigestion  bisacodyl Suppository 10 milliGRAM(s) Rectal daily PRN If no bowel movement by POD#2  magnesium hydroxide Suspension 30 milliLiter(s) Oral daily PRN Constipation  morphine  - Injectable 2 milliGRAM(s) IV Push every 3 hours PRN Severe Pain (7 - 10)  ondansetron Injectable 4 milliGRAM(s) IV Push every 6 hours PRN Nausea and/or Vomiting  oxyCODONE    IR 5 milliGRAM(s) Oral every 4 hours PRN Moderate Pain (4 - 6)  senna 2 Tablet(s) Oral at bedtime PRN Constipation

## 2019-12-15 NOTE — PROGRESS NOTE ADULT - ATTENDING COMMENTS
Pt seen and examined.  Agree with resident exam and plan
Pt seen and examined.  Agree with resident exam and plan.
I examined the patient with pa and resident and discussed my plan with them    awaiting ortho for or
I examined the patient with the resident and pa and discussed my plan with them    the patient is awaiting ortho  ortho requesting transfusion prior to or, 1u
Agree with resident's note, HPI, PE, assessment and plan.  Pt was seen and examined independently.     Pt feels better, no active complaints. Hb better after transfusion. Pain controlled. Pt eval today. Dispo planning.

## 2019-12-15 NOTE — PROGRESS NOTE ADULT - SUBJECTIVE AND OBJECTIVE BOX
Orthopaedics Progress Note    ELE BRAR  [id number]    Patient is a 89y year old Female with left femur fracture  POD#4 from left femur ORIF  Patient seen and examined bedside  Pain well controlled  Denies fever/chills/CP/SOB  No other complaints      Vital Signs Last 24 Hrs  T(C): 37.7 (15 Dec 2019 07:00), Max: 37.7 (15 Dec 2019 07:00)  T(F): 99.9 (15 Dec 2019 07:00), Max: 99.9 (15 Dec 2019 07:00)  HR: 84 (15 Dec 2019 07:00) (71 - 100)  BP: 129/59 (15 Dec 2019 07:00) (129/59 - 145/63)  BP(mean): --  RR: 18 (15 Dec 2019 07:00) (18 - 18)  SpO2: --    Physical Exam  NAD, AAOx3  Breathing comfortably on RA  Resting comfortably  LLE  Dressing with SS drainage noted, removed dressings  Incisions c/d/i with staples in place, no active drainage  Dressings replaced  Motor: TA/EHL/Gastroc intact  Sensory: SP/DP/Altman/Sa intact  Vasc: foot WWP, 2+ DP pulse    Labs                                   7.0    7.14  )-----------( 131      ( 15 Dec 2019 05:32 )             21.8            A/P: Patient is a 89y year old Female as above    Postop anemia, consider transfusion, monitor CBC transufse prn  WBAT on LLE  DVT ppx: SCD, HSQ  Pain control  Dispo planning

## 2019-12-16 ENCOUNTER — TRANSCRIPTION ENCOUNTER (OUTPATIENT)
Age: 84
End: 2019-12-16

## 2019-12-16 LAB
ALBUMIN SERPL ELPH-MCNC: 3 G/DL — LOW (ref 3.5–5.2)
ALP SERPL-CCNC: 57 U/L — SIGNIFICANT CHANGE UP (ref 30–115)
ALT FLD-CCNC: <5 U/L — SIGNIFICANT CHANGE UP (ref 0–41)
ANION GAP SERPL CALC-SCNC: 14 MMOL/L — SIGNIFICANT CHANGE UP (ref 7–14)
APPEARANCE UR: ABNORMAL
AST SERPL-CCNC: 21 U/L — SIGNIFICANT CHANGE UP (ref 0–41)
BACTERIA # UR AUTO: NEGATIVE — SIGNIFICANT CHANGE UP
BASOPHILS # BLD AUTO: 0.06 K/UL — SIGNIFICANT CHANGE UP (ref 0–0.2)
BASOPHILS NFR BLD AUTO: 0.8 % — SIGNIFICANT CHANGE UP (ref 0–1)
BILIRUB SERPL-MCNC: 1 MG/DL — SIGNIFICANT CHANGE UP (ref 0.2–1.2)
BILIRUB UR-MCNC: NEGATIVE — SIGNIFICANT CHANGE UP
BUN SERPL-MCNC: 28 MG/DL — HIGH (ref 10–20)
CALCIUM SERPL-MCNC: 8.8 MG/DL — SIGNIFICANT CHANGE UP (ref 8.5–10.1)
CHLORIDE SERPL-SCNC: 105 MMOL/L — SIGNIFICANT CHANGE UP (ref 98–110)
CO2 SERPL-SCNC: 20 MMOL/L — SIGNIFICANT CHANGE UP (ref 17–32)
COLOR SPEC: SIGNIFICANT CHANGE UP
CREAT SERPL-MCNC: 1.3 MG/DL — SIGNIFICANT CHANGE UP (ref 0.7–1.5)
DIFF PNL FLD: NEGATIVE — SIGNIFICANT CHANGE UP
EOSINOPHIL # BLD AUTO: 0.43 K/UL — SIGNIFICANT CHANGE UP (ref 0–0.7)
EOSINOPHIL NFR BLD AUTO: 5.4 % — SIGNIFICANT CHANGE UP (ref 0–8)
EPI CELLS # UR: 2 /HPF — SIGNIFICANT CHANGE UP (ref 0–5)
GLUCOSE SERPL-MCNC: 100 MG/DL — HIGH (ref 70–99)
GLUCOSE UR QL: NEGATIVE — SIGNIFICANT CHANGE UP
HCT VFR BLD CALC: 25.8 % — LOW (ref 37–47)
HGB BLD-MCNC: 8.1 G/DL — LOW (ref 12–16)
HYALINE CASTS # UR AUTO: 2 /LPF — SIGNIFICANT CHANGE UP (ref 0–7)
IMM GRANULOCYTES NFR BLD AUTO: 4.3 % — HIGH (ref 0.1–0.3)
KETONES UR-MCNC: NEGATIVE — SIGNIFICANT CHANGE UP
LEUKOCYTE ESTERASE UR-ACNC: NEGATIVE — SIGNIFICANT CHANGE UP
LYMPHOCYTES # BLD AUTO: 1.71 K/UL — SIGNIFICANT CHANGE UP (ref 1.2–3.4)
LYMPHOCYTES # BLD AUTO: 21.5 % — SIGNIFICANT CHANGE UP (ref 20.5–51.1)
MAGNESIUM SERPL-MCNC: 1.8 MG/DL — SIGNIFICANT CHANGE UP (ref 1.8–2.4)
MCHC RBC-ENTMCNC: 29 PG — SIGNIFICANT CHANGE UP (ref 27–31)
MCHC RBC-ENTMCNC: 31.4 G/DL — LOW (ref 32–37)
MCV RBC AUTO: 92.5 FL — SIGNIFICANT CHANGE UP (ref 81–99)
MONOCYTES # BLD AUTO: 0.96 K/UL — HIGH (ref 0.1–0.6)
MONOCYTES NFR BLD AUTO: 12.1 % — HIGH (ref 1.7–9.3)
NEUTROPHILS # BLD AUTO: 4.46 K/UL — SIGNIFICANT CHANGE UP (ref 1.4–6.5)
NEUTROPHILS NFR BLD AUTO: 55.9 % — SIGNIFICANT CHANGE UP (ref 42.2–75.2)
NITRITE UR-MCNC: NEGATIVE — SIGNIFICANT CHANGE UP
NRBC # BLD: 0 /100 WBCS — SIGNIFICANT CHANGE UP (ref 0–0)
PH UR: 6 — SIGNIFICANT CHANGE UP (ref 5–8)
PLATELET # BLD AUTO: 155 K/UL — SIGNIFICANT CHANGE UP (ref 130–400)
POTASSIUM SERPL-MCNC: 4 MMOL/L — SIGNIFICANT CHANGE UP (ref 3.5–5)
POTASSIUM SERPL-SCNC: 4 MMOL/L — SIGNIFICANT CHANGE UP (ref 3.5–5)
PROT SERPL-MCNC: 5.6 G/DL — LOW (ref 6–8)
PROT UR-MCNC: SIGNIFICANT CHANGE UP
RBC # BLD: 2.79 M/UL — LOW (ref 4.2–5.4)
RBC # FLD: 14.9 % — HIGH (ref 11.5–14.5)
RBC CASTS # UR COMP ASSIST: 86 /HPF — HIGH (ref 0–4)
SODIUM SERPL-SCNC: 139 MMOL/L — SIGNIFICANT CHANGE UP (ref 135–146)
SP GR SPEC: 1.01 — SIGNIFICANT CHANGE UP (ref 1.01–1.02)
UROBILINOGEN FLD QL: SIGNIFICANT CHANGE UP
WBC # BLD: 7.96 K/UL — SIGNIFICANT CHANGE UP (ref 4.8–10.8)
WBC # FLD AUTO: 7.96 K/UL — SIGNIFICANT CHANGE UP (ref 4.8–10.8)
WBC UR QL: 2 /HPF — SIGNIFICANT CHANGE UP (ref 0–5)

## 2019-12-16 PROCEDURE — 93970 EXTREMITY STUDY: CPT | Mod: 26

## 2019-12-16 PROCEDURE — 71045 X-RAY EXAM CHEST 1 VIEW: CPT | Mod: 26

## 2019-12-16 PROCEDURE — 99233 SBSQ HOSP IP/OBS HIGH 50: CPT

## 2019-12-16 RX ORDER — AMLODIPINE BESYLATE AND BENAZEPRIL HYDROCHLORIDE 10; 20 MG/1; MG/1
1 CAPSULE ORAL
Qty: 30 | Refills: 0
Start: 2019-12-16 | End: 2020-01-14

## 2019-12-16 RX ORDER — MORPHINE SULFATE 50 MG/1
2 CAPSULE, EXTENDED RELEASE ORAL
Qty: 0 | Refills: 0 | DISCHARGE
Start: 2019-12-16

## 2019-12-16 RX ORDER — SENNA PLUS 8.6 MG/1
2 TABLET ORAL
Qty: 0 | Refills: 0 | DISCHARGE
Start: 2019-12-16

## 2019-12-16 RX ORDER — MAGNESIUM HYDROXIDE 400 MG/1
30 TABLET, CHEWABLE ORAL
Qty: 0 | Refills: 0 | DISCHARGE
Start: 2019-12-16

## 2019-12-16 RX ORDER — PANTOPRAZOLE SODIUM 20 MG/1
1 TABLET, DELAYED RELEASE ORAL
Qty: 0 | Refills: 0 | DISCHARGE
Start: 2019-12-16

## 2019-12-16 RX ORDER — HEPARIN SODIUM 5000 [USP'U]/ML
5000 INJECTION INTRAVENOUS; SUBCUTANEOUS
Qty: 0 | Refills: 0 | DISCHARGE
Start: 2019-12-16

## 2019-12-16 RX ORDER — AMLODIPINE BESYLATE AND BENAZEPRIL HYDROCHLORIDE 10; 20 MG/1; MG/1
1 CAPSULE ORAL
Qty: 0 | Refills: 0 | DISCHARGE

## 2019-12-16 RX ORDER — OXYCODONE HYDROCHLORIDE 5 MG/1
1 TABLET ORAL
Qty: 0 | Refills: 0 | DISCHARGE
Start: 2019-12-16

## 2019-12-16 RX ORDER — POLYETHYLENE GLYCOL 3350 17 G/17G
17 POWDER, FOR SOLUTION ORAL
Qty: 0 | Refills: 0 | DISCHARGE
Start: 2019-12-16

## 2019-12-16 RX ADMIN — HEPARIN SODIUM 5000 UNIT(S): 5000 INJECTION INTRAVENOUS; SUBCUTANEOUS at 21:21

## 2019-12-16 RX ADMIN — Medication 1 DROP(S): at 12:10

## 2019-12-16 RX ADMIN — Medication 75 MICROGRAM(S): at 05:20

## 2019-12-16 RX ADMIN — LATANOPROST 1 DROP(S): 0.05 SOLUTION/ DROPS OPHTHALMIC; TOPICAL at 21:21

## 2019-12-16 RX ADMIN — HEPARIN SODIUM 5000 UNIT(S): 5000 INJECTION INTRAVENOUS; SUBCUTANEOUS at 05:20

## 2019-12-16 RX ADMIN — SIMVASTATIN 20 MILLIGRAM(S): 20 TABLET, FILM COATED ORAL at 21:20

## 2019-12-16 RX ADMIN — HEPARIN SODIUM 5000 UNIT(S): 5000 INJECTION INTRAVENOUS; SUBCUTANEOUS at 13:52

## 2019-12-16 RX ADMIN — POLYETHYLENE GLYCOL 3350 17 GRAM(S): 17 POWDER, FOR SOLUTION ORAL at 12:10

## 2019-12-16 RX ADMIN — PANTOPRAZOLE SODIUM 40 MILLIGRAM(S): 20 TABLET, DELAYED RELEASE ORAL at 05:20

## 2019-12-16 NOTE — PROGRESS NOTE ADULT - ASSESSMENT
89yo F with Past Medical History Recurrent Falls with recent right ORIF by Dr. Mathew, Hypertension, Hypercholesteremia, Hypothyroidism, and chronic anemia admitted status post fall complicated by left hip fracture.  Status post ORIF on 12/11/19.    Fall complicated by left hip fracture: status post ORIF on 12/11/19.  Orthopedics follow-up appreciated.  WBAT to the LLE.  Continue Senna to prevent constipation for decreased mobility and narcotics.  Tylenol and Oxycodone IR PRN for pain.  The patient was accepted to acute inpatient rehab.   Acute blood loss anemia: status post 2u PRBC transfusion.  Her hemoglobin has recovered to 8.1.  Repeat CBC tomorrow  Acute kidney injury: resolved with gentle IV hydration.  Serum creatinine has normalized, decreasing to 1.3.  Hypertension: restart Lisinopril and Norvasc upon discharge following stabilization in blood pressure.  Hypercholesteremia: continue Zocor 20mg PO QHS  Hypothyroidism: continue Synthroid 75mcg PO q24  GI/DVT prophylaxis    #Progress Note Handoff    Pending:  Tests: repeat CBC in 24 hours following transfusion    Future Disposition: discharge to acute inpatient rehab; time spent = 35 minutes

## 2019-12-16 NOTE — DISCHARGE NOTE PROVIDER - HOSPITAL COURSE
89 y/o Female with history of prior falls and recent ORIF R femur with Dr Frye, HTN, HLD, hypothyroidism, anemia, presents from home after a trip and fall from standing earlier today onto her L side. ?ht, -loc. She is complaining of left hip pain. She was found to have Acute subtrochanteric spiral fracture of the proximal left femur. S/p ORIF on DEC 11th, 2019. Patient had 2 perioperative blood transfusions and received 2 additional units ( one of the Dec 13th and one of Dec 15th). Lower extremity duplex negative for deep vein thrombosis.  Patient stable for discharge to inpatient rehab by medical team . Will need to follow up as outpatient with Orthopedic surgery and primary. 89 y/o Female with history of prior falls and recent ORIF R femur with Dr Frye, HTN, HLD, hypothyroidism, anemia, presents from home after a trip and fall from standing earlier on the day of admission onto her L side, no loss of consciousness. She is complaining of left hip pain. Trauma work up intitiated and patient was  found to have Acute subtrochanteric spiral fracture of the proximal left femur. S/p ORIF on DEC 11th, 2019. Patient had 2 perioperative blood transfusions and received 2 additional units ( one of the Dec 13th and one of Dec 15th). Lower extremity duplex negative for deep vein thrombosis.  Patient stable for discharge to inpatient rehab by medical team . Will need to follow up as outpatient with Orthopedic surgery and primary. 89 y/o Female with history of prior falls and recent ORIF R femur with Dr Frye, HTN, HLD, hypothyroidism, anemia, presents from home after a trip and fall from standing earlier on the day of admission onto her L side, no loss of consciousness. She was complaining of left hip pain. A trauma work up was intitiated, and the patient was  found to have Acute subtrochanteric spiral fracture of the proximal left femur. Status post ORIF on DEC 11th, 2019. Patient had 2 perioperative blood transfusions and received 2 additional units ( one of the Dec 13th and one of Dec 15th).  Her last hemoglobin was 8 on 12/17/19.  Lower extremity duplex negative for deep vein thrombosis.  Patient stable for discharge to inpatient rehab by medical team . Will need to follow up as outpatient with Orthopedic surgery and primary.

## 2019-12-16 NOTE — DISCHARGE NOTE PROVIDER - NSDCCPCAREPLAN_GEN_ALL_CORE_FT
PRINCIPAL DISCHARGE DIAGNOSIS  Diagnosis: Femur fracture, left  Assessment and Plan of Treatment: Left acute subtrochanteric fracture of proximal left femur  Open reduction internal fixation on 12/11/19  Can weight bear as tolerated, out of bed to chair  Will need inpatient rehabilitation  Continue DVT prophylaxis with Heparin as instructed in the discharge  Follow up with Orthopedic surgeon Dr. Quintana as outpatient      SECONDARY DISCHARGE DIAGNOSES  Diagnosis: Hypertension  Assessment and Plan of Treatment: Holding blood pressure meds from home in the post-op periods. May resume if patient stable and blood pressure trending up. Please follow up as outpatient with primary for further bp control    Diagnosis: H/O acute angle-closure glaucoma  Assessment and Plan of Treatment: Continue with home meds    Diagnosis: Hypothyroidism  Assessment and Plan of Treatment: C/with Levo thyroxine    Diagnosis: Anemia  Assessment and Plan of Treatment: Likely secondary to acute blood loss  2 transfusion sperioperatively and 2 transfusions on subsequent days  Will need repeat CBC tommorow, please transfuse if HB <7. Trend CBC if warranted, and transfuse as needed  Blood loss is expected given extent of subtrochanteric fracture.    Diagnosis: Fall  Assessment and Plan of Treatment: Multiple falls at home  Fall precautions on discharge  Patient ambulates with walker at baseline PRINCIPAL DISCHARGE DIAGNOSIS  Diagnosis: Femur fracture, left  Assessment and Plan of Treatment: Left acute subtrochanteric fracture of proximal left femur  Open reduction internal fixation on 12/11/19  Can weight bear as tolerated, out of bed to chair  Will need inpatient rehabilitation  Continue DVT prophylaxis with Heparin as instructed in the discharge  Follow up with Orthopedic surgeon Dr. Quintana as outpatient      SECONDARY DISCHARGE DIAGNOSES  Diagnosis: Hypertension  Assessment and Plan of Treatment: Resume blood pressure meds from home norvasc and benzapril as per discharge note    Diagnosis: H/O acute angle-closure glaucoma  Assessment and Plan of Treatment: Continue with home meds    Diagnosis: Hypothyroidism  Assessment and Plan of Treatment: C/with Levo thyroxine    Diagnosis: Anemia  Assessment and Plan of Treatment: Likely secondary to acute blood loss  2 transfusion sperioperatively and 2 transfusions on subsequent days  Will need repeat CBC tommorow, please transfuse if HB <7. Trend CBC if warranted, and transfuse as needed  Blood loss is expected given extent of subtrochanteric fracture.    Diagnosis: Fall  Assessment and Plan of Treatment: Multiple falls at home  Fall precautions on discharge  Patient ambulates with walker at baseline

## 2019-12-16 NOTE — PROGRESS NOTE ADULT - SUBJECTIVE AND OBJECTIVE BOX
SUBJECTIVE:     Today is hospital day 7d. 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, severe stage  High cholesterol  Hypertension, unspecified type  History of hip replacement, total, right      ALLERGIES:  No Known Allergies    MEDICATIONS:  STANDING MEDICATIONS  heparin  Injectable 5000 Unit(s) SubCutaneous every 8 hours  latanoprost 0.005% Ophthalmic Solution 1 Drop(s) Both EYES at bedtime  levothyroxine 75 MICROGram(s) Oral daily  pantoprazole    Tablet 40 milliGRAM(s) Oral before breakfast  polyethylene glycol 3350 17 Gram(s) Oral daily  simvastatin 20 milliGRAM(s) Oral at bedtime  timolol 0.25% Solution 1 Drop(s) Both EYES daily    PRN MEDICATIONS  aluminum hydroxide/magnesium hydroxide/simethicone Suspension 30 milliLiter(s) Oral four times a day PRN  bisacodyl Suppository 10 milliGRAM(s) Rectal daily PRN  magnesium hydroxide Suspension 30 milliLiter(s) Oral daily PRN  morphine  - Injectable 2 milliGRAM(s) IV Push every 3 hours PRN  ondansetron Injectable 4 milliGRAM(s) IV Push every 6 hours PRN  oxyCODONE    IR 5 milliGRAM(s) Oral every 4 hours PRN  senna 2 Tablet(s) Oral at bedtime PRN    VITALS:   T(F): 100.6  HR: 88  BP: 115/60  RR: 19  SpO2: --    LABS:                        8.1    7.96  )-----------( 155      ( 16 Dec 2019 05:28 )             25.8     12-    139  |  105  |  28<H>  ----------------------------<  100<H>  4.0   |  20  |  1.3    Ca    8.8      16 Dec 2019 05:28  Mg     1.8     -    TPro  5.6<L>  /  Alb  3.0<L>  /  TBili  1.0  /  DBili  x   /  AST  21  /  ALT  <5  /  AlkPhos  57  12-      Urinalysis Basic - ( 16 Dec 2019 07:13 )    Color: Light Yellow / Appearance: Slightly Turbid / S.011 / pH: x  Gluc: x / Ketone: Negative  / Bili: Negative / Urobili: <2 mg/dL   Blood: x / Protein: Trace / Nitrite: Negative   Leuk Esterase: Negative / RBC: 86 /HPF / WBC 2 /HPF   Sq Epi: x / Non Sq Epi: 2 /HPF / Bacteria: Negative                RADIOLOGY:    < from: CT Abdomen and Pelvis w/ IV Cont (19 @ 18:02) >  IMPRESSION:    Acute subtrochanteric spiral fracture of the proximal left femur. Left   thigh hematoma, incompletely imaged.    Additional chronic findings as above.    < end of copied text >      PHYSICAL EXAM:  GENERAL: NAD, lying in bed comfortably  HEAD:  Atraumatic, Normocephalic  EYES: conjunctiva and sclera clear  NECK: Supple, No JVD  CHEST/LUNG: Clear to auscultation bilaterally  HEART: tachy; No murmurs, rubs, or gallops  ABDOMEN: Bowel sounds present; Soft, Nontender, Nondistended. No hepatomegally  EXTREMITIES:  2+ Peripheral Pulses, brisk capillary refill. L hip dressing c/d/i  NERVOUS SYSTEM:  Alert & Oriented X2, speech clear, confused but follows command

## 2019-12-16 NOTE — PROVIDER CONTACT NOTE (OTHER) - ACTION/TREATMENT ORDERED:
MD Howard aware. MD stated to administer tylenol
Ok, no further interventions at this time
Provider stated to wait till morning team arrives to make appropriate decision.
Provider stated yes; if anything changes will make RN aware during rounds
Will order EKG
no intervention at this time
Ok, the duplex was done, we are pending results, the patient may work with PT at this time.
reassess pt for distention at 0000 12/13/19

## 2019-12-16 NOTE — DISCHARGE NOTE PROVIDER - NSDCACTIVITY_GEN_ALL_CORE
No heavy lifting/straining
40 year old male with a history of pre-DM presents with left shoulder pain.  The pain started suddenly 5 days ago when he reached across his body for his cell phone.  He felt a sudden "pull/spasm" in his left shoulder.  He went to  on the initial day, was given Motrin and Flexeril but the pain persisted.  He returned to  the following day and was given PO toradol and 40mg prednisone x 5 days.  He saw his PMD yesterday who scheduled an MRI for next week and advised patient to continue Prednisone, Flexeril, and Toradol.  Patient states he still has left shoulder pain and now feels it radiating to his left axilla and occasionally to his chest.  Denies n/v/SOB/diaphoresis.  Denies LUE weakness, paresthesias.  Pain improves with use and is worse at rest. No family history of cardiac disease. Patient lives in [place] and is in [place] visiting family.

## 2019-12-16 NOTE — DISCHARGE NOTE PROVIDER - NSDCFUADDAPPT_GEN_ALL_CORE_FT
Please follow up with primary provider within 1-2 weeks of discharge. Please call number listed to schedule and confirm follow up appointment    Please follow up with orthopedic surgeon as outpatient

## 2019-12-16 NOTE — CHART NOTE - NSCHARTNOTEFT_GEN_A_CORE
Registered Dietitian Limited Note:    Unable to complete LOS assessment due 12/16 - will attempt to complete RD initial assessment 12/17.

## 2019-12-16 NOTE — PROGRESS NOTE ADULT - ASSESSMENT
89 y/o Female with history of prior falls and recent ORIF R femur with Dr Frye, HTN, HLD, hypothyroidism, anemia, presents from home after a trip and fall from standing earlier today onto her L side. ?ht, -loc. She is complaining of left hip pain. She was found to have Acute subtrochanteric spiral fracture of the proximal left femur. S/p Orif on 12/11/2019    L hip - Acute subtrochanteric spiral fracture of the proximal left femur  - s/p ORIF on 12/11/2019  - pt was  transfused preop 1 unit of prbc and 1 unit post op prbc   - received 1 unit prbc on Dec 13 and Dec 15   - rehab for gait training wbat should begin  - pending SNF    Fever - low grade 100.4 F  - UA negative  - LE Duplex; negative for DVT  - encourage incentive spirometer  - will repeat blood culture and obtain a stat CXR  - f/u am cbc    CKD - Stage IV  - Cr 1.3  - encourage fluid intake, will dc IV fluids given aortic stenosis  - monitor bmp    Microcytic anemia at baseline  -  s/p 4 transfusion this admission  -  patient stable currently    Hypothyroidism  - L-thyroxine     HLD  - resumed statin    Exudative age-related macular degeneration of left eye with inactive choroidal neovascularization    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

## 2019-12-16 NOTE — PROGRESS NOTE ADULT - SUBJECTIVE AND OBJECTIVE BOX
ELE BRAR [id number]    Hospitalist Note  89yo F with Past Medical History Recurrent Falls with recent right ORIF by Dr. Mathew, Hypertension, Hypercholesteremia, Hypothyroidism, and chronic anemia admitted status post fall complicated by left hip fracture.  Status post ORIF on 12/11/19.    Overnight events/Updates: Her post-operative course was complicated by acute blood loss anemia requiring 2u PRBC transfusion.  Hemoglobin improved to 8.1 following her second transfusion.  No new complaints    Vital Signs Last 24 Hrs  T(C): 36.7 (16 Dec 2019 07:50), Max: 37.2 (16 Dec 2019 00:00)  T(F): 98 (16 Dec 2019 07:50), Max: 99 (16 Dec 2019 00:00)  HR: 76 (16 Dec 2019 07:50) (73 - 76)  BP: 140/64 (16 Dec 2019 07:50) (124/59 - 140/64)  BP(mean): --  RR: 18 (16 Dec 2019 07:50) (18 - 18)  SpO2: --    Physical Examination:  General: AAO x 3  HEENT: PERRLA, EOMI  CV= S1 & S2 appreciated  Lungs=CTA BL  Abdominal Examination= + BS, Soft, NT/ND  Extremity Examination= No C/C/    ROS: No chest pain, no shortness of breath.  All other systems reviewed and are within normal limits except for the complaints in the HPI.    MEDICATIONS  (STANDING):  heparin  Injectable 5000 Unit(s) SubCutaneous every 8 hours  latanoprost 0.005% Ophthalmic Solution 1 Drop(s) Both EYES at bedtime  levothyroxine 75 MICROGram(s) Oral daily  pantoprazole    Tablet 40 milliGRAM(s) Oral before breakfast  polyethylene glycol 3350 17 Gram(s) Oral daily  simvastatin 20 milliGRAM(s) Oral at bedtime  timolol 0.25% Solution 1 Drop(s) Both EYES daily    MEDICATIONS  (PRN):  aluminum hydroxide/magnesium hydroxide/simethicone Suspension 30 milliLiter(s) Oral four times a day PRN Indigestion  bisacodyl Suppository 10 milliGRAM(s) Rectal daily PRN If no bowel movement by POD#2  magnesium hydroxide Suspension 30 milliLiter(s) Oral daily PRN Constipation  morphine  - Injectable 2 milliGRAM(s) IV Push every 3 hours PRN Severe Pain (7 - 10)  ondansetron Injectable 4 milliGRAM(s) IV Push every 6 hours PRN Nausea and/or Vomiting  oxyCODONE    IR 5 milliGRAM(s) Oral every 4 hours PRN Moderate Pain (4 - 6)  senna 2 Tablet(s) Oral at bedtime PRN Constipation                            8.1    7.96  )-----------( 155      ( 16 Dec 2019 05:28 )             25.8     12-16    139  |  105  |  28<H>  ----------------------------<  100<H>  4.0   |  20  |  1.3    Ca    8.8      16 Dec 2019 05:28  Mg     1.8     12-16    TPro  5.6<L>  /  Alb  3.0<L>  /  TBili  1.0  /  DBili  x   /  AST  21  /  ALT  <5  /  AlkPhos  57  12-16      Case discussed with housesta & family  JESENIA Diaz [id number]

## 2019-12-16 NOTE — DISCHARGE NOTE PROVIDER - NSDCMRMEDTOKEN_GEN_ALL_CORE_FT
bisacodyl 10 mg rectal suppository: 1 suppository(ies) rectal once a day, As needed, If no bowel movement by POD#2  brimonidine-timolol 0.2%-0.5% ophthalmic solution: 1 drop(s) to each affected eye every 12 hours  ferrous sulfate 325 mg (65 mg elemental iron) oral tablet: 1 tab(s) orally once a day  fluorometholone 0.1% ophthalmic suspension: 1 drop(s) to each affected eye once a day  heparin: 5000 unit(s) subcutaneous every 8 hours for DVT prophylaxis  levothyroxine 75 mcg (0.075 mg) oral tablet: 1 tab(s) orally once a day  magnesium hydroxide 8% oral suspension: 30 milliliter(s) orally once a day, As needed, Constipation  morphine: 2 gram(s) intravenous every 6 hours, As Needed for pain  oxyCODONE 5 mg oral tablet: 1 tab(s) orally every 4 hours, As needed, Moderate Pain (4 - 6)  pantoprazole 40 mg oral delayed release tablet: 1 tab(s) orally once a day (before a meal)  polyethylene glycol 3350 oral powder for reconstitution: 17 gram(s) orally once a day  senna oral tablet: 2 tab(s) orally once a day (at bedtime), As needed, Constipation  simvastatin 20 mg oral tablet: 1 tab(s) orally once a day (at bedtime) amlodipine-benazepril 5 mg-10 mg oral capsule: 1 cap(s) orally once a day  bisacodyl 10 mg rectal suppository: 1 suppository(ies) rectal once a day, As needed, If no bowel movement by POD#2  brimonidine-timolol 0.2%-0.5% ophthalmic solution: 1 drop(s) to each affected eye every 12 hours  ferrous sulfate 325 mg (65 mg elemental iron) oral tablet: 1 tab(s) orally once a day  fluorometholone 0.1% ophthalmic suspension: 1 drop(s) to each affected eye once a day  heparin: 5000 unit(s) subcutaneous every 8 hours for DVT prophylaxis  levothyroxine 75 mcg (0.075 mg) oral tablet: 1 tab(s) orally once a day  magnesium hydroxide 8% oral suspension: 30 milliliter(s) orally once a day, As needed, Constipation  oxyCODONE 5 mg oral tablet: 1 tab(s) orally every 4 hours, As needed, Moderate Pain (4 - 6)  pantoprazole 40 mg oral delayed release tablet: 1 tab(s) orally once a day (before a meal)  polyethylene glycol 3350 oral powder for reconstitution: 17 gram(s) orally once a day  senna oral tablet: 2 tab(s) orally once a day (at bedtime), As needed, Constipation  simvastatin 20 mg oral tablet: 1 tab(s) orally once a day (at bedtime)

## 2019-12-16 NOTE — PROGRESS NOTE ADULT - SUBJECTIVE AND OBJECTIVE BOX
Orthopaedic Surgery Progress Note    ELE BRAR  [id number]      Pt S&E at bedside this AM. Pain well controlled. Denies fever/chills/CP/SOB. No other complaints    aluminum hydroxide/magnesium hydroxide/simethicone Suspension 30 milliLiter(s) Oral four times a day PRN  bisacodyl Suppository 10 milliGRAM(s) Rectal daily PRN  heparin  Injectable 5000 Unit(s) SubCutaneous every 8 hours  latanoprost 0.005% Ophthalmic Solution 1 Drop(s) Both EYES at bedtime  levothyroxine 75 MICROGram(s) Oral daily  magnesium hydroxide Suspension 30 milliLiter(s) Oral daily PRN  morphine  - Injectable 2 milliGRAM(s) IV Push every 3 hours PRN  ondansetron Injectable 4 milliGRAM(s) IV Push every 6 hours PRN  oxyCODONE    IR 5 milliGRAM(s) Oral every 4 hours PRN  pantoprazole    Tablet 40 milliGRAM(s) Oral before breakfast  polyethylene glycol 3350 17 Gram(s) Oral daily  senna 2 Tablet(s) Oral at bedtime PRN  simvastatin 20 milliGRAM(s) Oral at bedtime  timolol 0.25% Solution 1 Drop(s) Both EYES daily      T(C): 36.9 (12-16-19 @ 06:12), Max: 38 (12-15-19 @ 15:00)  HR: 74 (12-16-19 @ 06:12) (73 - 96)  BP: 124/59 (12-16-19 @ 06:12) (122/56 - 154/69)  RR: 18 (12-16-19 @ 00:00) (18 - 19)  SpO2: --    Physical Exam  NAD, AAOx3  Breathing comfortably on RA  Resting comfortably  Left LE  Dressing in place, minimal drainage; dressing changed  SILT sp/dp/t/sural/saph  Firing ta/ehl/fhl/gs  Foot WWP, 2+ DP pulse    Labs                        8.1    7.96  )-----------( 155      ( 16 Dec 2019 05:28 )             25.8     12-16    139  |  105  |  28<H>  ----------------------------<  100<H>  4.0   |  20  |  1.3    Ca    8.8      16 Dec 2019 05:28  Mg     1.8     12-16    TPro  5.6<L>  /  Alb  3.0<L>  /  TBili  1.0  /  DBili  x   /  AST  21  /  ALT  <5  /  AlkPhos  57  12-16    LIVER FUNCTIONS - ( 16 Dec 2019 05:28 )  Alb: 3.0 g/dL / Pro: 5.6 g/dL / ALK PHOS: 57 U/L / ALT: <5 U/L / AST: 21 U/L / GGT: x                 A/P:   89yo Female s/p right subtroch IM nail on 12/11/19. Pt doing well postop    Weightbearing: WBAT RLE  DVT ppx  Pain control  Home medications  Trend labs  PT/OT/Rehab  Dispo: Pending PT recommendations Orthopaedic Surgery Progress Note    ELE BRAR  [id number]      Pt S&E at bedside this AM. Pain well controlled. Denies fever/chills/CP/SOB. No other complaints    aluminum hydroxide/magnesium hydroxide/simethicone Suspension 30 milliLiter(s) Oral four times a day PRN  bisacodyl Suppository 10 milliGRAM(s) Rectal daily PRN  heparin  Injectable 5000 Unit(s) SubCutaneous every 8 hours  latanoprost 0.005% Ophthalmic Solution 1 Drop(s) Both EYES at bedtime  levothyroxine 75 MICROGram(s) Oral daily  magnesium hydroxide Suspension 30 milliLiter(s) Oral daily PRN  morphine  - Injectable 2 milliGRAM(s) IV Push every 3 hours PRN  ondansetron Injectable 4 milliGRAM(s) IV Push every 6 hours PRN  oxyCODONE    IR 5 milliGRAM(s) Oral every 4 hours PRN  pantoprazole    Tablet 40 milliGRAM(s) Oral before breakfast  polyethylene glycol 3350 17 Gram(s) Oral daily  senna 2 Tablet(s) Oral at bedtime PRN  simvastatin 20 milliGRAM(s) Oral at bedtime  timolol 0.25% Solution 1 Drop(s) Both EYES daily      T(C): 36.9 (12-16-19 @ 06:12), Max: 38 (12-15-19 @ 15:00)  HR: 74 (12-16-19 @ 06:12) (73 - 96)  BP: 124/59 (12-16-19 @ 06:12) (122/56 - 154/69)  RR: 18 (12-16-19 @ 00:00) (18 - 19)  SpO2: --    Physical Exam  NAD, AAOx3  Breathing comfortably on RA  Resting comfortably  Left LE  Dressing in place, minimal drainage; dressing changed  SILT sp/dp/t/sural/saph  Firing ta/ehl/fhl/gs  Foot WWP, 2+ DP pulse    Labs                        8.1    7.96  )-----------( 155      ( 16 Dec 2019 05:28 )             25.8     12-16    139  |  105  |  28<H>  ----------------------------<  100<H>  4.0   |  20  |  1.3    Ca    8.8      16 Dec 2019 05:28  Mg     1.8     12-16    TPro  5.6<L>  /  Alb  3.0<L>  /  TBili  1.0  /  DBili  x   /  AST  21  /  ALT  <5  /  AlkPhos  57  12-16    LIVER FUNCTIONS - ( 16 Dec 2019 05:28 )  Alb: 3.0 g/dL / Pro: 5.6 g/dL / ALK PHOS: 57 U/L / ALT: <5 U/L / AST: 21 U/L / GGT: x                 A/P:   89yo Female s/p left subtroch IM nail on 12/11/19. Pt doing well postop    Weightbearing: WBAT LLE  DVT ppx  Pain control  Home medications  Trend labs  PT/OT/Rehab  Dispo: Pending PT recommendations

## 2019-12-16 NOTE — DISCHARGE NOTE PROVIDER - PROVIDER TOKENS
PROVIDER:[TOKEN:[[id number]],FOLLOWUP:[1 week]],PROVIDER:[TOKEN:[[id number]],FOLLOWUP:[2 weeks]]

## 2019-12-16 NOTE — PROGRESS NOTE ADULT - SUBJECTIVE AND OBJECTIVE BOX
undergoing Bedside PT  Bed to chair only with PT    DC TO SNF FOR [facility] REHAB  NOT CANDIDATE FOR INTENSIVE REHAB UNIT

## 2019-12-16 NOTE — DISCHARGE NOTE PROVIDER - CARE PROVIDER_API CALL
Koffi Cooper)  Internal Medicine  [address] [place] [address]  Phone: [phone]  Fax: [phone]  Follow Up Time: 1 week    Randy Quintana)  Orthopaedic Surgery  [address] [place] [address]  Phone: [phone]  Fax: [phone]  Follow Up Time: 2 weeks

## 2019-12-17 ENCOUNTER — TRANSCRIPTION ENCOUNTER (OUTPATIENT)
Age: 84
End: 2019-12-17

## 2019-12-17 ENCOUNTER — INPATIENT (INPATIENT)
Facility: HOSPITAL | Age: 84
LOS: 13 days | Discharge: HOME HEALTH | End: 2019-12-31
Attending: PHYSICAL MEDICINE & REHABILITATION | Admitting: PHYSICAL MEDICINE & REHABILITATION
Payer: MEDICARE

## 2019-12-17 VITALS — HEIGHT: 58 IN | WEIGHT: 143.96 LBS

## 2019-12-17 DIAGNOSIS — Z96.641 PRESENCE OF RIGHT ARTIFICIAL HIP JOINT: Chronic | ICD-10-CM
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
## 2019-12-17 LAB
ALBUMIN SERPL ELPH-MCNC: 3.1 G/DL — LOW (ref 3.5–5.2)
ALP SERPL-CCNC: 65 U/L — SIGNIFICANT CHANGE UP (ref 30–115)
ALT FLD-CCNC: 9 U/L — SIGNIFICANT CHANGE UP (ref 0–41)
ANION GAP SERPL CALC-SCNC: 14 MMOL/L — SIGNIFICANT CHANGE UP (ref 7–14)
AST SERPL-CCNC: 31 U/L — SIGNIFICANT CHANGE UP (ref 0–41)
BASOPHILS # BLD AUTO: 0.06 K/UL — SIGNIFICANT CHANGE UP (ref 0–0.2)
BASOPHILS NFR BLD AUTO: 0.8 % — SIGNIFICANT CHANGE UP (ref 0–1)
BILIRUB SERPL-MCNC: 0.9 MG/DL — SIGNIFICANT CHANGE UP (ref 0.2–1.2)
BUN SERPL-MCNC: 28 MG/DL — HIGH (ref 10–20)
CALCIUM SERPL-MCNC: 8.9 MG/DL — SIGNIFICANT CHANGE UP (ref 8.5–10.1)
CHLORIDE SERPL-SCNC: 105 MMOL/L — SIGNIFICANT CHANGE UP (ref 98–110)
CO2 SERPL-SCNC: 21 MMOL/L — SIGNIFICANT CHANGE UP (ref 17–32)
CREAT SERPL-MCNC: 1.3 MG/DL — SIGNIFICANT CHANGE UP (ref 0.7–1.5)
EOSINOPHIL # BLD AUTO: 0.39 K/UL — SIGNIFICANT CHANGE UP (ref 0–0.7)
EOSINOPHIL NFR BLD AUTO: 5.4 % — SIGNIFICANT CHANGE UP (ref 0–8)
GLUCOSE SERPL-MCNC: 113 MG/DL — HIGH (ref 70–99)
HCT VFR BLD CALC: 25.5 % — LOW (ref 37–47)
HGB BLD-MCNC: 8 G/DL — LOW (ref 12–16)
IMM GRANULOCYTES NFR BLD AUTO: 4.2 % — HIGH (ref 0.1–0.3)
LYMPHOCYTES # BLD AUTO: 1.45 K/UL — SIGNIFICANT CHANGE UP (ref 1.2–3.4)
LYMPHOCYTES # BLD AUTO: 20.3 % — LOW (ref 20.5–51.1)
MAGNESIUM SERPL-MCNC: 1.9 MG/DL — SIGNIFICANT CHANGE UP (ref 1.8–2.4)
MCHC RBC-ENTMCNC: 29.1 PG — SIGNIFICANT CHANGE UP (ref 27–31)
MCHC RBC-ENTMCNC: 31.4 G/DL — LOW (ref 32–37)
MCV RBC AUTO: 92.7 FL — SIGNIFICANT CHANGE UP (ref 81–99)
MONOCYTES # BLD AUTO: 0.75 K/UL — HIGH (ref 0.1–0.6)
MONOCYTES NFR BLD AUTO: 10.5 % — HIGH (ref 1.7–9.3)
NEUTROPHILS # BLD AUTO: 4.21 K/UL — SIGNIFICANT CHANGE UP (ref 1.4–6.5)
NEUTROPHILS NFR BLD AUTO: 58.8 % — SIGNIFICANT CHANGE UP (ref 42.2–75.2)
NRBC # BLD: 0 /100 WBCS — SIGNIFICANT CHANGE UP (ref 0–0)
PLATELET # BLD AUTO: 184 K/UL — SIGNIFICANT CHANGE UP (ref 130–400)
POTASSIUM SERPL-MCNC: 4.2 MMOL/L — SIGNIFICANT CHANGE UP (ref 3.5–5)
POTASSIUM SERPL-SCNC: 4.2 MMOL/L — SIGNIFICANT CHANGE UP (ref 3.5–5)
PROT SERPL-MCNC: 5.7 G/DL — LOW (ref 6–8)
RBC # BLD: 2.75 M/UL — LOW (ref 4.2–5.4)
RBC # FLD: 14.7 % — HIGH (ref 11.5–14.5)
SODIUM SERPL-SCNC: 140 MMOL/L — SIGNIFICANT CHANGE UP (ref 135–146)
WBC # BLD: 7.16 K/UL — SIGNIFICANT CHANGE UP (ref 4.8–10.8)
WBC # FLD AUTO: 7.16 K/UL — SIGNIFICANT CHANGE UP (ref 4.8–10.8)

## 2019-12-17 PROCEDURE — 99239 HOSP IP/OBS DSCHRG MGMT >30: CPT

## 2019-12-17 PROCEDURE — 93010 ELECTROCARDIOGRAM REPORT: CPT

## 2019-12-17 RX ORDER — FLUOROMETHOLONE 1 MG/ML
1 SOLUTION/ DROPS OPHTHALMIC DAILY
Refills: 0 | Status: DISCONTINUED | OUTPATIENT
Start: 2019-12-17 | End: 2019-12-31

## 2019-12-17 RX ORDER — SENNA PLUS 8.6 MG/1
2 TABLET ORAL AT BEDTIME
Refills: 0 | Status: DISCONTINUED | OUTPATIENT
Start: 2019-12-17 | End: 2019-12-31

## 2019-12-17 RX ORDER — OXYCODONE HYDROCHLORIDE 5 MG/1
5 TABLET ORAL EVERY 4 HOURS
Refills: 0 | Status: DISCONTINUED | OUTPATIENT
Start: 2019-12-17 | End: 2019-12-24

## 2019-12-17 RX ORDER — LATANOPROST 0.05 MG/ML
1 SOLUTION/ DROPS OPHTHALMIC; TOPICAL AT BEDTIME
Refills: 0 | Status: DISCONTINUED | OUTPATIENT
Start: 2019-12-17 | End: 2019-12-31

## 2019-12-17 RX ORDER — BRIMONIDINE TARTRATE 2 MG/MG
1 SOLUTION/ DROPS OPHTHALMIC EVERY 12 HOURS
Refills: 0 | Status: DISCONTINUED | OUTPATIENT
Start: 2019-12-17 | End: 2019-12-31

## 2019-12-17 RX ORDER — SIMVASTATIN 20 MG/1
20 TABLET, FILM COATED ORAL AT BEDTIME
Refills: 0 | Status: DISCONTINUED | OUTPATIENT
Start: 2019-12-17 | End: 2019-12-31

## 2019-12-17 RX ORDER — POLYETHYLENE GLYCOL 3350 17 G/17G
17 POWDER, FOR SOLUTION ORAL DAILY
Refills: 0 | Status: DISCONTINUED | OUTPATIENT
Start: 2019-12-17 | End: 2019-12-31

## 2019-12-17 RX ORDER — ACETAMINOPHEN 500 MG
650 TABLET ORAL EVERY 4 HOURS
Refills: 0 | Status: DISCONTINUED | OUTPATIENT
Start: 2019-12-17 | End: 2019-12-31

## 2019-12-17 RX ORDER — MAGNESIUM HYDROXIDE 400 MG/1
30 TABLET, CHEWABLE ORAL DAILY
Refills: 0 | Status: DISCONTINUED | OUTPATIENT
Start: 2019-12-17 | End: 2019-12-31

## 2019-12-17 RX ORDER — HEPARIN SODIUM 5000 [USP'U]/ML
5000 INJECTION INTRAVENOUS; SUBCUTANEOUS EVERY 8 HOURS
Refills: 0 | Status: DISCONTINUED | OUTPATIENT
Start: 2019-12-17 | End: 2019-12-30

## 2019-12-17 RX ORDER — LEVOTHYROXINE SODIUM 125 MCG
75 TABLET ORAL DAILY
Refills: 0 | Status: DISCONTINUED | OUTPATIENT
Start: 2019-12-17 | End: 2019-12-31

## 2019-12-17 RX ORDER — TIMOLOL 0.5 %
1 DROPS OPHTHALMIC (EYE) AT BEDTIME
Refills: 0 | Status: DISCONTINUED | OUTPATIENT
Start: 2019-12-17 | End: 2019-12-31

## 2019-12-17 RX ORDER — OXYCODONE HYDROCHLORIDE 5 MG/1
10 TABLET ORAL EVERY 6 HOURS
Refills: 0 | Status: DISCONTINUED | OUTPATIENT
Start: 2019-12-17 | End: 2019-12-24

## 2019-12-17 RX ORDER — PANTOPRAZOLE SODIUM 20 MG/1
40 TABLET, DELAYED RELEASE ORAL
Refills: 0 | Status: DISCONTINUED | OUTPATIENT
Start: 2019-12-17 | End: 2019-12-31

## 2019-12-17 RX ORDER — FERROUS SULFATE 325(65) MG
325 TABLET ORAL DAILY
Refills: 0 | Status: DISCONTINUED | OUTPATIENT
Start: 2019-12-17 | End: 2019-12-31

## 2019-12-17 RX ADMIN — BRIMONIDINE TARTRATE 1 DROP(S): 2 SOLUTION/ DROPS OPHTHALMIC at 21:46

## 2019-12-17 RX ADMIN — LATANOPROST 1 DROP(S): 0.05 SOLUTION/ DROPS OPHTHALMIC; TOPICAL at 21:23

## 2019-12-17 RX ADMIN — HEPARIN SODIUM 5000 UNIT(S): 5000 INJECTION INTRAVENOUS; SUBCUTANEOUS at 21:22

## 2019-12-17 RX ADMIN — OXYCODONE HYDROCHLORIDE 5 MILLIGRAM(S): 5 TABLET ORAL at 21:34

## 2019-12-17 RX ADMIN — Medication 1 DROP(S): at 11:16

## 2019-12-17 RX ADMIN — Medication 30 MILLILITER(S): at 20:16

## 2019-12-17 RX ADMIN — Medication 1 DROP(S): at 21:24

## 2019-12-17 RX ADMIN — SIMVASTATIN 20 MILLIGRAM(S): 20 TABLET, FILM COATED ORAL at 21:23

## 2019-12-17 RX ADMIN — Medication 75 MICROGRAM(S): at 05:53

## 2019-12-17 RX ADMIN — HEPARIN SODIUM 5000 UNIT(S): 5000 INJECTION INTRAVENOUS; SUBCUTANEOUS at 05:53

## 2019-12-17 RX ADMIN — POLYETHYLENE GLYCOL 3350 17 GRAM(S): 17 POWDER, FOR SOLUTION ORAL at 11:17

## 2019-12-17 RX ADMIN — PANTOPRAZOLE SODIUM 40 MILLIGRAM(S): 20 TABLET, DELAYED RELEASE ORAL at 05:53

## 2019-12-17 NOTE — PROGRESS NOTE ADULT - ASSESSMENT
89yo F with Past Medical History Recurrent Falls with recent right ORIF by Dr. Mathew, Hypertension, Hypercholesteremia, Hypothyroidism, and chronic anemia admitted status post fall complicated by left hip fracture.  Status post ORIF on 12/11/19.    Fall complicated by left hip fracture: status post ORIF on 12/11/19.  Follow-up with orthopedic surgery as outpatient.  WBAT to the LLE.  Continue Senna & Miralax to prevent constipation.  Tylenol and Oxycodone IR PRN for pain.  Will transfer to acute inpatient rehab later this afternoon.  Fever: secondary to atelectasis vs. hematoma.  Repeat CXR, UA, and LE duplex were negative.  No need for antibiotics at this time.  Follow-up results from blood cultures in 4A.  Acute blood loss anemia: status post 2u PRBC transfusion.  Her hemoglobin stable @ 8.  Acute kidney injury: resolved with gentle IV hydration.  Serum creatinine has normalized, decreasing to 1.3.  Hypertension: restart Lisinopril and Norvasc upon discharge following stabilization in blood pressure.  Hypercholesteremia: continue Zocor 20mg PO QHS  Hypothyroidism: continue Synthroid 75mcg PO q24  GI/DVT prophylaxis    #Progress Note Handoff    Pending:    Future Disposition: discharge to acute inpatient rehab; time spent = 35 minutes

## 2019-12-17 NOTE — DIETITIAN INITIAL EVALUATION ADULT. - OTHER INFO
Nutrition Hx PTA: Pt with excellent appetite/po intake, typically consumes 2-3 meals + snacks daily and denies use of oral nutrition supplements. Pt with no cultural/Christianity restrictions and has NKFA.     Pt p/w left hip pain, found to have acute subtrochanteric spiral fracture of the proximal left femur s/p ORIF on 12/11. Pending SNF; pt obtained spot in 4A, however pt spiked repeat low grade fever 100.4 F.

## 2019-12-17 NOTE — PROGRESS NOTE ADULT - SUBJECTIVE AND OBJECTIVE BOX
ELE BRAR MRN-[id number]    Hospitalist Note  89yo F with Past Medical History Recurrent Falls with recent right ORIF by Dr. Mathew, Hypertension, Hypercholesteremia, Hypothyroidism, and chronic anemia admitted status post fall complicated by left hip fracture.  Status post ORIF on 12/11/19.    Overnight events/Updates: Her post-operative course was complicated by acute blood loss anemia requiring 2u PRBC transfusion.  Her hemoglobin has since stabilized at 8.  The patient was approved for transfer to inpatient rehab, however, she developed a low grade fever.  Repeat CXR, UA, and lower extremity duplex were all negative.    Vital Signs Last 24 Hrs  T(C): 36.5 (17 Dec 2019 07:36), Max: 38.1 (16 Dec 2019 15:53)  T(F): 97.7 (17 Dec 2019 07:36), Max: 100.6 (16 Dec 2019 15:53)  HR: 70 (17 Dec 2019 07:36) (70 - 88)  BP: 132/68 (17 Dec 2019 07:36) (115/60 - 142/65)  BP(mean): --  RR: 18 (17 Dec 2019 07:36) (18 - 19)  SpO2: --    Physical Examination:  General: AAO x 3  HEENT: PERRLA, EOMI  CV= S1 & S2 appreciated  Lungs= CTA BL  Abdominal Examination= + BS, Soft, NT/ND  Extremity Examination= No C/C/E    ROS: No chest pain, no shortness of breath.  All other systems reviewed and are within normal limits except for the complaints in the HPI.    MEDICATIONS  (STANDING):  heparin  Injectable 5000 Unit(s) SubCutaneous every 8 hours  latanoprost 0.005% Ophthalmic Solution 1 Drop(s) Both EYES at bedtime  levothyroxine 75 MICROGram(s) Oral daily  pantoprazole    Tablet 40 milliGRAM(s) Oral before breakfast  polyethylene glycol 3350 17 Gram(s) Oral daily  simvastatin 20 milliGRAM(s) Oral at bedtime  timolol 0.25% Solution 1 Drop(s) Both EYES daily    MEDICATIONS  (PRN):  aluminum hydroxide/magnesium hydroxide/simethicone Suspension 30 milliLiter(s) Oral four times a day PRN Indigestion  bisacodyl Suppository 10 milliGRAM(s) Rectal daily PRN If no bowel movement by POD#2  magnesium hydroxide Suspension 30 milliLiter(s) Oral daily PRN Constipation  morphine  - Injectable 2 milliGRAM(s) IV Push every 3 hours PRN Severe Pain (7 - 10)  ondansetron Injectable 4 milliGRAM(s) IV Push every 6 hours PRN Nausea and/or Vomiting  oxyCODONE    IR 5 milliGRAM(s) Oral every 4 hours PRN Moderate Pain (4 - 6)  senna 2 Tablet(s) Oral at bedtime PRN Constipation                            8.0    7.16  )-----------( 184      ( 17 Dec 2019 05:59 )             25.5     12-17    140  |  105  |  28<H>  ----------------------------<  113<H>  4.2   |  21  |  1.3    Ca    8.9      17 Dec 2019 05:59  Mg     1.9     12-17    TPro  5.7<L>  /  Alb  3.1<L>  /  TBili  0.9  /  DBili  x   /  AST  31  /  ALT  9   /  AlkPhos  65  12-17      Case discussed with housestaday & family  JESENIA Diaz [id number]

## 2019-12-17 NOTE — DIETITIAN INITIAL EVALUATION ADULT. - PHYSICAL APPEARANCE
obese/alert and oriented. BMI: 30.1, IBW: 95#, UBW: ~144#, denies any recent wt loss, 1+ edema to L leg, skin intact.

## 2019-12-17 NOTE — DIETITIAN INITIAL EVALUATION ADULT. - ENERGY NEEDS
Calories: 959-1151 kcal/day (MSJ x 1-1.2 AF)--obese BMI and advanced age considered   Protein: 65-72 gm/day (1-1.1 gm/kg CBW)   Fluid: 1 ml/kcal

## 2019-12-17 NOTE — DISCHARGE NOTE NURSING/CASE MANAGEMENT/SOCIAL WORK - PATIENT PORTAL LINK FT
You can access the FollowMyHealth Patient Portal offered by [facility] by registering at the following website: [email or web address]. By joining [employer]’s FollowMyHealth portal, you will also be able to view your health information using other applications (apps) compatible with our system.

## 2019-12-17 NOTE — PROGRESS NOTE ADULT - REASON FOR ADMISSION
left femur fracture

## 2019-12-18 LAB
ALBUMIN SERPL ELPH-MCNC: 3 G/DL — LOW (ref 3.5–5.2)
ALP SERPL-CCNC: 65 U/L — SIGNIFICANT CHANGE UP (ref 30–115)
ALT FLD-CCNC: 9 U/L — SIGNIFICANT CHANGE UP (ref 0–41)
ANION GAP SERPL CALC-SCNC: 13 MMOL/L — SIGNIFICANT CHANGE UP (ref 7–14)
APPEARANCE UR: CLEAR — SIGNIFICANT CHANGE UP
AST SERPL-CCNC: 25 U/L — SIGNIFICANT CHANGE UP (ref 0–41)
BASOPHILS # BLD AUTO: 0.04 K/UL — SIGNIFICANT CHANGE UP (ref 0–0.2)
BASOPHILS NFR BLD AUTO: 0.6 % — SIGNIFICANT CHANGE UP (ref 0–1)
BILIRUB SERPL-MCNC: 1.1 MG/DL — SIGNIFICANT CHANGE UP (ref 0.2–1.2)
BILIRUB UR-MCNC: NEGATIVE — SIGNIFICANT CHANGE UP
BUN SERPL-MCNC: 30 MG/DL — HIGH (ref 10–20)
CALCIUM SERPL-MCNC: 9.1 MG/DL — SIGNIFICANT CHANGE UP (ref 8.5–10.1)
CHLORIDE SERPL-SCNC: 101 MMOL/L — SIGNIFICANT CHANGE UP (ref 98–110)
CO2 SERPL-SCNC: 23 MMOL/L — SIGNIFICANT CHANGE UP (ref 17–32)
COLOR SPEC: SIGNIFICANT CHANGE UP
CREAT SERPL-MCNC: 1.3 MG/DL — SIGNIFICANT CHANGE UP (ref 0.7–1.5)
DIFF PNL FLD: NEGATIVE — SIGNIFICANT CHANGE UP
EOSINOPHIL # BLD AUTO: 0.31 K/UL — SIGNIFICANT CHANGE UP (ref 0–0.7)
EOSINOPHIL NFR BLD AUTO: 4.5 % — SIGNIFICANT CHANGE UP (ref 0–8)
GLUCOSE SERPL-MCNC: 140 MG/DL — HIGH (ref 70–99)
GLUCOSE UR QL: NEGATIVE — SIGNIFICANT CHANGE UP
HCT VFR BLD CALC: 24.7 % — LOW (ref 37–47)
HGB BLD-MCNC: 7.8 G/DL — LOW (ref 12–16)
IMM GRANULOCYTES NFR BLD AUTO: 3.9 % — HIGH (ref 0.1–0.3)
KETONES UR-MCNC: NEGATIVE — SIGNIFICANT CHANGE UP
LEUKOCYTE ESTERASE UR-ACNC: NEGATIVE — SIGNIFICANT CHANGE UP
LYMPHOCYTES # BLD AUTO: 1.28 K/UL — SIGNIFICANT CHANGE UP (ref 1.2–3.4)
LYMPHOCYTES # BLD AUTO: 18.6 % — LOW (ref 20.5–51.1)
MCHC RBC-ENTMCNC: 29 PG — SIGNIFICANT CHANGE UP (ref 27–31)
MCHC RBC-ENTMCNC: 31.6 G/DL — LOW (ref 32–37)
MCV RBC AUTO: 91.8 FL — SIGNIFICANT CHANGE UP (ref 81–99)
MONOCYTES # BLD AUTO: 0.74 K/UL — HIGH (ref 0.1–0.6)
MONOCYTES NFR BLD AUTO: 10.7 % — HIGH (ref 1.7–9.3)
NEUTROPHILS # BLD AUTO: 4.25 K/UL — SIGNIFICANT CHANGE UP (ref 1.4–6.5)
NEUTROPHILS NFR BLD AUTO: 61.7 % — SIGNIFICANT CHANGE UP (ref 42.2–75.2)
NITRITE UR-MCNC: NEGATIVE — SIGNIFICANT CHANGE UP
NRBC # BLD: 0 /100 WBCS — SIGNIFICANT CHANGE UP (ref 0–0)
PH UR: 6 — SIGNIFICANT CHANGE UP (ref 5–8)
PLATELET # BLD AUTO: 213 K/UL — SIGNIFICANT CHANGE UP (ref 130–400)
POTASSIUM SERPL-MCNC: 4.6 MMOL/L — SIGNIFICANT CHANGE UP (ref 3.5–5)
POTASSIUM SERPL-SCNC: 4.6 MMOL/L — SIGNIFICANT CHANGE UP (ref 3.5–5)
PROT SERPL-MCNC: 5.7 G/DL — LOW (ref 6–8)
PROT UR-MCNC: SIGNIFICANT CHANGE UP
RBC # BLD: 2.69 M/UL — LOW (ref 4.2–5.4)
RBC # FLD: 14.7 % — HIGH (ref 11.5–14.5)
SODIUM SERPL-SCNC: 137 MMOL/L — SIGNIFICANT CHANGE UP (ref 135–146)
SP GR SPEC: 1.01 — SIGNIFICANT CHANGE UP (ref 1.01–1.02)
UROBILINOGEN FLD QL: SIGNIFICANT CHANGE UP
WBC # BLD: 6.89 K/UL — SIGNIFICANT CHANGE UP (ref 4.8–10.8)
WBC # FLD AUTO: 6.89 K/UL — SIGNIFICANT CHANGE UP (ref 4.8–10.8)

## 2019-12-18 RX ORDER — CHLORHEXIDINE GLUCONATE 213 G/1000ML
1 SOLUTION TOPICAL
Refills: 0 | Status: DISCONTINUED | OUTPATIENT
Start: 2019-12-18 | End: 2019-12-30

## 2019-12-18 RX ADMIN — Medication 1 DROP(S): at 21:24

## 2019-12-18 RX ADMIN — LATANOPROST 1 DROP(S): 0.05 SOLUTION/ DROPS OPHTHALMIC; TOPICAL at 21:24

## 2019-12-18 RX ADMIN — BRIMONIDINE TARTRATE 1 DROP(S): 2 SOLUTION/ DROPS OPHTHALMIC at 05:48

## 2019-12-18 RX ADMIN — Medication 75 MICROGRAM(S): at 05:48

## 2019-12-18 RX ADMIN — FLUOROMETHOLONE 1 DROP(S): 1 SOLUTION/ DROPS OPHTHALMIC at 12:43

## 2019-12-18 RX ADMIN — HEPARIN SODIUM 5000 UNIT(S): 5000 INJECTION INTRAVENOUS; SUBCUTANEOUS at 05:48

## 2019-12-18 RX ADMIN — BRIMONIDINE TARTRATE 1 DROP(S): 2 SOLUTION/ DROPS OPHTHALMIC at 18:15

## 2019-12-18 RX ADMIN — PANTOPRAZOLE SODIUM 40 MILLIGRAM(S): 20 TABLET, DELAYED RELEASE ORAL at 06:19

## 2019-12-18 RX ADMIN — Medication 650 MILLIGRAM(S): at 10:22

## 2019-12-18 RX ADMIN — HEPARIN SODIUM 5000 UNIT(S): 5000 INJECTION INTRAVENOUS; SUBCUTANEOUS at 21:24

## 2019-12-18 RX ADMIN — Medication 650 MILLIGRAM(S): at 14:51

## 2019-12-18 RX ADMIN — HEPARIN SODIUM 5000 UNIT(S): 5000 INJECTION INTRAVENOUS; SUBCUTANEOUS at 14:50

## 2019-12-18 RX ADMIN — SIMVASTATIN 20 MILLIGRAM(S): 20 TABLET, FILM COATED ORAL at 21:24

## 2019-12-19 LAB
HCT VFR BLD CALC: 23.9 % — LOW (ref 37–47)
HGB BLD-MCNC: 7.4 G/DL — LOW (ref 12–16)
MCHC RBC-ENTMCNC: 28.7 PG — SIGNIFICANT CHANGE UP (ref 27–31)
MCHC RBC-ENTMCNC: 31 G/DL — LOW (ref 32–37)
MCV RBC AUTO: 92.6 FL — SIGNIFICANT CHANGE UP (ref 81–99)
NRBC # BLD: 0 /100 WBCS — SIGNIFICANT CHANGE UP (ref 0–0)
PLATELET # BLD AUTO: 216 K/UL — SIGNIFICANT CHANGE UP (ref 130–400)
RBC # BLD: 2.58 M/UL — LOW (ref 4.2–5.4)
RBC # FLD: 14.6 % — HIGH (ref 11.5–14.5)
WBC # BLD: 7.28 K/UL — SIGNIFICANT CHANGE UP (ref 4.8–10.8)
WBC # FLD AUTO: 7.28 K/UL — SIGNIFICANT CHANGE UP (ref 4.8–10.8)

## 2019-12-19 RX ADMIN — BRIMONIDINE TARTRATE 1 DROP(S): 2 SOLUTION/ DROPS OPHTHALMIC at 17:12

## 2019-12-19 RX ADMIN — BRIMONIDINE TARTRATE 1 DROP(S): 2 SOLUTION/ DROPS OPHTHALMIC at 06:05

## 2019-12-19 RX ADMIN — Medication 1 DROP(S): at 21:18

## 2019-12-19 RX ADMIN — Medication 650 MILLIGRAM(S): at 10:37

## 2019-12-19 RX ADMIN — LATANOPROST 1 DROP(S): 0.05 SOLUTION/ DROPS OPHTHALMIC; TOPICAL at 21:17

## 2019-12-19 RX ADMIN — HEPARIN SODIUM 5000 UNIT(S): 5000 INJECTION INTRAVENOUS; SUBCUTANEOUS at 15:17

## 2019-12-19 RX ADMIN — CHLORHEXIDINE GLUCONATE 1 APPLICATION(S): 213 SOLUTION TOPICAL at 06:31

## 2019-12-19 RX ADMIN — HEPARIN SODIUM 5000 UNIT(S): 5000 INJECTION INTRAVENOUS; SUBCUTANEOUS at 06:05

## 2019-12-19 RX ADMIN — PANTOPRAZOLE SODIUM 40 MILLIGRAM(S): 20 TABLET, DELAYED RELEASE ORAL at 06:05

## 2019-12-19 RX ADMIN — SIMVASTATIN 20 MILLIGRAM(S): 20 TABLET, FILM COATED ORAL at 21:18

## 2019-12-19 RX ADMIN — FLUOROMETHOLONE 1 DROP(S): 1 SOLUTION/ DROPS OPHTHALMIC at 12:24

## 2019-12-19 RX ADMIN — Medication 75 MICROGRAM(S): at 06:05

## 2019-12-19 RX ADMIN — Medication 325 MILLIGRAM(S): at 12:23

## 2019-12-19 RX ADMIN — HEPARIN SODIUM 5000 UNIT(S): 5000 INJECTION INTRAVENOUS; SUBCUTANEOUS at 21:17

## 2019-12-19 RX ADMIN — Medication 650 MILLIGRAM(S): at 10:35

## 2019-12-20 LAB
BLD GP AB SCN SERPL QL: SIGNIFICANT CHANGE UP
FOLATE SERPL-MCNC: >20 NG/ML — SIGNIFICANT CHANGE UP
HCT VFR BLD CALC: 27.9 % — LOW (ref 37–47)
HGB BLD-MCNC: 8.8 G/DL — LOW (ref 12–16)
IRON SATN MFR SERPL: 20 % — SIGNIFICANT CHANGE UP (ref 15–50)
IRON SATN MFR SERPL: 43 UG/DL — SIGNIFICANT CHANGE UP (ref 35–150)
MCHC RBC-ENTMCNC: 29.1 PG — SIGNIFICANT CHANGE UP (ref 27–31)
MCHC RBC-ENTMCNC: 31.5 G/DL — LOW (ref 32–37)
MCV RBC AUTO: 92.4 FL — SIGNIFICANT CHANGE UP (ref 81–99)
NRBC # BLD: 0 /100 WBCS — SIGNIFICANT CHANGE UP (ref 0–0)
PLATELET # BLD AUTO: 285 K/UL — SIGNIFICANT CHANGE UP (ref 130–400)
RBC # BLD: 3.02 M/UL — LOW (ref 4.2–5.4)
RBC # FLD: 14.4 % — SIGNIFICANT CHANGE UP (ref 11.5–14.5)
TIBC SERPL-MCNC: 217 UG/DL — LOW (ref 220–430)
UIBC SERPL-MCNC: 174 UG/DL — SIGNIFICANT CHANGE UP (ref 110–370)
VIT B12 SERPL-MCNC: 456 PG/ML — SIGNIFICANT CHANGE UP (ref 232–1245)
WBC # BLD: 7.56 K/UL — SIGNIFICANT CHANGE UP (ref 4.8–10.8)
WBC # FLD AUTO: 7.56 K/UL — SIGNIFICANT CHANGE UP (ref 4.8–10.8)

## 2019-12-20 RX ORDER — INFLUENZA VIRUS VACCINE 15; 15; 15; 15 UG/.5ML; UG/.5ML; UG/.5ML; UG/.5ML
0.5 SUSPENSION INTRAMUSCULAR ONCE
Refills: 0 | Status: COMPLETED | OUTPATIENT
Start: 2019-12-20 | End: 2019-12-21

## 2019-12-20 RX ADMIN — Medication 75 MICROGRAM(S): at 06:20

## 2019-12-20 RX ADMIN — BRIMONIDINE TARTRATE 1 DROP(S): 2 SOLUTION/ DROPS OPHTHALMIC at 06:20

## 2019-12-20 RX ADMIN — Medication 650 MILLIGRAM(S): at 10:23

## 2019-12-20 RX ADMIN — HEPARIN SODIUM 5000 UNIT(S): 5000 INJECTION INTRAVENOUS; SUBCUTANEOUS at 12:39

## 2019-12-20 RX ADMIN — SIMVASTATIN 20 MILLIGRAM(S): 20 TABLET, FILM COATED ORAL at 21:23

## 2019-12-20 RX ADMIN — Medication 1 DROP(S): at 21:23

## 2019-12-20 RX ADMIN — PANTOPRAZOLE SODIUM 40 MILLIGRAM(S): 20 TABLET, DELAYED RELEASE ORAL at 06:20

## 2019-12-20 RX ADMIN — BRIMONIDINE TARTRATE 1 DROP(S): 2 SOLUTION/ DROPS OPHTHALMIC at 17:35

## 2019-12-20 RX ADMIN — Medication 325 MILLIGRAM(S): at 12:39

## 2019-12-20 RX ADMIN — CHLORHEXIDINE GLUCONATE 1 APPLICATION(S): 213 SOLUTION TOPICAL at 06:22

## 2019-12-20 RX ADMIN — HEPARIN SODIUM 5000 UNIT(S): 5000 INJECTION INTRAVENOUS; SUBCUTANEOUS at 06:19

## 2019-12-20 RX ADMIN — HEPARIN SODIUM 5000 UNIT(S): 5000 INJECTION INTRAVENOUS; SUBCUTANEOUS at 21:23

## 2019-12-20 RX ADMIN — Medication 650 MILLIGRAM(S): at 11:38

## 2019-12-20 RX ADMIN — FLUOROMETHOLONE 1 DROP(S): 1 SOLUTION/ DROPS OPHTHALMIC at 12:39

## 2019-12-21 ENCOUNTER — TRANSCRIPTION ENCOUNTER (OUTPATIENT)
Age: 84
End: 2019-12-21

## 2019-12-21 LAB — FERRITIN SERPL-MCNC: 849 NG/ML — HIGH (ref 15–150)

## 2019-12-21 RX ADMIN — INFLUENZA VIRUS VACCINE 0.5 MILLILITER(S): 15; 15; 15; 15 SUSPENSION INTRAMUSCULAR at 14:12

## 2019-12-21 RX ADMIN — Medication 650 MILLIGRAM(S): at 10:00

## 2019-12-21 RX ADMIN — Medication 650 MILLIGRAM(S): at 04:09

## 2019-12-21 RX ADMIN — HEPARIN SODIUM 5000 UNIT(S): 5000 INJECTION INTRAVENOUS; SUBCUTANEOUS at 21:22

## 2019-12-21 RX ADMIN — BRIMONIDINE TARTRATE 1 DROP(S): 2 SOLUTION/ DROPS OPHTHALMIC at 18:26

## 2019-12-21 RX ADMIN — LATANOPROST 1 DROP(S): 0.05 SOLUTION/ DROPS OPHTHALMIC; TOPICAL at 21:23

## 2019-12-21 RX ADMIN — Medication 650 MILLIGRAM(S): at 08:42

## 2019-12-21 RX ADMIN — HEPARIN SODIUM 5000 UNIT(S): 5000 INJECTION INTRAVENOUS; SUBCUTANEOUS at 06:41

## 2019-12-21 RX ADMIN — Medication 75 MICROGRAM(S): at 06:41

## 2019-12-21 RX ADMIN — Medication 325 MILLIGRAM(S): at 12:14

## 2019-12-21 RX ADMIN — Medication 1 DROP(S): at 21:24

## 2019-12-21 RX ADMIN — SIMVASTATIN 20 MILLIGRAM(S): 20 TABLET, FILM COATED ORAL at 21:23

## 2019-12-21 RX ADMIN — FLUOROMETHOLONE 1 DROP(S): 1 SOLUTION/ DROPS OPHTHALMIC at 12:15

## 2019-12-21 RX ADMIN — BRIMONIDINE TARTRATE 1 DROP(S): 2 SOLUTION/ DROPS OPHTHALMIC at 06:42

## 2019-12-21 RX ADMIN — Medication 650 MILLIGRAM(S): at 04:30

## 2019-12-21 RX ADMIN — HEPARIN SODIUM 5000 UNIT(S): 5000 INJECTION INTRAVENOUS; SUBCUTANEOUS at 12:15

## 2019-12-21 RX ADMIN — PANTOPRAZOLE SODIUM 40 MILLIGRAM(S): 20 TABLET, DELAYED RELEASE ORAL at 06:41

## 2019-12-21 NOTE — DISCHARGE NOTE NURSING/CASE MANAGEMENT/SOCIAL WORK - PATIENT PORTAL LINK FT
You can access the FollowMyHealth Patient Portal offered by [facility] by registering at the following website: [email or web address]. By joining [employer]’s FollowMyHealth portal, you will also be able to view your health information using other applications (apps) compatible with our system.

## 2019-12-22 LAB
CULTURE RESULTS: SIGNIFICANT CHANGE UP
SPECIMEN SOURCE: SIGNIFICANT CHANGE UP

## 2019-12-22 RX ADMIN — HEPARIN SODIUM 5000 UNIT(S): 5000 INJECTION INTRAVENOUS; SUBCUTANEOUS at 06:01

## 2019-12-22 RX ADMIN — Medication 325 MILLIGRAM(S): at 12:21

## 2019-12-22 RX ADMIN — PANTOPRAZOLE SODIUM 40 MILLIGRAM(S): 20 TABLET, DELAYED RELEASE ORAL at 06:00

## 2019-12-22 RX ADMIN — HEPARIN SODIUM 5000 UNIT(S): 5000 INJECTION INTRAVENOUS; SUBCUTANEOUS at 12:21

## 2019-12-22 RX ADMIN — HEPARIN SODIUM 5000 UNIT(S): 5000 INJECTION INTRAVENOUS; SUBCUTANEOUS at 21:16

## 2019-12-22 RX ADMIN — BRIMONIDINE TARTRATE 1 DROP(S): 2 SOLUTION/ DROPS OPHTHALMIC at 17:42

## 2019-12-22 RX ADMIN — CHLORHEXIDINE GLUCONATE 1 APPLICATION(S): 213 SOLUTION TOPICAL at 06:08

## 2019-12-22 RX ADMIN — Medication 75 MICROGRAM(S): at 06:00

## 2019-12-22 RX ADMIN — LATANOPROST 1 DROP(S): 0.05 SOLUTION/ DROPS OPHTHALMIC; TOPICAL at 21:16

## 2019-12-22 RX ADMIN — SIMVASTATIN 20 MILLIGRAM(S): 20 TABLET, FILM COATED ORAL at 21:16

## 2019-12-22 RX ADMIN — FLUOROMETHOLONE 1 DROP(S): 1 SOLUTION/ DROPS OPHTHALMIC at 12:21

## 2019-12-22 RX ADMIN — BRIMONIDINE TARTRATE 1 DROP(S): 2 SOLUTION/ DROPS OPHTHALMIC at 06:01

## 2019-12-22 RX ADMIN — Medication 1 DROP(S): at 21:17

## 2019-12-23 LAB
ALBUMIN SERPL ELPH-MCNC: 3.2 G/DL — LOW (ref 3.5–5.2)
ALP SERPL-CCNC: 109 U/L — SIGNIFICANT CHANGE UP (ref 30–115)
ALT FLD-CCNC: 6 U/L — SIGNIFICANT CHANGE UP (ref 0–41)
ANION GAP SERPL CALC-SCNC: 14 MMOL/L — SIGNIFICANT CHANGE UP (ref 7–14)
AST SERPL-CCNC: 18 U/L — SIGNIFICANT CHANGE UP (ref 0–41)
BILIRUB SERPL-MCNC: 0.6 MG/DL — SIGNIFICANT CHANGE UP (ref 0.2–1.2)
BUN SERPL-MCNC: 40 MG/DL — HIGH (ref 10–20)
CALCIUM SERPL-MCNC: 9 MG/DL — SIGNIFICANT CHANGE UP (ref 8.5–10.1)
CHLORIDE SERPL-SCNC: 104 MMOL/L — SIGNIFICANT CHANGE UP (ref 98–110)
CO2 SERPL-SCNC: 20 MMOL/L — SIGNIFICANT CHANGE UP (ref 17–32)
CREAT SERPL-MCNC: 1.7 MG/DL — HIGH (ref 0.7–1.5)
GLUCOSE SERPL-MCNC: 138 MG/DL — HIGH (ref 70–99)
HCT VFR BLD CALC: 23.5 % — LOW (ref 37–47)
HGB BLD-MCNC: 7.5 G/DL — LOW (ref 12–16)
MCHC RBC-ENTMCNC: 29.6 PG — SIGNIFICANT CHANGE UP (ref 27–31)
MCHC RBC-ENTMCNC: 31.9 G/DL — LOW (ref 32–37)
MCV RBC AUTO: 92.9 FL — SIGNIFICANT CHANGE UP (ref 81–99)
NRBC # BLD: 0 /100 WBCS — SIGNIFICANT CHANGE UP (ref 0–0)
PLATELET # BLD AUTO: 260 K/UL — SIGNIFICANT CHANGE UP (ref 130–400)
POTASSIUM SERPL-MCNC: 4.7 MMOL/L — SIGNIFICANT CHANGE UP (ref 3.5–5)
POTASSIUM SERPL-SCNC: 4.7 MMOL/L — SIGNIFICANT CHANGE UP (ref 3.5–5)
PROT SERPL-MCNC: 6.2 G/DL — SIGNIFICANT CHANGE UP (ref 6–8)
RBC # BLD: 2.53 M/UL — LOW (ref 4.2–5.4)
RBC # FLD: 14.6 % — HIGH (ref 11.5–14.5)
SODIUM SERPL-SCNC: 138 MMOL/L — SIGNIFICANT CHANGE UP (ref 135–146)
WBC # BLD: 7.74 K/UL — SIGNIFICANT CHANGE UP (ref 4.8–10.8)
WBC # FLD AUTO: 7.74 K/UL — SIGNIFICANT CHANGE UP (ref 4.8–10.8)

## 2019-12-23 RX ORDER — OXYCODONE HYDROCHLORIDE 5 MG/1
10 TABLET ORAL EVERY 6 HOURS
Refills: 0 | Status: DISCONTINUED | OUTPATIENT
Start: 2019-12-25 | End: 2019-12-30

## 2019-12-23 RX ORDER — OXYCODONE HYDROCHLORIDE 5 MG/1
5 TABLET ORAL EVERY 4 HOURS
Refills: 0 | Status: DISCONTINUED | OUTPATIENT
Start: 2019-12-25 | End: 2019-12-30

## 2019-12-23 RX ORDER — SODIUM CHLORIDE 9 MG/ML
1000 INJECTION INTRAMUSCULAR; INTRAVENOUS; SUBCUTANEOUS
Refills: 0 | Status: DISCONTINUED | OUTPATIENT
Start: 2019-12-23 | End: 2019-12-24

## 2019-12-23 RX ADMIN — HEPARIN SODIUM 5000 UNIT(S): 5000 INJECTION INTRAVENOUS; SUBCUTANEOUS at 21:18

## 2019-12-23 RX ADMIN — Medication 75 MICROGRAM(S): at 05:28

## 2019-12-23 RX ADMIN — HEPARIN SODIUM 5000 UNIT(S): 5000 INJECTION INTRAVENOUS; SUBCUTANEOUS at 13:01

## 2019-12-23 RX ADMIN — LATANOPROST 1 DROP(S): 0.05 SOLUTION/ DROPS OPHTHALMIC; TOPICAL at 21:17

## 2019-12-23 RX ADMIN — Medication 325 MILLIGRAM(S): at 17:15

## 2019-12-23 RX ADMIN — BRIMONIDINE TARTRATE 1 DROP(S): 2 SOLUTION/ DROPS OPHTHALMIC at 05:29

## 2019-12-23 RX ADMIN — POLYETHYLENE GLYCOL 3350 17 GRAM(S): 17 POWDER, FOR SOLUTION ORAL at 13:00

## 2019-12-23 RX ADMIN — Medication 1 DROP(S): at 21:18

## 2019-12-23 RX ADMIN — SIMVASTATIN 20 MILLIGRAM(S): 20 TABLET, FILM COATED ORAL at 21:18

## 2019-12-23 RX ADMIN — HEPARIN SODIUM 5000 UNIT(S): 5000 INJECTION INTRAVENOUS; SUBCUTANEOUS at 05:29

## 2019-12-23 RX ADMIN — PANTOPRAZOLE SODIUM 40 MILLIGRAM(S): 20 TABLET, DELAYED RELEASE ORAL at 05:28

## 2019-12-23 RX ADMIN — BRIMONIDINE TARTRATE 1 DROP(S): 2 SOLUTION/ DROPS OPHTHALMIC at 17:15

## 2019-12-23 RX ADMIN — FLUOROMETHOLONE 1 DROP(S): 1 SOLUTION/ DROPS OPHTHALMIC at 13:01

## 2019-12-23 RX ADMIN — Medication 650 MILLIGRAM(S): at 09:14

## 2019-12-24 LAB
ANION GAP SERPL CALC-SCNC: 12 MMOL/L — SIGNIFICANT CHANGE UP (ref 7–14)
BUN SERPL-MCNC: 40 MG/DL — HIGH (ref 10–20)
CALCIUM SERPL-MCNC: 9 MG/DL — SIGNIFICANT CHANGE UP (ref 8.5–10.1)
CHLORIDE SERPL-SCNC: 104 MMOL/L — SIGNIFICANT CHANGE UP (ref 98–110)
CO2 SERPL-SCNC: 22 MMOL/L — SIGNIFICANT CHANGE UP (ref 17–32)
CREAT SERPL-MCNC: 1.6 MG/DL — HIGH (ref 0.7–1.5)
GLUCOSE SERPL-MCNC: 102 MG/DL — HIGH (ref 70–99)
HCT VFR BLD CALC: 24.6 % — LOW (ref 37–47)
HGB BLD-MCNC: 7.7 G/DL — LOW (ref 12–16)
MCHC RBC-ENTMCNC: 28.8 PG — SIGNIFICANT CHANGE UP (ref 27–31)
MCHC RBC-ENTMCNC: 31.3 G/DL — LOW (ref 32–37)
MCV RBC AUTO: 92.1 FL — SIGNIFICANT CHANGE UP (ref 81–99)
NRBC # BLD: 0 /100 WBCS — SIGNIFICANT CHANGE UP (ref 0–0)
PLATELET # BLD AUTO: 263 K/UL — SIGNIFICANT CHANGE UP (ref 130–400)
POTASSIUM SERPL-MCNC: 4.7 MMOL/L — SIGNIFICANT CHANGE UP (ref 3.5–5)
POTASSIUM SERPL-SCNC: 4.7 MMOL/L — SIGNIFICANT CHANGE UP (ref 3.5–5)
RBC # BLD: 2.67 M/UL — LOW (ref 4.2–5.4)
RBC # FLD: 14.6 % — HIGH (ref 11.5–14.5)
SODIUM SERPL-SCNC: 138 MMOL/L — SIGNIFICANT CHANGE UP (ref 135–146)
WBC # BLD: 6.96 K/UL — SIGNIFICANT CHANGE UP (ref 4.8–10.8)
WBC # FLD AUTO: 6.96 K/UL — SIGNIFICANT CHANGE UP (ref 4.8–10.8)

## 2019-12-24 RX ORDER — OXYCODONE HYDROCHLORIDE 5 MG/1
10 TABLET ORAL EVERY 6 HOURS
Refills: 0 | Status: DISCONTINUED | OUTPATIENT
Start: 2019-12-24 | End: 2019-12-30

## 2019-12-24 RX ORDER — OXYCODONE HYDROCHLORIDE 5 MG/1
5 TABLET ORAL EVERY 4 HOURS
Refills: 0 | Status: DISCONTINUED | OUTPATIENT
Start: 2019-12-24 | End: 2019-12-30

## 2019-12-24 RX ORDER — SODIUM CHLORIDE 9 MG/ML
1000 INJECTION INTRAMUSCULAR; INTRAVENOUS; SUBCUTANEOUS
Refills: 0 | Status: DISCONTINUED | OUTPATIENT
Start: 2019-12-24 | End: 2019-12-31

## 2019-12-24 RX ADMIN — HEPARIN SODIUM 5000 UNIT(S): 5000 INJECTION INTRAVENOUS; SUBCUTANEOUS at 13:30

## 2019-12-24 RX ADMIN — BRIMONIDINE TARTRATE 1 DROP(S): 2 SOLUTION/ DROPS OPHTHALMIC at 06:01

## 2019-12-24 RX ADMIN — SODIUM CHLORIDE 75 MILLILITER(S): 9 INJECTION INTRAMUSCULAR; INTRAVENOUS; SUBCUTANEOUS at 18:26

## 2019-12-24 RX ADMIN — HEPARIN SODIUM 5000 UNIT(S): 5000 INJECTION INTRAVENOUS; SUBCUTANEOUS at 21:41

## 2019-12-24 RX ADMIN — BRIMONIDINE TARTRATE 1 DROP(S): 2 SOLUTION/ DROPS OPHTHALMIC at 17:32

## 2019-12-24 RX ADMIN — HEPARIN SODIUM 5000 UNIT(S): 5000 INJECTION INTRAVENOUS; SUBCUTANEOUS at 06:02

## 2019-12-24 RX ADMIN — Medication 1 DROP(S): at 21:41

## 2019-12-24 RX ADMIN — SIMVASTATIN 20 MILLIGRAM(S): 20 TABLET, FILM COATED ORAL at 21:41

## 2019-12-24 RX ADMIN — PANTOPRAZOLE SODIUM 40 MILLIGRAM(S): 20 TABLET, DELAYED RELEASE ORAL at 06:01

## 2019-12-24 RX ADMIN — Medication 650 MILLIGRAM(S): at 13:47

## 2019-12-24 RX ADMIN — LATANOPROST 1 DROP(S): 0.05 SOLUTION/ DROPS OPHTHALMIC; TOPICAL at 20:47

## 2019-12-24 RX ADMIN — Medication 325 MILLIGRAM(S): at 11:24

## 2019-12-24 RX ADMIN — FLUOROMETHOLONE 1 DROP(S): 1 SOLUTION/ DROPS OPHTHALMIC at 11:24

## 2019-12-24 RX ADMIN — Medication 650 MILLIGRAM(S): at 13:48

## 2019-12-24 RX ADMIN — CHLORHEXIDINE GLUCONATE 1 APPLICATION(S): 213 SOLUTION TOPICAL at 06:02

## 2019-12-24 RX ADMIN — Medication 75 MICROGRAM(S): at 06:01

## 2019-12-25 RX ADMIN — HEPARIN SODIUM 5000 UNIT(S): 5000 INJECTION INTRAVENOUS; SUBCUTANEOUS at 22:07

## 2019-12-25 RX ADMIN — BRIMONIDINE TARTRATE 1 DROP(S): 2 SOLUTION/ DROPS OPHTHALMIC at 17:17

## 2019-12-25 RX ADMIN — PANTOPRAZOLE SODIUM 40 MILLIGRAM(S): 20 TABLET, DELAYED RELEASE ORAL at 06:39

## 2019-12-25 RX ADMIN — Medication 75 MICROGRAM(S): at 06:39

## 2019-12-25 RX ADMIN — Medication 1 DROP(S): at 22:07

## 2019-12-25 RX ADMIN — Medication 650 MILLIGRAM(S): at 22:07

## 2019-12-25 RX ADMIN — Medication 650 MILLIGRAM(S): at 11:15

## 2019-12-25 RX ADMIN — BRIMONIDINE TARTRATE 1 DROP(S): 2 SOLUTION/ DROPS OPHTHALMIC at 06:39

## 2019-12-25 RX ADMIN — Medication 650 MILLIGRAM(S): at 22:09

## 2019-12-25 RX ADMIN — CHLORHEXIDINE GLUCONATE 1 APPLICATION(S): 213 SOLUTION TOPICAL at 06:37

## 2019-12-25 RX ADMIN — FLUOROMETHOLONE 1 DROP(S): 1 SOLUTION/ DROPS OPHTHALMIC at 11:54

## 2019-12-25 RX ADMIN — LATANOPROST 1 DROP(S): 0.05 SOLUTION/ DROPS OPHTHALMIC; TOPICAL at 22:07

## 2019-12-25 RX ADMIN — HEPARIN SODIUM 5000 UNIT(S): 5000 INJECTION INTRAVENOUS; SUBCUTANEOUS at 13:56

## 2019-12-25 RX ADMIN — SIMVASTATIN 20 MILLIGRAM(S): 20 TABLET, FILM COATED ORAL at 22:07

## 2019-12-25 RX ADMIN — Medication 325 MILLIGRAM(S): at 11:54

## 2019-12-25 RX ADMIN — HEPARIN SODIUM 5000 UNIT(S): 5000 INJECTION INTRAVENOUS; SUBCUTANEOUS at 06:39

## 2019-12-26 LAB
ANION GAP SERPL CALC-SCNC: 14 MMOL/L — SIGNIFICANT CHANGE UP (ref 7–14)
BUN SERPL-MCNC: 39 MG/DL — HIGH (ref 10–20)
CALCIUM SERPL-MCNC: 9.1 MG/DL — SIGNIFICANT CHANGE UP (ref 8.5–10.1)
CHLORIDE SERPL-SCNC: 107 MMOL/L — SIGNIFICANT CHANGE UP (ref 98–110)
CO2 SERPL-SCNC: 21 MMOL/L — SIGNIFICANT CHANGE UP (ref 17–32)
CREAT SERPL-MCNC: 1.5 MG/DL — SIGNIFICANT CHANGE UP (ref 0.7–1.5)
GLUCOSE SERPL-MCNC: 98 MG/DL — SIGNIFICANT CHANGE UP (ref 70–99)
POTASSIUM SERPL-MCNC: 4.4 MMOL/L — SIGNIFICANT CHANGE UP (ref 3.5–5)
POTASSIUM SERPL-SCNC: 4.4 MMOL/L — SIGNIFICANT CHANGE UP (ref 3.5–5)
SODIUM SERPL-SCNC: 142 MMOL/L — SIGNIFICANT CHANGE UP (ref 135–146)

## 2019-12-26 RX ADMIN — BRIMONIDINE TARTRATE 1 DROP(S): 2 SOLUTION/ DROPS OPHTHALMIC at 05:51

## 2019-12-26 RX ADMIN — PANTOPRAZOLE SODIUM 40 MILLIGRAM(S): 20 TABLET, DELAYED RELEASE ORAL at 05:50

## 2019-12-26 RX ADMIN — Medication 75 MICROGRAM(S): at 05:50

## 2019-12-26 RX ADMIN — LATANOPROST 1 DROP(S): 0.05 SOLUTION/ DROPS OPHTHALMIC; TOPICAL at 22:00

## 2019-12-26 RX ADMIN — Medication 650 MILLIGRAM(S): at 13:46

## 2019-12-26 RX ADMIN — FLUOROMETHOLONE 1 DROP(S): 1 SOLUTION/ DROPS OPHTHALMIC at 12:25

## 2019-12-26 RX ADMIN — Medication 325 MILLIGRAM(S): at 12:24

## 2019-12-26 RX ADMIN — SIMVASTATIN 20 MILLIGRAM(S): 20 TABLET, FILM COATED ORAL at 21:59

## 2019-12-26 RX ADMIN — HEPARIN SODIUM 5000 UNIT(S): 5000 INJECTION INTRAVENOUS; SUBCUTANEOUS at 05:50

## 2019-12-26 RX ADMIN — Medication 650 MILLIGRAM(S): at 12:25

## 2019-12-26 RX ADMIN — Medication 1 DROP(S): at 21:59

## 2019-12-26 RX ADMIN — HEPARIN SODIUM 5000 UNIT(S): 5000 INJECTION INTRAVENOUS; SUBCUTANEOUS at 12:25

## 2019-12-26 RX ADMIN — BRIMONIDINE TARTRATE 1 DROP(S): 2 SOLUTION/ DROPS OPHTHALMIC at 17:47

## 2019-12-26 RX ADMIN — HEPARIN SODIUM 5000 UNIT(S): 5000 INJECTION INTRAVENOUS; SUBCUTANEOUS at 22:00

## 2019-12-27 DIAGNOSIS — S72.22XA DISPLACED SUBTROCHANTERIC FRACTURE OF LEFT FEMUR, INITIAL ENCOUNTER FOR CLOSED FRACTURE: ICD-10-CM

## 2019-12-27 DIAGNOSIS — E03.9 HYPOTHYROIDISM, UNSPECIFIED: ICD-10-CM
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
## 2019-12-27 DIAGNOSIS — H40.1423 CAPSULAR GLAUCOMA WITH PSEUDOEXFOLIATION OF LENS, LEFT EYE, SEVERE STAGE: ICD-10-CM

## 2019-12-27 DIAGNOSIS — Z91.81 HISTORY OF FALLING: ICD-10-CM

## 2019-12-27 DIAGNOSIS — D62 ACUTE POSTHEMORRHAGIC ANEMIA: ICD-10-CM

## 2019-12-27 DIAGNOSIS — R00.0 TACHYCARDIA, UNSPECIFIED: ICD-10-CM

## 2019-12-27 DIAGNOSIS — I12.9 HYPERTENSIVE CHRONIC KIDNEY DISEASE WITH STAGE 1 THROUGH STAGE 4 CHRONIC KIDNEY DISEASE, OR UNSPECIFIED CHRONIC KIDNEY DISEASE: ICD-10-CM

## 2019-12-27 DIAGNOSIS — N18.4 CHRONIC KIDNEY DISEASE, STAGE 4 (SEVERE): ICD-10-CM
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
## 2019-12-27 DIAGNOSIS — M25.552 PAIN IN LEFT HIP: ICD-10-CM

## 2019-12-27 DIAGNOSIS — Z96.641 PRESENCE OF RIGHT ARTIFICIAL HIP JOINT: ICD-10-CM

## 2019-12-27 DIAGNOSIS — H35.3222 EXUDATIVE AGE-RELATED MACULAR DEGENERATION, LEFT EYE, WITH INACTIVE CHOROIDAL NEOVASCULARIZATION: ICD-10-CM

## 2019-12-27 DIAGNOSIS — R41.0 DISORIENTATION, UNSPECIFIED: ICD-10-CM

## 2019-12-27 DIAGNOSIS — N17.9 ACUTE KIDNEY FAILURE, UNSPECIFIED: ICD-10-CM
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
## 2019-12-27 DIAGNOSIS — E78.00 PURE HYPERCHOLESTEROLEMIA, UNSPECIFIED: ICD-10-CM

## 2019-12-27 DIAGNOSIS — E86.1 HYPOVOLEMIA: ICD-10-CM

## 2019-12-27 RX ADMIN — CHLORHEXIDINE GLUCONATE 1 APPLICATION(S): 213 SOLUTION TOPICAL at 06:11

## 2019-12-27 RX ADMIN — SIMVASTATIN 20 MILLIGRAM(S): 20 TABLET, FILM COATED ORAL at 21:25

## 2019-12-27 RX ADMIN — LATANOPROST 1 DROP(S): 0.05 SOLUTION/ DROPS OPHTHALMIC; TOPICAL at 21:25

## 2019-12-27 RX ADMIN — FLUOROMETHOLONE 1 DROP(S): 1 SOLUTION/ DROPS OPHTHALMIC at 12:16

## 2019-12-27 RX ADMIN — HEPARIN SODIUM 5000 UNIT(S): 5000 INJECTION INTRAVENOUS; SUBCUTANEOUS at 21:27

## 2019-12-27 RX ADMIN — BRIMONIDINE TARTRATE 1 DROP(S): 2 SOLUTION/ DROPS OPHTHALMIC at 06:12

## 2019-12-27 RX ADMIN — HEPARIN SODIUM 5000 UNIT(S): 5000 INJECTION INTRAVENOUS; SUBCUTANEOUS at 06:12

## 2019-12-27 RX ADMIN — HEPARIN SODIUM 5000 UNIT(S): 5000 INJECTION INTRAVENOUS; SUBCUTANEOUS at 12:15

## 2019-12-27 RX ADMIN — PANTOPRAZOLE SODIUM 40 MILLIGRAM(S): 20 TABLET, DELAYED RELEASE ORAL at 06:12

## 2019-12-27 RX ADMIN — Medication 650 MILLIGRAM(S): at 10:28

## 2019-12-27 RX ADMIN — Medication 75 MICROGRAM(S): at 06:12

## 2019-12-27 RX ADMIN — Medication 325 MILLIGRAM(S): at 12:15

## 2019-12-27 RX ADMIN — Medication 650 MILLIGRAM(S): at 12:27

## 2019-12-27 RX ADMIN — Medication 1 DROP(S): at 21:26

## 2019-12-27 RX ADMIN — BRIMONIDINE TARTRATE 1 DROP(S): 2 SOLUTION/ DROPS OPHTHALMIC at 18:01

## 2019-12-28 RX ADMIN — HEPARIN SODIUM 5000 UNIT(S): 5000 INJECTION INTRAVENOUS; SUBCUTANEOUS at 21:36

## 2019-12-28 RX ADMIN — BRIMONIDINE TARTRATE 1 DROP(S): 2 SOLUTION/ DROPS OPHTHALMIC at 17:50

## 2019-12-28 RX ADMIN — FLUOROMETHOLONE 1 DROP(S): 1 SOLUTION/ DROPS OPHTHALMIC at 12:22

## 2019-12-28 RX ADMIN — HEPARIN SODIUM 5000 UNIT(S): 5000 INJECTION INTRAVENOUS; SUBCUTANEOUS at 05:41

## 2019-12-28 RX ADMIN — HEPARIN SODIUM 5000 UNIT(S): 5000 INJECTION INTRAVENOUS; SUBCUTANEOUS at 12:22

## 2019-12-28 RX ADMIN — BRIMONIDINE TARTRATE 1 DROP(S): 2 SOLUTION/ DROPS OPHTHALMIC at 05:41

## 2019-12-28 RX ADMIN — SIMVASTATIN 20 MILLIGRAM(S): 20 TABLET, FILM COATED ORAL at 21:36

## 2019-12-28 RX ADMIN — LATANOPROST 1 DROP(S): 0.05 SOLUTION/ DROPS OPHTHALMIC; TOPICAL at 21:37

## 2019-12-28 RX ADMIN — POLYETHYLENE GLYCOL 3350 17 GRAM(S): 17 POWDER, FOR SOLUTION ORAL at 12:22

## 2019-12-28 RX ADMIN — Medication 75 MICROGRAM(S): at 05:40

## 2019-12-28 RX ADMIN — Medication 1 DROP(S): at 21:36

## 2019-12-28 RX ADMIN — Medication 650 MILLIGRAM(S): at 23:49

## 2019-12-28 RX ADMIN — Medication 325 MILLIGRAM(S): at 12:22

## 2019-12-28 RX ADMIN — Medication 650 MILLIGRAM(S): at 21:46

## 2019-12-28 RX ADMIN — CHLORHEXIDINE GLUCONATE 1 APPLICATION(S): 213 SOLUTION TOPICAL at 05:42

## 2019-12-28 RX ADMIN — PANTOPRAZOLE SODIUM 40 MILLIGRAM(S): 20 TABLET, DELAYED RELEASE ORAL at 05:41

## 2019-12-29 RX ADMIN — SIMVASTATIN 20 MILLIGRAM(S): 20 TABLET, FILM COATED ORAL at 21:54

## 2019-12-29 RX ADMIN — HEPARIN SODIUM 5000 UNIT(S): 5000 INJECTION INTRAVENOUS; SUBCUTANEOUS at 05:48

## 2019-12-29 RX ADMIN — FLUOROMETHOLONE 1 DROP(S): 1 SOLUTION/ DROPS OPHTHALMIC at 12:45

## 2019-12-29 RX ADMIN — LATANOPROST 1 DROP(S): 0.05 SOLUTION/ DROPS OPHTHALMIC; TOPICAL at 21:53

## 2019-12-29 RX ADMIN — HEPARIN SODIUM 5000 UNIT(S): 5000 INJECTION INTRAVENOUS; SUBCUTANEOUS at 21:53

## 2019-12-29 RX ADMIN — Medication 325 MILLIGRAM(S): at 12:45

## 2019-12-29 RX ADMIN — CHLORHEXIDINE GLUCONATE 1 APPLICATION(S): 213 SOLUTION TOPICAL at 05:49

## 2019-12-29 RX ADMIN — HEPARIN SODIUM 5000 UNIT(S): 5000 INJECTION INTRAVENOUS; SUBCUTANEOUS at 12:45

## 2019-12-29 RX ADMIN — PANTOPRAZOLE SODIUM 40 MILLIGRAM(S): 20 TABLET, DELAYED RELEASE ORAL at 05:49

## 2019-12-29 RX ADMIN — BRIMONIDINE TARTRATE 1 DROP(S): 2 SOLUTION/ DROPS OPHTHALMIC at 17:13

## 2019-12-29 RX ADMIN — POLYETHYLENE GLYCOL 3350 17 GRAM(S): 17 POWDER, FOR SOLUTION ORAL at 12:45

## 2019-12-29 RX ADMIN — Medication 1 DROP(S): at 21:54

## 2019-12-29 RX ADMIN — Medication 75 MICROGRAM(S): at 05:48

## 2019-12-29 RX ADMIN — BRIMONIDINE TARTRATE 1 DROP(S): 2 SOLUTION/ DROPS OPHTHALMIC at 05:49

## 2019-12-30 ENCOUNTER — TRANSCRIPTION ENCOUNTER (OUTPATIENT)
Age: 84
End: 2019-12-30

## 2019-12-30 LAB
ALBUMIN SERPL ELPH-MCNC: 3.1 G/DL — LOW (ref 3.5–5.2)
ALP SERPL-CCNC: 120 U/L — HIGH (ref 30–115)
ALT FLD-CCNC: <5 U/L — SIGNIFICANT CHANGE UP (ref 0–41)
ANION GAP SERPL CALC-SCNC: 12 MMOL/L — SIGNIFICANT CHANGE UP (ref 7–14)
AST SERPL-CCNC: 15 U/L — SIGNIFICANT CHANGE UP (ref 0–41)
BILIRUB SERPL-MCNC: 0.5 MG/DL — SIGNIFICANT CHANGE UP (ref 0.2–1.2)
BUN SERPL-MCNC: 30 MG/DL — HIGH (ref 10–20)
CALCIUM SERPL-MCNC: 9 MG/DL — SIGNIFICANT CHANGE UP (ref 8.5–10.1)
CHLORIDE SERPL-SCNC: 107 MMOL/L — SIGNIFICANT CHANGE UP (ref 98–110)
CO2 SERPL-SCNC: 22 MMOL/L — SIGNIFICANT CHANGE UP (ref 17–32)
CREAT SERPL-MCNC: 1.5 MG/DL — SIGNIFICANT CHANGE UP (ref 0.7–1.5)
GLUCOSE SERPL-MCNC: 111 MG/DL — HIGH (ref 70–99)
HCT VFR BLD CALC: 23.4 % — LOW (ref 37–47)
HGB BLD-MCNC: 7.3 G/DL — LOW (ref 12–16)
MCHC RBC-ENTMCNC: 29.3 PG — SIGNIFICANT CHANGE UP (ref 27–31)
MCHC RBC-ENTMCNC: 31.2 G/DL — LOW (ref 32–37)
MCV RBC AUTO: 94 FL — SIGNIFICANT CHANGE UP (ref 81–99)
NRBC # BLD: 0 /100 WBCS — SIGNIFICANT CHANGE UP (ref 0–0)
PLATELET # BLD AUTO: 217 K/UL — SIGNIFICANT CHANGE UP (ref 130–400)
POTASSIUM SERPL-MCNC: 4.6 MMOL/L — SIGNIFICANT CHANGE UP (ref 3.5–5)
POTASSIUM SERPL-SCNC: 4.6 MMOL/L — SIGNIFICANT CHANGE UP (ref 3.5–5)
PROT SERPL-MCNC: 5.8 G/DL — LOW (ref 6–8)
RBC # BLD: 2.49 M/UL — LOW (ref 4.2–5.4)
RBC # FLD: 15.1 % — HIGH (ref 11.5–14.5)
SODIUM SERPL-SCNC: 141 MMOL/L — SIGNIFICANT CHANGE UP (ref 135–146)
WBC # BLD: 4.33 K/UL — LOW (ref 4.8–10.8)
WBC # FLD AUTO: 4.33 K/UL — LOW (ref 4.8–10.8)

## 2019-12-30 RX ORDER — OXYCODONE HYDROCHLORIDE 5 MG/1
5 TABLET ORAL EVERY 4 HOURS
Refills: 0 | Status: DISCONTINUED | OUTPATIENT
Start: 2019-12-30 | End: 2019-12-31

## 2019-12-30 RX ORDER — OXYCODONE HYDROCHLORIDE 5 MG/1
10 TABLET ORAL EVERY 6 HOURS
Refills: 0 | Status: DISCONTINUED | OUTPATIENT
Start: 2019-12-30 | End: 2019-12-31

## 2019-12-30 RX ORDER — HEPARIN SODIUM 5000 [USP'U]/ML
5000 INJECTION INTRAVENOUS; SUBCUTANEOUS EVERY 8 HOURS
Refills: 0 | Status: DISCONTINUED | OUTPATIENT
Start: 2019-12-30 | End: 2019-12-31

## 2019-12-30 RX ADMIN — LATANOPROST 1 DROP(S): 0.05 SOLUTION/ DROPS OPHTHALMIC; TOPICAL at 22:35

## 2019-12-30 RX ADMIN — HEPARIN SODIUM 5000 UNIT(S): 5000 INJECTION INTRAVENOUS; SUBCUTANEOUS at 05:16

## 2019-12-30 RX ADMIN — Medication 650 MILLIGRAM(S): at 11:21

## 2019-12-30 RX ADMIN — PANTOPRAZOLE SODIUM 40 MILLIGRAM(S): 20 TABLET, DELAYED RELEASE ORAL at 05:17

## 2019-12-30 RX ADMIN — HEPARIN SODIUM 5000 UNIT(S): 5000 INJECTION INTRAVENOUS; SUBCUTANEOUS at 13:22

## 2019-12-30 RX ADMIN — HEPARIN SODIUM 5000 UNIT(S): 5000 INJECTION INTRAVENOUS; SUBCUTANEOUS at 22:35

## 2019-12-30 RX ADMIN — Medication 1 DROP(S): at 22:36

## 2019-12-30 RX ADMIN — FLUOROMETHOLONE 1 DROP(S): 1 SOLUTION/ DROPS OPHTHALMIC at 13:23

## 2019-12-30 RX ADMIN — SIMVASTATIN 20 MILLIGRAM(S): 20 TABLET, FILM COATED ORAL at 22:35

## 2019-12-30 RX ADMIN — BRIMONIDINE TARTRATE 1 DROP(S): 2 SOLUTION/ DROPS OPHTHALMIC at 18:17

## 2019-12-30 RX ADMIN — BRIMONIDINE TARTRATE 1 DROP(S): 2 SOLUTION/ DROPS OPHTHALMIC at 05:16

## 2019-12-30 RX ADMIN — Medication 75 MICROGRAM(S): at 05:17

## 2019-12-30 RX ADMIN — Medication 650 MILLIGRAM(S): at 10:27

## 2019-12-30 RX ADMIN — CHLORHEXIDINE GLUCONATE 1 APPLICATION(S): 213 SOLUTION TOPICAL at 05:17

## 2019-12-30 RX ADMIN — Medication 325 MILLIGRAM(S): at 13:23

## 2019-12-30 NOTE — DISCHARGE NOTE PROVIDER - CARE PROVIDER_API CALL
Koffi [place])  Internal Medicine  [address] [place] [address]  Phone: [phone]  Fax: [phone]  Follow Up Time: 2 weeks    Randy Quintana)  Orthopaedic Surgery  [address] [place] [address]  Phone: [phone]  Fax: [phone]  Follow Up Time: 1 week

## 2019-12-30 NOTE — DISCHARGE NOTE PROVIDER - HOSPITAL COURSE
HPI:     Ms. Jaime is a 89 year-old woman with a PMH of falls, HTN, HLD, hypothyroidism, anemia, and PSH notable for ORIF of Right Femus (Dr. Frye) presented to the ED after a fall. Work-up was notable for acute subtrochanteric spiral fracture of the proximal left femur s/p left ORIF 12/11/2019 (Dr. Walker). Patient had 2 perioperative blood transfusions and received 2 additional units ( Dec 13th and Dec 15th). During hospital course abdoulaye had DVT study perfomred and was negative. On Dec 17th, 2019, patient was deemed medically stable for discharge for acute inpatient rehabiliation. (AA)         Prior functional status:indep. wishaniqua RW, indep. with ADLs at baseline; on admission 30ft with RW CS        Admission Physical Exam:    Vital signs stable. Afebrile    Gen: alert, NAD    Cardio: RRR    Lungs: clear    Abd: soft, NT    Extremities: without edema. PROM wnl.    Neuro: sensation intact to light touch    Cranial nerves: grossly intact    Motor Power: wnl except L hip not tested    Sensation: intact        Hospital Course: The patient was admitted to the acute inpatient rehab unit on 12/17/19 presenting with a decline in functional status. The patient participated in three hours of multidisciplinary therapy 5-6 days per week. The patient was continued on all home medications or equivalent alternatives as deemed appropriate. The patient received prophylactic anticoagulation medication and was monitored closely with no complications. During the stay on the inpatient unit, the patient showed as much progress as had been anticipated and was cleared for discharge by a multidisciplinary team.        Medically, during her stay, her Hb was found to be low (lowest on Rehab floor was 7.3). Her CBC was trended, but she did not require any transfusions on the rehab floor (got 2 units on medicine floor before rehab admission). Other than that she had an uncomplicated stay.         Discharge functional status: supervision with bed mobility, transfers and ambulation. RW 150ft under CS.         Discharge disposition: Home with home care HPI:     Ms. Jaime is a 89 year-old woman with a PMH of falls, HTN, HLD, hypothyroidism, anemia, and PSH notable for ORIF of Right Femus (Dr. Frye) presented to the ED after a fall. Work-up was notable for acute subtrochanteric spiral fracture of the proximal left femur s/p left ORIF 12/11/2019 (Dr. Walker). Patient had 2 perioperative blood transfusions and received 2 additional units ( Dec 13th and Dec 15th). During hospital course abdoulaye had DVT study perfomred and was negative. On Dec 17th, 2019, patient was deemed medically stable for discharge for acute inpatient rehabiliation. (AA)         Prior functional status:indep. wishaniqua RW, indep. with ADLs at baseline; on admission 30ft with RW CS        Admission Physical Exam:    Vital signs stable. Afebrile    Gen: alert, NAD    Cardio: RRR    Lungs: clear    Abd: soft, NT    Extremities: without edema. PROM wnl.    Neuro: sensation intact to light touch    Cranial nerves: grossly intact    Motor Power: wnl except L hip not tested    Sensation: intact        Hospital Course: The patient was admitted to the acute inpatient rehab unit on 12/17/19 presenting with a decline in functional status. The patient participated in three hours of multidisciplinary therapy 5-6 days per week. The patient was continued on all home medications or equivalent alternatives as deemed appropriate. The patient received prophylactic anticoagulation medication and was monitored closely with no complications. During the stay on the inpatient unit, the patient showed as much progress as had been anticipated and was cleared for discharge by a multidisciplinary team.        Medically, during her stay, her Hb was found to be low (lowest on Rehab floor was 7.3). Her CBC was trended, and she received  1u pRBC on 12/31 (got 2 units on medicine floor before rehab admission). Other than that she had an uncomplicated stay.         Discharge functional status: supervision with bed mobility, transfers and ambulation. RW 150ft under CS.         Discharge disposition: Home with home care HPI:     Ms. Jaime is a 89 year-old woman with a PMH of falls, HTN, HLD, hypothyroidism, anemia, and PSH notable for ORIF of Right Femus (Dr. Frye) presented to the ED after a fall. Work-up was notable for acute subtrochanteric spiral fracture of the proximal left femur s/p left ORIF 12/11/2019 (Dr. Walker). Patient had 2 perioperative blood transfusions and received 2 additional units ( Dec 13th and Dec 15th). During hospital course abdoulaye had DVT study perfomred and was negative. On Dec 17th, 2019, patient was deemed medically stable for discharge for acute inpatient rehabiliation. (AA)         Prior functional status:indep. tashia RW, indep. with ADLs at baseline; on admission  to rehab she walked 30ft with RW CS        Admission Physical Exam:    Vital signs stable. Afebrile    Gen: alert, NAD    Cardio: RRR    Lungs: clear    Abd: soft, NT    Extremities: without edema. PROM wnl.    Neuro: sensation intact to light touch    Cranial nerves: grossly intact    Motor Power: wnl except L hip not tested    Sensation: intact        Hospital Course: The patient was admitted to the acute inpatient rehab unit on 12/17/19 presenting with a decline in functional status. The patient participated in three hours of multidisciplinary therapy 5-6 days per week. The patient was continued on all home medications or equivalent alternatives as deemed appropriate. The patient received prophylactic anticoagulation medication and was monitored closely with no complications. During the stay on the inpatient unit, the patient showed as much progress as had been anticipated and was cleared for discharge by a multidisciplinary team.        Medically, during her stay, her Hb was low. Her CBC was trended, and on 12/30 she had a HB of 7.3. She received  1u pRBC on 12/30 (she also got 2 units on medicine floor before rehab admission). Other than that she had an uncomplicated stay.         Discharge functional status: supervision with bed mobility, transfers and ambulation. RW 150ft under CS.         Discharge disposition: Home with home care

## 2019-12-30 NOTE — DISCHARGE NOTE PROVIDER - NSDCMRMEDTOKEN_GEN_ALL_CORE_FT
amlodipine-benazepril 5 mg-10 mg oral capsule: 1 cap(s) orally once a day  bisacodyl 10 mg rectal suppository: 1 suppository(ies) rectal once a day, As needed, If no bowel movement by POD#2  brimonidine-timolol 0.2%-0.5% ophthalmic solution: 1 drop(s) to each affected eye every 12 hours  ferrous sulfate 325 mg (65 mg elemental iron) oral tablet: 1 tab(s) orally once a day  fluorometholone 0.1% ophthalmic suspension: 1 drop(s) to each affected eye once a day  heparin: 5000 unit(s) subcutaneous every 8 hours for DVT prophylaxis  levothyroxine 75 mcg (0.075 mg) oral tablet: 1 tab(s) orally once a day  magnesium hydroxide 8% oral suspension: 30 milliliter(s) orally once a day, As needed, Constipation  oxyCODONE 5 mg oral tablet: 1 tab(s) orally every 4 hours, As needed, Moderate Pain (4 - 6)  pantoprazole 40 mg oral delayed release tablet: 1 tab(s) orally once a day (before a meal)  polyethylene glycol 3350 oral powder for reconstitution: 17 gram(s) orally once a day  senna oral tablet: 2 tab(s) orally once a day (at bedtime), As needed, Constipation  simvastatin 20 mg oral tablet: 1 tab(s) orally once a day (at bedtime) acetaminophen 325 mg oral tablet: 2 tab(s) orally every 4 hours, As needed, Temp greater or equal to 38C (100.4F), Mild Pain (1 - 3)  brimonidine-timolol 0.2%-0.5% ophthalmic solution: 1 drop(s) to each affected eye every 12 hours  ferrous sulfate 325 mg (65 mg elemental iron) oral tablet: 1 tab(s) orally once a day  fluorometholone 0.1% ophthalmic suspension: 1 drop(s) in each eye once a day   latanoprost 0.005% ophthalmic solution: 1 drop(s) to each affected eye once a day (at bedtime)  levothyroxine 75 mcg (0.075 mg) oral tablet: 1 tab(s) orally once a day  polyethylene glycol 3350 oral powder for reconstitution: 17 gram(s) orally once a day, As Needed -for constipation   simvastatin 20 mg oral tablet: 1 tab(s) orally once a day (at bedtime)

## 2019-12-30 NOTE — DISCHARGE NOTE PROVIDER - NSDCCPCAREPLAN_GEN_ALL_CORE_FT
PRINCIPAL DISCHARGE DIAGNOSIS  Diagnosis: Fracture of proximal end of femur  Assessment and Plan of Treatment: Subtrochanteric fracture s/p ORIF 12/11/19  please take all medications as prescribed and schedule/go to all followup appointments.      SECONDARY DISCHARGE DIAGNOSES  Diagnosis: S/P total knee replacement, right  Assessment and Plan of Treatment:     Diagnosis: Multiple falls  Assessment and Plan of Treatment:     Diagnosis: Glaucoma  Assessment and Plan of Treatment:     Diagnosis: Hypothyroid  Assessment and Plan of Treatment:     Diagnosis: Anemia  Assessment and Plan of Treatment:     Diagnosis: HLD (hyperlipidemia)  Assessment and Plan of Treatment:     Diagnosis: HTN (hypertension)  Assessment and Plan of Treatment:

## 2019-12-30 NOTE — DISCHARGE NOTE PROVIDER - PROVIDER TOKENS
PROVIDER:[TOKEN:[[id number]],FOLLOWUP:[2 weeks]],PROVIDER:[TOKEN:[[id number]],FOLLOWUP:[1 week]]

## 2019-12-31 RX ORDER — LEVOTHYROXINE SODIUM 125 MCG
1 TABLET ORAL
Qty: 0 | Refills: 0 | DISCHARGE
Start: 2019-12-31

## 2019-12-31 RX ORDER — BRIMONIDINE TARTRATE, TIMOLOL MALEATE 2; 5 MG/ML; MG/ML
1 SOLUTION/ DROPS OPHTHALMIC
Qty: 5 | Refills: 0
Start: 2019-12-31 | End: 2020-01-29

## 2019-12-31 RX ORDER — ACETAMINOPHEN 500 MG
2 TABLET ORAL
Qty: 0 | Refills: 0 | DISCHARGE
Start: 2019-12-31

## 2019-12-31 RX ORDER — POLYETHYLENE GLYCOL 3350 17 G/17G
17 POWDER, FOR SOLUTION ORAL
Qty: 510 | Refills: 0
Start: 2019-12-31

## 2019-12-31 RX ORDER — FERROUS SULFATE 325(65) MG
1 TABLET ORAL
Qty: 30 | Refills: 0
Start: 2019-12-31 | End: 2020-01-29

## 2019-12-31 RX ORDER — FLUOROMETHOLONE 1 MG/ML
1 SOLUTION/ DROPS OPHTHALMIC
Qty: 5 | Refills: 0
Start: 2019-12-31

## 2019-12-31 RX ORDER — SIMVASTATIN 20 MG/1
1 TABLET, FILM COATED ORAL
Qty: 0 | Refills: 0 | DISCHARGE
Start: 2019-12-31

## 2019-12-31 RX ORDER — LATANOPROST 0.05 MG/ML
1 SOLUTION/ DROPS OPHTHALMIC; TOPICAL
Qty: 5 | Refills: 0
Start: 2019-12-31

## 2019-12-31 RX ADMIN — HEPARIN SODIUM 5000 UNIT(S): 5000 INJECTION INTRAVENOUS; SUBCUTANEOUS at 05:57

## 2019-12-31 RX ADMIN — POLYETHYLENE GLYCOL 3350 17 GRAM(S): 17 POWDER, FOR SOLUTION ORAL at 12:13

## 2019-12-31 RX ADMIN — Medication 75 MICROGRAM(S): at 05:56

## 2019-12-31 RX ADMIN — BRIMONIDINE TARTRATE 1 DROP(S): 2 SOLUTION/ DROPS OPHTHALMIC at 08:39

## 2019-12-31 RX ADMIN — Medication 325 MILLIGRAM(S): at 12:13

## 2019-12-31 RX ADMIN — FLUOROMETHOLONE 1 DROP(S): 1 SOLUTION/ DROPS OPHTHALMIC at 12:12

## 2019-12-31 RX ADMIN — PANTOPRAZOLE SODIUM 40 MILLIGRAM(S): 20 TABLET, DELAYED RELEASE ORAL at 06:03

## 2020-01-08 DIAGNOSIS — Z96.651 PRESENCE OF RIGHT ARTIFICIAL KNEE JOINT: ICD-10-CM

## 2020-01-08 DIAGNOSIS — H35.30 UNSPECIFIED MACULAR DEGENERATION: ICD-10-CM

## 2020-01-08 DIAGNOSIS — S72.22XD DISPLACED SUBTROCHANTERIC FRACTURE OF LEFT FEMUR, SUBSEQUENT ENCOUNTER FOR CLOSED FRACTURE WITH ROUTINE HEALING: ICD-10-CM

## 2020-01-08 DIAGNOSIS — E03.9 HYPOTHYROIDISM, UNSPECIFIED: ICD-10-CM

## 2020-01-08 DIAGNOSIS — Z96.642 PRESENCE OF LEFT ARTIFICIAL HIP JOINT: ICD-10-CM

## 2020-01-08 DIAGNOSIS — I12.9 HYPERTENSIVE CHRONIC KIDNEY DISEASE WITH STAGE 1 THROUGH STAGE 4 CHRONIC KIDNEY DISEASE, OR UNSPECIFIED CHRONIC KIDNEY DISEASE: ICD-10-CM

## 2020-01-08 DIAGNOSIS — E78.5 HYPERLIPIDEMIA, UNSPECIFIED: ICD-10-CM

## 2020-01-08 DIAGNOSIS — D50.0 IRON DEFICIENCY ANEMIA SECONDARY TO BLOOD LOSS (CHRONIC): ICD-10-CM
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
## 2020-01-08 DIAGNOSIS — Z51.89 ENCOUNTER FOR OTHER SPECIFIED AFTERCARE: ICD-10-CM

## 2020-01-08 DIAGNOSIS — D62 ACUTE POSTHEMORRHAGIC ANEMIA: ICD-10-CM

## 2020-01-08 DIAGNOSIS — N18.9 CHRONIC KIDNEY DISEASE, UNSPECIFIED: ICD-10-CM
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
## 2020-01-08 DIAGNOSIS — H40.9 UNSPECIFIED GLAUCOMA: ICD-10-CM

## 2020-01-08 DIAGNOSIS — N17.9 ACUTE KIDNEY FAILURE, UNSPECIFIED: ICD-10-CM

## 2020-08-02 ENCOUNTER — TRANSCRIPTION ENCOUNTER (OUTPATIENT)
Age: 85
End: 2020-08-02

## 2020-10-17 ENCOUNTER — EMERGENCY (EMERGENCY)
Facility: HOSPITAL | Age: 85
LOS: 0 days | Discharge: HOME | End: 2020-10-17
Attending: EMERGENCY MEDICINE | Admitting: STUDENT IN AN ORGANIZED HEALTH CARE EDUCATION/TRAINING PROGRAM

## 2020-10-17 ENCOUNTER — INPATIENT (INPATIENT)
Facility: HOSPITAL | Age: 85
LOS: 0 days | Discharge: HOME | End: 2020-10-18
Attending: STUDENT IN AN ORGANIZED HEALTH CARE EDUCATION/TRAINING PROGRAM | Admitting: STUDENT IN AN ORGANIZED HEALTH CARE EDUCATION/TRAINING PROGRAM
Payer: MEDICARE

## 2020-10-17 VITALS — HEIGHT: 58 IN | WEIGHT: 139.99 LBS

## 2020-10-17 DIAGNOSIS — E78.00 PURE HYPERCHOLESTEROLEMIA, UNSPECIFIED: ICD-10-CM

## 2020-10-17 DIAGNOSIS — E05.90 THYROTOXICOSIS, UNSPECIFIED WITHOUT THYROTOXIC CRISIS OR STORM: ICD-10-CM

## 2020-10-17 DIAGNOSIS — E86.0 DEHYDRATION: ICD-10-CM

## 2020-10-17 DIAGNOSIS — I10 ESSENTIAL (PRIMARY) HYPERTENSION: ICD-10-CM

## 2020-10-17 DIAGNOSIS — E87.5 HYPERKALEMIA: ICD-10-CM

## 2020-10-17 DIAGNOSIS — Z96.641 PRESENCE OF RIGHT ARTIFICIAL HIP JOINT: Chronic | ICD-10-CM

## 2020-10-17 DIAGNOSIS — H40.2223 CHRONIC ANGLE-CLOSURE GLAUCOMA, LEFT EYE, SEVERE STAGE: ICD-10-CM

## 2020-10-17 DIAGNOSIS — Z96.641 PRESENCE OF RIGHT ARTIFICIAL HIP JOINT: ICD-10-CM

## 2020-10-17 DIAGNOSIS — H35.3222 EXUDATIVE AGE-RELATED MACULAR DEGENERATION, LEFT EYE, WITH INACTIVE CHOROIDAL NEOVASCULARIZATION: ICD-10-CM

## 2020-10-17 LAB
ALBUMIN SERPL ELPH-MCNC: 4.2 G/DL — SIGNIFICANT CHANGE UP (ref 3.5–5.2)
ALP SERPL-CCNC: 61 U/L — SIGNIFICANT CHANGE UP (ref 30–115)
ALT FLD-CCNC: 6 U/L — SIGNIFICANT CHANGE UP (ref 0–41)
ANION GAP SERPL CALC-SCNC: 10 MMOL/L — SIGNIFICANT CHANGE UP (ref 7–14)
ANION GAP SERPL CALC-SCNC: 7 MMOL/L — SIGNIFICANT CHANGE UP (ref 7–14)
AST SERPL-CCNC: 22 U/L — SIGNIFICANT CHANGE UP (ref 0–41)
BASOPHILS # BLD AUTO: 0.05 K/UL — SIGNIFICANT CHANGE UP (ref 0–0.2)
BASOPHILS NFR BLD AUTO: 1.2 % — HIGH (ref 0–1)
BILIRUB SERPL-MCNC: 0.7 MG/DL — SIGNIFICANT CHANGE UP (ref 0.2–1.2)
BUN SERPL-MCNC: 52 MG/DL — HIGH (ref 10–20)
BUN SERPL-MCNC: 55 MG/DL — HIGH (ref 10–20)
CALCIUM SERPL-MCNC: 10 MG/DL — SIGNIFICANT CHANGE UP (ref 8.5–10.1)
CALCIUM SERPL-MCNC: 9.8 MG/DL — SIGNIFICANT CHANGE UP (ref 8.5–10.1)
CHLORIDE SERPL-SCNC: 109 MMOL/L — SIGNIFICANT CHANGE UP (ref 98–110)
CHLORIDE SERPL-SCNC: 109 MMOL/L — SIGNIFICANT CHANGE UP (ref 98–110)
CO2 SERPL-SCNC: 19 MMOL/L — SIGNIFICANT CHANGE UP (ref 17–32)
CO2 SERPL-SCNC: 23 MMOL/L — SIGNIFICANT CHANGE UP (ref 17–32)
CREAT SERPL-MCNC: 1.6 MG/DL — HIGH (ref 0.7–1.5)
CREAT SERPL-MCNC: 1.6 MG/DL — HIGH (ref 0.7–1.5)
EOSINOPHIL # BLD AUTO: 0.16 K/UL — SIGNIFICANT CHANGE UP (ref 0–0.7)
EOSINOPHIL NFR BLD AUTO: 3.7 % — SIGNIFICANT CHANGE UP (ref 0–8)
GLUCOSE SERPL-MCNC: 100 MG/DL — HIGH (ref 70–99)
GLUCOSE SERPL-MCNC: 97 MG/DL — SIGNIFICANT CHANGE UP (ref 70–99)
HCT VFR BLD CALC: 31.5 % — LOW (ref 37–47)
HGB BLD-MCNC: 10 G/DL — LOW (ref 12–16)
IMM GRANULOCYTES NFR BLD AUTO: 0.5 % — HIGH (ref 0.1–0.3)
LYMPHOCYTES # BLD AUTO: 0.91 K/UL — LOW (ref 1.2–3.4)
LYMPHOCYTES # BLD AUTO: 21.1 % — SIGNIFICANT CHANGE UP (ref 20.5–51.1)
MAGNESIUM SERPL-MCNC: 2.2 MG/DL — SIGNIFICANT CHANGE UP (ref 1.8–2.4)
MCHC RBC-ENTMCNC: 29.2 PG — SIGNIFICANT CHANGE UP (ref 27–31)
MCHC RBC-ENTMCNC: 31.7 G/DL — LOW (ref 32–37)
MCV RBC AUTO: 92.1 FL — SIGNIFICANT CHANGE UP (ref 81–99)
MONOCYTES # BLD AUTO: 0.43 K/UL — SIGNIFICANT CHANGE UP (ref 0.1–0.6)
MONOCYTES NFR BLD AUTO: 10 % — HIGH (ref 1.7–9.3)
NEUTROPHILS # BLD AUTO: 2.75 K/UL — SIGNIFICANT CHANGE UP (ref 1.4–6.5)
NEUTROPHILS NFR BLD AUTO: 63.5 % — SIGNIFICANT CHANGE UP (ref 42.2–75.2)
NRBC # BLD: 0 /100 WBCS — SIGNIFICANT CHANGE UP (ref 0–0)
PLATELET # BLD AUTO: 156 K/UL — SIGNIFICANT CHANGE UP (ref 130–400)
POTASSIUM SERPL-MCNC: 5 MMOL/L — SIGNIFICANT CHANGE UP (ref 3.5–5)
POTASSIUM SERPL-MCNC: 6.1 MMOL/L — CRITICAL HIGH (ref 3.5–5)
POTASSIUM SERPL-SCNC: 5 MMOL/L — SIGNIFICANT CHANGE UP (ref 3.5–5)
POTASSIUM SERPL-SCNC: 6.1 MMOL/L — CRITICAL HIGH (ref 3.5–5)
PROT SERPL-MCNC: 7.2 G/DL — SIGNIFICANT CHANGE UP (ref 6–8)
RAPID RVP RESULT: SIGNIFICANT CHANGE UP
RBC # BLD: 3.42 M/UL — LOW (ref 4.2–5.4)
RBC # FLD: 14.2 % — SIGNIFICANT CHANGE UP (ref 11.5–14.5)
SARS-COV-2 RNA SPEC QL NAA+PROBE: SIGNIFICANT CHANGE UP
SODIUM SERPL-SCNC: 138 MMOL/L — SIGNIFICANT CHANGE UP (ref 135–146)
SODIUM SERPL-SCNC: 139 MMOL/L — SIGNIFICANT CHANGE UP (ref 135–146)
WBC # BLD: 4.32 K/UL — LOW (ref 4.8–10.8)
WBC # FLD AUTO: 4.32 K/UL — LOW (ref 4.8–10.8)

## 2020-10-17 PROCEDURE — 99285 EMERGENCY DEPT VISIT HI MDM: CPT | Mod: CS,GC

## 2020-10-17 PROCEDURE — 99221 1ST HOSP IP/OBS SF/LOW 40: CPT | Mod: AI

## 2020-10-17 RX ORDER — CALCIUM CHLORIDE
500 POWDER (GRAM) MISCELLANEOUS ONCE
Refills: 0 | Status: DISCONTINUED | OUTPATIENT
Start: 2020-10-17 | End: 2020-10-17

## 2020-10-17 RX ORDER — SODIUM CHLORIDE 9 MG/ML
1000 INJECTION INTRAMUSCULAR; INTRAVENOUS; SUBCUTANEOUS ONCE
Refills: 0 | Status: COMPLETED | OUTPATIENT
Start: 2020-10-17 | End: 2020-10-17

## 2020-10-17 RX ORDER — SODIUM BICARBONATE 1 MEQ/ML
0.59 SYRINGE (ML) INTRAVENOUS
Qty: 150 | Refills: 0 | Status: DISCONTINUED | OUTPATIENT
Start: 2020-10-17 | End: 2020-10-18

## 2020-10-17 RX ORDER — INSULIN HUMAN 100 [IU]/ML
10 INJECTION, SOLUTION SUBCUTANEOUS ONCE
Refills: 0 | Status: COMPLETED | OUTPATIENT
Start: 2020-10-17 | End: 2020-10-17

## 2020-10-17 RX ORDER — CALCIUM GLUCONATE 100 MG/ML
1 VIAL (ML) INTRAVENOUS ONCE
Refills: 0 | Status: COMPLETED | OUTPATIENT
Start: 2020-10-17 | End: 2020-10-17

## 2020-10-17 RX ORDER — DEXTROSE 50 % IN WATER 50 %
50 SYRINGE (ML) INTRAVENOUS ONCE
Refills: 0 | Status: COMPLETED | OUTPATIENT
Start: 2020-10-17 | End: 2020-10-17

## 2020-10-17 RX ADMIN — Medication 50 MILLILITER(S): at 14:31

## 2020-10-17 RX ADMIN — Medication 0.59 MEQ/KG/HR: at 18:30

## 2020-10-17 RX ADMIN — Medication 1 GRAM(S): at 15:00

## 2020-10-17 RX ADMIN — INSULIN HUMAN 10 UNIT(S): 100 INJECTION, SOLUTION SUBCUTANEOUS at 14:29

## 2020-10-17 RX ADMIN — Medication 100 GRAM(S): at 14:31

## 2020-10-17 RX ADMIN — SODIUM CHLORIDE 1000 MILLILITER(S): 9 INJECTION INTRAMUSCULAR; INTRAVENOUS; SUBCUTANEOUS at 15:40

## 2020-10-17 RX ADMIN — Medication 250 MEQ/KG/HR: at 14:29

## 2020-10-17 RX ADMIN — SODIUM CHLORIDE 1000 MILLILITER(S): 9 INJECTION INTRAMUSCULAR; INTRAVENOUS; SUBCUTANEOUS at 14:32

## 2020-10-17 NOTE — H&P ADULT - NSHPLABSRESULTS_GEN_ALL_CORE
10.0   4.32  )-----------( 156      ( 17 Oct 2020 12:24 )             31.5     10-17    139  |  109  |  52<H>  ----------------------------<  97  5.0   |  23  |  1.6<H>    Ca    9.8      17 Oct 2020 16:51  Mg     2.2     10-17    TPro  7.2  /  Alb  4.2  /  TBili  0.7  /  DBili  x   /  AST  22  /  ALT  6   /  AlkPhos  61  10-17              Lactate Trend        CAPILLARY BLOOD GLUCOSE      POCT Blood Glucose.: 99 mg/dL (17 Oct 2020 16:26)

## 2020-10-17 NOTE — H&P ADULT - NSICDXPASTMEDICALHX_GEN_ALL_CORE_FT
PAST MEDICAL HISTORY:  Chronic primary angle-closure glaucoma of left eye, severe stage     Exudative age-related macular degeneration of left eye with inactive choroidal neovascularization     Fall Multiple falls at home    High cholesterol     Hypertension, unspecified type     Hyperthyroidism

## 2020-10-17 NOTE — ED PROVIDER NOTE - OBJECTIVE STATEMENT
patient is a 89 yo f who presents for evaluation of hyperkalemia found on outpatient routine labs over 6 she was sent in for further evaluation she denies any headache, chest pain, sob, fevers or chills she denies any weakness numbness or tingling

## 2020-10-17 NOTE — ED PROVIDER NOTE - ATTENDING CONTRIBUTION TO CARE
I was present for and supervised the key and critical aspects of the procedures performed during the care of the patient. patient presents for further evaluation after patient is a 89 yo f who presents for evaluation of hyperkalemia found on outpatient routine labs over 6 she was sent in for further evaluation she denies any headache, chest pain, sob, fevers or chills she denies any weakness numbness or tingling  on physical exam the patient is nc/at perrla eomi oropharynx clear cta b/l, rrr s1s2 noted abd-soft nt nd bs + ext from with no focal deficits   we obtained ekg, and repeat labs which showed elevated k patient given treatment and repeat showed improved k I will admit for further management at this time

## 2020-10-17 NOTE — H&P ADULT - HISTORY OF PRESENT ILLNESS
[age]yo female is advised to go to the ER due to abnormal lab results. Found to have hyperkalemia on hemolyzed specimen. Much improved after ER intervention and patient currently without any complaints

## 2020-10-17 NOTE — ED PROVIDER NOTE - PRO INTERPRETER NEED 2
Please call with culture results showing STAPHYLOCOCCUS LUGDUNENSIS - a type of staph bacteria. Please inquire how she is doing. English

## 2020-10-17 NOTE — ED PROVIDER NOTE - CARE PROVIDER_API CALL
Koffi [place]  INTERNAL MEDICINE  [address] [facility] [place] [address]  Phone: [phone]  Fax: [phone]  Established Patient  Follow Up Time: Urgent

## 2020-10-17 NOTE — ED PROVIDER NOTE - NSFOLLOWUPINSTRUCTIONS_ED_ALL_ED_FT
Hyperkalemia    Hyperkalemia is when you have too much potassium in your blood. Potassium is normally removed (excreted) from your body by your kidneys. If there is too much potassium in your blood, it can affect your heart’s ability to function.    What are the causes?    Hyperkalemia may be caused by:    Taking in too much potassium. You can do this by:    Using salt substitutes. They contain large amounts of potassium.  Taking potassium supplements.  Eating foods high in potassium.    Excreting too little potassium. This can happen if:    Your kidneys are not working properly. Kidney (renal) disease, including short- or long-term renal failure, is a very common cause of hyperkalemia.  You are taking medicines that lower your excretion of potassium.  You have Hughes disease.  You have a urinary tract blockage, such as kidney stones.  You are on treatment to mechanically clean your blood (dialysis) and you skip a treatment.    Releasing a high amount of potassium from your cells into your blood. This can happen with:    Injury to muscles (rhabdomyolysis) or other tissues. Most potassium is stored in your muscles.  Severe burns or infections.  Acidic blood plasma (acidosis). Acidosis can result from many diseases, such as uncontrolled diabetes.      What increases the risk?  The most common risk factor of hyperkalemia is kidney disease. Other risk factors of hyperkalemia include:    Rolan disease. This is a condition where your glands do not produce enough hormones.  Alcoholism or heavy drug use.  Using certain blood pressure medicines, such as angiotensin-converting enzyme (ACE) inhibitors, angiotensin II receptor blockers (ARBs), or potassium-sparing diuretics such as spironolactone.  Severe injury or burn.    What are the signs or symptoms?  Oftentimes, there are no signs or symptoms of hyperkalemia. However, when your potassium level becomes high enough, you may experience symptoms such as:    Irregular or very slow heartbeat.  Nausea.  Fatigue.  Tingling of the skin or numbness of the hands or feet.  Muscle weakness.  Fatigue.  Not being able to move (paralysis).    You may not have any symptoms of hyperkalemia.    How is this diagnosed?  Hyperkalemia may be diagnosed by:    Physical exam.  Blood tests.  ECG (electrocardiogram).  Discussion of prescription and non-prescription drug use.    How is this treated?  Treatment for hyperkalemia is often directed at the underlying cause. In some instances, treatment may include:    Insulin.  Glucose (sugar) and water solution given through a vein (intravenous or IV ).  Dialysis.  Medicines to remove the potassium from your body.  Medicines to move calcium from your bloodstream into your tissues.    Follow these instructions at home:  Take medicines only as directed by your health care provider.  Do not take any supplements, natural products, herbs, or vitamins without reviewing them with your health care provider. Certain supplements and natural food products can have high amounts of potassium.  Limit your alcohol intake as directed by your health care provider.  Stop illegal drug use. If you need help quitting, ask your health care provider.  Keep all follow-up visits as directed by your health care provider. This is important.  If you have kidney disease, you may need to follow a low potassium diet. A dietitian can help educate you on low potassium foods.  Contact a health care provider if:  You notice an irregular or very slow heartbeat.  You feel light-headed.  You feel weak.  You are nauseous.  You have tingling or numbness in your hands or feet.  Get help right away if:  You have shortness of breath.  You have chest pain or discomfort.  You pass out.  You have muscle paralysis.  This information is not intended to replace advice given to you by your health care provider. Make sure you discuss any questions you have with your health care provider.

## 2020-10-17 NOTE — ED ADULT NURSE NOTE - PMH
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

## 2020-10-17 NOTE — ED ADULT TRIAGE NOTE - CHIEF COMPLAINT QUOTE
pt sent in by PMD for elevated K of 6.2, also c/o frequent falls, last fall 2 days ago, no complaints of pain

## 2020-10-17 NOTE — ED PROVIDER NOTE - CLINICAL SUMMARY MEDICAL DECISION MAKING FREE TEXT BOX
patient found to have elevated k on second blood test she was given calcium, insulin dextrose, no ekg changes repeat k improved after treatment given 2 lab abnormalities I will admit for further workup at this time

## 2020-10-18 ENCOUNTER — TRANSCRIPTION ENCOUNTER (OUTPATIENT)
Age: 85
End: 2020-10-18

## 2020-10-18 VITALS
HEART RATE: 77 BPM | TEMPERATURE: 98 F | RESPIRATION RATE: 18 BRPM | SYSTOLIC BLOOD PRESSURE: 142 MMHG | DIASTOLIC BLOOD PRESSURE: 65 MMHG

## 2020-10-18 DIAGNOSIS — H40.2223 CHRONIC ANGLE-CLOSURE GLAUCOMA, LEFT EYE, SEVERE STAGE: ICD-10-CM

## 2020-10-18 DIAGNOSIS — I10 ESSENTIAL (PRIMARY) HYPERTENSION: ICD-10-CM

## 2020-10-18 DIAGNOSIS — H35.3222 EXUDATIVE AGE-RELATED MACULAR DEGENERATION, LEFT EYE, WITH INACTIVE CHOROIDAL NEOVASCULARIZATION: ICD-10-CM

## 2020-10-18 DIAGNOSIS — E87.5 HYPERKALEMIA: ICD-10-CM

## 2020-10-18 DIAGNOSIS — E05.90 THYROTOXICOSIS, UNSPECIFIED WITHOUT THYROTOXIC CRISIS OR STORM: ICD-10-CM

## 2020-10-18 LAB
ANION GAP SERPL CALC-SCNC: 7 MMOL/L — SIGNIFICANT CHANGE UP (ref 7–14)
BUN SERPL-MCNC: 43 MG/DL — HIGH (ref 10–20)
CALCIUM SERPL-MCNC: 9.4 MG/DL — SIGNIFICANT CHANGE UP (ref 8.5–10.1)
CHLORIDE SERPL-SCNC: 109 MMOL/L — SIGNIFICANT CHANGE UP (ref 98–110)
CO2 SERPL-SCNC: 25 MMOL/L — SIGNIFICANT CHANGE UP (ref 17–32)
CREAT SERPL-MCNC: 1.5 MG/DL — SIGNIFICANT CHANGE UP (ref 0.7–1.5)
GLUCOSE SERPL-MCNC: 99 MG/DL — SIGNIFICANT CHANGE UP (ref 70–99)
HCT VFR BLD CALC: 28.7 % — LOW (ref 37–47)
HGB BLD-MCNC: 9.2 G/DL — LOW (ref 12–16)
MCHC RBC-ENTMCNC: 29.6 PG — SIGNIFICANT CHANGE UP (ref 27–31)
MCHC RBC-ENTMCNC: 32.1 G/DL — SIGNIFICANT CHANGE UP (ref 32–37)
MCV RBC AUTO: 92.3 FL — SIGNIFICANT CHANGE UP (ref 81–99)
NRBC # BLD: 0 /100 WBCS — SIGNIFICANT CHANGE UP (ref 0–0)
PLATELET # BLD AUTO: 150 K/UL — SIGNIFICANT CHANGE UP (ref 130–400)
POTASSIUM SERPL-MCNC: 4.9 MMOL/L — SIGNIFICANT CHANGE UP (ref 3.5–5)
POTASSIUM SERPL-SCNC: 4.9 MMOL/L — SIGNIFICANT CHANGE UP (ref 3.5–5)
RBC # BLD: 3.11 M/UL — LOW (ref 4.2–5.4)
RBC # FLD: 14.1 % — SIGNIFICANT CHANGE UP (ref 11.5–14.5)
SARS-COV-2 IGG SERPL QL IA: NEGATIVE — SIGNIFICANT CHANGE UP
SARS-COV-2 IGM SERPL IA-ACNC: 0.09 INDEX — SIGNIFICANT CHANGE UP
SODIUM SERPL-SCNC: 141 MMOL/L — SIGNIFICANT CHANGE UP (ref 135–146)
WBC # BLD: 4.59 K/UL — LOW (ref 4.8–10.8)
WBC # FLD AUTO: 4.59 K/UL — LOW (ref 4.8–10.8)

## 2020-10-18 PROCEDURE — 99239 HOSP IP/OBS DSCHRG MGMT >30: CPT

## 2020-10-18 RX ORDER — POLYETHYLENE GLYCOL 3350 17 G/17G
17 POWDER, FOR SOLUTION ORAL DAILY
Refills: 0 | Status: DISCONTINUED | OUTPATIENT
Start: 2020-10-18 | End: 2020-10-18

## 2020-10-18 RX ORDER — FLUOROMETHOLONE 1 MG/ML
1 SOLUTION/ DROPS OPHTHALMIC DAILY
Refills: 0 | Status: DISCONTINUED | OUTPATIENT
Start: 2020-10-18 | End: 2020-10-18

## 2020-10-18 RX ORDER — TIMOLOL 0.5 %
1 DROPS OPHTHALMIC (EYE)
Refills: 0 | Status: DISCONTINUED | OUTPATIENT
Start: 2020-10-18 | End: 2020-10-18

## 2020-10-18 RX ORDER — SIMVASTATIN 20 MG/1
20 TABLET, FILM COATED ORAL AT BEDTIME
Refills: 0 | Status: DISCONTINUED | OUTPATIENT
Start: 2020-10-18 | End: 2020-10-18

## 2020-10-18 RX ORDER — BRIMONIDINE TARTRATE 2 MG/MG
1 SOLUTION/ DROPS OPHTHALMIC
Refills: 0 | Status: DISCONTINUED | OUTPATIENT
Start: 2020-10-18 | End: 2020-10-18

## 2020-10-18 RX ORDER — LEVOTHYROXINE SODIUM 125 MCG
75 TABLET ORAL
Refills: 0 | Status: DISCONTINUED | OUTPATIENT
Start: 2020-10-18 | End: 2020-10-18

## 2020-10-18 RX ORDER — LATANOPROST 0.05 MG/ML
1 SOLUTION/ DROPS OPHTHALMIC; TOPICAL AT BEDTIME
Refills: 0 | Status: DISCONTINUED | OUTPATIENT
Start: 2020-10-18 | End: 2020-10-18

## 2020-10-18 RX ORDER — FERROUS SULFATE 325(65) MG
325 TABLET ORAL DAILY
Refills: 0 | Status: DISCONTINUED | OUTPATIENT
Start: 2020-10-18 | End: 2020-10-18

## 2020-10-18 RX ADMIN — Medication 75 MICROGRAM(S): at 05:17

## 2020-10-18 RX ADMIN — Medication 1 DROP(S): at 05:17

## 2020-10-18 RX ADMIN — FLUOROMETHOLONE 1 DROP(S): 1 SOLUTION/ DROPS OPHTHALMIC at 11:21

## 2020-10-18 RX ADMIN — Medication 325 MILLIGRAM(S): at 11:21

## 2020-10-18 RX ADMIN — BRIMONIDINE TARTRATE 1 DROP(S): 2 SOLUTION/ DROPS OPHTHALMIC at 05:17

## 2020-10-18 NOTE — DISCHARGE NOTE PROVIDER - CARE PROVIDER_API CALL
Koffi [place]  INTERNAL MEDICINE  [address] [facility] [address] [address]  Phone: [phone]  Fax: [phone]  Follow Up Time:

## 2020-10-18 NOTE — DISCHARGE NOTE PROVIDER - NSDCMRMEDTOKEN_GEN_ALL_CORE_FT
acetaminophen 325 mg oral tablet: 2 tab(s) orally every 4 hours, As needed, Temp greater or equal to 38C (100.4F), Mild Pain (1 - 3)  brimonidine-timolol 0.2%-0.5% ophthalmic solution: 1 drop(s) to each affected eye every 12 hours  ferrous sulfate 325 mg (65 mg elemental iron) oral tablet: 1 tab(s) orally once a day  fluorometholone 0.1% ophthalmic suspension: 1 drop(s) in each eye once a day   latanoprost 0.005% ophthalmic solution: 1 drop(s) to each affected eye once a day (at bedtime)  levothyroxine 75 mcg (0.075 mg) oral tablet: 1 tab(s) orally once a day  polyethylene glycol 3350 oral powder for reconstitution: 17 gram(s) orally once a day, As Needed -for constipation   simvastatin 20 mg oral tablet: 1 tab(s) orally once a day (at bedtime)   brimonidine-timolol 0.2%-0.5% ophthalmic solution: 1 drop(s) to each affected eye every 12 hours  ferrous sulfate 325 mg (65 mg elemental iron) oral tablet: 1 tab(s) orally once a day  fluorometholone 0.1% ophthalmic suspension: 1 drop(s) in each eye once a day   latanoprost 0.005% ophthalmic solution: 1 drop(s) to each affected eye once a day (at bedtime)  levothyroxine 75 mcg (0.075 mg) oral tablet: 1 tab(s) orally once a day  polyethylene glycol 3350 oral powder for reconstitution: 17 gram(s) orally once a day, As Needed -for constipation   simvastatin 20 mg oral tablet: 1 tab(s) orally once a day (at bedtime)

## 2020-10-18 NOTE — DISCHARGE NOTE PROVIDER - HOSPITAL COURSE
89yo female is admitted to low risk telemetry unit secondary to hyperkalemia.  Pt was advised to go to the ER due to abnormal lab results. She was found to have a potassium of 6.0, on hemolyzed specimen.   Repeat level following ER intervention of Insulin and calcium gluconate was 5.0. BUN/Cr borderline elevate on admission, 55/1.6.   Potassium normalized with a follow up level of 4.9 and BUN/Cr 43/1.5 today.  Pt without complaints and discharged home with instruction to ensure sufficient PO hydration and to follow up with PMD in 1 week. 89yo female is admitted to low risk telemetry unit secondary to hyperkalemia.  Pt was advised to go to the ER due to abnormal lab results. She was found to have a potassium of 6.0, on hemolyzed specimen.   Repeat level following ER intervention of Insulin and calcium gluconate was 5.0. BUN/Cr borderline elevate on admission, 55/1.6.   Potassium normalized with a follow up level of 4.9 and BUN/Cr 43/1.5 today.  Pt without complaints and discharged home with instruction to ensure sufficient PO hydration and to follow up with PMD in 1 week.    Vital Signs Last 24 Hrs  T(C): 36.6 (18 Oct 2020 05:00), Max: 36.9 (17 Oct 2020 13:07)  T(F): 97.8 (18 Oct 2020 05:00), Max: 98.4 (17 Oct 2020 13:07)  HR: 77 (18 Oct 2020 05:00) (69 - 84)  BP: 142/65 (18 Oct 2020 05:00) (134/62 - 142/65)  BP(mean): --  RR: 18 (18 Oct 2020 05:00) (18 - 19)  SpO2: 97% (17 Oct 2020 16:55) (97% - 97%)     89yo female is admitted to low risk telemetry unit secondary to hyperkalemia.  Pt was advised to go to the ER due to abnormal lab results. She was found to have a potassium of 6.0, on hemolyzed specimen.   Repeat level following ER intervention of Insulin and calcium gluconate was 5.0. BUN/Cr borderline elevate on admission, 55/1.6.   Potassium normalized with a follow up level of 4.9 and BUN/Cr 43/1.5 today.  Pt without complaints and discharged home with instruction to ensure sufficient PO hydration and to follow up with PMD in 1 week.    Vital Signs Last 24 Hrs  T(C): 36.6 (18 Oct 2020 05:00), Max: 36.9 (17 Oct 2020 13:07)  T(F): 97.8 (18 Oct 2020 05:00), Max: 98.4 (17 Oct 2020 13:07)  HR: 77 (18 Oct 2020 05:00) (69 - 84)  BP: 142/65 (18 Oct 2020 05:00) (134/62 - 142/65)  BP(mean): --  RR: 18 (18 Oct 2020 05:00) (18 - 19)  SpO2: 97% (17 Oct 2020 16:55) (97% - 97%)    Physical exam  Gen- elderly F, pleasant and cooperative, NAD  HEENT- anicteric sclera, non injected conjunctiva, EOMI, MMM  Cardiac- RRR, normal s1s2, no RMG appreciated  Chest- CTAB, no wheezing, coarse breath sounds, or increased WOB  Abdomen- soft, non distended, non ttp, nabs+  Ext- no clubbing or cyanosis  Neuro- AOx3, normal mentation, CN 2-12 grossly intact, spontaneously moving all 4ext  Skin- no open wounds, warm to touch

## 2020-10-18 NOTE — DISCHARGE NOTE PROVIDER - NSDCCPCAREPLAN_GEN_ALL_CORE_FT
PRINCIPAL DISCHARGE DIAGNOSIS  Diagnosis: Hyperkalemia  Assessment and Plan of Treatment:        PRINCIPAL DISCHARGE DIAGNOSIS  Diagnosis: Hyperkalemia  Assessment and Plan of Treatment: Continue your current medications  Follow up with your PMD in 1 week for repeat blood work.      SECONDARY DISCHARGE DIAGNOSES  Diagnosis: Mild dehydration  Assessment and Plan of Treatment: Ensure sufficient hydration

## 2020-10-18 NOTE — DISCHARGE NOTE NURSING/CASE MANAGEMENT/SOCIAL WORK - PATIENT PORTAL LINK FT
You can access the FollowMyHealth Patient Portal offered by [facility] by registering at the following website: [email or web address]. By joining [employer]’s FollowMyHealth portal, you will also be able to view your health information using other applications (apps) compatible with our system.

## 2021-02-09 ENCOUNTER — TRANSCRIPTION ENCOUNTER (OUTPATIENT)
Age: 86
End: 2021-02-09

## 2021-06-30 NOTE — OCCUPATIONAL THERAPY INITIAL EVALUATION ADULT - TOILETING, PREVIOUS LEVEL OF FUNCTION, OT EVAL
independent
Patient requests all Lab, Cardiology, and Radiology Results on their Discharge Instructions

## 2021-08-29 ENCOUNTER — INPATIENT (INPATIENT)
Facility: HOSPITAL | Age: 86
LOS: 0 days | Discharge: HOME HEALTH | End: 2021-08-30
Attending: STUDENT IN AN ORGANIZED HEALTH CARE EDUCATION/TRAINING PROGRAM | Admitting: STUDENT IN AN ORGANIZED HEALTH CARE EDUCATION/TRAINING PROGRAM
Payer: MEDICARE

## 2021-08-29 ENCOUNTER — EMERGENCY (EMERGENCY)
Facility: HOSPITAL | Age: 86
LOS: 0 days | Discharge: HOME | End: 2021-08-29
Admitting: EMERGENCY MEDICINE

## 2021-08-29 VITALS — HEIGHT: 58 IN | WEIGHT: 141.1 LBS

## 2021-08-29 DIAGNOSIS — Z96.641 PRESENCE OF RIGHT ARTIFICIAL HIP JOINT: ICD-10-CM

## 2021-08-29 DIAGNOSIS — E78.00 PURE HYPERCHOLESTEROLEMIA, UNSPECIFIED: ICD-10-CM
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
## 2021-08-29 DIAGNOSIS — H40.2223 CHRONIC ANGLE-CLOSURE GLAUCOMA, LEFT EYE, SEVERE STAGE: ICD-10-CM

## 2021-08-29 DIAGNOSIS — I10 ESSENTIAL (PRIMARY) HYPERTENSION: ICD-10-CM

## 2021-08-29 DIAGNOSIS — Z79.890 HORMONE REPLACEMENT THERAPY: ICD-10-CM

## 2021-08-29 DIAGNOSIS — Z20.822 CONTACT WITH AND (SUSPECTED) EXPOSURE TO COVID-19: ICD-10-CM

## 2021-08-29 DIAGNOSIS — Z96.641 PRESENCE OF RIGHT ARTIFICIAL HIP JOINT: Chronic | ICD-10-CM

## 2021-08-29 DIAGNOSIS — R26.2 DIFFICULTY IN WALKING, NOT ELSEWHERE CLASSIFIED: ICD-10-CM

## 2021-08-29 DIAGNOSIS — M79.605 PAIN IN LEFT LEG: ICD-10-CM
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
## 2021-08-29 DIAGNOSIS — E78.5 HYPERLIPIDEMIA, UNSPECIFIED: ICD-10-CM

## 2021-08-29 DIAGNOSIS — E05.90 THYROTOXICOSIS, UNSPECIFIED WITHOUT THYROTOXIC CRISIS OR STORM: ICD-10-CM

## 2021-08-29 DIAGNOSIS — M25.561 PAIN IN RIGHT KNEE: ICD-10-CM

## 2021-08-29 DIAGNOSIS — Z79.899 OTHER LONG TERM (CURRENT) DRUG THERAPY: ICD-10-CM

## 2021-08-29 LAB
ALBUMIN SERPL ELPH-MCNC: 4.3 G/DL — SIGNIFICANT CHANGE UP (ref 3.5–5.2)
ALP SERPL-CCNC: 99 U/L — SIGNIFICANT CHANGE UP (ref 30–115)
ALT FLD-CCNC: 5 U/L — SIGNIFICANT CHANGE UP (ref 0–41)
ANION GAP SERPL CALC-SCNC: 11 MMOL/L — SIGNIFICANT CHANGE UP (ref 7–14)
AST SERPL-CCNC: 19 U/L — SIGNIFICANT CHANGE UP (ref 0–41)
BASOPHILS # BLD AUTO: 0.04 K/UL — SIGNIFICANT CHANGE UP (ref 0–0.2)
BASOPHILS NFR BLD AUTO: 0.4 % — SIGNIFICANT CHANGE UP (ref 0–1)
BILIRUB SERPL-MCNC: 0.7 MG/DL — SIGNIFICANT CHANGE UP (ref 0.2–1.2)
BUN SERPL-MCNC: 38 MG/DL — HIGH (ref 10–20)
CALCIUM SERPL-MCNC: 10.5 MG/DL — HIGH (ref 8.5–10.1)
CHLORIDE SERPL-SCNC: 106 MMOL/L — SIGNIFICANT CHANGE UP (ref 98–110)
CO2 SERPL-SCNC: 24 MMOL/L — SIGNIFICANT CHANGE UP (ref 17–32)
CREAT SERPL-MCNC: 1.1 MG/DL — SIGNIFICANT CHANGE UP (ref 0.7–1.5)
EOSINOPHIL # BLD AUTO: 0.09 K/UL — SIGNIFICANT CHANGE UP (ref 0–0.7)
EOSINOPHIL NFR BLD AUTO: 0.9 % — SIGNIFICANT CHANGE UP (ref 0–8)
GLUCOSE SERPL-MCNC: 109 MG/DL — HIGH (ref 70–99)
HCT VFR BLD CALC: 35.3 % — LOW (ref 37–47)
HGB BLD-MCNC: 11.5 G/DL — LOW (ref 12–16)
IMM GRANULOCYTES NFR BLD AUTO: 0.6 % — HIGH (ref 0.1–0.3)
LYMPHOCYTES # BLD AUTO: 1.27 K/UL — SIGNIFICANT CHANGE UP (ref 1.2–3.4)
LYMPHOCYTES # BLD AUTO: 12.4 % — LOW (ref 20.5–51.1)
MAGNESIUM SERPL-MCNC: 2 MG/DL — SIGNIFICANT CHANGE UP (ref 1.8–2.4)
MCHC RBC-ENTMCNC: 28.7 PG — SIGNIFICANT CHANGE UP (ref 27–31)
MCHC RBC-ENTMCNC: 32.6 G/DL — SIGNIFICANT CHANGE UP (ref 32–37)
MCV RBC AUTO: 88 FL — SIGNIFICANT CHANGE UP (ref 81–99)
MONOCYTES # BLD AUTO: 0.57 K/UL — SIGNIFICANT CHANGE UP (ref 0.1–0.6)
MONOCYTES NFR BLD AUTO: 5.5 % — SIGNIFICANT CHANGE UP (ref 1.7–9.3)
NEUTROPHILS # BLD AUTO: 8.25 K/UL — HIGH (ref 1.4–6.5)
NEUTROPHILS NFR BLD AUTO: 80.2 % — HIGH (ref 42.2–75.2)
NRBC # BLD: 0 /100 WBCS — SIGNIFICANT CHANGE UP (ref 0–0)
PLATELET # BLD AUTO: 169 K/UL — SIGNIFICANT CHANGE UP (ref 130–400)
POTASSIUM SERPL-MCNC: 4.6 MMOL/L — SIGNIFICANT CHANGE UP (ref 3.5–5)
POTASSIUM SERPL-SCNC: 4.6 MMOL/L — SIGNIFICANT CHANGE UP (ref 3.5–5)
PROT SERPL-MCNC: 7.8 G/DL — SIGNIFICANT CHANGE UP (ref 6–8)
RBC # BLD: 4.01 M/UL — LOW (ref 4.2–5.4)
RBC # FLD: 14 % — SIGNIFICANT CHANGE UP (ref 11.5–14.5)
SARS-COV-2 RNA SPEC QL NAA+PROBE: SIGNIFICANT CHANGE UP
SODIUM SERPL-SCNC: 141 MMOL/L — SIGNIFICANT CHANGE UP (ref 135–146)
WBC # BLD: 10.28 K/UL — SIGNIFICANT CHANGE UP (ref 4.8–10.8)
WBC # FLD AUTO: 10.28 K/UL — SIGNIFICANT CHANGE UP (ref 4.8–10.8)

## 2021-08-29 PROCEDURE — 73502 X-RAY EXAM HIP UNI 2-3 VIEWS: CPT | Mod: 26,LT

## 2021-08-29 PROCEDURE — 73562 X-RAY EXAM OF KNEE 3: CPT | Mod: 26,LT

## 2021-08-29 PROCEDURE — 99285 EMERGENCY DEPT VISIT HI MDM: CPT

## 2021-08-29 PROCEDURE — 73090 X-RAY EXAM OF FOREARM: CPT | Mod: 26,LT

## 2021-08-29 PROCEDURE — 99222 1ST HOSP IP/OBS MODERATE 55: CPT

## 2021-08-29 PROCEDURE — 93010 ELECTROCARDIOGRAM REPORT: CPT

## 2021-08-29 PROCEDURE — 71045 X-RAY EXAM CHEST 1 VIEW: CPT | Mod: 26

## 2021-08-29 RX ORDER — FLUOROMETHOLONE 1 MG/ML
1 SOLUTION/ DROPS OPHTHALMIC DAILY
Refills: 0 | Status: DISCONTINUED | OUTPATIENT
Start: 2021-08-29 | End: 2021-08-30

## 2021-08-29 RX ORDER — ACETAMINOPHEN 500 MG
650 TABLET ORAL EVERY 6 HOURS
Refills: 0 | Status: DISCONTINUED | OUTPATIENT
Start: 2021-08-29 | End: 2021-08-30

## 2021-08-29 RX ORDER — ACETAMINOPHEN 500 MG
650 TABLET ORAL EVERY 6 HOURS
Refills: 0 | Status: DISCONTINUED | OUTPATIENT
Start: 2021-08-29 | End: 2021-08-29

## 2021-08-29 RX ORDER — TRAMADOL HYDROCHLORIDE 50 MG/1
25 TABLET ORAL EVERY 6 HOURS
Refills: 0 | Status: DISCONTINUED | OUTPATIENT
Start: 2021-08-29 | End: 2021-08-30

## 2021-08-29 RX ORDER — CELECOXIB 200 MG/1
200 CAPSULE ORAL DAILY
Refills: 0 | Status: DISCONTINUED | OUTPATIENT
Start: 2021-08-29 | End: 2021-08-30

## 2021-08-29 RX ORDER — FERROUS SULFATE 325(65) MG
325 TABLET ORAL DAILY
Refills: 0 | Status: DISCONTINUED | OUTPATIENT
Start: 2021-08-29 | End: 2021-08-30

## 2021-08-29 RX ORDER — LATANOPROST 0.05 MG/ML
1 SOLUTION/ DROPS OPHTHALMIC; TOPICAL AT BEDTIME
Refills: 0 | Status: DISCONTINUED | OUTPATIENT
Start: 2021-08-29 | End: 2021-08-30

## 2021-08-29 RX ORDER — MORPHINE SULFATE 50 MG/1
2 CAPSULE, EXTENDED RELEASE ORAL ONCE
Refills: 0 | Status: DISCONTINUED | OUTPATIENT
Start: 2021-08-29 | End: 2021-08-29

## 2021-08-29 RX ORDER — LEVOTHYROXINE SODIUM 125 MCG
75 TABLET ORAL DAILY
Refills: 0 | Status: DISCONTINUED | OUTPATIENT
Start: 2021-08-29 | End: 2021-08-30

## 2021-08-29 RX ORDER — POLYETHYLENE GLYCOL 3350 17 G/17G
17 POWDER, FOR SOLUTION ORAL DAILY
Refills: 0 | Status: DISCONTINUED | OUTPATIENT
Start: 2021-08-29 | End: 2021-08-30

## 2021-08-29 RX ORDER — HEPARIN SODIUM 5000 [USP'U]/ML
5000 INJECTION INTRAVENOUS; SUBCUTANEOUS EVERY 8 HOURS
Refills: 0 | Status: DISCONTINUED | OUTPATIENT
Start: 2021-08-29 | End: 2021-08-30

## 2021-08-29 RX ADMIN — LATANOPROST 1 DROP(S): 0.05 SOLUTION/ DROPS OPHTHALMIC; TOPICAL at 22:36

## 2021-08-29 RX ADMIN — MORPHINE SULFATE 2 MILLIGRAM(S): 50 CAPSULE, EXTENDED RELEASE ORAL at 13:35

## 2021-08-29 RX ADMIN — HEPARIN SODIUM 5000 UNIT(S): 5000 INJECTION INTRAVENOUS; SUBCUTANEOUS at 22:36

## 2021-08-29 NOTE — H&P ADULT - NSICDXPASTMEDICALHX_GEN_ALL_CORE_FT
PAST MEDICAL HISTORY:  Chronic primary angle-closure glaucoma of left eye, severe stage     Exudative age-related macular degeneration of left eye with inactive choroidal neovascularization     Fall Multiple falls at home    High cholesterol     Hypertension, unspecified type     Hyperthyroidism      Pt to consume >75% meals/supplements & maintain stable wt +/-2#

## 2021-08-29 NOTE — ED ADULT NURSE NOTE - NSIMPLEMENTINTERV_GEN_ALL_ED
Implemented All Fall Risk Interventions:  [place] to call system. Call bell, personal items and telephone within reach. Instruct patient to call for assistance. Room bathroom lighting operational. Non-slip footwear when patient is off stretcher. Physically safe environment: no spills, clutter or unnecessary equipment. Stretcher in lowest position, wheels locked, appropriate side rails in place. Provide visual cue, wrist band, yellow gown, etc. Monitor gait and stability. Monitor for mental status changes and reorient to person, place, and time. Review medications for side effects contributing to fall risk. Reinforce activity limits and safety measures with patient and family.

## 2021-08-29 NOTE — H&P ADULT - ASSESSMENT
89yo F w/ PMHx and HPI as above now being admitted for observation s/p mechanical fall    Fall:  - s/p imaging as above  - continue tylenol PRN mild-mod pain, tramadol severe pain  - obtain PT/OT consult    Glaucoma:  - continue home medication regimen    Hyperthyroidism:  - continue home levothyroxine 75mcg daily    HTN:  - on no home meds  - continue to monitor BP, HR    HLD:  - on no home meds  - MILO    PPX:  - DVT: continue SQH 89yo F w/ PMHx and HPI as above now being admitted for observation s/p mechanical fall    Fall:  - s/p imaging as above  - continue tylenol PRN mild-mod pain, tramadol severe pain  - obtain PT/OT consult  - likely discharge to Short term   - patient reports that she needs new walker     Mild dementia   - grand-daughter reports that she is usually forgetful about time but overall aware to surroundings     Glaucoma:  - continue home medication regimen    Hyperthyroidism:  - continue home levothyroxine 75mcg daily    HTN:  - on no home meds  - continue to monitor BP, HR    HLD:  - on no home meds  - MILO    PPX:  - DVT: continue SQH 89yo F w/ PMHx and HPI as above now being admitted for observation s/p mechanical fall    Fall:  - s/p imaging as above  - continue tylenol PRN mild-mod pain, tramadol severe pain  - obtain PT/OT consult  - likely discharge to Short term   - patient reports that she needs new walker     Mild dementia   - grand-daughter reports that she is usually forgetful about time but overall aware to surroundings     Glaucoma:  - continue home medication regimen    Hypothyroidism:  - continue home levothyroxine 75mcg daily    HTN:  - on no home meds  - continue to monitor BP, HR    HLD:  - on no home meds  - MILO    PPX:  - DVT: continue SQH

## 2021-08-29 NOTE — H&P ADULT - NSHPLABSRESULTS_GEN_ALL_CORE
11.5   10.28 )-----------( 169      ( 29 Aug 2021 12:59 )             35.3     08-29    141  |  106  |  38<H>  ----------------------------<  109<H>  4.6   |  24  |  1.1    Ca    10.5<H>      29 Aug 2021 12:59  Mg     2.0     08-29    TPro  7.8  /  Alb  4.3  /  TBili  0.7  /  DBili  x   /  AST  19  /  ALT  5   /  AlkPhos  99  08-29      CAPILLARY BLOOD GLUCOSE      < from: Xray Hip 2-3 Views, Left (08.29.21 @ 14:08) >    1.  Status post bilateral femoral galen and gamma nail fixation.  2.  No evidence of acute fracture or dislocation.    < from: Xray Chest 1 View-PORTABLE IMMEDIATE (08.29.21 @ 14:08) >    No radiographic evidence of acute cardiopulmonarydisease.

## 2021-08-29 NOTE — ED PROVIDER NOTE - OBJECTIVE STATEMENT
89 year old female past medical history of falls, Hypertension, HLD comes to emergency room for fall and left leg pain. patient states that she was trying to get something out of fridge and she and her son fell. patient fell onto left side. did not hit head, no loss of consciousness. patient states she was picked up by family and was unable to walk due to pain in her left leg. no chest or back pain.

## 2021-08-29 NOTE — ED PROVIDER NOTE - PHYSICAL EXAMINATION
Physical Exam    Vital Signs: I have reviewed the initial vital signs.  Constitutional: elderly and frail, no acute distress  Eyes: Conjunctiva pink, Sclera clear, PERRLA, EOMI.  Cardiovascular: S1 and S2, regular rate, regular rhythm, well-perfused extremities, radial pulses equal and 2+  Respiratory: unlabored respiratory effort, clear to auscultation bilaterally no wheezing, rales and rhonchi  Gastrointestinal: soft, non-tender abdomen, no pulsatile mass, normal bowl sounds  Musculoskeletal: supple neck, no lower extremity edema, no midline tenderness + left knee generalized tendenress, LROM of left leg secondary to knee pain and swelling. DP pulses equal.   Integumentary: warm, dry, no rash  Neurologic: awake, alert, cranial nerves II-XII grossly intact, extremities’ motor and sensory functions grossly intact  Psychiatric: appropriate mood, appropriate affect

## 2021-08-29 NOTE — ED PROVIDER NOTE - ATTENDING CONTRIBUTION TO CARE
89 yo F PMHx noted presents via EMS from home s/p fall.  Pt states that she was in the refrigerator looking for something when he turned and she fell.  States that her son then fell on top of her.  Pt c/o pain mostly to left knee and trouble standing.  Pt usually ambulates with walker at baseline.  Daughter states it was difficult to get pt into wheelchair because she couldn't stand.  On exam pt in NAD AAO x 3, no sign sof head trauma, no midline vertebral tenderness, Pelvis stable, + tender left knee with pain with flexion, mild swelling, skin intact

## 2021-08-29 NOTE — H&P ADULT - NSHPPHYSICALEXAM_GEN_ALL_CORE
PHYSICAL EXAM:    General: elderly, AAOx3, NAD    HEENT: NCAT, no JVD    Thorax: CTA b/l    Heart: normal S1/S2, rrr, no m/r/g    GI: BS normoactive, s/nt/nd    Extremities: no c/c/e, wwp, 2+ b/l UE/LE distal pulses. Mild tenderness over anterior and lateral L knee w/ FROM    Neurologic: no focal deficits

## 2021-08-29 NOTE — ED PROVIDER NOTE - CARE PLAN
1 Principal Discharge DX:	Fall  Secondary Diagnosis:	Right knee pain  Secondary Diagnosis:	Inability to ambulate due to knee

## 2021-08-29 NOTE — ED PROVIDER NOTE - CLINICAL SUMMARY MEDICAL DECISION MAKING FREE TEXT BOX
Labs and imaging reviewed.  Pt still with difficulty bending knee.  Unsafe discharge at this time.  Will need PT eval, will admit. Pt aware of plan

## 2021-08-29 NOTE — H&P ADULT - HISTORY OF PRESENT ILLNESS
89yo F w/ PMHx of chronic primary angle-closure glaucoma of left eye, exudative age-related macular degeneration of left eye with inactive choroidal neovascularization, HTN, HLD, hyperthyroidism, now presenting s/p fall. Pt lives at home w/ her adult son, ambulates w/ walker at baseline. This morning pt was standing in kitchen w/ her son when he suddenly turned around and bumped into her. Pt fell to the ground and landed on her left side, but denies any head strike or LOC. Denies any preceding dizziness, lightheadedness, palpitations, or other associated sx. Also denies any recent fevers, chills, CP, SOB, n/v/d, LE edema, or other associated sx. Transported to ED for further evaluation. Currently c/o mild L knee pain, otherwise asymptomatic. 89yo F w/ PMHx of chronic primary angle-closure glaucoma of left eye, exudative age-related macular degeneration of left eye with inactive choroidal neovascularization, HTN, HLD, hyperthyroidism, mild dementia, now presenting s/p fall. Pt lives at home w/ her adult son, ambulates w/ walker at baseline. This morning pt was standing in kitchen w/ her son when he suddenly turned around and bumped into her. Pt fell to the ground and landed on her left side, but denies any head strike or LOC. Denies any preceding dizziness, lightheadedness, palpitations, or other associated sx. Also denies any recent fevers, chills, CP, SOB, n/v/d, LE edema, or other associated sx. Transported to ED for further evaluation. Currently c/o mild L knee pain, otherwise asymptomatic.

## 2021-08-30 ENCOUNTER — TRANSCRIPTION ENCOUNTER (OUTPATIENT)
Age: 86
End: 2021-08-30

## 2021-08-30 VITALS
DIASTOLIC BLOOD PRESSURE: 58 MMHG | RESPIRATION RATE: 18 BRPM | HEART RATE: 84 BPM | SYSTOLIC BLOOD PRESSURE: 134 MMHG | TEMPERATURE: 97 F

## 2021-08-30 LAB
ANION GAP SERPL CALC-SCNC: 9 MMOL/L — SIGNIFICANT CHANGE UP (ref 7–14)
BUN SERPL-MCNC: 32 MG/DL — HIGH (ref 10–20)
CALCIUM SERPL-MCNC: 9.8 MG/DL — SIGNIFICANT CHANGE UP (ref 8.5–10.1)
CHLORIDE SERPL-SCNC: 105 MMOL/L — SIGNIFICANT CHANGE UP (ref 98–110)
CO2 SERPL-SCNC: 25 MMOL/L — SIGNIFICANT CHANGE UP (ref 17–32)
CREAT SERPL-MCNC: 1.1 MG/DL — SIGNIFICANT CHANGE UP (ref 0.7–1.5)
GLUCOSE SERPL-MCNC: 104 MG/DL — HIGH (ref 70–99)
HCT VFR BLD CALC: 31.9 % — LOW (ref 37–47)
HGB BLD-MCNC: 10.4 G/DL — LOW (ref 12–16)
MCHC RBC-ENTMCNC: 28.7 PG — SIGNIFICANT CHANGE UP (ref 27–31)
MCHC RBC-ENTMCNC: 32.6 G/DL — SIGNIFICANT CHANGE UP (ref 32–37)
MCV RBC AUTO: 88.1 FL — SIGNIFICANT CHANGE UP (ref 81–99)
NRBC # BLD: 0 /100 WBCS — SIGNIFICANT CHANGE UP (ref 0–0)
PLATELET # BLD AUTO: 152 K/UL — SIGNIFICANT CHANGE UP (ref 130–400)
POTASSIUM SERPL-MCNC: 4.1 MMOL/L — SIGNIFICANT CHANGE UP (ref 3.5–5)
POTASSIUM SERPL-SCNC: 4.1 MMOL/L — SIGNIFICANT CHANGE UP (ref 3.5–5)
RBC # BLD: 3.62 M/UL — LOW (ref 4.2–5.4)
RBC # FLD: 14.1 % — SIGNIFICANT CHANGE UP (ref 11.5–14.5)
SODIUM SERPL-SCNC: 139 MMOL/L — SIGNIFICANT CHANGE UP (ref 135–146)
WBC # BLD: 5.76 K/UL — SIGNIFICANT CHANGE UP (ref 4.8–10.8)
WBC # FLD AUTO: 5.76 K/UL — SIGNIFICANT CHANGE UP (ref 4.8–10.8)

## 2021-08-30 PROCEDURE — 99239 HOSP IP/OBS DSCHRG MGMT >30: CPT

## 2021-08-30 RX ORDER — TRAMADOL HYDROCHLORIDE 50 MG/1
0.5 TABLET ORAL
Qty: 4.5 | Refills: 0
Start: 2021-08-30 | End: 2021-09-01

## 2021-08-30 RX ORDER — ACETAMINOPHEN 500 MG
2 TABLET ORAL
Qty: 0 | Refills: 0 | DISCHARGE
Start: 2021-08-30

## 2021-08-30 RX ADMIN — HEPARIN SODIUM 5000 UNIT(S): 5000 INJECTION INTRAVENOUS; SUBCUTANEOUS at 05:50

## 2021-08-30 RX ADMIN — HEPARIN SODIUM 5000 UNIT(S): 5000 INJECTION INTRAVENOUS; SUBCUTANEOUS at 14:16

## 2021-08-30 RX ADMIN — Medication 75 MICROGRAM(S): at 05:50

## 2021-08-30 RX ADMIN — Medication 325 MILLIGRAM(S): at 15:46

## 2021-08-30 RX ADMIN — CELECOXIB 200 MILLIGRAM(S): 200 CAPSULE ORAL at 15:45

## 2021-08-30 NOTE — PHYSICAL THERAPY INITIAL EVALUATION ADULT - ADDITIONAL COMMENTS
Per patient , resides with her son and his family in a private home.  Patient says there are stairs up to 2nd level and down to basement, however, patient says she is able to remain on 1st floor if needed.   Ambulates with rolling walker at baseline.

## 2021-08-30 NOTE — PHYSICAL THERAPY INITIAL EVALUATION ADULT - GAIT DEVIATIONS NOTED, PT EVAL
narrow INDERJIT , decreased heel strike / push off/decreased mallika/increased time in double stance/decreased step length/decreased weight-shifting ability

## 2021-08-30 NOTE — PROGRESS NOTE ADULT - ASSESSMENT
89yo F w/ PMHx and HPI as above now being admitted for observation s/p mechanical fall    Fall:  - s/p imaging as above  - continue tylenol PRN mild-mod pain, tramadol severe pain  - discharge home with Home PT     Mild dementia   - grand-daughter reports that she is usually forgetful about time but overall aware to surroundings     Glaucoma:  - continue home medication regimen    Hyperthyroidism:  - continue home levothyroxine 75mcg daily    HTN:  - on no home meds  - continue to monitor BP, HR    HLD:  - on no home meds  - MILO    PPX:  - DVT: continue SQH 89yo F w/ PMHx and HPI as above now being admitted for observation s/p mechanical fall    Fall:  - s/p imaging as above  - continue tylenol PRN mild-mod pain, tramadol severe pain  - discharge home with Home PT     Mild dementia   - grand-daughter reports that she is usually forgetful about time but overall aware to surroundings     Glaucoma:  - continue home medication regimen    Hypothyroidism:  - continue home levothyroxine 75mcg daily    HTN:  - on no home meds  - continue to monitor BP, HR    HLD:  - on no home meds  - MILO    PPX:  - DVT: continue SQH

## 2021-08-30 NOTE — PROGRESS NOTE ADULT - SUBJECTIVE AND OBJECTIVE BOX
Patient is a 89y old  Female who presents with a chief complaint of s/p fall (30 Aug 2021 10:55)      INTERVAL HPI/OVERNIGHT EVENTS: no acute overnight events noted.   Patient seen and examined at bedside.     REVIEW OF SYSTEMS: negative  Vital Signs Last 24 Hrs  T(C): 36.3 (30 Aug 2021 13:43), Max: 37.7 (29 Aug 2021 19:30)  T(F): 97.4 (30 Aug 2021 13:43), Max: 99.9 (29 Aug 2021 19:30)  HR: 84 (30 Aug 2021 13:43) (71 - 84)  BP: 134/58 (30 Aug 2021 13:43) (129/61 - 177/73)  BP(mean): --  RR: 18 (30 Aug 2021 13:43) (18 - 18)  SpO2: 95% (30 Aug 2021 05:59) (95% - 98%)    PHYSICAL EXAM:   NAD; Normocephalic;   LUNGS - no wheezing  HEART: S1 S2+   ABDOMEN: Soft, Nontender, non distended  EXTREMITIES: no cyanosis; no edema  NERVOUS SYSTEM:  Awake and alert; no focal neuro deficits appreciated    LABS:                        10.4   5.76  )-----------( 152      ( 30 Aug 2021 06:15 )             31.9     08-30    139  |  105  |  32<H>  ----------------------------<  104<H>  4.1   |  25  |  1.1    Ca    9.8      30 Aug 2021 06:15  Mg     2.0     08-29    TPro  7.8  /  Alb  4.3  /  TBili  0.7  /  DBili  x   /  AST  19  /  ALT  5   /  AlkPhos  99  08-29      Medications:  MEDICATIONS  (STANDING):  celecoxib 200 milliGRAM(s) Oral daily  ferrous    sulfate 325 milliGRAM(s) Oral daily  fluorometholone 0.1% Suspension 1 Drop(s) Both EYES daily  heparin   Injectable 5000 Unit(s) SubCutaneous every 8 hours  latanoprost 0.005% Ophthalmic Solution 1 Drop(s) Both EYES at bedtime  levothyroxine 75 MICROGram(s) Oral daily    MEDICATIONS  (PRN):  acetaminophen   Tablet .. 650 milliGRAM(s) Oral every 6 hours PRN Temp greater or equal to 38C (100.4F), Mild Pain (1 - 3), Moderate Pain (4 - 6)  polyethylene glycol 3350 17 Gram(s) Oral daily PRN Constipation  traMADol 25 milliGRAM(s) Oral every 6 hours PRN Severe Pain (7 - 10)

## 2021-08-30 NOTE — DISCHARGE NOTE PROVIDER - NSDCCPCAREPLAN_GEN_ALL_CORE_FT
PRINCIPAL DISCHARGE DIAGNOSIS  Diagnosis: Fall  Assessment and Plan of Treatment: You had a fall, your workup was negative for any injuries.  You were discharged to a rehab facility.  Make sure to FU with your pcp on discharge      SECONDARY DISCHARGE DIAGNOSES  Diagnosis: Right knee pain  Assessment and Plan of Treatment: c/w APAP and PT    Diagnosis: Inability to ambulate due to knee  Assessment and Plan of Treatment: c/w PT     PRINCIPAL DISCHARGE DIAGNOSIS  Diagnosis: Fall  Assessment and Plan of Treatment: You had a fall, your workup was negative for any injuries.  Make sure to FU with your pcp on discharge      SECONDARY DISCHARGE DIAGNOSES  Diagnosis: Right knee pain  Assessment and Plan of Treatment: -  use tylenol for mild to moderate pain and Tramadol for severe pain  - Continue physical therapy    Diagnosis: Inability to ambulate due to knee  Assessment and Plan of Treatment: c/w PT

## 2021-08-30 NOTE — DISCHARGE NOTE PROVIDER - HOSPITAL COURSE
89yo F w/ PMHx of chronic primary angle-closure glaucoma of left eye, exudative age-related macular degeneration of left eye with inactive choroidal neovascularization, HTN, HLD, hyperthyroidism, mild dementia, now presenting s/p fall. Pt lives at home w/ her adult son, ambulates w/ walker at baseline. This morning pt was standing in kitchen w/ her son when he suddenly turned around and bumped into her. Pt fell to the ground and landed on her left side, but denies any head strike or LOC. Denies any preceding dizziness, lightheadedness, palpitations, or other associated sx. Also denies any recent fevers, chills, CP, SOB, n/v/d, LE edema, or other associated sx. Transported to ED for further evaluation. Currently c/o mild L knee pain, otherwise asymptomatic.    Patient admitted to medicine service, seen by PT and otherwise remained stable.  Patient discharged to SNF for further management.  Chronic conditions remained stable.

## 2021-08-30 NOTE — DISCHARGE NOTE PROVIDER - CARE PROVIDER_API CALL
Koffi Cooper)  Internal Medicine  [address] [place] [address]  Phone: [phone]  Fax: [phone]  Follow Up Time:

## 2021-08-30 NOTE — DISCHARGE NOTE NURSING/CASE MANAGEMENT/SOCIAL WORK - NSDCPEFALRISK_GEN_ALL_CORE
For information on Fall & injury Prevention, visit [email or web address]

## 2021-08-30 NOTE — DISCHARGE NOTE NURSING/CASE MANAGEMENT/SOCIAL WORK - PATIENT PORTAL LINK FT
You can access the FollowMyHealth Patient Portal offered by [facility] by registering at the following website: [email or web address]. By joining [employer]’s FollowMyHealth portal, you will also be able to view your health information using other applications (apps) compatible with our system.

## 2021-08-30 NOTE — DISCHARGE NOTE NURSING/CASE MANAGEMENT/SOCIAL WORK - NSDCVIVACCINE_GEN_ALL_CORE_FT
influenza, injectable, quadrivalent, preservative free; 21-Dec-2019 14:12; Felipe Pearson (RN); [employer]; [id number] (Exp. Date: 30-Jun-2020); IntraMuscular; Deltoid Left.; 0.5 milliLiter(s); VIS (VIS Published: 15-Aug-2019, VIS Presented: 21-Dec-2019);   Tdap; 25-Jul-2019 16:09; Marcy Coelho (ZHANNA); [employer]; [id number] (Exp. Date: 07-Jun-2021); IntraMuscular; Deltoid Right.; 0.5 milliLiter(s); VIS (VIS Published: 09-May-2013, VIS Presented: 25-Jul-2019);

## 2021-08-30 NOTE — PHYSICAL THERAPY INITIAL EVALUATION ADULT - GENERAL OBSERVATIONS, REHAB EVAL
9:25 - 9:50. Chart reviewed. Patient available to be seen for physical therapy, confirmed with nurse. Patient encountered semi-reclined in bed, +prima fit. Denies pain at rest and is ready to walk with PT now.

## 2021-08-30 NOTE — DISCHARGE NOTE PROVIDER - NSDCMRMEDTOKEN_GEN_ALL_CORE_FT
acetaminophen 325 mg oral tablet: 2 tab(s) orally every 6 hours, As needed, Temp greater or equal to 38C (100.4F), Mild Pain (1 - 3), Moderate Pain (4 - 6)  brimonidine-timolol 0.2%-0.5% ophthalmic solution: 1 drop(s) to each affected eye every 12 hours  ferrous sulfate 325 mg (65 mg elemental iron) oral tablet: 1 tab(s) orally once a day  fluorometholone 0.1% ophthalmic suspension: 1 drop(s) in each eye once a day   latanoprost 0.005% ophthalmic solution: 1 drop(s) to each affected eye once a day (at bedtime)  levothyroxine 75 mcg (0.075 mg) oral tablet: 1 tab(s) orally once a day  LORazepam 0.5 mg oral tablet:   meloxicam 5 mg oral capsule: 1 cap(s) orally once a day  polyethylene glycol 3350 oral powder for reconstitution: 17 gram(s) orally once a day, As Needed -for constipation    acetaminophen 325 mg oral tablet: 2 tab(s) orally every 6 hours, As needed, Temp greater or equal to 38C (100.4F), Mild Pain (1 - 3), Moderate Pain (4 - 6)  brimonidine-timolol 0.2%-0.5% ophthalmic solution: 1 drop(s) to each affected eye every 12 hours  ferrous sulfate 325 mg (65 mg elemental iron) oral tablet: 1 tab(s) orally once a day  fluorometholone 0.1% ophthalmic suspension: 1 drop(s) in each eye once a day   latanoprost 0.005% ophthalmic solution: 1 drop(s) to each affected eye once a day (at bedtime)  levothyroxine 75 mcg (0.075 mg) oral tablet: 1 tab(s) orally once a day  LORazepam 0.5 mg oral tablet:   meloxicam 5 mg oral capsule: 1 cap(s) orally once a day  polyethylene glycol 3350 oral powder for reconstitution: 17 gram(s) orally once a day, As Needed -for constipation   traMADol 50 mg oral tablet: 0.5 tab(s) orally every 8 hours, As Needed -Severe Pain (7 - 10) MDD:1.5 tab/day

## 2021-08-31 DIAGNOSIS — E78.5 HYPERLIPIDEMIA, UNSPECIFIED: ICD-10-CM

## 2021-08-31 DIAGNOSIS — E03.9 HYPOTHYROIDISM, UNSPECIFIED: ICD-10-CM

## 2021-08-31 DIAGNOSIS — F03.90 UNSPECIFIED DEMENTIA WITHOUT BEHAVIORAL DISTURBANCE: ICD-10-CM

## 2021-08-31 DIAGNOSIS — H40.9 UNSPECIFIED GLAUCOMA: ICD-10-CM

## 2021-08-31 DIAGNOSIS — I10 ESSENTIAL (PRIMARY) HYPERTENSION: ICD-10-CM
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
## 2021-08-31 LAB
COVID-19 SPIKE DOMAIN AB INTERP: POSITIVE
COVID-19 SPIKE DOMAIN ANTIBODY RESULT: >250 U/ML — HIGH
SARS-COV-2 IGG+IGM SERPL QL IA: >250 U/ML — HIGH
SARS-COV-2 IGG+IGM SERPL QL IA: POSITIVE

## 2021-09-10 DIAGNOSIS — M25.561 PAIN IN RIGHT KNEE: ICD-10-CM

## 2021-09-10 DIAGNOSIS — E03.9 HYPOTHYROIDISM, UNSPECIFIED: ICD-10-CM

## 2021-09-10 DIAGNOSIS — Z79.899 OTHER LONG TERM (CURRENT) DRUG THERAPY: ICD-10-CM

## 2021-09-10 DIAGNOSIS — H40.2220 CHRONIC ANGLE-CLOSURE GLAUCOMA, LEFT EYE, STAGE UNSPECIFIED: ICD-10-CM

## 2021-09-10 DIAGNOSIS — R29.6 REPEATED FALLS: ICD-10-CM

## 2021-09-10 DIAGNOSIS — Z96.651 PRESENCE OF RIGHT ARTIFICIAL KNEE JOINT: ICD-10-CM

## 2021-09-10 DIAGNOSIS — I10 ESSENTIAL (PRIMARY) HYPERTENSION: ICD-10-CM

## 2021-09-10 DIAGNOSIS — H35.3222 EXUDATIVE AGE-RELATED MACULAR DEGENERATION, LEFT EYE, WITH INACTIVE CHOROIDAL NEOVASCULARIZATION: ICD-10-CM

## 2021-09-10 DIAGNOSIS — M25.562 PAIN IN LEFT KNEE: ICD-10-CM

## 2021-09-10 DIAGNOSIS — E78.00 PURE HYPERCHOLESTEROLEMIA, UNSPECIFIED: ICD-10-CM

## 2021-09-10 DIAGNOSIS — F03.90 UNSPECIFIED DEMENTIA, UNSPECIFIED SEVERITY, WITHOUT BEHAVIORAL DISTURBANCE, PSYCHOTIC DISTURBANCE, MOOD DISTURBANCE, AND ANXIETY: ICD-10-CM

## 2021-10-18 ENCOUNTER — TRANSCRIPTION ENCOUNTER (OUTPATIENT)
Age: 86
End: 2021-10-18

## 2022-04-12 NOTE — ED ADULT NURSE NOTE - BRADEN SCORE (IF 18 OR LESS ACTIVATE SKIN INJURY RISK INCREASED GUIDELINE), MLM
16 Calcipotriene Pregnancy And Lactation Text: This medication has not been proven safe during pregnancy. It is unknown if this medication is excreted in breast milk.

## 2022-05-04 ENCOUNTER — INPATIENT (INPATIENT)
Facility: HOSPITAL | Age: 87
LOS: 0 days | Discharge: HOME HEALTH | End: 2022-05-05
Attending: INTERNAL MEDICINE | Admitting: INTERNAL MEDICINE
Payer: MEDICARE

## 2022-05-04 VITALS
RESPIRATION RATE: 18 BRPM | HEART RATE: 68 BPM | OXYGEN SATURATION: 95 % | SYSTOLIC BLOOD PRESSURE: 146 MMHG | TEMPERATURE: 100 F | DIASTOLIC BLOOD PRESSURE: 63 MMHG

## 2022-05-04 DIAGNOSIS — R55 SYNCOPE AND COLLAPSE: ICD-10-CM

## 2022-05-04 DIAGNOSIS — M19.90 UNSPECIFIED OSTEOARTHRITIS, UNSPECIFIED SITE: ICD-10-CM

## 2022-05-04 DIAGNOSIS — I10 ESSENTIAL (PRIMARY) HYPERTENSION: ICD-10-CM

## 2022-05-04 DIAGNOSIS — F03.90 UNSPECIFIED DEMENTIA WITHOUT BEHAVIORAL DISTURBANCE: ICD-10-CM

## 2022-05-04 DIAGNOSIS — U07.1 COVID-19: ICD-10-CM

## 2022-05-04 DIAGNOSIS — Z96.641 PRESENCE OF RIGHT ARTIFICIAL HIP JOINT: Chronic | ICD-10-CM

## 2022-05-04 DIAGNOSIS — H35.3222 EXUDATIVE AGE-RELATED MACULAR DEGENERATION, LEFT EYE, WITH INACTIVE CHOROIDAL NEOVASCULARIZATION: ICD-10-CM

## 2022-05-04 DIAGNOSIS — E05.90 THYROTOXICOSIS, UNSPECIFIED WITHOUT THYROTOXIC CRISIS OR STORM: ICD-10-CM

## 2022-05-04 DIAGNOSIS — D64.9 ANEMIA, UNSPECIFIED: ICD-10-CM

## 2022-05-04 DIAGNOSIS — E78.00 PURE HYPERCHOLESTEROLEMIA, UNSPECIFIED: ICD-10-CM

## 2022-05-04 DIAGNOSIS — F41.9 ANXIETY DISORDER, UNSPECIFIED: ICD-10-CM

## 2022-05-04 DIAGNOSIS — H40.2223 CHRONIC ANGLE-CLOSURE GLAUCOMA, LEFT EYE, SEVERE STAGE: ICD-10-CM

## 2022-05-04 LAB
ALBUMIN SERPL ELPH-MCNC: 4.2 G/DL — SIGNIFICANT CHANGE UP (ref 3.5–5.2)
ALP SERPL-CCNC: 74 U/L — SIGNIFICANT CHANGE UP (ref 30–115)
ALT FLD-CCNC: <5 U/L — SIGNIFICANT CHANGE UP (ref 0–41)
ANION GAP SERPL CALC-SCNC: 11 MMOL/L — SIGNIFICANT CHANGE UP (ref 7–14)
APPEARANCE UR: CLEAR — SIGNIFICANT CHANGE UP
AST SERPL-CCNC: 14 U/L — SIGNIFICANT CHANGE UP (ref 0–41)
BASOPHILS # BLD AUTO: 0.03 K/UL — SIGNIFICANT CHANGE UP (ref 0–0.2)
BASOPHILS NFR BLD AUTO: 0.5 % — SIGNIFICANT CHANGE UP (ref 0–1)
BILIRUB SERPL-MCNC: 0.5 MG/DL — SIGNIFICANT CHANGE UP (ref 0.2–1.2)
BILIRUB UR-MCNC: NEGATIVE — SIGNIFICANT CHANGE UP
BUN SERPL-MCNC: 37 MG/DL — HIGH (ref 10–20)
CALCIUM SERPL-MCNC: 9.5 MG/DL — SIGNIFICANT CHANGE UP (ref 8.5–10.1)
CHLORIDE SERPL-SCNC: 105 MMOL/L — SIGNIFICANT CHANGE UP (ref 98–110)
CO2 SERPL-SCNC: 21 MMOL/L — SIGNIFICANT CHANGE UP (ref 17–32)
COLOR SPEC: YELLOW — SIGNIFICANT CHANGE UP
CREAT SERPL-MCNC: 1.5 MG/DL — SIGNIFICANT CHANGE UP (ref 0.7–1.5)
DIFF PNL FLD: NEGATIVE — SIGNIFICANT CHANGE UP
EGFR: 32 ML/MIN/1.73M2 — LOW
EOSINOPHIL # BLD AUTO: 0.05 K/UL — SIGNIFICANT CHANGE UP (ref 0–0.7)
EOSINOPHIL NFR BLD AUTO: 0.8 % — SIGNIFICANT CHANGE UP (ref 0–8)
FLUAV AG NPH QL: SIGNIFICANT CHANGE UP
FLUBV AG NPH QL: SIGNIFICANT CHANGE UP
GLUCOSE SERPL-MCNC: 118 MG/DL — HIGH (ref 70–99)
GLUCOSE UR QL: NEGATIVE MG/DL — SIGNIFICANT CHANGE UP
HCT VFR BLD CALC: 32.1 % — LOW (ref 37–47)
HGB BLD-MCNC: 10.2 G/DL — LOW (ref 12–16)
IMM GRANULOCYTES NFR BLD AUTO: 0.5 % — HIGH (ref 0.1–0.3)
KETONES UR-MCNC: NEGATIVE — SIGNIFICANT CHANGE UP
LEUKOCYTE ESTERASE UR-ACNC: NEGATIVE — SIGNIFICANT CHANGE UP
LYMPHOCYTES # BLD AUTO: 0.89 K/UL — LOW (ref 1.2–3.4)
LYMPHOCYTES # BLD AUTO: 13.4 % — LOW (ref 20.5–51.1)
MAGNESIUM SERPL-MCNC: 2 MG/DL — SIGNIFICANT CHANGE UP (ref 1.8–2.4)
MCHC RBC-ENTMCNC: 28.3 PG — SIGNIFICANT CHANGE UP (ref 27–31)
MCHC RBC-ENTMCNC: 31.8 G/DL — LOW (ref 32–37)
MCV RBC AUTO: 89.2 FL — SIGNIFICANT CHANGE UP (ref 81–99)
MONOCYTES # BLD AUTO: 0.68 K/UL — HIGH (ref 0.1–0.6)
MONOCYTES NFR BLD AUTO: 10.3 % — HIGH (ref 1.7–9.3)
NEUTROPHILS # BLD AUTO: 4.94 K/UL — SIGNIFICANT CHANGE UP (ref 1.4–6.5)
NEUTROPHILS NFR BLD AUTO: 74.5 % — SIGNIFICANT CHANGE UP (ref 42.2–75.2)
NITRITE UR-MCNC: NEGATIVE — SIGNIFICANT CHANGE UP
NRBC # BLD: 0 /100 WBCS — SIGNIFICANT CHANGE UP (ref 0–0)
PH UR: 7 — SIGNIFICANT CHANGE UP (ref 5–8)
PLATELET # BLD AUTO: 134 K/UL — SIGNIFICANT CHANGE UP (ref 130–400)
POTASSIUM SERPL-MCNC: 4.4 MMOL/L — SIGNIFICANT CHANGE UP (ref 3.5–5)
POTASSIUM SERPL-SCNC: 4.4 MMOL/L — SIGNIFICANT CHANGE UP (ref 3.5–5)
PROT SERPL-MCNC: 7.3 G/DL — SIGNIFICANT CHANGE UP (ref 6–8)
PROT UR-MCNC: NEGATIVE MG/DL — SIGNIFICANT CHANGE UP
RBC # BLD: 3.6 M/UL — LOW (ref 4.2–5.4)
RBC # FLD: 14.3 % — SIGNIFICANT CHANGE UP (ref 11.5–14.5)
RSV RNA NPH QL NAA+NON-PROBE: SIGNIFICANT CHANGE UP
SARS-COV-2 RNA SPEC QL NAA+PROBE: DETECTED
SODIUM SERPL-SCNC: 137 MMOL/L — SIGNIFICANT CHANGE UP (ref 135–146)
SP GR SPEC: 1.01 — SIGNIFICANT CHANGE UP (ref 1.01–1.03)
TROPONIN T SERPL-MCNC: 0.01 NG/ML — SIGNIFICANT CHANGE UP
UROBILINOGEN FLD QL: 0.2 MG/DL — SIGNIFICANT CHANGE UP
WBC # BLD: 6.62 K/UL — SIGNIFICANT CHANGE UP (ref 4.8–10.8)
WBC # FLD AUTO: 6.62 K/UL — SIGNIFICANT CHANGE UP (ref 4.8–10.8)

## 2022-05-04 PROCEDURE — 71045 X-RAY EXAM CHEST 1 VIEW: CPT | Mod: 26

## 2022-05-04 PROCEDURE — 99285 EMERGENCY DEPT VISIT HI MDM: CPT

## 2022-05-04 PROCEDURE — 93010 ELECTROCARDIOGRAM REPORT: CPT

## 2022-05-04 PROCEDURE — 70450 CT HEAD/BRAIN W/O DYE: CPT | Mod: 26,MA

## 2022-05-04 NOTE — ED PROVIDER NOTE - NS ED ATTENDING STATEMENT MOD
This was a shared visit with the ROSE. I reviewed and verified the documentation and independently performed the documented:

## 2022-05-04 NOTE — ED ADULT NURSE NOTE - NSIMPLEMENTINTERV_GEN_ALL_ED
Implemented All Fall with Harm Risk Interventions:  [place] to call system. Call bell, personal items and telephone within reach. Instruct patient to call for assistance. Room bathroom lighting operational. Non-slip footwear when patient is off stretcher. Physically safe environment: no spills, clutter or unnecessary equipment. Stretcher in lowest position, wheels locked, appropriate side rails in place. Provide visual cue, wrist band, yellow gown, etc. Monitor gait and stability. Monitor for mental status changes and reorient to person, place, and time. Review medications for side effects contributing to fall risk. Reinforce activity limits and safety measures with patient and family. Provide visual clues: red socks.

## 2022-05-04 NOTE — ED PROVIDER NOTE - NS ED ROS FT
Constitutional: (+) fever, (-) chills  Eyes: (-) visual changes  ENT: (-) nasal congestions  Cardiovascular: (-) chest pain, (+) syncope  Respiratory: (-) cough, (-) shortness of breath, (-) dyspnea,   Gastrointestinal: (-) vomiting, (-) diarrhea, (-)nausea,  : (+) Frequency, (-) hematuria, (-) Dysuria.   Musculoskeletal: (-) neck pain, (-) back pain, (-) joint pain,  Integumentary: (-) rash, (-) edema, (-) bruises  Neurological: (-) headache, (-) loc, (-) dizziness, (-) tingling, (-)numbness,  Peripheral Vascular: (-) leg swelling

## 2022-05-04 NOTE — ED PROVIDER NOTE - PHYSICAL EXAMINATION
Physical Exam    Vital Signs: I have reviewed the initial vital signs.  Constitutional: Elderly Female, NAD.  Eyes: Conjunctiva pink, Sclera clear, PERRLA, EOMI.  ENT: OP is clear without exudates, normal dentition, normal gingival, tongue without swelling  Cardiovascular: S1 and S2, regular rate, regular rhythm, well-perfused extremities, radial pulses equal and 2+  Respiratory: unlabored respiratory effort, clear to auscultation bilaterally no wheezing, rales and rhonchi  Gastrointestinal: soft, non-tender abdomen, no pulsatile mass, normal bowl sounds  Musculoskeletal: supple neck, no lower extremity edema, no midline tenderness, no signs of trauma.   Integumentary: warm, dry, no rash  Neurologic: awake, alert, motor and sensory functions grossly intact  Psychiatric: appropriate mood, appropriate affect

## 2022-05-04 NOTE — ED PROVIDER NOTE - OBJECTIVE STATEMENT
89 y/o F pmhx hyperthyroidism, high cholesterol and HTN bib son s/p syncopal episode at home. Son who is bedside states that the Pt was walking around and experienced the syncopal event with no preceding symptoms. Near by family were able to catch the Pt and she experienced no head injury or other trauma. Currently, in the ED, Pt c/o increased urinary frequency, is unable to recall the events that took place at home. Son notes recent subjective fever over the past 1-2 days but no cough, chest pain, SOB, diaphoresis, leg pain/leg swelling or other URI symptoms.

## 2022-05-04 NOTE — ED PROVIDER NOTE - ATTENDING APP SHARED VISIT CONTRIBUTION OF CARE
Patient is a poor historian however presents for syncopal event after walking to the kitchen.  She does report change in urination over the past few days.  Syncope was noted by the son.  He denies that she had any presyncopal symptoms.  Patient awoke in immediately without any seizure-like activities.  Denies any head injury.  On exam she is AO x2 to person and place which is baseline.  She follows all commands.  Heart and lungs are normal.  She is warm to touch.  Plan is to obtain lab work CT head EKG and chest x-ray for evaluation of sepsis and syncope

## 2022-05-04 NOTE — ED PROVIDER NOTE - CLINICAL SUMMARY MEDICAL DECISION MAKING FREE TEXT BOX
Patient evaluated for syncope.  Found to be febrile.  Source is likely COVID infection.  Lab work was normal.  EKG and cardiac enzymes were normal.  CT head did not show any acute process.  Patient admitted for syncope work-up

## 2022-05-05 ENCOUNTER — TRANSCRIPTION ENCOUNTER (OUTPATIENT)
Age: 87
End: 2022-05-05

## 2022-05-05 VITALS
RESPIRATION RATE: 18 BRPM | HEART RATE: 66 BPM | OXYGEN SATURATION: 95 % | DIASTOLIC BLOOD PRESSURE: 65 MMHG | SYSTOLIC BLOOD PRESSURE: 153 MMHG | TEMPERATURE: 99 F

## 2022-05-05 LAB
ALBUMIN SERPL ELPH-MCNC: 4.1 G/DL — SIGNIFICANT CHANGE UP (ref 3.5–5.2)
ALP SERPL-CCNC: 68 U/L — SIGNIFICANT CHANGE UP (ref 30–115)
ALT FLD-CCNC: <5 U/L — SIGNIFICANT CHANGE UP (ref 0–41)
ANION GAP SERPL CALC-SCNC: 12 MMOL/L — SIGNIFICANT CHANGE UP (ref 7–14)
AST SERPL-CCNC: 13 U/L — SIGNIFICANT CHANGE UP (ref 0–41)
BILIRUB SERPL-MCNC: 0.5 MG/DL — SIGNIFICANT CHANGE UP (ref 0.2–1.2)
BUN SERPL-MCNC: 38 MG/DL — HIGH (ref 10–20)
CALCIUM SERPL-MCNC: 9.5 MG/DL — SIGNIFICANT CHANGE UP (ref 8.5–10.1)
CHLORIDE SERPL-SCNC: 108 MMOL/L — SIGNIFICANT CHANGE UP (ref 98–110)
CO2 SERPL-SCNC: 20 MMOL/L — SIGNIFICANT CHANGE UP (ref 17–32)
CREAT SERPL-MCNC: 1.5 MG/DL — SIGNIFICANT CHANGE UP (ref 0.7–1.5)
D DIMER BLD IA.RAPID-MCNC: 874 NG/ML DDU — HIGH (ref 0–230)
EGFR: 32 ML/MIN/1.73M2 — LOW
ERYTHROCYTE [SEDIMENTATION RATE] IN BLOOD: 60 MM/HR — HIGH (ref 0–20)
FERRITIN SERPL-MCNC: 1014 NG/ML — HIGH (ref 15–150)
GLUCOSE SERPL-MCNC: 101 MG/DL — HIGH (ref 70–99)
LDH SERPL L TO P-CCNC: 149 — SIGNIFICANT CHANGE UP (ref 50–242)
POTASSIUM SERPL-MCNC: 4.2 MMOL/L — SIGNIFICANT CHANGE UP (ref 3.5–5)
POTASSIUM SERPL-SCNC: 4.2 MMOL/L — SIGNIFICANT CHANGE UP (ref 3.5–5)
PROT SERPL-MCNC: 7.1 G/DL — SIGNIFICANT CHANGE UP (ref 6–8)
SODIUM SERPL-SCNC: 140 MMOL/L — SIGNIFICANT CHANGE UP (ref 135–146)

## 2022-05-05 PROCEDURE — 99222 1ST HOSP IP/OBS MODERATE 55: CPT

## 2022-05-05 RX ORDER — BRIMONIDINE TARTRATE 2 MG/MG
1 SOLUTION/ DROPS OPHTHALMIC
Refills: 0 | Status: DISCONTINUED | OUTPATIENT
Start: 2022-05-05 | End: 2022-05-05

## 2022-05-05 RX ORDER — FLUOROMETHOLONE 1 MG/ML
1 SOLUTION/ DROPS OPHTHALMIC DAILY
Refills: 0 | Status: DISCONTINUED | OUTPATIENT
Start: 2022-05-05 | End: 2022-05-05

## 2022-05-05 RX ORDER — FERROUS SULFATE 325(65) MG
325 TABLET ORAL DAILY
Refills: 0 | Status: DISCONTINUED | OUTPATIENT
Start: 2022-05-05 | End: 2022-05-05

## 2022-05-05 RX ORDER — ALBUTEROL 90 UG/1
1 AEROSOL, METERED ORAL
Qty: 1 | Refills: 0
Start: 2022-05-05 | End: 2022-06-03

## 2022-05-05 RX ORDER — CELECOXIB 200 MG/1
200 CAPSULE ORAL DAILY
Refills: 0 | Status: DISCONTINUED | OUTPATIENT
Start: 2022-05-05 | End: 2022-05-05

## 2022-05-05 RX ORDER — DONEPEZIL HYDROCHLORIDE 10 MG/1
1 TABLET, FILM COATED ORAL
Qty: 0 | Refills: 0 | DISCHARGE

## 2022-05-05 RX ORDER — DEXAMETHASONE 0.5 MG/5ML
1 ELIXIR ORAL
Qty: 10 | Refills: 0
Start: 2022-05-05 | End: 2022-05-14

## 2022-05-05 RX ORDER — LATANOPROST 0.05 MG/ML
1 SOLUTION/ DROPS OPHTHALMIC; TOPICAL AT BEDTIME
Refills: 0 | Status: DISCONTINUED | OUTPATIENT
Start: 2022-05-05 | End: 2022-05-05

## 2022-05-05 RX ORDER — ONDANSETRON 8 MG/1
4 TABLET, FILM COATED ORAL EVERY 8 HOURS
Refills: 0 | Status: DISCONTINUED | OUTPATIENT
Start: 2022-05-05 | End: 2022-05-05

## 2022-05-05 RX ORDER — DEXAMETHASONE 0.5 MG/5ML
6 ELIXIR ORAL DAILY
Refills: 0 | Status: DISCONTINUED | OUTPATIENT
Start: 2022-05-05 | End: 2022-05-05

## 2022-05-05 RX ORDER — AMLODIPINE BESYLATE 2.5 MG/1
5 TABLET ORAL DAILY
Refills: 0 | Status: DISCONTINUED | OUTPATIENT
Start: 2022-05-05 | End: 2022-05-05

## 2022-05-05 RX ORDER — POLYETHYLENE GLYCOL 3350 17 G/17G
17 POWDER, FOR SOLUTION ORAL DAILY
Refills: 0 | Status: DISCONTINUED | OUTPATIENT
Start: 2022-05-05 | End: 2022-05-05

## 2022-05-05 RX ORDER — ACETAMINOPHEN 500 MG
650 TABLET ORAL EVERY 6 HOURS
Refills: 0 | Status: DISCONTINUED | OUTPATIENT
Start: 2022-05-05 | End: 2022-05-05

## 2022-05-05 RX ORDER — TIMOLOL 0.5 %
1 DROPS OPHTHALMIC (EYE)
Refills: 0 | Status: DISCONTINUED | OUTPATIENT
Start: 2022-05-05 | End: 2022-05-05

## 2022-05-05 RX ORDER — LANOLIN ALCOHOL/MO/W.PET/CERES
3 CREAM (GRAM) TOPICAL AT BEDTIME
Refills: 0 | Status: DISCONTINUED | OUTPATIENT
Start: 2022-05-05 | End: 2022-05-05

## 2022-05-05 RX ORDER — DOXAZOSIN MESYLATE 4 MG
1 TABLET ORAL AT BEDTIME
Refills: 0 | Status: DISCONTINUED | OUTPATIENT
Start: 2022-05-05 | End: 2022-05-05

## 2022-05-05 RX ORDER — LEVOTHYROXINE SODIUM 125 MCG
75 TABLET ORAL DAILY
Refills: 0 | Status: DISCONTINUED | OUTPATIENT
Start: 2022-05-05 | End: 2022-05-05

## 2022-05-05 RX ORDER — TRAMADOL HYDROCHLORIDE 50 MG/1
50 TABLET ORAL EVERY 8 HOURS
Refills: 0 | Status: DISCONTINUED | OUTPATIENT
Start: 2022-05-05 | End: 2022-05-05

## 2022-05-05 RX ORDER — DONEPEZIL HYDROCHLORIDE 10 MG/1
5 TABLET, FILM COATED ORAL AT BEDTIME
Refills: 0 | Status: DISCONTINUED | OUTPATIENT
Start: 2022-05-05 | End: 2022-05-05

## 2022-05-05 RX ADMIN — CELECOXIB 200 MILLIGRAM(S): 200 CAPSULE ORAL at 12:21

## 2022-05-05 RX ADMIN — POLYETHYLENE GLYCOL 3350 17 GRAM(S): 17 POWDER, FOR SOLUTION ORAL at 12:21

## 2022-05-05 RX ADMIN — Medication 75 MICROGRAM(S): at 06:08

## 2022-05-05 RX ADMIN — FLUOROMETHOLONE 1 DROP(S): 1 SOLUTION/ DROPS OPHTHALMIC at 13:57

## 2022-05-05 RX ADMIN — Medication 1 DROP(S): at 06:08

## 2022-05-05 RX ADMIN — Medication 6 MILLIGRAM(S): at 06:08

## 2022-05-05 RX ADMIN — BRIMONIDINE TARTRATE 1 DROP(S): 2 SOLUTION/ DROPS OPHTHALMIC at 06:08

## 2022-05-05 RX ADMIN — AMLODIPINE BESYLATE 5 MILLIGRAM(S): 2.5 TABLET ORAL at 06:08

## 2022-05-05 RX ADMIN — Medication 650 MILLIGRAM(S): at 06:10

## 2022-05-05 RX ADMIN — Medication 325 MILLIGRAM(S): at 12:21

## 2022-05-05 NOTE — DISCHARGE NOTE NURSING/CASE MANAGEMENT/SOCIAL WORK - PATIENT PORTAL LINK FT
You can access the FollowMyHealth Patient Portal offered by [facility] by registering at the following website: [email or web address]. By joining [facility]’s FollowMyHealth portal, you will also be able to view your health information using other applications (apps) compatible with our system.

## 2022-05-05 NOTE — DISCHARGE NOTE NURSING/CASE MANAGEMENT/SOCIAL WORK - NSDCPEFALRISK_GEN_ALL_CORE
For information on Fall & Injury Prevention, visit: [email or web address] OR  [email or web address] OR  https://www.cdc.gov/steadi/patient.html

## 2022-05-05 NOTE — CONSULT NOTE ADULT - ASSESSMENT
89 y/o f PMH HTN, HLD, hypothyroidism presents s/p witnessed near syncopal episode from home. In ED lab work mostly unremarkable, + Covid +, BUN/Cr 37/1.5 CT neg for acute process    Impression  #Syncope with unclear etiology possible 2/2 covid r/o vasovagal vs orthostatic vs arrhythmic   #HTN  #hypothyroidism    Plan  Check Orthostatic vital signs, Possibly dehydrated given elevated bun/cr   EKG to r/o arrhythmias  Continuos tele   Fall precautions   TTE to r/o structural/valvular abnormalities   Cardiac enzymes  c/w home antihypertensives    discussed w/ Dr mariee

## 2022-05-05 NOTE — CONSULT NOTE ADULT - NS ATTEND AMEND GEN_ALL_CORE FT
Patient seen and examined. Pertinent labs, imaging and telemetry reviewed. I agree with the above.     Patient feeling well. She denies having passed out. Her family said she didn't look well and wanted her to sit. She couldn't find a chair so she was lowered to ground. She had no LOC, per patient. Found to be COVID positive.     EKG nonischemic. No events on telemetry.     Presyncope: In setting of COVID infection.   -Can check TTE if staying for further work up.  -Check orthostatics.   -Monitor on tele, if staying.   -Can follow up with Dr. Cooper and cardio as needed.     Discussed with NP and hospitalist.

## 2022-05-05 NOTE — DISCHARGE NOTE NURSING/CASE MANAGEMENT/SOCIAL WORK - NSDCVIVACCINE_GEN_ALL_CORE_FT
influenza, injectable, quadrivalent, preservative free; 21-Dec-2019 14:12; Felipe Pearson (RN); [employer]; [id number] (Exp. Date: 30-Jun-2020); IntraMuscular; Deltoid Left.; 0.5 milliLiter(s); VIS (VIS Published: 15-Aug-2019, VIS Presented: 21-Dec-2019);   Tdap; 25-Jul-2019 16:09; Marcy Coelho (ZHANNA); [employer]; [id number] (Exp. Date: 07-Jun-2021); IntraMuscular; Deltoid Right.; 0.5 milliLiter(s); VIS (VIS Published: 09-May-2013, VIS Presented: 25-Jul-2019);

## 2022-05-05 NOTE — H&P ADULT - ASSESSMENT
89 y/o Female c/o urinary symptoms and  states  I was walking around and experienced the syncopal event with no precipitating factors. Pt with  a pmhx hyperthyroidism, high cholesterol and HTN bib son s/p syncopal episode at home. Patient 'son  at bedside states that the Near by family were able to catch the Pt and she experienced no head injury or other trauma. Currently, in the ED, Pt c/o increased urinary frequency, is unable to recall the events that took place at home. Son reports  a fever on and off over the past 1-2 days, Pt denies  cough, chest pain, SOB       DX covid/ pneumonia:  decadron,   -PRn oxygen  prn tylenol for fever  ID    syncope: tele monitoring  -CEx3  -Ortho static BP    HTN:  -norvasc    dementia:  - domepezil    Anemiia: on ferous sulfate    hypothyroid:  -synthroid    macular degeneration: eye drop as per opthalmo    Anxiety: xanax 89 y/o Female c/o urinary symptoms and  states  I was walking around and experienced the syncopal event with no precipitating factors. Pt with  a pmhx hyperthyroidism, high cholesterol and HTN bib son s/p syncopal episode at home. Patient 'son  at bedside states that the Near by family were able to catch the Pt and she experienced no head injury or other trauma. Currently, in the ED, Pt c/o increased urinary frequency, is unable to recall the events that took place at home. 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       #Syncope possibly secondary to COVID PNA as a symptom r/o vasovagal r/o arrhythmia r/o orthostatic - pt is saturating >95% on RA.   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       #Syncope possibly secondary to COVID PNA as a symptom r/o vasovagal r/o arrhythmia r/o orthostatic - pt is mildly symptomatic saturating >95% on RA.   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       #Syncope possibly secondary to COVID PNA as a symptom r/o vasovagal r/o arrhythmia r/o orthostatic - pt is mildly symptomatic saturating >95% on RA.   -decadron,   -PRn oxygen  -prn tylenol for fever  -ID consult pending  Robitussin   - f/u covid reactive markers  - f/u official CXR read  -f/u procal      #syncope:   -tele monitoring  - EKG shows NSR @77bpm, no acute changes, no arrhythmia.   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

## 2022-05-05 NOTE — DISCHARGE NOTE PROVIDER - NSDCHHCONTACT_GEN_ALL_CORE_FT
As certified below, I, or a nurse practitioner or physician assistant working with me, had a face-to-face encounter that meets the physician face-to-face encounter requirements.
car

## 2022-05-05 NOTE — DISCHARGE NOTE PROVIDER - HOSPITAL COURSE
89 y/o Female with a pmh  hypothyroidism, high cholesterol and HTN. Pt states she is unsure if she had lost consciousness althought pt was reportedly walking around and experienced the syncopal event with no precipitating factors, pt was apparently caught by family and did not sustain any trauma. Pt is unsure why she is in the hospital. Currently, in the ED, Pt c/o increased urinary frequency, is unable to recall the events that took place at home clearly. Son reports  a fever on and off over the past 1-2 days, Pt denies, chest pain, SOB, Pt does c/o mild cough (non productive) which she states started 2 days ago, pt is vaccinated for COVID ([employer]). in the ED pt was found to be covid positive, saturating well on RA.     Patient was admitted for pre-syncope likely vasovagal. Patient was also found to have COVID infection which was thought to be mild in nature; patient's pulse oxygen was normal on room air and symptoms were mild (runny nose, fever). Patient was evaluated by cardiologist and recommended no further inpatient workup for pre-syncope. D-dimer when corrected for age was unremarkable. Patient was seen and examined at bedside this AM and currently stable enough for a hospital discharge.    Things to follow up:  -PCP follow up in 1-2 weeks  -Recommended quarantine at home for 7-10 days at home  -Take decadron steroid daily for 10 days  -Take albuterol only as needed for shortness of breath and wheezing  -Follow up with Dr. Cooper as needed    Patient was seen and examined at bedside today; discharge planning was discussed with her and her son.  Vital Signs Last 24 Hrs  T(C): 38.4 (05 May 2022 05:10), Max: 38.4 (05 May 2022 05:10)  T(F): 101.1 (05 May 2022 05:10), Max: 101.1 (05 May 2022 05:10)  HR: 93 (05 May 2022 05:10) (74 - 93)  BP: 133/63 (05 May 2022 05:10) (133/63 - 144/66)  BP(mean): --  RR: 19 (05 May 2022 05:10) (19 - 20)  SpO2: 98% (05 May 2022 05:10) (95% - 98%)  GENERAL: NAD,  HEAD:  Atraumatic, Normocephalic  EYES: EOMI, PERRLA, conjunctiva and sclera clear  NECK: Supple, No JVD, no cervical lymphadenopathy, non-tender  CHEST/LUNG: shallow breathing  bilaterally; No wheeze, rhonchi, or rales  HEART: Regular rate and rhythm; S1&S2  ABDOMEN: Soft, Nontender, Nondistended x 4 quadrants; Bowel sounds present  EXTREMITIES:   Peripheral Pulses Present, No clubbing, no cyanosis, or no edema, no calf tenderness  PSYCH: AAOx3, cooperative, appropriate  NEUROLOGY: AAOX3 no focal deficit

## 2022-05-05 NOTE — H&P ADULT - NSHPPHYSICALEXAM_GEN_ALL_CORE
GENERAL:  89y/o Female NAD, resting comfortably.  HEAD:  Atraumatic, Normocephalic  EYES: EOMI, PERRLA, conjunctiva and sclera clear  NECK: Supple, No JVD, no cervical lymphadenopathy, non-tender  CHEST/LUNG: shallow breathing  bilaterally; No wheeze, rhonchi, or rales  HEART: Regular rate and rhythm; S1&S2  ABDOMEN: Soft, Nontender, Nondistended x 4 quadrants; Bowel sounds present  EXTREMITIES:   Peripheral Pulses Present, No clubbing, no cyanosis, or no edema, no calf tenderness  PSYCH: AAOx3, cooperative, appropriate  NEUROLOGY: WNL  SKIN: WNL

## 2022-05-05 NOTE — H&P ADULT - NSICDXPASTMEDICALHX_GEN_ALL_CORE_FT
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

## 2022-05-05 NOTE — GOALS OF CARE CONVERSATION - ADVANCED CARE PLANNING - CONVERSATION DETAILS
Patient is AAOX3, reports she lives with her son. Reports she would like to be resuscitated  in the event we think we can save her.

## 2022-05-05 NOTE — H&P ADULT - NSHPLABSRESULTS_GEN_ALL_CORE
10.2   6.62  )-----------( 134      ( 04 May 2022 21:14 )             32.1       05-04    137  |  105  |  37<H>  ----------------------------<  118<H>  4.4   |  21  |  1.5    Ca    9.5      04 May 2022 21:14  Mg     2.0     05-04    TPro  7.3  /  Alb  4.2  /  TBili  0.5  /  DBili  x   /  AST  14  /  ALT  <5  /  AlkPhos  74  05-04              Urinalysis Basic - ( 04 May 2022 23:40 )    Color: Yellow / Appearance: Clear / S.015 / pH: x  Gluc: x / Ketone: Negative  / Bili: Negative / Urobili: 0.2 mg/dL   Blood: x / Protein: Negative mg/dL / Nitrite: Negative   Leuk Esterase: Negative / RBC: x / WBC x   Sq Epi: x / Non Sq Epi: x / Bacteria: x            Lactate Trend      CARDIAC MARKERS ( 04 May 2022 21:14 )  x     / 0.01 ng/mL / x     / x     / x            CAPILLARY BLOOD GLUCOSE      POCT Blood Glucose.: 122 mg/dL (04 May 2022 20:50)

## 2022-05-05 NOTE — DISCHARGE NOTE PROVIDER - NSDCCPCAREPLAN_GEN_ALL_CORE_FT
PRINCIPAL DISCHARGE DIAGNOSIS  Diagnosis: Syncope  Assessment and Plan of Treatment: Patient was admitted for pre-syncope likely vasovagal. Patient was also found to have COVID infection which was thought to be mild in nature; patient's pulse oxygen was normal on room air and symptoms were mild (runny nose, fever). Patient was evaluated by cardiologist and recommended no further inpatient workup for pre-syncope. D-dimer when corrected for age was unremarkable. Patient was seen and examined at bedside this AM and currently stable enough for a hospital discharge.  Things to follow up:  -PCP follow up in 1-2 weeks  -Recommended quarantine at home for 7-10 days at home  -Take decadron steroid daily for 10 days  -Take albuterol only as needed for shortness of breath and wheezing  -Follow up with Dr. Cooper as needed      SECONDARY DISCHARGE DIAGNOSES  Diagnosis: COVID-19  Assessment and Plan of Treatment:

## 2022-05-05 NOTE — PATIENT PROFILE ADULT - FALL HARM RISK - HARM RISK INTERVENTIONS
Assistance with ambulation/Assistance OOB with selected safe patient handling equipment/Communicate Risk of Fall with Harm to all staff/Discuss with provider need for PT consult/Monitor gait and stability/Reinforce activity limits and safety measures with patient and family/Tailored Fall Risk Interventions/Visual Cue: Yellow wristband and red socks/Bed in lowest position, wheels locked, appropriate side rails in place/Call bell, personal items and telephone in reach/Instruct patient to call for assistance before getting out of bed or chair/Non-slip footwear when patient is out of bed/[place] to call system/Physically safe environment - no spills, clutter or unnecessary equipment/Purposeful Proactive Rounding/Room/bathroom lighting operational, light cord in reach

## 2022-05-05 NOTE — DISCHARGE NOTE PROVIDER - CARE PROVIDER_API CALL
Koffi Cooper)  Internal Medicine  [address] [place] [address]  Phone: [phone]  Fax: [phone]  Follow Up Time: 1 week

## 2022-05-05 NOTE — H&P ADULT - HISTORY OF PRESENT ILLNESS
89 y/o Female c/o urinary symptoms and  states  I was walking around and experienced the syncopal event with no precipitating factors. Pt with  a pmhx hyperthyroidism, high cholesterol and HTN bib son s/p syncopal episode at home. Patient 'son  at bedside states that the Near by family were able to catch the Pt and she experienced no head injury or other trauma. Currently, in the ED, Pt c/o increased urinary frequency, is unable to recall the events that took place at home. Son reports  a fever on and off over the past 1-2 days, Pt denies  cough, chest pain, SOB, Pt is a poor historian, hx obtained from chart.     89 y/o Female with a pmh  hyperthyroidism, high cholesterol and HTN. Pt states she is unsure if she had lost consciousness althought pt was reportedly walking around and experienced the syncopal event with no precipitating factors, pt was apparently caught by family and did not sustain any trauma. Pt is unsure why she is in the hospital. Currently, in the ED, Pt c/o increased urinary frequency, is unable to recall the events that took place at home clearly. Son reports  a fever on and off over the past 1-2 days, Pt denies, chest pain, SOB, Pt does c/o mild cough (non productive) which she states started 2 days ago, pt is vaccinated for COVID ([employer]). in the ED pt was found to be covid positive, saturating well on RA.  Pt is a poor historian, hx obtained from chart.     89 y/o Female with a pmh  hypothyroidism, high cholesterol and HTN. Pt states she is unsure if she had lost consciousness althought pt was reportedly walking around and experienced the syncopal event with no precipitating factors, pt was apparently caught by family and did not sustain any trauma. Pt is unsure why she is in the hospital. Currently, in the ED, Pt c/o increased urinary frequency, is unable to recall the events that took place at home clearly. Son reports  a fever on and off over the past 1-2 days, Pt denies, chest pain, SOB, Pt does c/o mild cough (non productive) which she states started 2 days ago, pt is vaccinated for COVID (J&J). in the ED pt was found to be covid positive, saturating well on RA.

## 2022-05-05 NOTE — CONSULT NOTE ADULT - SUBJECTIVE AND OBJECTIVE BOX
HPI:  Pt is a poor historian, hx obtained from chart.     89 y/o Female with a pmh  hypothyroidism, high cholesterol and HTN. Pt states she is unsure if she had lost consciousness althought pt was reportedly walking around and experienced the syncopal event with no precipitating factors, pt was apparently caught by family and did not sustain any trauma. Pt is unsure why she is in the hospital. Currently, in the ED, Pt c/o increased urinary frequency, is unable to recall the events that took place at home clearly. Son reports  a fever on and off over the past 1-2 days, Pt denies, chest pain, SOB, Pt does c/o mild cough (non productive) which she states started 2 days ago, pt is vaccinated for COVID ([employer]). in the ED pt was found to be covid positive, saturating well on RA.  (05 May 2022 01:49)    HPI-Cardiology   89 y/o f PMH HTN, HLD, hypothyroidism presents s/p witnessed near syncopal episode. Pt evaluated at bedside currently alert to self and place, poor hoistorian unsure why she is in the hospital. States she lives with dtr and has not left the house in 2 years. denies history of seizures. Pt endorses mild cough for few days, denies chest pain, sob, dizziness, n/v/d, recent illness/sick contacts. In ED found to be Covid +    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    Fall  Multiple falls at home    Hypothyroidism    History of hip replacement, total, right        FAMILY HISTORY:  FAMILY HISTORY:  No pertinent family history in first degree relatives        SOCIAL HISTORY:  []smoker  []Alcohol  []Drug    ALLERGIES:  No Known Allergies      MEDICATIONS:  MEDICATIONS  (STANDING):  amLODIPine   Tablet 5 milliGRAM(s) Oral daily  brimonidine 0.2% Ophthalmic Solution 1 Drop(s) Both EYES two times a day  celecoxib 200 milliGRAM(s) Oral daily  dexAMETHasone     Tablet 6 milliGRAM(s) Oral daily  donepezil 5 milliGRAM(s) Oral at bedtime  doxazosin 1 milliGRAM(s) Oral at bedtime  ferrous    sulfate 325 milliGRAM(s) Oral daily  fluorometholone 0.1% Suspension 1 Drop(s) Both EYES daily  latanoprost 0.005% Ophthalmic Solution 1 Drop(s) Both EYES at bedtime  levothyroxine 75 MICROGram(s) Oral daily  polyethylene glycol 3350 17 Gram(s) Oral daily  timolol 0.5% Solution 1 Drop(s) Both EYES two times a day    MEDICATIONS  (PRN):  acetaminophen     Tablet .. 650 milliGRAM(s) Oral every 6 hours PRN Temp greater or equal to 38C (100.4F), Mild Pain (1 - 3)  aluminum hydroxide/magnesium hydroxide/simethicone Suspension 30 milliLiter(s) Oral every 4 hours PRN Dyspepsia  LORazepam     Tablet 0.5 milliGRAM(s) Oral two times a day PRN Anxiety  melatonin 3 milliGRAM(s) Oral at bedtime PRN Insomnia  ondansetron Injectable 4 milliGRAM(s) IV Push every 8 hours PRN Nausea and/or Vomiting  traMADol 50 milliGRAM(s) Oral every 8 hours PRN Moderate Pain (4 - 6)      HOME MEDICATIONS:  Home Medications:  acetaminophen 325 mg oral tablet: 2 tab(s) orally every 6 hours, As needed, Temp greater or equal to 38C (100.4F), Mild Pain (1 - 3), Moderate Pain (4 - 6) (30 Aug 2021 10:57)  amLODIPine 5 mg oral tablet: 1 tab(s) orally once a day (05 May 2022 02:12)  donepezil 5 mg oral tablet: 1 tab(s) orally once a day (at bedtime) (05 May 2022 02:12)  levothyroxine 75 mcg (0.075 mg) oral tablet: 1 tab(s) orally once a day (29 Aug 2021 13:15)  LORazepam 0.5 mg oral tablet:  (29 Aug 2021 13:15)  meloxicam 5 mg oral capsule: 1 cap(s) orally once a day (29 Aug 2021 13:15)  prazosin 1 mg oral capsule: orally once a day (05 May 2022 02:12)      VITALS:   T(F): 101.1 (05-05 @ 05:10), Max: 101.1 (05-05 @ 05:10)  HR: 93 (05-05 @ 05:10) (68 - 93)  BP: 133/63 (05-05 @ 05:10) (133/63 - 146/63)  BP(mean): --  RR: 19 (05-05 @ 05:10) (18 - 20)  SpO2: 98% (05-05 @ 05:10) (95% - 98%)    I&O's Summary      REVIEW OF SYSTEMS:  CONSTITUTIONAL: No weakness, fevers or chills  EYES: No visual changes  ENT: No vertigo or throat pain   NECK: No pain or stiffness  RESPIRATORY: No cough, wheezing, hemoptysis; No shortness of breath  CARDIOVASCULAR: No chest pain or palpitations  GASTROINTESTINAL: No abdominal or epigastric pain. No nausea, vomiting, or hematemesis; No diarrhea or constipation. No melena or hematochezia.  GENITOURINARY: No dysuria, frequency or hematuria  NEUROLOGICAL: No numbness or weakness  SKIN: No itching, no rashes  MSK: no    PHYSICAL EXAM:  NEURO: patient is awake , alert and oriented  GEN: Not in acute distress  NECK: no thyroid enlargement, no JVD  LUNGS: Clear to auscultation bilaterally   CARDIOVASCULAR: S1/S2 present, RRR , no murmurs or rubs, no carotid bruits,  + PP bilaterally  ABD: Soft, non-tender, non-distended, +BS  EXT: No JONNIE  SKIN: Intact    LABS:                        10.2   6.62  )-----------( 134      ( 04 May 2022 21:14 )             32.1     05-04    137  |  105  |  37<H>  ----------------------------<  118<H>  4.4   |  21  |  1.5    Ca    9.5      04 May 2022 21:14  Mg     2.0     05-04    TPro  7.3  /  Alb  4.2  /  TBili  0.5  /  DBili  x   /  AST  14  /  ALT  <5  /  AlkPhos  74  05-04      Troponin T, Serum: 0.01 ng/mL (05-04-22 @ 21:14)    CARDIAC MARKERS ( 04 May 2022 21:14 )  x     / 0.01 ng/mL / x     / x     / x            Troponin trend:            RADIOLOGY:  -CXR:  < from: CT Head No Cont (05.04.22 @ 21:46) >  IMPRESSION:      No acute intracranial hemorrhage, mass-effect or midline shift.        --- End of Report ---    < end of copied text >      -TTE:  -CCTA:  -STRESS TEST:  -CATHETERIZATION:    ECG:    TELEMETRY EVENTS:   HPI:  Pt is a poor historian, hx obtained from chart.     89 y/o Female with a pmh  hypothyroidism, high cholesterol and HTN. Pt states she is unsure if she had lost consciousness althought pt was reportedly walking around and experienced the syncopal event with no precipitating factors, pt was apparently caught by family and did not sustain any trauma. Pt is unsure why she is in the hospital. Currently, in the ED, Pt c/o increased urinary frequency, is unable to recall the events that took place at home clearly. Son reports  a fever on and off over the past 1-2 days, Pt denies, chest pain, SOB, Pt does c/o mild cough (non productive) which she states started 2 days ago, pt is vaccinated for COVID ([employer]). in the ED pt was found to be covid positive, saturating well on RA.  (05 May 2022 01:49)    HPI-Cardiology   89 y/o f PMH HTN, HLD, hypothyroidism presents s/p witnessed near syncopal episode. Pt evaluated at bedside currently alert to self and place, poor historian unsure why she is in the hospital. States she lives with dtr and has not left the house in 2 years. denies history of seizures. Pt endorses mild cough for few days, denies chest pain, sob, dizziness, n/v/d, recent illness/sick contacts. In ED lab work mostly unremarkable, + Covid +, BUN/Cr 37/1.5 CT neg for acute process    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    Fall  Multiple falls at home    Hypothyroidism    History of hip replacement, total, right        FAMILY HISTORY:  FAMILY HISTORY:  No pertinent family history in first degree relatives        SOCIAL HISTORY:  []smoker  []Alcohol  []Drug    ALLERGIES:  No Known Allergies      MEDICATIONS:  MEDICATIONS  (STANDING):  amLODIPine   Tablet 5 milliGRAM(s) Oral daily  brimonidine 0.2% Ophthalmic Solution 1 Drop(s) Both EYES two times a day  celecoxib 200 milliGRAM(s) Oral daily  dexAMETHasone     Tablet 6 milliGRAM(s) Oral daily  donepezil 5 milliGRAM(s) Oral at bedtime  doxazosin 1 milliGRAM(s) Oral at bedtime  ferrous    sulfate 325 milliGRAM(s) Oral daily  fluorometholone 0.1% Suspension 1 Drop(s) Both EYES daily  latanoprost 0.005% Ophthalmic Solution 1 Drop(s) Both EYES at bedtime  levothyroxine 75 MICROGram(s) Oral daily  polyethylene glycol 3350 17 Gram(s) Oral daily  timolol 0.5% Solution 1 Drop(s) Both EYES two times a day    MEDICATIONS  (PRN):  acetaminophen     Tablet .. 650 milliGRAM(s) Oral every 6 hours PRN Temp greater or equal to 38C (100.4F), Mild Pain (1 - 3)  aluminum hydroxide/magnesium hydroxide/simethicone Suspension 30 milliLiter(s) Oral every 4 hours PRN Dyspepsia  LORazepam     Tablet 0.5 milliGRAM(s) Oral two times a day PRN Anxiety  melatonin 3 milliGRAM(s) Oral at bedtime PRN Insomnia  ondansetron Injectable 4 milliGRAM(s) IV Push every 8 hours PRN Nausea and/or Vomiting  traMADol 50 milliGRAM(s) Oral every 8 hours PRN Moderate Pain (4 - 6)      HOME MEDICATIONS:  Home Medications:  acetaminophen 325 mg oral tablet: 2 tab(s) orally every 6 hours, As needed, Temp greater or equal to 38C (100.4F), Mild Pain (1 - 3), Moderate Pain (4 - 6) (30 Aug 2021 10:57)  amLODIPine 5 mg oral tablet: 1 tab(s) orally once a day (05 May 2022 02:12)  donepezil 5 mg oral tablet: 1 tab(s) orally once a day (at bedtime) (05 May 2022 02:12)  levothyroxine 75 mcg (0.075 mg) oral tablet: 1 tab(s) orally once a day (29 Aug 2021 13:15)  LORazepam 0.5 mg oral tablet:  (29 Aug 2021 13:15)  meloxicam 5 mg oral capsule: 1 cap(s) orally once a day (29 Aug 2021 13:15)  prazosin 1 mg oral capsule: orally once a day (05 May 2022 02:12)      VITALS:   T(F): 101.1 (05-05 @ 05:10), Max: 101.1 (05-05 @ 05:10)  HR: 93 (05-05 @ 05:10) (68 - 93)  BP: 133/63 (05-05 @ 05:10) (133/63 - 146/63)  BP(mean): --  RR: 19 (05-05 @ 05:10) (18 - 20)  SpO2: 98% (05-05 @ 05:10) (95% - 98%)    I&O's Summary      REVIEW OF SYSTEMS:  CONSTITUTIONAL: No weakness, fevers or chills  EYES: No visual changes  ENT: No vertigo or throat pain   NECK: No pain or stiffness  RESPIRATORY: No cough, wheezing, hemoptysis; No shortness of breath  CARDIOVASCULAR: No chest pain or palpitations  GASTROINTESTINAL: No abdominal or epigastric pain. No nausea, vomiting, or hematemesis; No diarrhea or constipation. No melena or hematochezia.  GENITOURINARY: No dysuria, frequency or hematuria  NEUROLOGICAL: No numbness or weakness  SKIN: No itching, no rashes  MSK: no    PHYSICAL EXAM:  NEURO: patient is awake , alert and oriented  GEN: Not in acute distress  NECK: no thyroid enlargement, no JVD  LUNGS: Clear to auscultation bilaterally   CARDIOVASCULAR: S1/S2 present, RRR , no murmurs or rubs, no carotid bruits,  + PP bilaterally  ABD: Soft, non-tender, non-distended, +BS  EXT: No JONNIE  SKIN: Intact    LABS:                        10.2   6.62  )-----------( 134      ( 04 May 2022 21:14 )             32.1     05-04    137  |  105  |  37<H>  ----------------------------<  118<H>  4.4   |  21  |  1.5    Ca    9.5      04 May 2022 21:14  Mg     2.0     05-04    TPro  7.3  /  Alb  4.2  /  TBili  0.5  /  DBili  x   /  AST  14  /  ALT  <5  /  AlkPhos  74  05-04      Troponin T, Serum: 0.01 ng/mL (05-04-22 @ 21:14)    CARDIAC MARKERS ( 04 May 2022 21:14 )  x     / 0.01 ng/mL / x     / x     / x            Troponin trend:            RADIOLOGY:  CT Head  < from: CT Head No Cont (05.04.22 @ 21:46) >  IMPRESSION:      No acute intracranial hemorrhage, mass-effect or midline shift.        --- End of Report ---    < end of copied text >      ECG:  < from: 12 Lead ECG (05.04.22 @ 20:48) >    Ventricular Rate 77 BPM    Atrial Rate 77 BPM    P-R Interval 148 ms    QRS Duration 78 ms    Q-T Interval 368 ms    QTC Calculation(Bazett) 416 ms    P Axis 57 degrees    R Axis 60 degrees    T Axis 36 degrees    Diagnosis Line Normal sinus rhythm  Poor R wave progression    < end of copied text >        TELEMETRY EVENTS:

## 2022-05-06 LAB
CRP SERPL-MCNC: 17 MG/L — HIGH
PROCALCITONIN SERPL-MCNC: 0.13 NG/ML — HIGH (ref 0.02–0.1)

## 2022-05-07 LAB
CULTURE RESULTS: SIGNIFICANT CHANGE UP
SPECIMEN SOURCE: SIGNIFICANT CHANGE UP

## 2022-07-25 PROBLEM — E03.9 HYPOTHYROIDISM, UNSPECIFIED: Chronic | Status: ACTIVE | Noted: 2022-05-05

## 2022-07-26 RX ORDER — LEVOTHYROXINE SODIUM 0.17 MG/1
TABLET ORAL
Refills: 0 | Status: DISCONTINUED | COMMUNITY
End: 2022-07-26

## 2022-07-26 RX ORDER — AMLODIPINE BESYLATE AND BENAZEPRIL HYDROCHLORIDE 10; 40 MG/1; MG/1
CAPSULE ORAL
Refills: 0 | Status: DISCONTINUED | COMMUNITY
End: 2022-07-26

## 2022-07-26 RX ORDER — PANTOPRAZOLE 40 MG/1
TABLET, DELAYED RELEASE ORAL
Refills: 0 | Status: DISCONTINUED | COMMUNITY
End: 2022-07-26

## 2022-07-26 RX ORDER — SIMVASTATIN 20 MG/1
20 TABLET, FILM COATED ORAL
Refills: 0 | Status: DISCONTINUED | COMMUNITY
End: 2022-07-26

## 2022-07-26 RX ORDER — LETROZOLE TABLETS 2.5 MG/1
TABLET, FILM COATED ORAL
Refills: 0 | Status: DISCONTINUED | COMMUNITY
End: 2022-07-26

## 2022-07-29 ENCOUNTER — APPOINTMENT (OUTPATIENT)
Dept: GERIATRICS | Facility: HOME HEALTH | Age: 87
End: 2022-07-29

## 2022-07-29 VITALS
TEMPERATURE: 98.2 F | HEART RATE: 68 BPM | RESPIRATION RATE: 18 BRPM | SYSTOLIC BLOOD PRESSURE: 142 MMHG | OXYGEN SATURATION: 98 % | HEIGHT: 58 IN | WEIGHT: 140 LBS | BODY MASS INDEX: 29.39 KG/M2 | DIASTOLIC BLOOD PRESSURE: 70 MMHG

## 2022-07-29 PROCEDURE — 99343: CPT

## 2022-07-29 RX ORDER — TIMOLOL MALEATE 5 MG/ML
0.5 SOLUTION OPHTHALMIC
Qty: 15 | Refills: 0 | Status: ACTIVE | COMMUNITY
Start: 2021-08-26

## 2022-07-29 RX ORDER — AMLODIPINE BESYLATE 5 MG/1
5 TABLET ORAL
Qty: 90 | Refills: 0 | Status: ACTIVE | COMMUNITY
Start: 2021-12-14

## 2022-07-29 RX ORDER — LATANOPROST/PF 0.005 %
0.01 DROPS OPHTHALMIC (EYE)
Qty: 2 | Refills: 0 | Status: ACTIVE | COMMUNITY
Start: 2022-07-23

## 2022-07-29 RX ORDER — CHLORHEXIDINE GLUCONATE 4 %
325 (65 FE) LIQUID (ML) TOPICAL
Qty: 90 | Refills: 0 | Status: ACTIVE | COMMUNITY
Start: 2022-01-10

## 2022-07-29 NOTE — PHYSICAL EXAM
[Alert] : alert [No Acute Distress] : in no acute distress [Sclera] : the sclera and conjunctiva were normal [Normal Outer Ear/Nose] : the ears and nose were normal in appearance [Normal Appearance] : the appearance of the neck was normal [Supple] : the neck was supple [No Respiratory Distress] : no respiratory distress [No Acc Muscle Use] : no accessory muscle use [Respiration, Rhythm And Depth] : normal respiratory rhythm and effort [Auscultation Breath Sounds / Voice Sounds] : lungs were clear to auscultation bilaterally [Normal S1, S2] : normal S1 and S2 [Heart Rate And Rhythm] : heart rate was normal and rhythm regular [Edema] : edema was not present [Abdomen Tenderness] : non-tender [Abdomen Soft] : soft [No Spinal Tenderness] : no spinal tenderness [No Clubbing, Cyanosis] : no clubbing or cyanosis of the fingernails [Involuntary Movements] : no involuntary movements were seen [] : no rash [Skin Lesions] : no skin lesions [No Focal Deficits] : no focal deficits [Oriented To Time, Place, And Person] : oriented to person, place, and time [de-identified] : A/O x 3

## 2022-07-29 NOTE — HISTORY OF PRESENT ILLNESS
[FreeTextEntry1] : Patient seen and examined at home.  Patient is homebound 2/2 generalized weakness/debility and advanced age.  89F w/PMHx of  hypothyroidism, Macular Degeneratio/Glaucoma, HTN/HLD seen for initial home visit.  Patient admitted 5/22 2/2 syncope, vasovagal and incidental COVID.  Discharged home.  No acute complaints.  No CP/SOB. No abdominal complaints with good appetite and regular BM's.  No urinary complaints.  No recent falls.  No skin wounds.  Dtr reporting waxing/waning mental status, Hx od AD.  CTH reviewed 5/22 w/volume loss and chronic microvascular changes.  Patient lives with her son, no HHA.  Adequate social support and familial support.  Patient uses a walker for ambulation.

## 2022-07-29 NOTE — ASSESSMENT
[FreeTextEntry1] : HTN/HLD\par - c/w Amlodipine\par - At baseline\par - c/w DASH diet\par \par Hypothyroid\par - c/w Levothyroxine\par - no TSH for review\par \par Macular Degeneratio/Glaucoma\par - c/w eye gtt per Optho\par - Optho FU as scheduled\par \par Cognitive Dysfunction\par - A/O x 3 today but Dtr voices periods of confusion and hallucinations\par - c/w Donepezil\par - at baseline\par \par Generalized Weakness/Debility/OA\par - DME for ambulation\par - APAP PRN for pain\par - Fall precautions\par - PT/OT c/s

## 2022-08-01 ENCOUNTER — NON-APPOINTMENT (OUTPATIENT)
Age: 87
End: 2022-08-01

## 2022-08-02 ENCOUNTER — NON-APPOINTMENT (OUTPATIENT)
Age: 87
End: 2022-08-02

## 2022-10-23 ENCOUNTER — APPOINTMENT (OUTPATIENT)
Dept: GERIATRICS | Facility: HOME HEALTH | Age: 87
End: 2022-10-23

## 2022-10-23 DIAGNOSIS — J30.2 OTHER SEASONAL ALLERGIC RHINITIS: ICD-10-CM

## 2022-10-23 DIAGNOSIS — Z00.00 ENCOUNTER FOR GENERAL ADULT MEDICAL EXAMINATION W/OUT ABNORMAL FINDINGS: ICD-10-CM

## 2022-10-23 PROCEDURE — G0008: CPT

## 2022-10-23 PROCEDURE — 90686 IIV4 VACC NO PRSV 0.5 ML IM: CPT

## 2022-10-23 PROCEDURE — 99349 HOME/RES VST EST MOD MDM 40: CPT | Mod: 25

## 2022-10-25 VITALS
OXYGEN SATURATION: 95 % | HEART RATE: 69 BPM | DIASTOLIC BLOOD PRESSURE: 70 MMHG | SYSTOLIC BLOOD PRESSURE: 145 MMHG | RESPIRATION RATE: 17 BRPM

## 2022-10-25 PROBLEM — J30.2 SEASONAL ALLERGIES: Status: ACTIVE | Noted: 2022-10-25

## 2022-10-25 PROBLEM — Z00.00 ENCOUNTER FOR PREVENTIVE HEALTH EXAMINATION: Status: ACTIVE | Noted: 2019-05-09

## 2022-10-25 RX ORDER — DEXAMETHASONE 6 MG/1
6 TABLET ORAL
Qty: 10 | Refills: 0 | Status: DISCONTINUED | COMMUNITY
Start: 2022-05-05

## 2022-10-25 RX ORDER — FOLIC ACID 1 MG/1
1 TABLET ORAL
Qty: 90 | Refills: 1 | Status: ACTIVE | COMMUNITY
Start: 2022-09-22 | End: 1900-01-01

## 2022-10-25 RX ORDER — ALBUTEROL SULFATE 90 UG/1
108 (90 BASE) INHALANT RESPIRATORY (INHALATION)
Qty: 7 | Refills: 0 | Status: DISCONTINUED | COMMUNITY
Start: 2022-05-05

## 2022-10-25 RX ORDER — CETIRIZINE HYDROCHLORIDE 10 MG/1
10 TABLET, COATED ORAL
Qty: 30 | Refills: 0 | Status: ACTIVE | COMMUNITY
Start: 2022-05-23 | End: 1900-01-01

## 2022-10-25 NOTE — PHYSICAL EXAM
[No Acute Distress] : no acute distress [Well Nourished] : well nourished [Well Developed] : well developed [Normal Voice/Communication] : normal voice communication [Normal Oropharynx] : the oropharynx was normal [Normal Nasal Mucosa] : the nasal mucosa was normal [No LAD] : no lymphadenopathy [Thyroid Normal, No Nodules] : the thyroid was normal and there were no nodules present [No Respiratory Distress] : no respiratory distress [Clear to Auscultation] : lungs were clear to auscultation bilaterally [No Accessory Muscle Use] : no accessory muscle use [Normal Rate] : heart rate was normal  [Regular Rhythm] : with a regular rhythm [Normal S1, S2] : normal S1 and S2 [No Carotid Bruit] : No carotid bruit [Non Tender] : non-tender [Soft] : abdomen soft [Not Distended] : not distended [No CVA Tenderness] : no ~M costovertebral angle tenderness [No Spinal Tenderness] : no spinal tenderness [Oriented x3] : oriented to person, place, and time [Normal Affect] : the affect was normal [Normal Mood] : the mood was normal [de-identified] : Mild edema in legs.

## 2022-10-25 NOTE — HISTORY OF PRESENT ILLNESS
[FreeTextEntry1] : Old age.  [FreeTextEntry2] : Pt was seen and examined for administration for influenza vaccination and f/up. Pt denies any fever, chills, SOB or chest pain. Pt gave verbal consent to receive flu shot. All other ROS were unremarkable as mentioned below.\par

## 2022-10-25 NOTE — ASSESSMENT
[FreeTextEntry1] : #) Cognitive Dysfunction:\par Symptoms are well controlled on Donepezil.\par \par #) HTN:\par Pt's BP is well controlled. Today BP is: 145/70\par Continue current meds. \par Low salt diet and exercise advised.\par \par #) HLD:\par Low fat diet and regular exercise advised.\par \par #) Hypothyroidism:\par Pt's symptoms are well controlled on current dose of levothyroxine. \par Will continue same dose of levothyroxine.\par \par #) Allergies:\par Cetirizine sent to her pharmacy. \par \par #) Flu Vaccination:\par Flu shot given on left deltoid. \par No immediate side effects noticed. \par Advised to use ice pack and take Tylenol if pt develops any pain or fever. \par Counseling done.\par \par

## 2022-11-01 ENCOUNTER — LABORATORY RESULT (OUTPATIENT)
Age: 87
End: 2022-11-01

## 2022-11-17 LAB
ALBUMIN SERPL ELPH-MCNC: 4.4 G/DL
ALP BLD-CCNC: 83 U/L
ALT SERPL-CCNC: <5 U/L
ANION GAP SERPL CALC-SCNC: 11 MMOL/L
APPEARANCE: ABNORMAL
AST SERPL-CCNC: 16 U/L
BASOPHILS # BLD AUTO: 0.07 K/UL
BASOPHILS NFR BLD AUTO: 1.4 %
BILIRUB SERPL-MCNC: 0.6 MG/DL
BILIRUBIN URINE: NEGATIVE
BLOOD URINE: ABNORMAL
BUN SERPL-MCNC: 49 MG/DL
CALCIUM SERPL-MCNC: 9.8 MG/DL
CHLORIDE SERPL-SCNC: 110 MMOL/L
CHOLEST SERPL-MCNC: 262 MG/DL
CO2 SERPL-SCNC: 23 MMOL/L
COLOR: NORMAL
CREAT SERPL-MCNC: 1.4 MG/DL
EGFR: 35 ML/MIN/1.73M2
EOSINOPHIL # BLD AUTO: 0.21 K/UL
EOSINOPHIL NFR BLD AUTO: 4.3 %
ESTIMATED AVERAGE GLUCOSE: 103 MG/DL
FOLATE SERPL-MCNC: >20 NG/ML
GLUCOSE QUALITATIVE U: NEGATIVE
GLUCOSE SERPL-MCNC: 105 MG/DL
HBA1C MFR BLD HPLC: 5.2 %
HCT VFR BLD CALC: 33.9 %
HDLC SERPL-MCNC: 50 MG/DL
HGB BLD-MCNC: 10.9 G/DL
IMM GRANULOCYTES NFR BLD AUTO: 0.4 %
KETONES URINE: NEGATIVE
LDLC SERPL CALC-MCNC: 192 MG/DL
LEUKOCYTE ESTERASE URINE: ABNORMAL
LYMPHOCYTES # BLD AUTO: 1.43 K/UL
LYMPHOCYTES NFR BLD AUTO: 29.1 %
MAN DIFF?: NORMAL
MCHC RBC-ENTMCNC: 29 PG
MCHC RBC-ENTMCNC: 32.2 G/DL
MCV RBC AUTO: 90.2 FL
MONOCYTES # BLD AUTO: 0.38 K/UL
MONOCYTES NFR BLD AUTO: 7.7 %
NEUTROPHILS # BLD AUTO: 2.81 K/UL
NEUTROPHILS NFR BLD AUTO: 57.1 %
NITRITE URINE: NEGATIVE
NONHDLC SERPL-MCNC: 212 MG/DL
PH URINE: 6
PLATELET # BLD AUTO: 180 K/UL
POTASSIUM SERPL-SCNC: 4.4 MMOL/L
PROT SERPL-MCNC: 7.7 G/DL
PROTEIN URINE: NORMAL
RBC # BLD: 3.76 M/UL
RBC # FLD: 14.6 %
SODIUM SERPL-SCNC: 144 MMOL/L
SPECIFIC GRAVITY URINE: 1.01
TRIGL SERPL-MCNC: 101 MG/DL
TSH SERPL-ACNC: 13.12 UIU/ML
UROBILINOGEN URINE: NORMAL
VIT B12 SERPL-MCNC: 611 PG/ML
WBC # FLD AUTO: 4.92 K/UL

## 2022-11-20 ENCOUNTER — APPOINTMENT (OUTPATIENT)
Dept: GERIATRICS | Facility: HOME HEALTH | Age: 87
End: 2022-11-20

## 2022-11-20 DIAGNOSIS — A49.9 URINARY TRACT INFECTION, SITE NOT SPECIFIED: ICD-10-CM

## 2022-11-20 DIAGNOSIS — N39.0 URINARY TRACT INFECTION, SITE NOT SPECIFIED: ICD-10-CM

## 2022-11-20 PROCEDURE — 99349 HOME/RES VST EST MOD MDM 40: CPT

## 2022-11-20 RX ORDER — NITROFURANTOIN (MONOHYDRATE/MACROCRYSTALS) 25; 75 MG/1; MG/1
100 CAPSULE ORAL
Qty: 14 | Refills: 0 | Status: ACTIVE | COMMUNITY
Start: 2022-11-20 | End: 1900-01-01

## 2022-11-23 VITALS
RESPIRATION RATE: 17 BRPM | DIASTOLIC BLOOD PRESSURE: 80 MMHG | OXYGEN SATURATION: 96 % | HEART RATE: 81 BPM | SYSTOLIC BLOOD PRESSURE: 160 MMHG

## 2022-11-23 NOTE — ASSESSMENT
[FreeTextEntry1] : #) Hypothyroidism:\par Pt's TSH was 13.12. Apparently pt was not taking her levothyroxine as suggested. I advised dtr to ensure she is taking levothyroxine before breakfast. Will repeat TSH in 3 months. \par \par #) UTI:\par Advised to drink water. Macrobid sent to her pharmacy. \par \par #) Cognitive Dysfunction:\par Symptoms are well controlled on Donepezil.\par \par #) HTN:\par Not well controlled. \par Continue meds. Low salt diet advised. \par \par #) HLD:\par Low fat diet and regular exercise advised.\par LDL is high, ML because of uncontrolled hypothyroidism. \par Will repeat lipid profile in 3 months. \par \par \par

## 2022-11-23 NOTE — PHYSICAL EXAM
[No Acute Distress] : no acute distress [Well Nourished] : well nourished [Well Developed] : well developed [Normal Voice/Communication] : normal voice communication [Normal Outer Ear/Nose] : the ears and nose were normal in appearance [Normal Oropharynx] : the oropharynx was normal [Normal Nasal Mucosa] : the nasal mucosa was normal [No LAD] : no lymphadenopathy [Thyroid Normal, No Nodules] : the thyroid was normal and there were no nodules present [No Respiratory Distress] : no respiratory distress [Clear to Auscultation] : lungs were clear to auscultation bilaterally [No Accessory Muscle Use] : no accessory muscle use [Normal Rate] : heart rate was normal  [Regular Rhythm] : with a regular rhythm [Normal S1, S2] : normal S1 and S2 [No Carotid Bruit] : No carotid bruit [Non Tender] : non-tender [Soft] : abdomen soft [Not Distended] : not distended [Normal Post Cervical Nodes] : no posterior cervical lymphadenopathy [Normal Anterior Cervical Nodes] : no anterior cervical lymphadenopathy [No CVA Tenderness] : no ~M costovertebral angle tenderness [No Spinal Tenderness] : no spinal tenderness [Oriented x3] : oriented to person, place, and time [Normal Affect] : the affect was normal [Normal Mood] : the mood was normal [de-identified] : Mild edema in legs.

## 2022-11-23 NOTE — HISTORY OF PRESENT ILLNESS
[FreeTextEntry1] : Old age.  [FreeTextEntry2] : Pt was seen and examined at her home in the presence of her dtr (Ethel). Pt's TSH was very high. Apparently, she was not taking her meds regularly. She denies any SOB or chest pain. Denies any falls. c/o urinary frequency. UA was +ve for UTI. \par Other ROS were unremarkable.

## 2023-01-22 ENCOUNTER — FORM ENCOUNTER (OUTPATIENT)
Age: 88
End: 2023-01-22

## 2023-01-29 ENCOUNTER — APPOINTMENT (OUTPATIENT)
Dept: GERIATRICS | Facility: HOME HEALTH | Age: 88
End: 2023-01-29
Payer: MEDICARE

## 2023-01-29 PROCEDURE — 99349 HOME/RES VST EST MOD MDM 40: CPT

## 2023-01-30 VITALS
RESPIRATION RATE: 16 BRPM | DIASTOLIC BLOOD PRESSURE: 80 MMHG | OXYGEN SATURATION: 99 % | HEART RATE: 67 BPM | SYSTOLIC BLOOD PRESSURE: 145 MMHG

## 2023-01-30 NOTE — PHYSICAL EXAM
[No Acute Distress] : no acute distress [Well Nourished] : well nourished [Well Developed] : well developed [Normal Voice/Communication] : normal voice communication [Normal Outer Ear/Nose] : the ears and nose were normal in appearance [Normal Oropharynx] : the oropharynx was normal [Normal Nasal Mucosa] : the nasal mucosa was normal [No LAD] : no lymphadenopathy [Thyroid Normal, No Nodules] : the thyroid was normal and there were no nodules present [No Respiratory Distress] : no respiratory distress [Clear to Auscultation] : lungs were clear to auscultation bilaterally [No Accessory Muscle Use] : no accessory muscle use [Normal Rate] : heart rate was normal  [Regular Rhythm] : with a regular rhythm [Normal S1, S2] : normal S1 and S2 [No Carotid Bruit] : No carotid bruit [Non Tender] : non-tender [Soft] : abdomen soft [Not Distended] : not distended [Normal Post Cervical Nodes] : no posterior cervical lymphadenopathy [Normal Anterior Cervical Nodes] : no anterior cervical lymphadenopathy [No CVA Tenderness] : no ~M costovertebral angle tenderness [No Spinal Tenderness] : no spinal tenderness [Oriented x3] : oriented to person, place, and time [Normal Affect] : the affect was normal [Normal Mood] : the mood was normal [de-identified] : varicose veins seen in both legs.

## 2023-01-30 NOTE — ASSESSMENT
[FreeTextEntry1] : #) Hypothyroidism:\par Pt's TSH was 13.12. She still seems to have symptoms. Will repeat blood w/up. \par \par #) Cognitive Dysfunction:\par Symptoms are well controlled on Donepezil. No worsening behavioral issues. \par \par #) HTN:\par Continue meds. Low salt diet advised. \par \par #) HLD:\par Will repeat blood w/up. If LDL is still elevated, will start medication. \par \par #) CKD:\par Monitor GFR. \par Advised to avoid all OTC NSAIDs (Motrin, Advil, Naproxen ect)\par \par Will schedule blood w/up\par f/up in 2 months. \par \par \par

## 2023-01-30 NOTE — HISTORY OF PRESENT ILLNESS
[FreeTextEntry1] : Old age.  [FreeTextEntry2] : Pt was seen and examined at her home in the presence of her dtr Stacey. Pt is doing well. Medication compliance is still an issue. Pt was c/o occasional discomfort in her esophagus. c/o excessive cold. Denies any SOb or chest pain. Denies any falls. Denies any  or GI issues. Other ROS were unremarkable.

## 2023-03-05 ENCOUNTER — APPOINTMENT (OUTPATIENT)
Dept: GERIATRICS | Facility: HOME HEALTH | Age: 88
End: 2023-03-05
Payer: MEDICARE

## 2023-03-05 PROCEDURE — 99349 HOME/RES VST EST MOD MDM 40: CPT

## 2023-03-06 VITALS
HEART RATE: 56 BPM | DIASTOLIC BLOOD PRESSURE: 80 MMHG | OXYGEN SATURATION: 98 % | RESPIRATION RATE: 17 BRPM | SYSTOLIC BLOOD PRESSURE: 145 MMHG

## 2023-03-06 RX ORDER — LOTEPREDNOL ETABONATE 5 MG/ML
0.5 SUSPENSION/ DROPS OPHTHALMIC
Qty: 15 | Refills: 0 | Status: DISCONTINUED | COMMUNITY
Start: 2022-11-10

## 2023-03-06 RX ORDER — DONEPEZIL HYDROCHLORIDE 5 MG/1
5 TABLET ORAL
Qty: 90 | Refills: 1 | Status: ACTIVE | COMMUNITY
Start: 2022-01-10 | End: 1900-01-01

## 2023-03-06 RX ORDER — LEVOTHYROXINE SODIUM 0.07 MG/1
75 TABLET ORAL DAILY
Qty: 90 | Refills: 1 | Status: ACTIVE | COMMUNITY
Start: 1900-01-01 | End: 1900-01-01

## 2023-03-06 RX ORDER — NYSTATIN 100000 U/G
100000 OINTMENT TOPICAL
Qty: 30 | Refills: 0 | Status: DISCONTINUED | COMMUNITY
Start: 2023-01-12

## 2023-03-06 RX ORDER — PRAZOSIN HYDROCHLORIDE 1 MG/1
1 CAPSULE ORAL DAILY
Qty: 90 | Refills: 1 | Status: ACTIVE | COMMUNITY
Start: 2022-07-23 | End: 1900-01-01

## 2023-03-06 RX ORDER — LORAZEPAM 0.5 MG/1
0.5 TABLET ORAL
Qty: 30 | Refills: 0 | Status: DISCONTINUED | COMMUNITY
Start: 2023-01-16

## 2023-03-10 ENCOUNTER — LABORATORY RESULT (OUTPATIENT)
Age: 88
End: 2023-03-10

## 2023-03-11 NOTE — ASSESSMENT
[FreeTextEntry1] : #) Fall:\par Fall precaution d/w pt and family in detail. Advised to use non slip shoes and walker. Avoid taking benzodiazepines (Given by some outside provider). \par \par #) Hypothyroidism:\par Pt's TSH was 13.12. No repeat blood w/up done. Will order again. Family is busy with their own stuff so i would suggest to have HHA for few hours that can remind pt to take medication on time. \par \par #) Cognitive Dysfunction:\par Symptoms are well controlled on Donepezil. \par \par #) HTN:\par Continue meds. Low salt diet advised. Refills sent to her pharmacy. \par \par #) HLD:\par LDL was very high. Low fat diet advised. Will repeat blood w/up. \par \par #) CKD:\par Monitor GFR. \par Advised to avoid all OTC NSAIDs (Motrin, Advil, Naproxen ect)\par \par Will schedule blood w/up\par f/up in 2 months. \par \par \par

## 2023-03-11 NOTE — PHYSICAL EXAM
[No Acute Distress] : no acute distress [Well Nourished] : well nourished [Well Developed] : well developed [Normal Voice/Communication] : normal voice communication [Normal Outer Ear/Nose] : the ears and nose were normal in appearance [Normal Oropharynx] : the oropharynx was normal [Normal Nasal Mucosa] : the nasal mucosa was normal [No LAD] : no lymphadenopathy [Thyroid Normal, No Nodules] : the thyroid was normal and there were no nodules present [No Respiratory Distress] : no respiratory distress [Clear to Auscultation] : lungs were clear to auscultation bilaterally [No Accessory Muscle Use] : no accessory muscle use [Normal Rate] : heart rate was normal  [Regular Rhythm] : with a regular rhythm [Normal S1, S2] : normal S1 and S2 [No Carotid Bruit] : No carotid bruit [Non Tender] : non-tender [Soft] : abdomen soft [Not Distended] : not distended [Normal Post Cervical Nodes] : no posterior cervical lymphadenopathy [Normal Anterior Cervical Nodes] : no anterior cervical lymphadenopathy [No CVA Tenderness] : no ~M costovertebral angle tenderness [No Spinal Tenderness] : no spinal tenderness [Oriented x3] : oriented to person, place, and time [Normal Affect] : the affect was normal [Normal Mood] : the mood was normal [de-identified] : varicose veins seen in both legs.

## 2023-03-11 NOTE — HISTORY OF PRESENT ILLNESS
[FreeTextEntry1] : Old age.  [FreeTextEntry2] : Pt was seen and examined at her home in the presence of her dtr Ethel & son Waldo. Pt slipped few days ago and hurt her right eyelid. She had some bleeding but it stopped now. Pt denies any pain now. Denies any SOB, chest pain or palpitations before fall. Son was concerned about occasional hallucinations. Pt denies any complaints. Dtr was asking to give her lorazepam that was given in past by her previous PCP. Pt did not use any lorazepam in about 1 month. I checked istop and it was given by a PA for 1 month. \par Pt is non compliant with her meds. \par Other ROS were unremarkable.

## 2023-03-13 NOTE — ED ADULT TRIAGE NOTE - CCCP TRG CHIEF CMPLNT
OPAT Nurse Coordinator Assessment Note      Primary ID Diagnosis:  Left knee prosthetic joint infection   ID Provider: Dr. Pradhan      IV Antimicrobial Therapy Plan:  IV vancomycin 750 mg every 24 hours, p.o. linezolid 600 mg every 12 hours    IV Access: PICC    Family Support: wife Meghna (family, neighbors)     Contact information: see Epic emographics    Outpatient services (including home infusion, home health, facility referral: See recent case management/social work note for finalization of services    ID follow up appointment:  4/26/23 @11am       Patient Assessment    I spoke with patient at bedside and completed an initial assessment and education on OPAT program. I explained the OPAT program and its service (IV antimicrobial management, lab monitoring, follow up appointments reminders, etc.) to the patient and/or family. Patient states family can help with IV antimicrobial administration at home. Wife Meghna present for visit and states she can assist with IV antibiotic administration at home. After this discussion, the patient appeared to be a good candidate for the OPAT program.     Patient was educated about how PICC line is placed, s/s of infection at the PICC line insertion site, how to keep the PICC line dressing dry while bathing, weightlifting limitations of 10 pounds or more, avoiding intense physical work and any repetitive motion in arm the PICC line was placed and how the PICC line dressing must be changed each week along with weekly lab monitoring. Patient was given information about IV antimicrobials including possible administration options (continuous, IV push, number of infusions) and estimated duration of therapy.     Patient and/or family verbalized understanding and provide teach back on assessment discussion stated above. Patient agreed for the OPAT team to leave messages on their cell phone.      OPAT program packet including contacts were left with patient and/or family in the event they  hip pain/injury would have questions or concerns. The OPAT nurse coordinator will continue to follow the patient peripherally for any changes in the discharge plan. Please direct questions to myself, another member of the OPAT team, or the ID consulting provider via [address] [place] [address] or by choosing option #2 when calling the office to speak with your MD's. This note is not the final recommendations from the infectious diseases team, please refer to the most recent note for this information.

## 2023-03-19 ENCOUNTER — INPATIENT (INPATIENT)
Facility: HOSPITAL | Age: 88
LOS: 2 days | Discharge: NURSING, REHAB OR LONG-TERM CARE | DRG: 86 | End: 2023-03-22
Attending: HOSPITALIST | Admitting: HOSPITALIST
Payer: MEDICARE

## 2023-03-19 ENCOUNTER — NON-APPOINTMENT (OUTPATIENT)
Age: 88
End: 2023-03-19

## 2023-03-19 VITALS
WEIGHT: 160.06 LBS | RESPIRATION RATE: 18 BRPM | HEART RATE: 87 BPM | SYSTOLIC BLOOD PRESSURE: 175 MMHG | OXYGEN SATURATION: 99 % | DIASTOLIC BLOOD PRESSURE: 89 MMHG | TEMPERATURE: 98 F

## 2023-03-19 DIAGNOSIS — Z96.641 PRESENCE OF RIGHT ARTIFICIAL HIP JOINT: Chronic | ICD-10-CM

## 2023-03-19 DIAGNOSIS — S42.309A UNSPECIFIED FRACTURE OF SHAFT OF HUMERUS, UNSPECIFIED ARM, INITIAL ENCOUNTER FOR CLOSED FRACTURE: ICD-10-CM

## 2023-03-19 LAB
ALBUMIN SERPL ELPH-MCNC: 4.4 G/DL
ALBUMIN SERPL ELPH-MCNC: 4.5 G/DL — SIGNIFICANT CHANGE UP (ref 3.5–5.2)
ALP BLD-CCNC: 68 U/L
ALP SERPL-CCNC: 72 U/L — SIGNIFICANT CHANGE UP (ref 30–115)
ALT FLD-CCNC: 6 U/L — SIGNIFICANT CHANGE UP (ref 0–41)
ALT SERPL-CCNC: 5 U/L
ANION GAP SERPL CALC-SCNC: 11 MMOL/L
ANION GAP SERPL CALC-SCNC: 12 MMOL/L — SIGNIFICANT CHANGE UP (ref 7–14)
APTT BLD: 42.6 SEC — HIGH (ref 27–39.2)
AST SERPL-CCNC: 20 U/L
AST SERPL-CCNC: 23 U/L — SIGNIFICANT CHANGE UP (ref 0–41)
BASOPHILS # BLD AUTO: 0.04 K/UL — SIGNIFICANT CHANGE UP (ref 0–0.2)
BASOPHILS # BLD AUTO: 0.08 K/UL
BASOPHILS NFR BLD AUTO: 0.8 % — SIGNIFICANT CHANGE UP (ref 0–1)
BASOPHILS NFR BLD AUTO: 1.5 %
BILIRUB SERPL-MCNC: 0.5 MG/DL — SIGNIFICANT CHANGE UP (ref 0.2–1.2)
BILIRUB SERPL-MCNC: 0.6 MG/DL
BLD GP AB SCN SERPL QL: SIGNIFICANT CHANGE UP
BUN SERPL-MCNC: 36 MG/DL
BUN SERPL-MCNC: 42 MG/DL — HIGH (ref 10–20)
CALCIUM SERPL-MCNC: 9.4 MG/DL
CALCIUM SERPL-MCNC: 9.7 MG/DL — SIGNIFICANT CHANGE UP (ref 8.4–10.5)
CHLORIDE SERPL-SCNC: 103 MMOL/L
CHLORIDE SERPL-SCNC: 109 MMOL/L — SIGNIFICANT CHANGE UP (ref 98–110)
CHOLEST SERPL-MCNC: 265 MG/DL
CO2 SERPL-SCNC: 22 MMOL/L
CO2 SERPL-SCNC: 22 MMOL/L — SIGNIFICANT CHANGE UP (ref 17–32)
CREAT SERPL-MCNC: 1.2 MG/DL — SIGNIFICANT CHANGE UP (ref 0.7–1.5)
CREAT SERPL-MCNC: 1.3 MG/DL
EGFR: 38 ML/MIN/1.73M2
EGFR: 42 ML/MIN/1.73M2 — LOW
EOSINOPHIL # BLD AUTO: 0.27 K/UL
EOSINOPHIL # BLD AUTO: 0.28 K/UL — SIGNIFICANT CHANGE UP (ref 0–0.7)
EOSINOPHIL NFR BLD AUTO: 5.2 %
EOSINOPHIL NFR BLD AUTO: 5.6 % — SIGNIFICANT CHANGE UP (ref 0–8)
ESTIMATED AVERAGE GLUCOSE: 114 MG/DL
FOLATE SERPL-MCNC: >20 NG/ML
GLUCOSE SERPL-MCNC: 128 MG/DL — HIGH (ref 70–99)
GLUCOSE SERPL-MCNC: 91 MG/DL
HBA1C MFR BLD HPLC: 5.6 %
HCT VFR BLD CALC: 33.4 %
HCT VFR BLD CALC: 35.8 % — LOW (ref 37–47)
HDLC SERPL-MCNC: 49 MG/DL
HGB BLD-MCNC: 10.8 G/DL
HGB BLD-MCNC: 11.5 G/DL — LOW (ref 12–16)
IMM GRANULOCYTES NFR BLD AUTO: 0.4 %
IMM GRANULOCYTES NFR BLD AUTO: 0.6 % — HIGH (ref 0.1–0.3)
INR BLD: 1.2 RATIO — SIGNIFICANT CHANGE UP (ref 0.65–1.3)
LDLC SERPL CALC-MCNC: 193 MG/DL
LYMPHOCYTES # BLD AUTO: 1.49 K/UL
LYMPHOCYTES # BLD AUTO: 1.53 K/UL — SIGNIFICANT CHANGE UP (ref 1.2–3.4)
LYMPHOCYTES # BLD AUTO: 30.5 % — SIGNIFICANT CHANGE UP (ref 20.5–51.1)
LYMPHOCYTES NFR BLD AUTO: 28.7 %
MAN DIFF?: NORMAL
MCHC RBC-ENTMCNC: 28.8 PG
MCHC RBC-ENTMCNC: 29.5 PG — SIGNIFICANT CHANGE UP (ref 27–31)
MCHC RBC-ENTMCNC: 32.1 G/DL — SIGNIFICANT CHANGE UP (ref 32–37)
MCHC RBC-ENTMCNC: 32.3 G/DL
MCV RBC AUTO: 89.1 FL
MCV RBC AUTO: 91.8 FL — SIGNIFICANT CHANGE UP (ref 81–99)
MONOCYTES # BLD AUTO: 0.36 K/UL
MONOCYTES # BLD AUTO: 0.39 K/UL — SIGNIFICANT CHANGE UP (ref 0.1–0.6)
MONOCYTES NFR BLD AUTO: 6.9 %
MONOCYTES NFR BLD AUTO: 7.8 % — SIGNIFICANT CHANGE UP (ref 1.7–9.3)
NEUTROPHILS # BLD AUTO: 2.75 K/UL — SIGNIFICANT CHANGE UP (ref 1.4–6.5)
NEUTROPHILS # BLD AUTO: 2.97 K/UL
NEUTROPHILS NFR BLD AUTO: 54.7 % — SIGNIFICANT CHANGE UP (ref 42.2–75.2)
NEUTROPHILS NFR BLD AUTO: 57.3 %
NONHDLC SERPL-MCNC: 216 MG/DL
NRBC # BLD: 0 /100 WBCS — SIGNIFICANT CHANGE UP (ref 0–0)
PLATELET # BLD AUTO: 153 K/UL — SIGNIFICANT CHANGE UP (ref 130–400)
PLATELET # BLD AUTO: 175 K/UL
POTASSIUM SERPL-MCNC: 4.6 MMOL/L — SIGNIFICANT CHANGE UP (ref 3.5–5)
POTASSIUM SERPL-SCNC: 4.6 MMOL/L — SIGNIFICANT CHANGE UP (ref 3.5–5)
POTASSIUM SERPL-SCNC: 4.7 MMOL/L
PROT SERPL-MCNC: 7.2 G/DL
PROT SERPL-MCNC: 7.7 G/DL — SIGNIFICANT CHANGE UP (ref 6–8)
PROTHROM AB SERPL-ACNC: 13.7 SEC — HIGH (ref 9.95–12.87)
RBC # BLD: 3.75 M/UL
RBC # BLD: 3.9 M/UL — LOW (ref 4.2–5.4)
RBC # FLD: 14.2 %
RBC # FLD: 14.5 % — SIGNIFICANT CHANGE UP (ref 11.5–14.5)
SARS-COV-2 RNA SPEC QL NAA+PROBE: SIGNIFICANT CHANGE UP
SODIUM SERPL-SCNC: 136 MMOL/L
SODIUM SERPL-SCNC: 143 MMOL/L — SIGNIFICANT CHANGE UP (ref 135–146)
TRIGL SERPL-MCNC: 115 MG/DL
TSH SERPL-ACNC: 27.62 UIU/ML
VIT B12 SERPL-MCNC: 666 PG/ML
WBC # BLD: 5.02 K/UL — SIGNIFICANT CHANGE UP (ref 4.8–10.8)
WBC # FLD AUTO: 5.02 K/UL — SIGNIFICANT CHANGE UP (ref 4.8–10.8)
WBC # FLD AUTO: 5.19 K/UL

## 2023-03-19 PROCEDURE — 73080 X-RAY EXAM OF ELBOW: CPT | Mod: 26,LT

## 2023-03-19 PROCEDURE — 36415 COLL VENOUS BLD VENIPUNCTURE: CPT

## 2023-03-19 PROCEDURE — 72125 CT NECK SPINE W/O DYE: CPT | Mod: 26,MA

## 2023-03-19 PROCEDURE — 85610 PROTHROMBIN TIME: CPT

## 2023-03-19 PROCEDURE — 71260 CT THORAX DX C+: CPT | Mod: 26,MA

## 2023-03-19 PROCEDURE — 84443 ASSAY THYROID STIM HORMONE: CPT

## 2023-03-19 PROCEDURE — 70450 CT HEAD/BRAIN W/O DYE: CPT | Mod: 26,MA

## 2023-03-19 PROCEDURE — 85730 THROMBOPLASTIN TIME PARTIAL: CPT

## 2023-03-19 PROCEDURE — 97530 THERAPEUTIC ACTIVITIES: CPT | Mod: GP

## 2023-03-19 PROCEDURE — U0003: CPT

## 2023-03-19 PROCEDURE — 80048 BASIC METABOLIC PNL TOTAL CA: CPT

## 2023-03-19 PROCEDURE — 72170 X-RAY EXAM OF PELVIS: CPT | Mod: 26

## 2023-03-19 PROCEDURE — 97116 GAIT TRAINING THERAPY: CPT | Mod: GP

## 2023-03-19 PROCEDURE — 71045 X-RAY EXAM CHEST 1 VIEW: CPT | Mod: 26

## 2023-03-19 PROCEDURE — 73030 X-RAY EXAM OF SHOULDER: CPT | Mod: 26,LT

## 2023-03-19 PROCEDURE — 86850 RBC ANTIBODY SCREEN: CPT

## 2023-03-19 PROCEDURE — 73000 X-RAY EXAM OF COLLAR BONE: CPT | Mod: 26,LT

## 2023-03-19 PROCEDURE — 99223 1ST HOSP IP/OBS HIGH 75: CPT

## 2023-03-19 PROCEDURE — 83735 ASSAY OF MAGNESIUM: CPT

## 2023-03-19 PROCEDURE — 74177 CT ABD & PELVIS W/CONTRAST: CPT | Mod: 26,MA

## 2023-03-19 PROCEDURE — 83036 HEMOGLOBIN GLYCOSYLATED A1C: CPT

## 2023-03-19 PROCEDURE — 97163 PT EVAL HIGH COMPLEX 45 MIN: CPT | Mod: GP

## 2023-03-19 PROCEDURE — 86901 BLOOD TYPING SEROLOGIC RH(D): CPT

## 2023-03-19 PROCEDURE — U0005: CPT

## 2023-03-19 PROCEDURE — 99285 EMERGENCY DEPT VISIT HI MDM: CPT

## 2023-03-19 PROCEDURE — 86900 BLOOD TYPING SEROLOGIC ABO: CPT

## 2023-03-19 PROCEDURE — 73060 X-RAY EXAM OF HUMERUS: CPT | Mod: 26,LT

## 2023-03-19 PROCEDURE — 73090 X-RAY EXAM OF FOREARM: CPT | Mod: 26,LT

## 2023-03-19 PROCEDURE — 70486 CT MAXILLOFACIAL W/O DYE: CPT | Mod: 26,MA

## 2023-03-19 PROCEDURE — 85025 COMPLETE CBC W/AUTO DIFF WBC: CPT

## 2023-03-19 RX ORDER — LORATADINE 10 MG/1
10 TABLET ORAL DAILY
Refills: 0 | Status: DISCONTINUED | OUTPATIENT
Start: 2023-03-19 | End: 2023-03-22

## 2023-03-19 RX ORDER — OXYMETAZOLINE HYDROCHLORIDE 0.5 MG/ML
1 SPRAY NASAL
Refills: 0 | Status: COMPLETED | OUTPATIENT
Start: 2023-03-19 | End: 2023-03-22

## 2023-03-19 RX ORDER — LATANOPROST 0.05 MG/ML
1 SOLUTION/ DROPS OPHTHALMIC; TOPICAL AT BEDTIME
Refills: 0 | Status: DISCONTINUED | OUTPATIENT
Start: 2023-03-19 | End: 2023-03-22

## 2023-03-19 RX ORDER — ENOXAPARIN SODIUM 100 MG/ML
40 INJECTION SUBCUTANEOUS EVERY 24 HOURS
Refills: 0 | Status: DISCONTINUED | OUTPATIENT
Start: 2023-03-19 | End: 2023-03-22

## 2023-03-19 RX ORDER — TRAMADOL HYDROCHLORIDE 50 MG/1
50 TABLET ORAL EVERY 8 HOURS
Refills: 0 | Status: DISCONTINUED | OUTPATIENT
Start: 2023-03-19 | End: 2023-03-19

## 2023-03-19 RX ORDER — FERROUS SULFATE 325(65) MG
325 TABLET ORAL DAILY
Refills: 0 | Status: DISCONTINUED | OUTPATIENT
Start: 2023-03-19 | End: 2023-03-22

## 2023-03-19 RX ORDER — TETANUS TOXOID, REDUCED DIPHTHERIA TOXOID AND ACELLULAR PERTUSSIS VACCINE, ADSORBED 5; 2.5; 8; 8; 2.5 [IU]/.5ML; [IU]/.5ML; UG/.5ML; UG/.5ML; UG/.5ML
0.5 SUSPENSION INTRAMUSCULAR ONCE
Refills: 0 | Status: COMPLETED | OUTPATIENT
Start: 2023-03-19 | End: 2023-03-19

## 2023-03-19 RX ORDER — ACETAMINOPHEN 500 MG
650 TABLET ORAL EVERY 6 HOURS
Refills: 0 | Status: DISCONTINUED | OUTPATIENT
Start: 2023-03-19 | End: 2023-03-22

## 2023-03-19 RX ORDER — AMLODIPINE BESYLATE 2.5 MG/1
5 TABLET ORAL DAILY
Refills: 0 | Status: DISCONTINUED | OUTPATIENT
Start: 2023-03-19 | End: 2023-03-22

## 2023-03-19 RX ORDER — MELOXICAM 15 MG/1
1 TABLET ORAL
Qty: 0 | Refills: 0 | DISCHARGE

## 2023-03-19 RX ORDER — TIMOLOL 0.5 %
1 DROPS OPHTHALMIC (EYE) EVERY 12 HOURS
Refills: 0 | Status: DISCONTINUED | OUTPATIENT
Start: 2023-03-19 | End: 2023-03-22

## 2023-03-19 RX ORDER — DOXAZOSIN MESYLATE 4 MG
1 TABLET ORAL AT BEDTIME
Refills: 0 | Status: DISCONTINUED | OUTPATIENT
Start: 2023-03-19 | End: 2023-03-22

## 2023-03-19 RX ORDER — LEVOTHYROXINE SODIUM 125 MCG
75 TABLET ORAL DAILY
Refills: 0 | Status: DISCONTINUED | OUTPATIENT
Start: 2023-03-19 | End: 2023-03-22

## 2023-03-19 RX ORDER — FLUOROMETHOLONE 1 MG/ML
1 SOLUTION/ DROPS OPHTHALMIC DAILY
Refills: 0 | Status: DISCONTINUED | OUTPATIENT
Start: 2023-03-19 | End: 2023-03-22

## 2023-03-19 RX ORDER — SODIUM CHLORIDE 0.65 %
1 AEROSOL, SPRAY (ML) NASAL EVERY 8 HOURS
Refills: 0 | Status: DISCONTINUED | OUTPATIENT
Start: 2023-03-19 | End: 2023-03-22

## 2023-03-19 RX ORDER — CEFAZOLIN SODIUM 1 G
2000 VIAL (EA) INJECTION ONCE
Refills: 0 | Status: COMPLETED | OUTPATIENT
Start: 2023-03-19 | End: 2023-03-19

## 2023-03-19 RX ORDER — DONEPEZIL HYDROCHLORIDE 10 MG/1
5 TABLET, FILM COATED ORAL AT BEDTIME
Refills: 0 | Status: DISCONTINUED | OUTPATIENT
Start: 2023-03-19 | End: 2023-03-22

## 2023-03-19 RX ORDER — BRIMONIDINE TARTRATE 2 MG/MG
1 SOLUTION/ DROPS OPHTHALMIC THREE TIMES A DAY
Refills: 0 | Status: DISCONTINUED | OUTPATIENT
Start: 2023-03-19 | End: 2023-03-22

## 2023-03-19 RX ADMIN — BRIMONIDINE TARTRATE 1 DROP(S): 2 SOLUTION/ DROPS OPHTHALMIC at 21:18

## 2023-03-19 RX ADMIN — ENOXAPARIN SODIUM 40 MILLIGRAM(S): 100 INJECTION SUBCUTANEOUS at 21:15

## 2023-03-19 RX ADMIN — Medication 100 MILLIGRAM(S): at 12:27

## 2023-03-19 RX ADMIN — Medication 1 SPRAY(S): at 22:35

## 2023-03-19 RX ADMIN — Medication 1 MILLIGRAM(S): at 21:14

## 2023-03-19 RX ADMIN — DONEPEZIL HYDROCHLORIDE 5 MILLIGRAM(S): 10 TABLET, FILM COATED ORAL at 21:14

## 2023-03-19 RX ADMIN — LATANOPROST 1 DROP(S): 0.05 SOLUTION/ DROPS OPHTHALMIC; TOPICAL at 21:57

## 2023-03-19 RX ADMIN — Medication 1 DROP(S): at 18:52

## 2023-03-19 RX ADMIN — OXYMETAZOLINE HYDROCHLORIDE 1 SPRAY(S): 0.5 SPRAY NASAL at 21:57

## 2023-03-19 RX ADMIN — TETANUS TOXOID, REDUCED DIPHTHERIA TOXOID AND ACELLULAR PERTUSSIS VACCINE, ADSORBED 0.5 MILLILITER(S): 5; 2.5; 8; 8; 2.5 SUSPENSION INTRAMUSCULAR at 11:49

## 2023-03-19 RX ADMIN — Medication 0.5 MILLIGRAM(S): at 21:14

## 2023-03-19 NOTE — H&P ADULT - HISTORY OF PRESENT ILLNESS
89-year-old female past medical history of hypothyroidism, hypertension, hyperlipidemia, hard of hearing presents to ED status post mechanical trip and fall at home today.  Patient states that she was walking with her walker but the steps at home were uneven and she tripped and landed on her left side, positive head trauma with swelling to her left eye.  Denies LOC, no blood thinner use. Patient also with pain at left elbow. Denies chest pain, shortness of breath, abdominal pain, nausea, vomiting, change in vision.  No headache, neck pain.  No numbness/tingling. States she normally walks with walker at home, lives with her sons and is independent of some ADL's

## 2023-03-19 NOTE — ED PROVIDER NOTE - CLINICAL SUMMARY MEDICAL DECISION MAKING FREE TEXT BOX
Patient presented s/p trip and fall as documented with (+) head trauma. Otherwise on arrival patient afebrile, HD stable, grossly neurovascularly intact but (+) tenderness noted to L humerus. Obtained labs which were grossly unremarkable including no significant leukocytosis, anemia, signs of dehydration/SOILA, transaminitis or significant electrolyte abnormalities. Pan scan revealed (+) orbital fx as well as (+) L humerus fx. Humerus placed in sling. In terms of orbital fx, no signs of entrapment, no vision changes. Consulted OMFS who evaluated patient in the ED - no emergent intervention from their standpoint. Patient unable to ambulate with her baseline walker with the sling and is fall risk if discharged. Given the above, will require admission for PT/OT, further pain control. HD stable at time of admission.

## 2023-03-19 NOTE — PATIENT PROFILE ADULT - VISION (WITH CORRECTIVE LENSES IF THE PATIENT USUALLY WEARS THEM):
Detail Level: Detailed
Quality 110: Preventive Care And Screening: Influenza Immunization: Influenza Immunization not Administered for Documented Reasons.
Additional Notes: Personal reasons
Partially impaired: cannot see medication labels or newsprint, but can see obstacles in path, and the surrounding layout; can count fingers at arm's length

## 2023-03-19 NOTE — ED PROVIDER NOTE - CONSIDERATION OF ADMISSION OBSERVATION
Patient ambulates with walker at bedside, and now with humerus fx will not be able to ambulate and is fall risk outpatient. Will require admission for PT/OT, pain control. Consideration of Admission/Observation

## 2023-03-19 NOTE — H&P ADULT - NSHPLABSRESULTS_GEN_ALL_CORE
WBC Count: 5.02 K/uL   RBC Count: 3.90 M/uL   Hemoglobin: 11.5 g/dL   Hematocrit: 35.8 %   Mean Cell Volume: 91.8 fL   Mean Cell Hemoglobin: 29.5 pg   Mean Cell Hemoglobin Conc: 32.1 g/dL   Red Cell Distrib Width: 14.5 %   Platelet Count - Automated: 153 K/uL   Auto Neutrophil #: 2.75 K/uL   Auto Lymphocyte #: 1.53 K/uL   Auto Monocyte #: 0.39 K/uL   Auto Eosinophil #: 0.28 K/uL   Auto Basophil #: 0.04 K/uL   Auto Neutrophil %: 54.7: Differential percentages must be correlated with absolute numbers for   clinical significance. %   Auto Lymphocyte %: 30.5 %   Auto Monocyte %: 7.8 %   Auto Eosinophil %: 5.6 %   Auto Basophil %: 0.8 %   Auto Immature Granulocyte %: 0.6: (Includes meta, myelo and promyelocytes). Mild elevations in immature   granulocytes may be seen with many inflammatory processes and pregnancy;   clinical correlation suggested. %   Nucleated RBC: 0 /100 WBCs Sodium, Serum: 143 mmol/L   Potassium, Serum: 4.6: Slighty Hemolyzed use with Caution mmol/L   Chloride, Serum: 109 mmol/L   Carbon Dioxide, Serum: 22 mmol/L   Anion Gap, Serum: 12 mmol/L   Blood Urea Nitrogen, Serum: 42 mg/dL   Creatinine, Serum: 1.2 mg/dL   Glucose, Serum: 128 mg/dL   Calcium, Total Serum: 9.7 mg/dL   Protein Total, Serum: 7.7 g/dL   Albumin, Serum: 4.5 g/dL   Bilirubin Total, Serum: 0.5 mg/dL   Alkaline Phosphatase, Serum: 72 U/L   Aspartate Aminotransferase (AST/SGOT): 23: Hemolyzed. Interpret with caution U/L   Alanine Aminotransferase (ALT/SGPT): 6 U/L     < from: CT Abdomen and Pelvis w/ IV Cont (03.19.23 @ 10:52) >    FINDINGS:    CHEST:    LUNGS, PLEURA, AIRWAYS: Central tracheal bronchial tree is patent. No   focal consolidation, pleural effusion, or pneumothorax. Bibasilar   subsegmental atelectasis/scarring.    THORACIC NODES: No enlarged supraclavicular, axillary, mediastinal, or   hilar lymph nodes.    MEDIASTINUM/GREAT VESSELS: Heart size is within normal limits. No   pericardial effusion. Coronary artery and aortic calcifications. Multiple   hypodense thyroid nodules.    ABDOMEN/PELVIS:    HEPATOBILIARY: Unremarkable.    SPLEEN: Subcentimeter splenic hypodensity too small to characterize.    PANCREAS: Unremarkable.    ADRENAL GLANDS: Unremarkable.    KIDNEYS: Symmetric renal enhancement. No hydronephrosis. Bilateral   subcentimeter renal hypodensities too small to characterize.    ABDOMINOPELVIC NODES: No enlarged lymph nodes.    PELVIC ORGANS: Unremarkable.    PERITONEUM/MESENTERY/BOWEL: No evidence of bowel obstruction, free fluid,   or pneumoperitoneum. Scattered colonic diverticulosis without evidence of   acute diverticulitis.    BONES/SOFT TISSUES: Acute nondisplaced fracture of the left humeral head   extending into the humeral neck with surrounding edema/hematoma. Chronic   fractures of the left superior and inferior pelvic rami. Post ORIF of   bilateral femur. Surgical staples of the right lateral chest subcutaneous   soft tissue. Degenerative changes of the spine.    OTHER: Calcific atherosclerosis.    IMPRESSION:    Acute nondisplaced fracture of the left humeral head extending into the   humeral neck with mild surrounding edema/hematoma.    < end of copied text >

## 2023-03-19 NOTE — H&P ADULT - ASSESSMENT
89-year-old F with the aforementioned PMHx, presents for a mechanical fall with fracture of the left humerus and left orbital and maxillary sinuses.     #Mechanical Fall  #Acute Non-displaced fracture of the Left humeral head with mild surrounding edema and hematoma  #Minimally Displaced fracture of the Left lateral orbital wall and posterior wall of the left maxillary sinus  - Code trauma, PAN CT scan  - Follow up Xrays   - CT Chest/Abdomen and Pelvis:   Acute nondisplaced fracture of the left humeral head extending into the humeral neck with mild surrounding edema/hematoma.  - CTH, Cervical Spine and Orbit:   A. CT HEAD:  No acute intracranial pathology. No evidence of midline shift, mass effect or intracranial hemorrhage.  B.CT FACIAL BONES:  Minimally displaced acute fractures of the left lateral orbital wall and posterior wall of the left maxillary sinus with associated periorbital soft tissue swelling.  C. CT CERVICAL SPINE:  No acute fracture or subluxation.Multilevel degenerative changes as detailed above.  - S/p left arm sling by ED  - OMF Recommendations appreciated:  a. No acute surgical intervention   b. Opthalmology follow up  c. Sinus precautions (no nose blowing, sneeze with mouth open)  d. Saline nasal spray tid  e. Afrin nasal spray bid x 3 days  f. Follow up in clinic  - Opthalmology Consult    #HTN     #Hypothyroidism    #Misc:   - DVT proph:   - GI proph:   - Diet:    89-year-old F with the aforementioned PMHx, presents for a mechanical fall with fracture of the left humerus and left orbital and maxillary sinuses.     #Mechanical Fall  #Acute Non-displaced fracture of the Left humeral head with mild surrounding edema and hematoma  #Minimally Displaced fracture of the Left lateral orbital wall and posterior wall of the left maxillary sinus  - Code trauma, PAN CT scan  - Follow up Xrays   - CT Chest/Abdomen and Pelvis:   Acute nondisplaced fracture of the left humeral head extending into the humeral neck with mild surrounding edema/hematoma.  - CTH, Cervical Spine and Orbit:   A. CT HEAD:  No acute intracranial pathology. No evidence of midline shift, mass effect or intracranial hemorrhage.  B.CT FACIAL BONES:  Minimally displaced acute fractures of the left lateral orbital wall and posterior wall of the left maxillary sinus with associated periorbital soft tissue swelling.  C. CT CERVICAL SPINE:  No acute fracture or subluxation.Multilevel degenerative changes as detailed above.  - S/p left arm sling by ED  - OMF Recommendations appreciated:  a. No acute surgical intervention   b. Opthalmology follow up  c. Sinus precautions (no nose blowing, sneeze with mouth open)  d. Saline nasal spray tid  e. Afrin nasal spray bid x 3 days  f. Follow up in clinic  - Opthalmology Consult  - PT consultation, weight bearing restriction of the LUE  - Fall precautions    #HTN     #Dyslipidemia    #Hypothyroidism    #Misc:   - DVT proph:   - GI proph:   - Diet:    89-year-old F with the aforementioned PMHx, presents for a mechanical fall with fracture of the left humerus and left orbital and maxillary sinuses.     #Mechanical Fall  #Acute Non-displaced fracture of the Left humeral head with mild surrounding edema and hematoma  #Minimally Displaced fracture of the Left lateral orbital wall and posterior wall of the left maxillary sinus  - Code trauma, PAN CT scan  - Follow up Xrays   - CT Chest/Abdomen and Pelvis:   Acute nondisplaced fracture of the left humeral head extending into the humeral neck with mild surrounding edema/hematoma.  - CTH, Cervical Spine and Orbit:   A. CT HEAD:  No acute intracranial pathology. No evidence of midline shift, mass effect or intracranial hemorrhage.  B.CT FACIAL BONES:  Minimally displaced acute fractures of the left lateral orbital wall and posterior wall of the left maxillary sinus with associated periorbital soft tissue swelling.  C. CT CERVICAL SPINE:  No acute fracture or subluxation.Multilevel degenerative changes as detailed above.  - S/p left arm sling by ED  - OMF Recommendations appreciated:  a. No acute surgical intervention   b. Opthalmology follow up  c. Sinus precautions (no nose blowing, sneeze with mouth open)  d. Saline nasal spray tid  e. Afrin nasal spray bid x 3 days  f. Follow up in clinic  - Opthalmology Consult  - PT consultation, weight bearing restriction of the LUE  - Fall precautions    #HTN;   - C/w Amlodipine    #Dyslipidemia    #Hypothyroidism:   - C/w Levothyroxine  - TSH in am    # Left eye Macular Degeneration  # Left Glaucoma  - C/w Eye drops    #Misc:   - DVT proph: Lovenox 40mg SubQ once daily  - GI proph: Not indicated  - Diet: DASH/TLC   89-year-old F with the aforementioned PMHx, presents for a mechanical fall with fracture of the left humerus and left orbital and maxillary sinuses.     #Mechanical Fall  #Acute Non-displaced fracture of the Left humeral head with mild surrounding edema and hematoma  #Minimally Displaced fracture of the Left lateral orbital wall and posterior wall of the left maxillary sinus  - Code trauma, PAN CT scan  - Follow up Xrays   - CT Chest/Abdomen and Pelvis:   Acute nondisplaced fracture of the left humeral head extending into the humeral neck with mild surrounding edema/hematoma.  - CTH, Cervical Spine and Orbit:   A. CT HEAD:  No acute intracranial pathology. No evidence of midline shift, mass effect or intracranial hemorrhage.  B.CT FACIAL BONES:  Minimally displaced acute fractures of the left lateral orbital wall and posterior wall of the left maxillary sinus with associated periorbital soft tissue swelling.  C. CT CERVICAL SPINE:  No acute fracture or subluxation.Multilevel degenerative changes as detailed above.  - S/p left arm sling by ED  - OMF Recommendations appreciated:  a. No acute surgical intervention   b. Opthalmology follow up  c. Sinus precautions (no nose blowing, sneeze with mouth open)  d. Saline nasal spray tid  e. Afrin nasal spray bid x 3 days  f. Follow up in clinic  - Opthalmology Consult  - PT consultation, weight bearing restriction of the LUE  - Fall precautions  - The patient lives at her son's house, she uses a walker, all 3 children are involved in care, mainly Ethel ( Daughter) who follows her medical conditions.  - May need SNF vs Home with HHA ( As the family members work)  - The patient is on Zyrtec 10mg po once daily, Consider stopping this medication in an elderly patient    #HTN;   - C/w Amlodipine 5mg po once daily      #Hypothyroidism:   - C/w Levothyroxine 75mcg po once daily  - TSH in am    # Left eye Macular Degeneration  # Left Glaucoma  - C/w Eye drops    #Misc:   - DVT proph: Lovenox 40mg SubQ once daily  - GI proph: Not indicated  - Diet: DASH/TLC  - Daughter's nb ( Ethel): [phone] 89-year-old F with the aforementioned PMHx, presents for a mechanical fall with fracture of the left humerus and left orbital and maxillary sinuses.     #Mechanical Fall  #Acute Non-displaced fracture of the Left humeral head with mild surrounding edema and hematoma  #Minimally Displaced fracture of the Left lateral orbital wall and posterior wall of the left maxillary sinus  - Code trauma, PAN CT scan  - Follow up Xrays   - CT Chest/Abdomen and Pelvis:   Acute nondisplaced fracture of the left humeral head extending into the humeral neck with mild surrounding edema/hematoma.  - CTH, Cervical Spine and Orbit:   A. CT HEAD:  No acute intracranial pathology. No evidence of midline shift, mass effect or intracranial hemorrhage.  B.CT FACIAL BONES:  Minimally displaced acute fractures of the left lateral orbital wall and posterior wall of the left maxillary sinus with associated periorbital soft tissue swelling.  C. CT CERVICAL SPINE:  No acute fracture or subluxation.Multilevel degenerative changes as detailed above.  - S/p left arm sling by ED  - Left orbital ecchymosis, no visual disturbance or decreased acuity, no photophobia.   - OMF Recommendations appreciated:  a. No acute surgical intervention   b. Opthalmology follow up  c. Sinus precautions (no nose blowing, sneeze with mouth open)  d. Saline nasal spray tid  e. Afrin nasal spray bid x 3 days  f. Follow up in clinic  - Opthalmology Consult  - PT consultation, weight bearing restriction of the LUE  - Fall precautions  - The patient lives at her son's house, she uses a walker, all 3 children are involved in care, mainly Ethel ( Daughter) who follows her medical conditions.  - May need SNF vs Home with HHA ( As the family members work)  - The patient is on Zyrtec 10mg po once daily, Consider stopping this medication in an elderly patient    #HTN;   - C/w Amlodipine 5mg po once daily    #Hypothyroidism:   - C/w Levothyroxine 75mcg po once daily  - TSH in am    # Left eye Macular Degeneration  # Left Glaucoma  - C/w Eye drops    #Misc:   - DVT proph: Lovenox 40mg SubQ once daily  - GI proph: Not indicated  - Diet: DASH/TLC  - Daughter's nb ( Ethel): [phone]

## 2023-03-19 NOTE — ED PROVIDER NOTE - PROGRESS NOTE DETAILS
EMANUEL: labs WNL, CT max/face shows minimally displaced left lateral orbital wall fracture at area of laceration concerning for open fracture. Ancef ordered. OMFS team called and are aware of pt, will see in ED. Additional minimally displaced acute fracture of the lateral wall of the left maxillary sinus Elieser: XR LUE significant for left proximal humerus fracture. sling applied. Elieser: left periorbital laceration cleaned - no gaping/active bleeding, about 0.5cm in length. cleaned, bacitracin applied, steri strips placed. Sinus precautions explained to pt and daughter given orbital fractures.  Pt ambulates with walker at baseline. Given left humeral fracture, will be unable to use walker. Daughter at bedside, discussion had about home care as pt lives with son who is working often. shared decision making to have pt admitted for safety reasons as she would be fall risk. pt and daughter understand and agree with plan.   Endorsed to MAR - aware of injuries including orbital fractures, left proximal humerus fracture, OMFS rec for continue Augmentin.

## 2023-03-19 NOTE — ED PROVIDER NOTE - NS ED MD DISPO SPECIAL CONSIDERATION1
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

## 2023-03-19 NOTE — ED PROVIDER NOTE - OBJECTIVE STATEMENT
89-year-old female past medical history of hypothyroidism, hypertension, hyperlipidemia, hard of hearing presents to ED status post mechanical trip and fall at home today.  Patient states that the steps at home were uneven and she tripped and landed on her left side, positive head trauma with swelling to her left eye.  Denies LOC, no blood thinner use. Patient also with pain at left elbow. Denies chest pain, shortness of breath, abdominal pain, nausea, vomiting, change in vision.  No headache, neck pain.  No numbness/tingling. 89-year-old female past medical history of hypothyroidism, hypertension, hyperlipidemia, hard of hearing presents to ED status post mechanical trip and fall at home today.  Patient states that she was walking with her walker but the steps at home were uneven and she tripped and landed on her left side, positive head trauma with swelling to her left eye.  Denies LOC, no blood thinner use. Patient also with pain at left elbow. Denies chest pain, shortness of breath, abdominal pain, nausea, vomiting, change in vision.  No headache, neck pain.  No numbness/tingling. States she normally walks with walker at home, lives with her sons and is independent of some ADL's.

## 2023-03-19 NOTE — ED PROVIDER NOTE - ATTENDING CONTRIBUTION TO CARE
85-year-old female with a past medical history significant for hypothyroidism hypertension hyperlipidemia who presents status post mechanical fall.  Patient states that she was walking with her walker when she tripped and fell on her head.  Patient is not on any blood thinners.  Patient denies any other medical complaints.    VITAL SIGNS: I have reviewed nursing notes and confirm.  CONSTITUTIONAL: non-toxic, well appearing  SKIN: no rash, no petechiae.  EYES: EOMI, pink conjunctiva, anicteric. (+)periorbital ecchymosis to left eye with small 1cm laceration to lateral left orbital rim without active bleeding/oozing, no foreign body, no bony exposure. no raccoon eyes,  no avitia sign  ENT: tongue midline, no exudates, MMM  NECK: Supple; no meningismus, no JVD  CARD: RRR, no murmurs, equal radial pulses bilaterally 2+  RESP: CTAB, no respiratory distress  ABD: Soft, non-tender, non-distended, no peritoneal signs, no HSM, no CVA tenderness  EXT: Normal ROM x4. No edema. No calves tenderness  NEURO: Alert, oriented x3. CN2-12 intact, equal strength bilaterally    85 yr old f that presents s/p fall. labs, imaging, reassess. dispo pending. pt signed out to Dr. Dias.

## 2023-03-19 NOTE — PATIENT PROFILE ADULT - FALL HARM RISK - HARM RISK INTERVENTIONS

## 2023-03-19 NOTE — ED PROVIDER NOTE - WR INTERPRETATION DATE TIME  4
-- DO NOT REPLY / DO NOT REPLY ALL --  -- Message is from the [facility]--    Order Request  Lab: covid test    Message / reason: retest    Insurance type: blue cross  Payor:  [employer] / Plan:  BE HI [id number] / Product Type:  HMOI    Preferred Delivery Method   Call when ready for pickup - phone number to notify: [phone]     Caller Information       Type Contact Phone    04/21/2021 09:16 AM CDT Phone (Incoming) Yudith Ferrer (Self) [phone] (M)          Alternative phone number: none    Turnaround time given to caller:   \"This message will be sent to [state Provider's name]. The clinical team will fulfill your request as soon as they review your message.\"  
Spoke with patient   
19-Mar-2023 12:32

## 2023-03-19 NOTE — ED PROVIDER NOTE - NSICDXPASTMEDICALHX_GEN_ALL_CORE_FT
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

## 2023-03-19 NOTE — ED ADULT NURSE NOTE - NSIMPLEMENTINTERV_GEN_ALL_ED
Implemented All Fall with Harm Risk Interventions:  [place] to call system. Call bell, personal items and telephone within reach. Instruct patient to call for assistance. Room bathroom lighting operational. Non-slip footwear when patient is off stretcher. Physically safe environment: no spills, clutter or unnecessary equipment. Stretcher in lowest position, wheels locked, appropriate side rails in place. Provide visual cue, wrist band, yellow gown, etc. Monitor gait and stability. Monitor for mental status changes and reorient to person, place, and time. Review medications for side effects contributing to fall risk. Reinforce activity limits and safety measures with patient and family. Provide visual clues: red socks.

## 2023-03-19 NOTE — H&P ADULT - ATTENDING COMMENTS
89-year-old F with the aforementioned PMHx, presents for a mechanical fall with fracture of the left humerus and left orbital and maxillary sinuses.       Imaging   FINDINGS:  Acute nondisplaced fracture of the left humeral head    IMPRESSION:  Acute nondisplaced fracture of the left humeral head      CT Chest   Acute nondisplaced fracture of the left humeral head extending into the   humeral neck with mild surrounding edema/hematoma.      CT HEAD:  No acute intracranial pathology. No evidence of midline shift, mass   effect or intracranial hemorrhage.  CT FACIAL BONES:  Minimally displaced acute fractures of the left lateral orbital wall and   posterior wall of the left maxillary sinus with associated periorbital   soft tissue swelling.  CT CERVICAL SPINE:  No acute fracture or subluxation.  Multilevel degenerative changes as detailed above.          IMPRESSION   Mechanical  Fall  complicated  by  left humerus and left Orbital and maxillary Sinuses  Acute Non-displaced fracture of the Left humeral head with mild surrounding edema and hematoma  Minimally Displaced fracture of the Left lateral orbital wall and posterior wall of the left maxillary sinus  Risk Factors [place] problems   Evaluated by OMF  Surgery, Sling Placed by the ED   Fall precaution, PT OT   for placement   f/u Opthalmology  Check orthostatics   Hx  HT -  Amlodipine   Hx hypothyroidism - c/w home medication   Hx Left eye Macular Degeneration  # Left Glaucoma  - C/w Eye drops 89-year-old F with the aforementioned PMHx, presents for a mechanical fall with fracture of the left humerus and left orbital and maxillary sinuses.       Imaging   FINDINGS:  Acute nondisplaced fracture of the left humeral head    IMPRESSION:  Acute nondisplaced fracture of the left humeral head      CT Chest   Acute nondisplaced fracture of the left humeral head extending into the   humeral neck with mild surrounding edema/hematoma.      CT HEAD:  No acute intracranial pathology. No evidence of midline shift, mass   effect or intracranial hemorrhage.  CT FACIAL BONES:  Minimally displaced acute fractures of the left lateral orbital wall and   posterior wall of the left maxillary sinus with associated periorbital   soft tissue swelling.  CT CERVICAL SPINE:  No acute fracture or subluxation.  Multilevel degenerative changes as detailed above.    VITAL SIGNS: AFebrile, vital signs stable  CONSTITUTIONAL: Well-developed; well-nourished; in no acute distress.  SKIN: Skin exam is warm and dry, no acute rash.  HEAD: Normocephalic; left orbital  ecchymosis   EYES: Pupils  reactive to light, Extraocular movements intact; conjunctiva and sclera clear.  ENT: No nasal discharge; airway clear. Moist mucus membranes.  NECK: Supple; non tender. No rigidity  CARD: Rregular rate and rhythm. Normal S1, S2; no murmurs, gallops, or rubs.  RESP: CT  auscultation bilaterally. No wheezes, rales or rhonchi.  ABD: Abdomen soft; non-tender; non-distended  EXT: Normal ROM. No clubbing, cyanosis or edema.   NEURO: Alert and oriented x 0. No focal deficits.  PSYCH: confused          IMPRESSION   Mechanical  Fall  complicated  by  left humerus and left Orbital and maxillary Sinuses  Acute Non-displaced fracture of the Left humeral head with mild surrounding edema and hematoma  Minimally Displaced fracture of the Left lateral orbital wall and posterior wall of the left maxillary sinus  Risk Factors [place] problems   Evaluated by OMF  Surgery, Sling Placed by the ED   Fall precaution, PT OT   for placement   f/u Opthalmology  Check orthostatics   Hx  HTN -  Amlodipine   Hx hypothyroidism - c/w home medication   Hx Left eye Macular Degeneration  # Left Glaucoma  - C/w Eye drops  Dementia     Seen on 03/19/23

## 2023-03-19 NOTE — ED PROVIDER NOTE - PHYSICAL EXAMINATION
VITAL SIGNS: I have reviewed nursing notes and confirm.  CONSTITUTIONAL: elderly female, well-developed; well-nourished; in no acute distress.  SKIN: Skin exam is warm and dry, no acute rash.   HEAD: (+)periorbital ecchymosis to left eye with small 1cm laceration to lateral left orbital rim without active bleeding/oozing, no foreign body, no bony exposure. no raccoon eyes,  no avitia sign  EYES: PERRL, EOM intact (+) subconjunctival hemorrhage to lateral aspect of left eye  ENT: MMM.   NECK: Supple; non tender. No midline C spine ttp, FROM of neck without pain  CARD: S1, S2 normal; no murmurs, gallops, or rubs. Regular rate and rhythm. 2+ distal pulses  RESP: Normal respiratory effort, no tachypnea or distress. Lungs CTAB, no wheezes, rales or rhonchi.  chest wall non-tender  back with no midline c/t/l/s spinal or paraspinal ttp no bruising noted  pelvis stable  ABD: soft, NT/ND.  EXT: (+) diffuse left shoulder, humerus, and elbow TTP with limited ROM due to pain, no obvious deformities or bruising noted. FROM of left wrist and hand without pain or TTP. Pt has FROM and strength 5/5 x b/l LE and RUE.   Neuro: A&Ox3, normal speech, Sensation intact and equal.   PSYCH: Cooperative, appropriate.

## 2023-03-19 NOTE — ED PROVIDER NOTE - CARE PLAN
1 Principal Discharge DX:	Humerus fracture  Secondary Diagnosis:	Orbital fracture  Secondary Diagnosis:	Fall  Secondary Diagnosis:	Unable to ambulate

## 2023-03-19 NOTE — H&P ADULT - NSHPPHYSICALEXAM_GEN_ALL_CORE
General:   Lungs:   Heart:   Abdomen:   LE: General: NAD  HEENT: Left orbital ecchymosis, no visual disturbances  Lungs: GBAE  Heart: RRR, normal s1s2, no audible murmurs   Abdomen: +BS, soft, nontender  LE: No JONNIE  Skin: No hematoma or bruising seen on the back , chest wall or abdomen

## 2023-03-20 LAB
A1C WITH ESTIMATED AVERAGE GLUCOSE RESULT: 5.4 % — SIGNIFICANT CHANGE UP (ref 4–5.6)
ANION GAP SERPL CALC-SCNC: 12 MMOL/L — SIGNIFICANT CHANGE UP (ref 7–14)
BASOPHILS # BLD AUTO: 0.05 K/UL — SIGNIFICANT CHANGE UP (ref 0–0.2)
BASOPHILS NFR BLD AUTO: 0.7 % — SIGNIFICANT CHANGE UP (ref 0–1)
BUN SERPL-MCNC: 33 MG/DL — HIGH (ref 10–20)
CALCIUM SERPL-MCNC: 9.4 MG/DL — SIGNIFICANT CHANGE UP (ref 8.4–10.5)
CHLORIDE SERPL-SCNC: 108 MMOL/L — SIGNIFICANT CHANGE UP (ref 98–110)
CO2 SERPL-SCNC: 23 MMOL/L — SIGNIFICANT CHANGE UP (ref 17–32)
CREAT SERPL-MCNC: 1.5 MG/DL — SIGNIFICANT CHANGE UP (ref 0.7–1.5)
EGFR: 32 ML/MIN/1.73M2 — LOW
EOSINOPHIL # BLD AUTO: 0.17 K/UL — SIGNIFICANT CHANGE UP (ref 0–0.7)
EOSINOPHIL NFR BLD AUTO: 2.4 % — SIGNIFICANT CHANGE UP (ref 0–8)
ESTIMATED AVERAGE GLUCOSE: 108 MG/DL — SIGNIFICANT CHANGE UP (ref 68–114)
GLUCOSE SERPL-MCNC: 117 MG/DL — HIGH (ref 70–99)
HCT VFR BLD CALC: 31.6 % — LOW (ref 37–47)
HGB BLD-MCNC: 10.3 G/DL — LOW (ref 12–16)
IMM GRANULOCYTES NFR BLD AUTO: 0.4 % — HIGH (ref 0.1–0.3)
LYMPHOCYTES # BLD AUTO: 1.3 K/UL — SIGNIFICANT CHANGE UP (ref 1.2–3.4)
LYMPHOCYTES # BLD AUTO: 18.6 % — LOW (ref 20.5–51.1)
MCHC RBC-ENTMCNC: 29.4 PG — SIGNIFICANT CHANGE UP (ref 27–31)
MCHC RBC-ENTMCNC: 32.6 G/DL — SIGNIFICANT CHANGE UP (ref 32–37)
MCV RBC AUTO: 90.3 FL — SIGNIFICANT CHANGE UP (ref 81–99)
MONOCYTES # BLD AUTO: 0.55 K/UL — SIGNIFICANT CHANGE UP (ref 0.1–0.6)
MONOCYTES NFR BLD AUTO: 7.9 % — SIGNIFICANT CHANGE UP (ref 1.7–9.3)
NEUTROPHILS # BLD AUTO: 4.89 K/UL — SIGNIFICANT CHANGE UP (ref 1.4–6.5)
NEUTROPHILS NFR BLD AUTO: 70 % — SIGNIFICANT CHANGE UP (ref 42.2–75.2)
NRBC # BLD: 0 /100 WBCS — SIGNIFICANT CHANGE UP (ref 0–0)
PLATELET # BLD AUTO: 159 K/UL — SIGNIFICANT CHANGE UP (ref 130–400)
POTASSIUM SERPL-MCNC: 4 MMOL/L — SIGNIFICANT CHANGE UP (ref 3.5–5)
POTASSIUM SERPL-SCNC: 4 MMOL/L — SIGNIFICANT CHANGE UP (ref 3.5–5)
RBC # BLD: 3.5 M/UL — LOW (ref 4.2–5.4)
RBC # FLD: 14.4 % — SIGNIFICANT CHANGE UP (ref 11.5–14.5)
SODIUM SERPL-SCNC: 143 MMOL/L — SIGNIFICANT CHANGE UP (ref 135–146)
TSH SERPL-MCNC: 8.62 UIU/ML — HIGH (ref 0.27–4.2)
WBC # BLD: 6.99 K/UL — SIGNIFICANT CHANGE UP (ref 4.8–10.8)
WBC # FLD AUTO: 6.99 K/UL — SIGNIFICANT CHANGE UP (ref 4.8–10.8)

## 2023-03-20 PROCEDURE — 99232 SBSQ HOSP IP/OBS MODERATE 35: CPT

## 2023-03-20 RX ADMIN — FLUOROMETHOLONE 1 DROP(S): 1 SOLUTION/ DROPS OPHTHALMIC at 13:46

## 2023-03-20 RX ADMIN — Medication 1 SPRAY(S): at 05:37

## 2023-03-20 RX ADMIN — Medication 75 MICROGRAM(S): at 05:38

## 2023-03-20 RX ADMIN — Medication 1 DROP(S): at 05:43

## 2023-03-20 RX ADMIN — LORATADINE 10 MILLIGRAM(S): 10 TABLET ORAL at 12:22

## 2023-03-20 RX ADMIN — Medication 1 MILLIGRAM(S): at 21:28

## 2023-03-20 RX ADMIN — DONEPEZIL HYDROCHLORIDE 5 MILLIGRAM(S): 10 TABLET, FILM COATED ORAL at 21:28

## 2023-03-20 RX ADMIN — Medication 650 MILLIGRAM(S): at 12:22

## 2023-03-20 RX ADMIN — Medication 650 MILLIGRAM(S): at 21:24

## 2023-03-20 RX ADMIN — BRIMONIDINE TARTRATE 1 DROP(S): 2 SOLUTION/ DROPS OPHTHALMIC at 13:46

## 2023-03-20 RX ADMIN — ENOXAPARIN SODIUM 40 MILLIGRAM(S): 100 INJECTION SUBCUTANEOUS at 21:26

## 2023-03-20 RX ADMIN — Medication 1 SPRAY(S): at 13:47

## 2023-03-20 RX ADMIN — BRIMONIDINE TARTRATE 1 DROP(S): 2 SOLUTION/ DROPS OPHTHALMIC at 21:29

## 2023-03-20 RX ADMIN — OXYMETAZOLINE HYDROCHLORIDE 1 SPRAY(S): 0.5 SPRAY NASAL at 17:22

## 2023-03-20 RX ADMIN — Medication 0.5 MILLIGRAM(S): at 21:25

## 2023-03-20 RX ADMIN — Medication 1 SPRAY(S): at 21:26

## 2023-03-20 RX ADMIN — OXYMETAZOLINE HYDROCHLORIDE 1 SPRAY(S): 0.5 SPRAY NASAL at 05:36

## 2023-03-20 RX ADMIN — LATANOPROST 1 DROP(S): 0.05 SOLUTION/ DROPS OPHTHALMIC; TOPICAL at 21:29

## 2023-03-20 RX ADMIN — AMLODIPINE BESYLATE 5 MILLIGRAM(S): 2.5 TABLET ORAL at 05:38

## 2023-03-20 RX ADMIN — Medication 325 MILLIGRAM(S): at 12:21

## 2023-03-20 RX ADMIN — BRIMONIDINE TARTRATE 1 DROP(S): 2 SOLUTION/ DROPS OPHTHALMIC at 05:43

## 2023-03-20 NOTE — CONSULT NOTE ADULT - ASSESSMENT
left proximal humerus fx  pain control  sling for comfort  nwb  outpatient follow up in 2 weeks [phone]

## 2023-03-20 NOTE — PHYSICAL THERAPY INITIAL EVALUATION ADULT - GAIT DEVIATIONS NOTED, PT EVAL
lateral shifting noted. LOB noted and requires assisitance to maintain balance/increased time in double stance

## 2023-03-20 NOTE — PHYSICAL THERAPY INITIAL EVALUATION ADULT - ADDITIONAL COMMENTS
Patient lives with son in house with 3 steps to enter. Was independent in ADL's and ambulates using RW. As per niece, patient occasionally forgets the walker and walks fast. Niece also states she occasionally requires assistance in ADL's

## 2023-03-20 NOTE — PHYSICAL THERAPY INITIAL EVALUATION ADULT - RANGE OF MOTION EXAMINATION, REHAB EVAL
except L shoulder and elbow on a sling. Complains of pain on L shoulder on movement and exertion/no ROM deficits were identified

## 2023-03-20 NOTE — PHYSICAL THERAPY INITIAL EVALUATION ADULT - GENERAL OBSERVATIONS, REHAB EVAL
Patient encountered lying in bed. (+) arm sling on LUE. Niece at bedside. Agreed to participate in therapy.

## 2023-03-20 NOTE — CONSULT NOTE ADULT - ASSESSMENT
Patient is a 89-year-old female past medical history of hypothyroidism, hypertension, hyperlipidemia, hard of hearing who presented yesterday to ED status post mechanical trip and fall at home.  Patient states that she was walking with her walker but the steps at home were uneven and she tripped and landed on her left side, positive head trauma with swelling to her left eye.  Denies LOC, no blood thinner use. States she normally walks with walker at home, lives with her sons and is independent of some ADL's.    Ophthalmology consulted after patient examined by OMFS and evidence of mildly displaced left lateral orbital wall fracture and posterior maxillary fracture of the left orbit. Patient denies any vision changes, flahses/floaters/veils, diplopia, discharge, redness. History obtained with aid of niece and daughter.    POHx: Denies prior trauma. Endorses cataract surgery ~20 years ago OU. History of LIBRA OS for macular degeneration. Hx of glaucoma and macular degeneration. Denies hx of laser. Follows ophthalmologist Horacio Lock.  Gtts: Combigan 2/2, latanoprost 1/1, daughter believes was recently on FML but is unsure    VA (near card w/ readers): 20/25 OD, 20/30 OS  IOP: 14/15  Ppl: R&r OU, no RAPD OU  EOM: Full OU  CvF: Full OU  Negative ocular cardiac refelx    Bedside penlight exam  External: superficial laceration of left temple patched with steri-strips. Periorbital ecchymosis OS. Mild periorbital swelling OS  L/l/a: wnl OD, periorbital ecchymosis OS, mild periorbital swelling OS  C/s: W&q OD, temporal subconjunctival heme OS  K: Clear OU  A/c: Deep OU  Iris: R&r OU  Lens: 3-piece IOL OU  Vitreous: Clear OU    DFE  ONH: S&p OU, PPA OD. No ONH edema OU  C/d: 0.40 OU  Macula: Flat with few drusen OU  Vessels: wnl OU  Periphery: lattice superiorly OD otherwise wnl, wnl OS    CT Head/FacialBones/Cervical Spine 3/19/23 10:30  IMPRESSION:  CT HEAD:  No acute intracranial pathology. No evidence of midline shift, mass effect or intracranial hemorrhage.  CT FACIAL BONES:  Minimally displaced acute fractures of the left lateral orbital wall and posterior wall of the left maxillary sinus with associated periorbital soft tissue swelling.  CT CERVICAL SPINE:  No acute fracture or subluxation.    Multilevel degenerative changes as detailed above.      1. Left lateral orbital wall and posterior wall of left maxillary sinus fractures  - negative oculocardiac reflex, EOMs full OU, no RAPD, VA 20/25 OD, 20/30 OS  - Recommend fracture protocol:  -- Afrin nasal spray x 3 days  -- Open mouth sneezing and avoid nose blowing  -- ice packs  - Follow up with ophthalmologist Dr. Horacio Lock upon discharge    2. Glaucoma OU  - Patient normally on combigan 1 drop morning and night OU and latanoprost 1 drop at night time OU, can continue during inpatient stay  - Continued care outpatient Dr. Horacio Lock    3. Macular Degeneration OU  - No evidence of retinal CNV or hemorrhage son DFE today  - Continue care outpatient Dr. Horacio Smith PGY3  D/w  Patient is a 89-year-old female past medical history of hypothyroidism, hypertension, hyperlipidemia, hard of hearing who presented yesterday to ED status post mechanical trip and fall at home.  Patient states that she was walking with her walker but the steps at home were uneven and she tripped and landed on her left side, positive head trauma with swelling to her left eye.  Denies LOC, no blood thinner use. States she normally walks with walker at home, lives with her sons and is independent of some ADL's.    Ophthalmology consulted after patient examined by OMFS and evidence of mildly displaced left lateral orbital wall fracture and posterior maxillary fracture of the left orbit. Patient denies any vision changes, flahses/floaters/veils, diplopia, discharge, redness. History obtained with aid of niece and daughter.    POHx: Denies prior trauma. Endorses cataract surgery ~20 years ago OU. History of LIBRA OS for macular degeneration. Hx of glaucoma and macular degeneration. Denies hx of laser. Follows ophthalmologist Horacio Lock.  Gtts: Combigan 2/2, latanoprost 1/1, daughter believes was recently on FML but is unsure    VA (near card w/ readers): 20/25 OD, 20/30 OS  IOP: 14/15  Ppl: R&r OU, no RAPD OU  EOM: Full OU  CvF: Full OU  Negative ocular cardiac refelx    Bedside penlight exam  External: superficial laceration of left temple patched with steri-strips. Periorbital ecchymosis OS. Mild periorbital swelling OS  L/l/a: wnl OD, periorbital ecchymosis OS, mild periorbital swelling OS  C/s: W&q OD, temporal subconjunctival heme OS  K: Clear OU  A/c: Deep OU  Iris: R&r OU  Lens: 3-piece IOL OU  Vitreous: Clear OU    DFE  ONH: S&p OU, PPA OD. No ONH edema OU  C/d: 0.40 OU  Macula: Flat with few drusen OU  Vessels: wnl OU  Periphery: lattice superiorly OD otherwise wnl, wnl OS    CT Head/FacialBones/Cervical Spine 3/19/23 10:30  IMPRESSION:  CT HEAD:  No acute intracranial pathology. No evidence of midline shift, mass effect or intracranial hemorrhage.  CT FACIAL BONES:  Minimally displaced acute fractures of the left lateral orbital wall and posterior wall of the left maxillary sinus with associated periorbital soft tissue swelling.  CT CERVICAL SPINE:  No acute fracture or subluxation.    Multilevel degenerative changes as detailed above.      1. Left lateral orbital wall and posterior wall of left maxillary sinus fractures  - negative oculocardiac reflex, EOMs full OU, no RAPD, VA 20/25 OD, 20/30 OS  - Recommend fracture protocol:  -- Afrin nasal spray x 3 days  -- Open mouth sneezing and avoid nose blowing  -- ice packs  - Follow up with ophthalmologist Dr. Horacio Lock upon discharge    2. Glaucoma OU  - Patient normally on combigan 1 drop morning and night OU and latanoprost 1 drop at night time OU, can continue during inpatient stay  - Continued care outpatient Dr. Horacio Lock    3. Macular Degeneration OU  - No evidence of retinal CNV or hemorrhage son DFE today  - Continue care outpatient Dr. Horacio Smith PGY3  D/w Dr. Alvino Farrell

## 2023-03-21 ENCOUNTER — TRANSCRIPTION ENCOUNTER (OUTPATIENT)
Age: 88
End: 2023-03-21

## 2023-03-21 LAB
ANION GAP SERPL CALC-SCNC: 10 MMOL/L — SIGNIFICANT CHANGE UP (ref 7–14)
BASOPHILS # BLD AUTO: 0.05 K/UL — SIGNIFICANT CHANGE UP (ref 0–0.2)
BASOPHILS NFR BLD AUTO: 0.8 % — SIGNIFICANT CHANGE UP (ref 0–1)
BUN SERPL-MCNC: 34 MG/DL — HIGH (ref 10–20)
CALCIUM SERPL-MCNC: 9.5 MG/DL — SIGNIFICANT CHANGE UP (ref 8.4–10.4)
CHLORIDE SERPL-SCNC: 106 MMOL/L — SIGNIFICANT CHANGE UP (ref 98–110)
CO2 SERPL-SCNC: 24 MMOL/L — SIGNIFICANT CHANGE UP (ref 17–32)
CREAT SERPL-MCNC: 1.4 MG/DL — SIGNIFICANT CHANGE UP (ref 0.7–1.5)
EGFR: 35 ML/MIN/1.73M2 — LOW
EOSINOPHIL # BLD AUTO: 0.41 K/UL — SIGNIFICANT CHANGE UP (ref 0–0.7)
EOSINOPHIL NFR BLD AUTO: 6.9 % — SIGNIFICANT CHANGE UP (ref 0–8)
GLUCOSE SERPL-MCNC: 107 MG/DL — HIGH (ref 70–99)
HCT VFR BLD CALC: 31.1 % — LOW (ref 37–47)
HGB BLD-MCNC: 10 G/DL — LOW (ref 12–16)
IMM GRANULOCYTES NFR BLD AUTO: 0.3 % — SIGNIFICANT CHANGE UP (ref 0.1–0.3)
LYMPHOCYTES # BLD AUTO: 1.67 K/UL — SIGNIFICANT CHANGE UP (ref 1.2–3.4)
LYMPHOCYTES # BLD AUTO: 28.2 % — SIGNIFICANT CHANGE UP (ref 20.5–51.1)
MAGNESIUM SERPL-MCNC: 2.1 MG/DL — SIGNIFICANT CHANGE UP (ref 1.8–2.4)
MCHC RBC-ENTMCNC: 29.7 PG — SIGNIFICANT CHANGE UP (ref 27–31)
MCHC RBC-ENTMCNC: 32.2 G/DL — SIGNIFICANT CHANGE UP (ref 32–37)
MCV RBC AUTO: 92.3 FL — SIGNIFICANT CHANGE UP (ref 81–99)
MONOCYTES # BLD AUTO: 0.53 K/UL — SIGNIFICANT CHANGE UP (ref 0.1–0.6)
MONOCYTES NFR BLD AUTO: 9 % — SIGNIFICANT CHANGE UP (ref 1.7–9.3)
NEUTROPHILS # BLD AUTO: 3.24 K/UL — SIGNIFICANT CHANGE UP (ref 1.4–6.5)
NEUTROPHILS NFR BLD AUTO: 54.8 % — SIGNIFICANT CHANGE UP (ref 42.2–75.2)
NRBC # BLD: 0 /100 WBCS — SIGNIFICANT CHANGE UP (ref 0–0)
PLATELET # BLD AUTO: 141 K/UL — SIGNIFICANT CHANGE UP (ref 130–400)
POTASSIUM SERPL-MCNC: 4 MMOL/L — SIGNIFICANT CHANGE UP (ref 3.5–5)
POTASSIUM SERPL-SCNC: 4 MMOL/L — SIGNIFICANT CHANGE UP (ref 3.5–5)
RBC # BLD: 3.37 M/UL — LOW (ref 4.2–5.4)
RBC # FLD: 14.7 % — HIGH (ref 11.5–14.5)
SODIUM SERPL-SCNC: 140 MMOL/L — SIGNIFICANT CHANGE UP (ref 135–146)
WBC # BLD: 5.92 K/UL — SIGNIFICANT CHANGE UP (ref 4.8–10.8)
WBC # FLD AUTO: 5.92 K/UL — SIGNIFICANT CHANGE UP (ref 4.8–10.8)

## 2023-03-21 PROCEDURE — 99232 SBSQ HOSP IP/OBS MODERATE 35: CPT

## 2023-03-21 RX ADMIN — Medication 1 SPRAY(S): at 14:33

## 2023-03-21 RX ADMIN — Medication 325 MILLIGRAM(S): at 12:24

## 2023-03-21 RX ADMIN — OXYMETAZOLINE HYDROCHLORIDE 1 SPRAY(S): 0.5 SPRAY NASAL at 18:13

## 2023-03-21 RX ADMIN — Medication 1 SPRAY(S): at 05:05

## 2023-03-21 RX ADMIN — LATANOPROST 1 DROP(S): 0.05 SOLUTION/ DROPS OPHTHALMIC; TOPICAL at 21:14

## 2023-03-21 RX ADMIN — FLUOROMETHOLONE 1 DROP(S): 1 SOLUTION/ DROPS OPHTHALMIC at 12:24

## 2023-03-21 RX ADMIN — Medication 75 MICROGRAM(S): at 05:06

## 2023-03-21 RX ADMIN — BRIMONIDINE TARTRATE 1 DROP(S): 2 SOLUTION/ DROPS OPHTHALMIC at 05:06

## 2023-03-21 RX ADMIN — LORATADINE 10 MILLIGRAM(S): 10 TABLET ORAL at 12:24

## 2023-03-21 RX ADMIN — Medication 0.5 MILLIGRAM(S): at 21:12

## 2023-03-21 RX ADMIN — OXYMETAZOLINE HYDROCHLORIDE 1 SPRAY(S): 0.5 SPRAY NASAL at 05:05

## 2023-03-21 RX ADMIN — BRIMONIDINE TARTRATE 1 DROP(S): 2 SOLUTION/ DROPS OPHTHALMIC at 14:33

## 2023-03-21 RX ADMIN — BRIMONIDINE TARTRATE 1 DROP(S): 2 SOLUTION/ DROPS OPHTHALMIC at 21:13

## 2023-03-21 RX ADMIN — AMLODIPINE BESYLATE 5 MILLIGRAM(S): 2.5 TABLET ORAL at 05:06

## 2023-03-21 RX ADMIN — Medication 1 DROP(S): at 05:08

## 2023-03-21 RX ADMIN — DONEPEZIL HYDROCHLORIDE 5 MILLIGRAM(S): 10 TABLET, FILM COATED ORAL at 21:14

## 2023-03-21 RX ADMIN — Medication 650 MILLIGRAM(S): at 18:13

## 2023-03-21 RX ADMIN — Medication 1 SPRAY(S): at 21:13

## 2023-03-21 RX ADMIN — ENOXAPARIN SODIUM 40 MILLIGRAM(S): 100 INJECTION SUBCUTANEOUS at 21:12

## 2023-03-21 RX ADMIN — Medication 1 MILLIGRAM(S): at 21:14

## 2023-03-21 RX ADMIN — Medication 1 DROP(S): at 18:13

## 2023-03-21 NOTE — DISCHARGE NOTE PROVIDER - CARE PROVIDERS DIRECT ADDRESSES
,DirectAddress_Unknown,[email or web address]

## 2023-03-21 NOTE — DISCHARGE NOTE PROVIDER - NSDCCPCAREPLAN_GEN_ALL_CORE_FT
PRINCIPAL DISCHARGE DIAGNOSIS  Diagnosis: Humerus fracture  Assessment and Plan of Treatment: You came in after fall. You had left arm fracture, and left face fracture. Orthopedic and maxillary surgery doctors saw you and didnt recommend surgery. You will need to use sling for now and follow up with orthopedic surgeon in 2 weeks, call this number to make appointment soon [phone]. You also had problems in the eye, and treatment was recommended by eye doctor, please follow up iwith them (Dr. Horacio Lock) as well in 2 weeks. Take all medications as prescribed.      SECONDARY DISCHARGE DIAGNOSES  Diagnosis: Orbital fracture  Assessment and Plan of Treatment:     Diagnosis: Fall  Assessment and Plan of Treatment:     Diagnosis: Unable to ambulate  Assessment and Plan of Treatment:

## 2023-03-21 NOTE — DISCHARGE NOTE PROVIDER - NSDCMRMEDTOKEN_GEN_ALL_CORE_FT
amLODIPine 5 mg oral tablet: 1 tab(s) orally once a day  brimonidine 0.2% ophthalmic solution: 1 drop(s) to each affected eye 3 times a day  brimonidine-timolol 0.2%-0.5% ophthalmic solution: 1 drop(s) to each affected eye every 12 hours  donepezil 5 mg oral tablet: 1 tab(s) orally once a day (at bedtime)  ferrous sulfate 325 mg (65 mg elemental iron) oral tablet: 1 tab(s) orally once a day  fluorometholone 0.1% ophthalmic suspension: 1 drop(s) in each eye once a day   latanoprost 0.005% ophthalmic solution: 1 drop(s) to each affected eye once a day (at bedtime)  levothyroxine 75 mcg (0.075 mg) oral tablet: 1 tab(s) orally once a day  LORazepam 0.5 mg oral tablet:   prazosin 1 mg oral capsule: orally once a day  timolol hemihydrate 0.5% ophthalmic solution: 1 drop(s) to each affected eye 2 times a day  ZyrTEC 10 mg oral tablet: 1 tab(s) orally once a day (at bedtime)   acetaminophen 325 mg oral tablet: 2 tab(s) orally every 6 hours, As needed, Mild Pain (1 - 3)  amLODIPine 5 mg oral tablet: 1 tab(s) orally once a day  brimonidine 0.2% ophthalmic solution: 1 drop(s) to each affected eye 3 times a day  brimonidine-timolol 0.2%-0.5% ophthalmic solution: 1 drop(s) to each affected eye every 12 hours  donepezil 5 mg oral tablet: 1 tab(s) orally once a day (at bedtime)  ferrous sulfate 325 mg (65 mg elemental iron) oral tablet: 1 tab(s) orally once a day  fluorometholone 0.1% ophthalmic suspension: 1 drop(s) in each eye once a day   latanoprost 0.005% ophthalmic solution: 1 drop(s) to each affected eye once a day (at bedtime)  levothyroxine 75 mcg (0.075 mg) oral tablet: 1 tab(s) orally once a day  LORazepam 0.5 mg oral tablet:   prazosin 1 mg oral capsule: orally once a day  timolol hemihydrate 0.5% ophthalmic solution: 1 drop(s) to each affected eye 2 times a day  ZyrTEC 10 mg oral tablet: 1 tab(s) orally once a day (at bedtime)

## 2023-03-21 NOTE — PROGRESS NOTE ADULT - TIME BILLING
Direct patient care. Discussed on rounds with Housestaff
Direct patient care. Discussed on rounds with housestaff

## 2023-03-21 NOTE — DISCHARGE NOTE PROVIDER - HOSPITAL COURSE
HPI:  89-year-old female past medical history of hypothyroidism, hypertension, hyperlipidemia, hard of hearing presents to ED status post mechanical trip and fall at home today.  Patient states that she was walking with her walker but the steps at home were uneven and she tripped and landed on her left side, positive head trauma with swelling to her left eye.  Denies LOC, no blood thinner use. Patient also with pain at left elbow. Denies chest pain, shortness of breath, abdominal pain, nausea, vomiting, change in vision.  No headache, neck pain.  No numbness/tingling. States she normally walks with walker at home, lives with her sons and is independent of some ADL's (19 Mar 2023 16:15)    Hospital course:  Pt was admitted to floors for left humeral fracture, left maxillary fracture. Ortho saw the pt and rec o/p follow up in 2 weeks. Maxillary surgery saw the pt as well and no surgical intervnetion was needed. Pt was also seen by opthalmology and rec treatment. Pt was seen by PT and will need sling for left humeral fracture. Pt was deemed stable for dc today. - - -

## 2023-03-21 NOTE — DISCHARGE NOTE PROVIDER - CARE PROVIDER_API CALL
Horacio Lock)  Ophthalmology  [address] [place] [address]  Phone: [phone]  Fax: [phone]  Follow Up Time:     Mary Minor)  Geriatric Medicine; Internal Medicine  [address], [place] [id number]  Phone: [phone]  Fax: [phone]  Follow Up Time:

## 2023-03-22 ENCOUNTER — TRANSCRIPTION ENCOUNTER (OUTPATIENT)
Age: 88
End: 2023-03-22

## 2023-03-22 VITALS
SYSTOLIC BLOOD PRESSURE: 134 MMHG | OXYGEN SATURATION: 96 % | HEART RATE: 62 BPM | RESPIRATION RATE: 18 BRPM | DIASTOLIC BLOOD PRESSURE: 59 MMHG | TEMPERATURE: 96 F

## 2023-03-22 LAB
ANION GAP SERPL CALC-SCNC: 12 MMOL/L — SIGNIFICANT CHANGE UP (ref 7–14)
BASOPHILS # BLD AUTO: 0.08 K/UL — SIGNIFICANT CHANGE UP (ref 0–0.2)
BASOPHILS NFR BLD AUTO: 1.1 % — HIGH (ref 0–1)
BUN SERPL-MCNC: 28 MG/DL — HIGH (ref 10–20)
CALCIUM SERPL-MCNC: 9.8 MG/DL — SIGNIFICANT CHANGE UP (ref 8.4–10.5)
CHLORIDE SERPL-SCNC: 102 MMOL/L — SIGNIFICANT CHANGE UP (ref 98–110)
CO2 SERPL-SCNC: 26 MMOL/L — SIGNIFICANT CHANGE UP (ref 17–32)
CREAT SERPL-MCNC: 1.2 MG/DL — SIGNIFICANT CHANGE UP (ref 0.7–1.5)
EGFR: 42 ML/MIN/1.73M2 — LOW
EOSINOPHIL # BLD AUTO: 0.36 K/UL — SIGNIFICANT CHANGE UP (ref 0–0.7)
EOSINOPHIL NFR BLD AUTO: 4.8 % — SIGNIFICANT CHANGE UP (ref 0–8)
GLUCOSE SERPL-MCNC: 113 MG/DL — HIGH (ref 70–99)
HCT VFR BLD CALC: 38.4 % — SIGNIFICANT CHANGE UP (ref 37–47)
HGB BLD-MCNC: 12.5 G/DL — SIGNIFICANT CHANGE UP (ref 12–16)
IMM GRANULOCYTES NFR BLD AUTO: 0.4 % — HIGH (ref 0.1–0.3)
LYMPHOCYTES # BLD AUTO: 1.27 K/UL — SIGNIFICANT CHANGE UP (ref 1.2–3.4)
LYMPHOCYTES # BLD AUTO: 17 % — LOW (ref 20.5–51.1)
MCHC RBC-ENTMCNC: 29.4 PG — SIGNIFICANT CHANGE UP (ref 27–31)
MCHC RBC-ENTMCNC: 32.6 G/DL — SIGNIFICANT CHANGE UP (ref 32–37)
MCV RBC AUTO: 90.4 FL — SIGNIFICANT CHANGE UP (ref 81–99)
MONOCYTES # BLD AUTO: 0.57 K/UL — SIGNIFICANT CHANGE UP (ref 0.1–0.6)
MONOCYTES NFR BLD AUTO: 7.6 % — SIGNIFICANT CHANGE UP (ref 1.7–9.3)
NEUTROPHILS # BLD AUTO: 5.16 K/UL — SIGNIFICANT CHANGE UP (ref 1.4–6.5)
NEUTROPHILS NFR BLD AUTO: 69.1 % — SIGNIFICANT CHANGE UP (ref 42.2–75.2)
NRBC # BLD: 0 /100 WBCS — SIGNIFICANT CHANGE UP (ref 0–0)
PLATELET # BLD AUTO: 169 K/UL — SIGNIFICANT CHANGE UP (ref 130–400)
POTASSIUM SERPL-MCNC: 3.7 MMOL/L — SIGNIFICANT CHANGE UP (ref 3.5–5)
POTASSIUM SERPL-SCNC: 3.7 MMOL/L — SIGNIFICANT CHANGE UP (ref 3.5–5)
RBC # BLD: 4.25 M/UL — SIGNIFICANT CHANGE UP (ref 4.2–5.4)
RBC # FLD: 14.3 % — SIGNIFICANT CHANGE UP (ref 11.5–14.5)
SARS-COV-2 RNA SPEC QL NAA+PROBE: SIGNIFICANT CHANGE UP
SODIUM SERPL-SCNC: 140 MMOL/L — SIGNIFICANT CHANGE UP (ref 135–146)
WBC # BLD: 7.47 K/UL — SIGNIFICANT CHANGE UP (ref 4.8–10.8)
WBC # FLD AUTO: 7.47 K/UL — SIGNIFICANT CHANGE UP (ref 4.8–10.8)

## 2023-03-22 PROCEDURE — 99232 SBSQ HOSP IP/OBS MODERATE 35: CPT

## 2023-03-22 RX ORDER — ACETAMINOPHEN 500 MG
2 TABLET ORAL
Qty: 120 | Refills: 0
Start: 2023-03-22 | End: 2023-04-05

## 2023-03-22 RX ADMIN — FLUOROMETHOLONE 1 DROP(S): 1 SOLUTION/ DROPS OPHTHALMIC at 12:57

## 2023-03-22 RX ADMIN — OXYMETAZOLINE HYDROCHLORIDE 1 SPRAY(S): 0.5 SPRAY NASAL at 05:49

## 2023-03-22 RX ADMIN — AMLODIPINE BESYLATE 5 MILLIGRAM(S): 2.5 TABLET ORAL at 08:59

## 2023-03-22 RX ADMIN — Medication 1 DROP(S): at 17:08

## 2023-03-22 RX ADMIN — BRIMONIDINE TARTRATE 1 DROP(S): 2 SOLUTION/ DROPS OPHTHALMIC at 05:48

## 2023-03-22 RX ADMIN — Medication 75 MICROGRAM(S): at 08:01

## 2023-03-22 RX ADMIN — LORATADINE 10 MILLIGRAM(S): 10 TABLET ORAL at 12:56

## 2023-03-22 RX ADMIN — BRIMONIDINE TARTRATE 1 DROP(S): 2 SOLUTION/ DROPS OPHTHALMIC at 14:02

## 2023-03-22 RX ADMIN — Medication 1 DROP(S): at 07:03

## 2023-03-22 RX ADMIN — Medication 1 SPRAY(S): at 05:47

## 2023-03-22 RX ADMIN — Medication 1 SPRAY(S): at 14:01

## 2023-03-22 NOTE — PROGRESS NOTE ADULT - ASSESSMENT
89-year-old F with the aforementioned PMHx, presents for a mechanical fall with fracture of the left humerus and left orbital and maxillary sinuses.     #Mechanical Fall  #Acute Non-displaced fracture of the Left humeral head with mild surrounding edema and hematoma  #Minimally Displaced fracture of the Left lateral orbital wall and posterior wall of the left maxillary sinus  - Code trauma, PAN CT scan  - Follow up Xrays   - CT Chest/Abdomen and Pelvis:   Acute nondisplaced fracture of the left humeral head extending into the humeral neck with mild surrounding edema/hematoma.  - CTH, Cervical Spine and Orbit:   A. CT HEAD:  No acute intracranial pathology. No evidence of midline shift, mass effect or intracranial hemorrhage.  B.CT FACIAL BONES:  Minimally displaced acute fractures of the left lateral orbital wall and posterior wall of the left maxillary sinus with associated periorbital soft tissue swelling.  C. CT CERVICAL SPINE:  No acute fracture or subluxation.Multilevel degenerative changes as detailed above.  - S/p left arm sling by ED  - Left orbital ecchymosis, no visual disturbance or decreased acuity, no photophobia.   - OMF Recommendations appreciated:  a. No acute surgical intervention   b. Opthalmology follow up  c. Sinus precautions (no nose blowing, sneeze with mouth open)  d. Saline nasal spray tid  e. Afrin nasal spray bid x 3 days  f. Follow up in clinic  - Opthalmology Consult  - PT consultation, weight bearing restriction of the LUE  - Fall precautions  - The patient lives at her son's house, she uses a walker, all 3 children are involved in care, mainly Ethel ( Daughter) who follows her medical conditions.  - May need SNF vs Home with HHA ( As the family members work)  - The patient is on Zyrtec 10mg po once daily, Consider stopping this medication in an elderly patient  - Ortho consult >> no intervention, o/p follow up in 2 weeks    #HTN;   - C/w Amlodipine 5mg po once daily    #Hypothyroidism:   - C/w Levothyroxine 75mcg po once daily  - TSH is 8.62 (may need to adjust the dose?)    # Left eye Macular Degeneration  # Left Glaucoma  - C/w Eye drops  - opthamology consult apprecaited, c/w current treatment  - o/p follow up    #Misc:   - DVT proph: Lovenox 40mg SubQ once daily  - GI proph: Not indicated  - Diet: DASH/TLC  - Daughter's nb ( Ethel): [phone]    [facility])  family insisting 4A placement but pt doesnt qualify, and family was informed. dc planning awaiting [facility] auth
89-year-old female past medical history of hypothyroidism, hypertension, hyperlipidemia, hard of hearing presents to ED status post mechanical trip and fall at home today.  Patient states that she was walking with her walker but the steps at home were uneven and she tripped and landed on her left side, positive head trauma with swelling to her left eye.  Denies LOC, no blood thinner use. Patient also with pain at left elbow. States she normally walks with walker at home, lives with her sons and is independent of some ADL's (19 Mar 2023 16:15)    # Acute fracture of the Left humeral  sec to mechanical fall  sling , no intervention per ortho    # Fracture of the Left lateral orbital wall and left maxillary sinus  evaluated by opthalmology and OMFS  no intervention    # Left orbital ecchymosis,  evaluated by opthalmology no intervention    # Hypertension  controlled  BP: 148/69 (22 Mar 2023 08:00) (120/56 - 169/78)    # Hypothyroidism    synthroid    # Dementia    aricept    # CKD stage3 - stable    # Left eye Macular Degeneration    # Left Glaucoma    #Class 1 obesity BMI 33 patient needs to see dieitian outpatient for further evaluation     # DVT prophylaxis    PROGRESS NOTE HANDOFF    Pending:  placement     Family discussion:  family  aware of plan of care     Disposition: SNF , Inpatient rehab ? consulted physicatry  
89-year-old F with the aforementioned PMHx, presents for a mechanical fall with fracture of the left humerus and left orbital and maxillary sinuses.     #Mechanical Fall  #Acute Non-displaced fracture of the Left humeral head with mild surrounding edema and hematoma  #Minimally Displaced fracture of the Left lateral orbital wall and posterior wall of the left maxillary sinus  - Code trauma, PAN CT scan  - Follow up Xrays   - CT Chest/Abdomen and Pelvis:   Acute nondisplaced fracture of the left humeral head extending into the humeral neck with mild surrounding edema/hematoma.  - CTH, Cervical Spine and Orbit:   A. CT HEAD:  No acute intracranial pathology. No evidence of midline shift, mass effect or intracranial hemorrhage.  B.CT FACIAL BONES:  Minimally displaced acute fractures of the left lateral orbital wall and posterior wall of the left maxillary sinus with associated periorbital soft tissue swelling.  C. CT CERVICAL SPINE:  No acute fracture or subluxation.Multilevel degenerative changes as detailed above.  - S/p left arm sling by ED  - Left orbital ecchymosis, no visual disturbance or decreased acuity, no photophobia.   - OMF Recommendations appreciated:  a. No acute surgical intervention   b. Opthalmology follow up  c. Sinus precautions (no nose blowing, sneeze with mouth open)  d. Saline nasal spray tid  e. Afrin nasal spray bid x 3 days  f. Follow up in clinic  - Opthalmology Consult  - PT consultation, weight bearing restriction of the LUE  - Fall precautions  - The patient lives at her son's house, she uses a walker, all 3 children are involved in care, mainly Ethel ( Daughter) who follows her medical conditions.  - May need SNF vs Home with HHA ( As the family members work)  - The patient is on Zyrtec 10mg po once daily, Consider stopping this medication in an elderly patient  - Ortho consult >> no intervention, o/p follow up in 2 weeks    #HTN;   - C/w Amlodipine 5mg po once daily    #Hypothyroidism:   - C/w Levothyroxine 75mcg po once daily  - TSH is 8.62 (may need to adjust the dose?)    # Left eye Macular Degeneration  # Left Glaucoma  - C/w Eye drops  - opthamology consult apprecaited, c/w current treatment  - o/p follow up    #Misc:   - DVT proph: Lovenox 40mg SubQ once daily  - GI proph: Not indicated  - Diet: DASH/TLC  - Daughter's nb ( Ethel): [phone]    [facility]) dc planning
89-year-old F with the aforementioned PMHx, presents for a mechanical fall with fracture of the left humerus and left orbital and maxillary sinuses.     #Mechanical Fall  #Acute Non-displaced fracture of the Left humeral head with mild surrounding edema and hematoma  #Minimally Displaced fracture of the Left lateral orbital wall and posterior wall of the left maxillary sinus  - Code trauma, PAN CT scan  - Follow up Xrays   - CT Chest/Abdomen and Pelvis:   Acute nondisplaced fracture of the left humeral head extending into the humeral neck with mild surrounding edema/hematoma.  - CTH, Cervical Spine and Orbit:   A. CT HEAD:  No acute intracranial pathology. No evidence of midline shift, mass effect or intracranial hemorrhage.  B.CT FACIAL BONES:  Minimally displaced acute fractures of the left lateral orbital wall and posterior wall of the left maxillary sinus with associated periorbital soft tissue swelling.  C. CT CERVICAL SPINE:  No acute fracture or subluxation.Multilevel degenerative changes as detailed above.  - S/p left arm sling by ED  - Left orbital ecchymosis, no visual disturbance or decreased acuity, no photophobia.   - OMF Recommendations appreciated:  a. No acute surgical intervention   b. Opthalmology follow up  c. Sinus precautions (no nose blowing, sneeze with mouth open)  d. Saline nasal spray tid  e. Afrin nasal spray bid x 3 days  f. Follow up in clinic  - Opthalmology Consult  - PT consultation, weight bearing restriction of the LUE  - Fall precautions  - The patient lives at her son's house, she uses a walker, all 3 children are involved in care, mainly Ethel ( Daughter) who follows her medical conditions.  - May need SNF vs Home with HHA ( As the family members work)  - The patient is on Zyrtec 10mg po once daily, Consider stopping this medication in an elderly patient  - Ortho consult pending    #SOILA  - Cr 1.2 to 1.5  - IVF for few hours    #HTN;   - C/w Amlodipine 5mg po once daily    #Hypothyroidism:   - C/w Levothyroxine 75mcg po once daily  - TSH is 8.62 (may need to adjust the dose?)    # Left eye Macular Degeneration  # Left Glaucoma  - C/w Eye drops    #Misc:   - DVT proph: Lovenox 40mg SubQ once daily  - GI proph: Not indicated  - Diet: DASH/TLC  - Daughter's nb ( Ethel): [phone]
89-year-old female past medical history of hypothyroidism, hypertension, hyperlipidemia, hard of hearing presents to ED status post mechanical trip and fall at home today.  Patient states that she was walking with her walker but the steps at home were uneven and she tripped and landed on her left side, positive head trauma with swelling to her left eye.  Denies LOC, no blood thinner use. Patient also with pain at left elbow. States she normally walks with walker at home, lives with her sons and is independent of some ADL's (19 Mar 2023 16:15)    # Acute fracture of the Left humeral  sec to mechanical fall  # Fracture of the Left lateral orbital wall and left maxillary sinus  # Left orbital ecchymosis,  - CT Cervical Spine No Cont (03.19.23 @ 10:36) >No acute intracranial pathology. No evidence of midline shift, mass  effect or intracranial hemorrhage.  - CT FACIAL BONES: Minimally displaced acute fractures of the left lateral orbital wall and posterior wall of the left maxillary sinus with associated periorbital soft tissue swelling.  - CT CERVICAL SPINE:No acute fracture or subluxation.  - S/p left arm sling by ED  - evaluated by OMF: No acute surgical intervention ,  Saline nasal spray tid, Afrin nasal spray bid x 3 days  - evaluated by ophthalmology  - ortho eval: sling for comfort. nwb, outpatient follow up in 2 weeks [phone]  - evaluated by PT    # Hypertension  - c/w norvasc, doxazocin    # Hypothyroidism  - Thyroid Stimulating Hormone, Serum: 8.62 uIU/mL (03.20.23 @ 07:11)  - c/w synthroid    # Dementia  - c/w aricept    # CKD stage3 - stable    # Left eye Macular Degeneration  # Left Glaucoma  - c/w home  eye drops    # DVT prophylaxis    # Full code    #Pending: auth for SNF  # Disposition: snf
89-year-old female past medical history of hypothyroidism, hypertension, hyperlipidemia, hard of hearing presents to ED status post mechanical trip and fall at home today.  Patient states that she was walking with her walker but the steps at home were uneven and she tripped and landed on her left side, positive head trauma with swelling to her left eye.  Denies LOC, no blood thinner use. Patient also with pain at left elbow. States she normally walks with walker at home, lives with her sons and is independent of some ADL's (19 Mar 2023 16:15)    # Acute fracture of the Left humeral  sec to mechanical fall  # Fracture of the Left lateral orbital wall and left maxillary sinus  # Left orbital ecchymosis,  - CT Cervical Spine No Cont (03.19.23 @ 10:36) >No acute intracranial pathology. No evidence of midline shift, mass  effect or intracranial hemorrhage.  - CT FACIAL BONES: Minimally displaced acute fractures of the left lateral orbital wall and posterior wall of the left maxillary sinus with associated periorbital soft tissue swelling.  - CT CERVICAL SPINE:No acute fracture or subluxation.  - S/p left arm sling by ED  - evaluated by OMF: No acute surgical intervention ,  Saline nasal spray tid, Afrin nasal spray bid x 3 days  - evaluated by ophthalmology  - ortho eval  - PT eval    # Hypertension  - c/w norvasc, doxazocin    # Hypothyroidism  - Thyroid Stimulating Hormone, Serum: 8.62 uIU/mL (03.20.23 @ 07:11)  - c/w synthroid    # Dementia  - c/w aricept    # CKD stage3 - stable    # Left eye Macular Degeneration  # Left Glaucoma  - c/w home  eye drops    # DVT prophylaxis    # Full code    #Pending: ortho eval, PT eval  # Disposition: Home when stable

## 2023-03-22 NOTE — CONSULT NOTE ADULT - CONSULT REASON
Left eye orbital fractures
gait abnormality  left humeral prox fracture
proximal humerus fracture
displaced left lateral orbital wall fracture at area of laceration concerning for open fracture

## 2023-03-22 NOTE — CONSULT NOTE ADULT - REASON FOR ADMISSION
fall
85-year-old female with a past medical history significant for hypothyroidism hypertension hyperlipidemia who presents status post mechanical fall.  Patient states that she was walking with her walker when she tripped and fell on her head.

## 2023-03-22 NOTE — CONSULT NOTE ADULT - SUBJECTIVE AND OBJECTIVE BOX
03-19-23 @ 12:54 ELE BRAR [id number] [facility] ED    89y y.o. Female presents to the ED status post fall.89-year-old female past medical history of hypothyroidism, hypertension, hyperlipidemia, hard of hearing presents to ED status post mechanical trip and fall at home today.  Patient states that she was walking with her walker but the steps at home were uneven and she tripped and landed on her left side, positive head trauma with swelling to her left eye.  Denies LOC, no blood thinner use. Patient also with pain at left elbow. Denies chest pain, shortness of breath, abdominal pain, nausea, vomiting, change in vision.  No headache, neck pain.  No numbness/tingling.       Dental/oral surgery were consulted to evaluate as CT max/face shows minimally displaced left lateral orbital wall fracture at area of laceration concerning for open fracture.    Allergies:No Known Allergies      Medications:    · 	Albuterol (Eqv-ProAir HFA) 90 mcg/inh inhalation aerosol: 1 puff(s) inhaled every 6 hours     · 	dexamethasone 6 mg oral tablet: 1 tab(s) orally once a day    · 	traMADol 50 mg oral tablet: 0.5 tab(s) orally every 8 hours, As Needed -Severe Pain (7 - 10) MDD:1.5 tab/day    · 	acetaminophen 325 mg oral tablet: 2 tab(s) orally every 6 hours, As needed, Temp greater or equal to 38C (100.4F), Mild Pain (1 - 3), Moderate Pain (4 - 6)    · 	fluorometholone 0.1% ophthalmic suspension: 1 drop(s) in each eye once a day     · 	latanoprost 0.005% ophthalmic solution: 1 drop(s) to each affected eye once a day (at bedtime)    · 	brimonidine-timolol 0.2%-0.5% ophthalmic solution: 1 drop(s) to each affected eye every 12 hours    · 	levothyroxine 75 mcg (0.075 mg) oral tablet: 1 tab(s) orally once a day    · 	polyethylene glycol 3350 oral powder for reconstitution: 17 gram(s) orally once a day, As Needed -for constipation     · 	ferrous sulfate 325 mg (65 mg elemental iron) oral tablet: 1 tab(s) orally once a day  · 	LORazepam 0.5 mg oral tablet:     · 	meloxicam 5 mg oral capsule: 1 cap(s) orally once a day    · 	donepezil 5 mg oral tablet: 1 tab(s) orally once a day (at bedtime)    · 	amLODIPine 5 mg oral tablet: 1 tab(s) orally once a day    · 	prazosin 1 mg oral capsule: orally once a day    Health Issues:       No pertinent family history in first degree relatives      Hypertension, unspecified type    High cholesterol    Chronic primary angle-closure glaucoma of left eye, severe stage    Exudative age-related macular degeneration of left eye with inactive choroidal neovascularization    Fall    Hyperthyroidism    Hypothyroidism    History of hip replacement, total, right    L SHOULDER INJURY DUE TO FALL    90+    SysAdmin_VisitLink            Exam:    Vitals: T(C): 36.6 (03-19-23 @ 09:34), Max: 36.6 (03-19-23 @ 09:34)  HR: 87 (03-19-23 @ 09:34) (87 - 87)  BP: 175/89 (03-19-23 @ 09:34) (175/89 - 175/89)  RR: 18 (03-19-23 @ 09:34) (18 - 18)  SpO2: 99% (03-19-23 @ 09:34) (99% - 99%)    General: calm, resting in bed    Neuro: AAOX3, GCS 15. Alert, oriented x3. CN2-12 intact, equal strength bilaterally    Head: normacephalic , 1cm laceration to lateral left orbital rim without active bleeding/oozing, no foreign body, no bony exposure. no raccoon eyes,  no avitia sign  , no avitia signs, no step deformity to zygomatic arches, no skull fractures , base of skull intact     Ears: Bilateral shape and symmetry, no tenderness, no discharge, no Avitia’s signs, no tinnitus, hearing intact     Eyes: (+)periorbital ecchymosis to left eye with small 1cm laceration to lateral left orbital rim without active bleeding/oozing, no foreign body, no bony exposure.  Bilateral PERRLA, Bilateral EOMI, no proptosis, no bony steps noted, pink conjunctiva, anicteric. (+)periorbital ecchymosis to left eye with small 1cm laceration to lateral left orbital rim without active bleeding/oozing, no foreign body, no bony exposure. no raccoon eyes,  no avitia sign.  no blurry or double vision    Nose: symmetry, no lacerations/abrasion, no tenderness, no discharge, no hematoma of septum, no crepitus, no mobility or deformities    Face: Trigeminal nerve (V1, V2, V3) intact bilaterally, Facial nerve (VII) intact bilaterally    IOE: no lacerations, no swellings, no abrasions, no fractured teeth noted, no petechiae of FOM, tongue full range mobility, uvula in the midline, no false point of mandible, maxilla intact, AUDREY?, occlusion reproducible, no deviation on opening, no Trismus, full permanent dentition, poor OH    Neck: no masses, full range of motion, no lacerations, no lymphadenopathy, swelling or edema present      CT Findings:    CT HEAD:  No acute intracranial pathology. No evidence of midline shift, mass effect or intracranial hemorrhage.    CT FACIAL BONES:  Minimally displaced acute fractures of the left lateral orbital wall and posterior wall of the left maxillary sinus with associated periorbital soft tissue swelling.    CT CERVICAL SPINE:  No acute fracture or subluxation.            Assessment: 89y y.o. Female presents to the ED status post fall.89-year-old female past medical history of hypothyroidism, hypertension, hyperlipidemia, hard of hearing presents to ED status post mechanical trip and fall at home today.  Patient states that she was walking with her walker but the steps at home were uneven and she tripped and landed on her left side, positive head trauma with swelling to her left eye.  Denies LOC, no blood thinner use. Patient also with pain at left elbow. Denies chest pain, shortness of breath, abdominal pain, nausea, vomiting, change in vision.  No headache, neck pain.  No numbness/tingling.       Dental/oral surgery were consulted to evaluate as CT max/face shows minimally displaced left lateral orbital wall fracture at area of laceration concerning for open fracture.    A/P: the patient is a 89 year old female s/p fall sustaining a nondisplaced left orbital wall and left posterior maxillary sinus wall fracture.    Recommendations:    No acute surgical intervention   Opthalmology follow up  Sinus precautions (no nose blowing, sneeze with mouth open)  Saline nasal spray tid  Afrin nasal spray bid x 3 days  Follow up in clinic    Patient seen by Dr. London Bal   Case discussed with Dr. Koffi Torre
HPI:  89-year-old female past medical history of hypothyroidism, hypertension, hyperlipidemia, hard of hearing presents to ED status post mechanical trip and fall at home today.  Patient states that she was walking with her walker but the steps at home were uneven and she tripped and landed on her left side, positive head trauma with swelling to her left eye.  Denies LOC, no blood thinner use. Patient also with pain at left elbow. Denies chest pain, shortness of breath, abdominal pain, nausea, vomiting, change in vision.  No headache, neck pain.  No numbness/tingling. States she normally walks with walker at home, lives with her sons and is independent of some ADL's (19 Mar 2023 16:15)  Patient denies any other injuries     PAST MEDICAL & SURGICAL HISTORY:  Hypertension, unspecified type      High cholesterol      Chronic primary angle-closure glaucoma of left eye, severe stage      Exudative age-related macular degeneration of left eye with inactive choroidal neovascularization      Fall  Multiple falls at home      Hypothyroidism      History of hip replacement, total, right      MEDICATIONS  (STANDING):  amLODIPine   Tablet 5 milliGRAM(s) Oral daily  brimonidine 0.2% Ophthalmic Solution 1 Drop(s) Both EYES three times a day  donepezil 5 milliGRAM(s) Oral at bedtime  doxazosin 1 milliGRAM(s) Oral at bedtime  enoxaparin Injectable 40 milliGRAM(s) SubCutaneous every 24 hours  ferrous    sulfate 325 milliGRAM(s) Oral daily  fluorometholone 0.1% Suspension 1 Drop(s) Both EYES daily  latanoprost 0.005% Ophthalmic Solution 1 Drop(s) Both EYES at bedtime  levothyroxine 75 MICROGram(s) Oral daily  loratadine 10 milliGRAM(s) Oral daily  LORazepam     Tablet 0.5 milliGRAM(s) Oral at bedtime  oxymetazoline 0.05% Nasal Spray 1 Spray(s) Both Nostrils two times a day  sodium chloride 0.65% Nasal 1 Spray(s) Both Nostrils every 8 hours  timolol 0.5% Solution 1 Drop(s) Both EYES every 12 hours    MEDICATIONS  (PRN):  acetaminophen     Tablet .. 650 milliGRAM(s) Oral every 6 hours PRN Mild Pain (1 - 3)  traMADol 50 milliGRAM(s) Oral every 8 hours PRN Moderate Pain (4 - 6)        < from: Xray Elbow AP + Lateral + Oblique, Left (03.19.23 @ 14:12) >    ACC: [id number] EXAM:  XR ELBOW COMP MIN 3V LT   ORDERED BY: TAYLOR VITAL     PROCEDURE DATE:  03/19/2023          INTERPRETATION:  Clinical history:Left elbow pain after a fall  Technique: XR ELBOW COMPLETE 3 VIEWS LEFT  Comparison: 7/25/2019    Findings/  impression:    Osteopenia without a definite fracture. No effusion is seen. No   radiopaque foreign body is identified.    --- End of Report ---            ILDEFONSO FIELDS MD; Attending Radiologist  This document has been electronically signed. Mar 19 2023  7:10PM    < end of copied text >  < from: Xray Humerus, Left (03.19.23 @ 11:29) >    ACC: [id number] EXAM:  XR HUMERUS MIN 2 VIEWS LT   ORDERED BY: NICK CRISTINA     PROCEDURE DATE:  03/19/2023          INTERPRETATION:  Trauma, fall  Technique: XR HUMERUS 2 VIEWS LEFT  Comparison: None available    Findings/  impression:    There is essentially a nondisplaced fracture involving surgical neck and   proximal humeral shaft. No other fractures are identified.  --- End of Report ---            ILDEFONSO FIELDS MD; Attending Radiologist  This document has been electronically signed. Mar 19 2023  7:02PM    < end of copied text >  < from: Xray Shoulder 2 Views, Left (03.19.23 @ 11:28) >    ACC: [id number] EXAM:  XR SHOULDER COMP MIN 2V LT   ORDERED BY: NICK CRISTINA     PROCEDURE DATE:  03/19/2023          INTERPRETATION:  CLINICAL INDICATION:fall    TECHNIQUE: Four  radiographs of the left shoulder.    COMPARISON: 7/25/2019    FINDINGS:  Acute nondisplaced fracture of the left humeral head    IMPRESSION:  Acute nondisplaced fracture of the left humeral head    --- End of Report ---            FERANNDO RANGEL MD; Attending Radiologist    < end of copied text >      Vital Signs Last 24 Hrs  T(C): 37.4 (19 Mar 2023 23:49), Max: 37.4 (19 Mar 2023 23:49)  T(F): 99.3 (19 Mar 2023 23:49), Max: 99.3 (19 Mar 2023 23:49)  HR: 91 (19 Mar 2023 23:49) (68 - 91)  BP: 108/64 (19 Mar 2023 23:49) (108/64 - 166/72)  BP(mean): --  RR: 18 (19 Mar 2023 23:49) (18 - 18)  SpO2: 98% (19 Mar 2023 23:49) (97% - 98%)    Parameters below as of 19 Mar 2023 23:49  Patient On (Oxygen Delivery Method): room air        The patient was seen and examined at bedside, sleeping, arousable  Large ecchymosis to left eye  left upper extremity- + ttp over the humeral head, no ttp of the elbow, hand or digits, limited rom of the arm secondary to pain   neuro intact,       
HPI:  89-year-old female past medical history of hypothyroidism, hypertension, hyperlipidemia, hard of hearing presents to ED status post mechanical trip and fall at home today.  Patient states that she was walking with her walker but the steps at home were uneven and she tripped and landed on her left side, positive head trauma with swelling to her left eye.  Denies LOC, no blood thinner use. Patient also with pain at left elbow. Denies chest pain, shortness of breath, abdominal pain, nausea, vomiting, change in vision.  No headache, neck pain.  No numbness/tingling. 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 She suffered a left prox humerus fracture. NWB LUE in sling.    TODAY'S SUBJECTIVE & REVIEW OF SYMPTOMS:     Constitutional WNL   Cardio WNL   Resp WNL   GI WNL  Heme WNL  Endo WNL  Skin WNL  MSK WNL  Neuro WNL  Cognitive WNL  Psych WNL      MEDICATIONS  (STANDING):  amLODIPine   Tablet 5 milliGRAM(s) Oral daily  brimonidine 0.2% Ophthalmic Solution 1 Drop(s) Both EYES three times a day  donepezil 5 milliGRAM(s) Oral at bedtime  doxazosin 1 milliGRAM(s) Oral at bedtime  enoxaparin Injectable 40 milliGRAM(s) SubCutaneous every 24 hours  ferrous    sulfate 325 milliGRAM(s) Oral daily  fluorometholone 0.1% Suspension 1 Drop(s) Both EYES daily  latanoprost 0.005% Ophthalmic Solution 1 Drop(s) Both EYES at bedtime  levothyroxine 75 MICROGram(s) Oral daily  loratadine 10 milliGRAM(s) Oral daily  LORazepam     Tablet 0.5 milliGRAM(s) Oral at bedtime  sodium chloride 0.65% Nasal 1 Spray(s) Both Nostrils every 8 hours  timolol 0.5% Solution 1 Drop(s) Both EYES every 12 hours    MEDICATIONS  (PRN):  acetaminophen     Tablet .. 650 milliGRAM(s) Oral every 6 hours PRN Mild Pain (1 - 3)  traMADol 50 milliGRAM(s) Oral every 8 hours PRN Moderate Pain (4 - 6)      FAMILY HISTORY:      Allergies    No Known Allergies    Intolerances        SOCIAL HISTORY:    [  ] Etoh  [  ] Smoking  [  ] Substance abuse     Home Environment:  [  ] Home Alone  [x  ] Lives with Family-son in law  [  ] Home Health Aid    Dwelling:  [  ] Apartment  [  ] Private House  [  ] Adult Home  [  ] Skilled Nursing Facility      [  ] Short Term  [  ] Long Term  [  ] Stairs       Elevator [  ]    FUNCTIONAL STATUS PTA: (Check all that apply)  Ambulation: [ x  ]Independent    [  ] Dependent     [  ] Non-Ambulatory  Assistive Device: [  ] SA Cane  [  ]  Q Cane  [x  ] Walker  [  ]  Wheelchair  ADL : [  ] Independent  [  ]  Dependent       Vital Signs Last 24 Hrs  T(C): 35.8 (22 Mar 2023 08:00), Max: 36.1 (21 Mar 2023 23:45)  T(F): 96.5 (22 Mar 2023 08:00), Max: 97 (21 Mar 2023 23:45)  HR: 99 (22 Mar 2023 08:00) (71 - 99)  BP: 148/69 (22 Mar 2023 08:00) (120/56 - 169/78)  BP(mean): --  RR: 18 (22 Mar 2023 08:00) (18 - 18)  SpO2: 96% (21 Mar 2023 16:05) (96% - 96%)    Parameters below as of 21 Mar 2023 23:45  Patient On (Oxygen Delivery Method): room air          PHYSICAL EXAM: Alert & Oriented X 3; she can knows the  borough and year but is a so-so historian at best  GENERAL: NAD, well-groomed, well-developed  HEAD:  left facial echymosis  EYES: EOMI, PERRLA, conjunctiva and sclera clear  NECK: Supple, No JVD, Normal thyroid  CHEST/LUNG: Clear bilaterally; No rales, rhonchi, wheezing, or rubs  HEART: Regular rate and rhythm; No murmurs, rubs, or gallops  ABDOMEN: Soft, Nontender, Nondistended; Bowel sounds present  EXTREMITIES:  2+ Peripheral Pulses, No clubbing, cyanosis, or edema; left UE in sling    NERVOUS SYSTEM:  Cranial Nerves 2-12 intact [  ] Abnormal  [  ]  ROM: WFL all extremities [  ]  Abnormal [  ]  Motor Strength: WFL all extremities  [  ]  Abnormal [ x ] 4+/5 LEs  Sensation: intact to light touch [  ] Abnormal [  ]  Reflexes: Symmetric [  ]  Abnormal [  ]    FUNCTIONAL STATUS:  Bed Mobility: Independent [  ]  Supervision [  ]  Needs Assistance [  ]  N/A [  ]  Transfers: Independent [  ]  Supervision [  ]  Needs Assistance [  ]  N/A [  ]   Ambulation: Independent [  ]  Supervision [  ]  Needs Assistance [  ]  N/A [  ]  ADL: Independent [  ] Requires Assistance [  ] N/A [  ]      LABS:                        12.5   7.47  )-----------( 169      ( 22 Mar 2023 06:55 )             38.4     03-22    140  |  102  |  28<H>  ----------------------------<  113<H>  3.7   |  26  |  1.2    Ca    9.8      22 Mar 2023 06:55  Mg     2.1     03-21            RADIOLOGY & ADDITIONAL STUDIES:    Assesment:

## 2023-03-22 NOTE — PROGRESS NOTE ADULT - SUBJECTIVE AND OBJECTIVE BOX
ELE BRAR 89y Female  MRN#: [id number]   Hospital Day: 2d    SUBJECTIVE  Patient is a 89y old Female who presents with a chief complaint of 85-year-old female with a past medical history significant for hypothyroidism hypertension hyperlipidemia who presents status post mechanical fall.  Patient states that she was walking with her walker when she tripped and fell on her head. (19 Mar 2023 12:51)  Currently admitted to medicine with the primary diagnosis of Humerus fracture      INTERVAL HPI AND OVERNIGHT EVENTS:  Patient was examined and seen at bedside. This morning she is resting comfortably in bed. No issues or overnight events.    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    Fall  Multiple falls at home    Hypothyroidism    History of hip replacement, total, right      ALLERGIES:  No Known Allergies    MEDICATIONS:  STANDING MEDICATIONS  amLODIPine   Tablet 5 milliGRAM(s) Oral daily  brimonidine 0.2% Ophthalmic Solution 1 Drop(s) Both EYES three times a day  donepezil 5 milliGRAM(s) Oral at bedtime  doxazosin 1 milliGRAM(s) Oral at bedtime  enoxaparin Injectable 40 milliGRAM(s) SubCutaneous every 24 hours  ferrous    sulfate 325 milliGRAM(s) Oral daily  fluorometholone 0.1% Suspension 1 Drop(s) Both EYES daily  latanoprost 0.005% Ophthalmic Solution 1 Drop(s) Both EYES at bedtime  levothyroxine 75 MICROGram(s) Oral daily  loratadine 10 milliGRAM(s) Oral daily  LORazepam     Tablet 0.5 milliGRAM(s) Oral at bedtime  oxymetazoline 0.05% Nasal Spray 1 Spray(s) Both Nostrils two times a day  sodium chloride 0.65% Nasal 1 Spray(s) Both Nostrils every 8 hours  timolol 0.5% Solution 1 Drop(s) Both EYES every 12 hours    PRN MEDICATIONS  acetaminophen     Tablet .. 650 milliGRAM(s) Oral every 6 hours PRN  traMADol 50 milliGRAM(s) Oral every 8 hours PRN      VITAL SIGNS: Last 24 Hours  T(C): 36.7 (21 Mar 2023 07:00), Max: 36.7 (21 Mar 2023 07:00)  T(F): 98 (21 Mar 2023 07:00), Max: 98 (21 Mar 2023 07:00)  HR: 46 (21 Mar 2023 07:00) (46 - 67)  BP: 158/70 (21 Mar 2023 07:00) (107/54 - 158/70)  BP(mean): --  RR: 18 (21 Mar 2023 07:00) (18 - 18)  SpO2: 95% (21 Mar 2023 07:00) (95% - 100%)    LABS:                        10.0   5.92  )-----------( 141      ( 21 Mar 2023 07:39 )             31.1     03-21    140  |  106  |  34<H>  ----------------------------<  107<H>  4.0   |  24  |  1.4    Ca    9.5      21 Mar 2023 07:39  Mg     2.1     03-21      PT/INR - ( 19 Mar 2023 21:52 )   PT: 13.70 sec;   INR: 1.20 ratio         PTT - ( 19 Mar 2023 21:52 )  PTT:42.6 sec          Physical Exam:  CONSTITUTIONAL: No acute distress, AxO=3  HEAD: bruise over left orbital and maxillary area  PULMONARY: clear  CARDIOVASCULAR: Regular rate and rhythm  GASTROINTESTINAL: Soft, non-tender, non-distended; bowel sounds present  NEUROLOGY: non-focal  EXT: left humerus fracture  
ELE BRAR 89y Female  MRN#: [id number]   Hospital Day: 1d    SUBJECTIVE  Patient is a 89y old Female who presents with a chief complaint of 85-year-old female with a past medical history significant for hypothyroidism hypertension hyperlipidemia who presents status post mechanical fall.  Patient states that she was walking with her walker when she tripped and fell on her head. (19 Mar 2023 12:51)  Currently admitted to medicine with the primary diagnosis of Humerus fracture      INTERVAL HPI AND OVERNIGHT EVENTS:  Patient was examined and seen at bedside. This morning she is resting comfortably in bed. No issues or overnight events.    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    Fall  Multiple falls at home    Hypothyroidism    History of hip replacement, total, right      ALLERGIES:  No Known Allergies    MEDICATIONS:  STANDING MEDICATIONS  amLODIPine   Tablet 5 milliGRAM(s) Oral daily  brimonidine 0.2% Ophthalmic Solution 1 Drop(s) Both EYES three times a day  donepezil 5 milliGRAM(s) Oral at bedtime  doxazosin 1 milliGRAM(s) Oral at bedtime  enoxaparin Injectable 40 milliGRAM(s) SubCutaneous every 24 hours  ferrous    sulfate 325 milliGRAM(s) Oral daily  fluorometholone 0.1% Suspension 1 Drop(s) Both EYES daily  latanoprost 0.005% Ophthalmic Solution 1 Drop(s) Both EYES at bedtime  levothyroxine 75 MICROGram(s) Oral daily  loratadine 10 milliGRAM(s) Oral daily  LORazepam     Tablet 0.5 milliGRAM(s) Oral at bedtime  oxymetazoline 0.05% Nasal Spray 1 Spray(s) Both Nostrils two times a day  sodium chloride 0.65% Nasal 1 Spray(s) Both Nostrils every 8 hours  timolol 0.5% Solution 1 Drop(s) Both EYES every 12 hours    PRN MEDICATIONS  acetaminophen     Tablet .. 650 milliGRAM(s) Oral every 6 hours PRN  traMADol 50 milliGRAM(s) Oral every 8 hours PRN      VITAL SIGNS: Last 24 Hours  T(C): 37.4 (19 Mar 2023 23:49), Max: 37.4 (19 Mar 2023 23:49)  T(F): 99.3 (19 Mar 2023 23:49), Max: 99.3 (19 Mar 2023 23:49)  HR: 91 (19 Mar 2023 23:49) (68 - 91)  BP: 108/64 (19 Mar 2023 23:49) (108/64 - 166/72)  BP(mean): --  RR: 18 (19 Mar 2023 23:49) (18 - 18)  SpO2: 98% (19 Mar 2023 23:49) (97% - 98%)    LABS:                        10.3   6.99  )-----------( 159      ( 20 Mar 2023 07:11 )             31.6     03-20    143  |  108  |  33<H>  ----------------------------<  117<H>  4.0   |  23  |  1.5    Ca    9.4      20 Mar 2023 07:11    TPro  7.7  /  Alb  4.5  /  TBili  0.5  /  DBili  x   /  AST  23  /  ALT  6   /  AlkPhos  72  03-19    PT/INR - ( 19 Mar 2023 21:52 )   PT: 13.70 sec;   INR: 1.20 ratio         PTT - ( 19 Mar 2023 21:52 )  PTT:42.6 sec            Physical Exam:  CONSTITUTIONAL: No acute distress, alert and following commands  HEAD: Atraumatic, normocephalic  EYES: EOM intact, PERRLA, conjunctiva and sclera clear  PULMONARY: clear  CARDIOVASCULAR: Regular rate and rhythm  GASTROINTESTINAL: Soft, non-tender, non-distended; bowel sounds present  NEUROLOGY: non-focal
Patient is a 89y old  Female who presents with a chief complaint of 85-year-old female with a past medical history significant for hypothyroidism hypertension hyperlipidemia who presents status post mechanical fall.  Patient states that she was walking with her walker when she tripped and fell on her head. (19 Mar 2023 12:51)    Patient was seen and examined.  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  Fall, Multiple falls at home  Hypothyroidism  History of hip replacement, total, right    Allergies  No Known Allergies    MEDICATIONS  (STANDING):  amLODIPine   Tablet 5 milliGRAM(s) Oral daily  brimonidine 0.2% Ophthalmic Solution 1 Drop(s) Both EYES three times a day  donepezil 5 milliGRAM(s) Oral at bedtime  doxazosin 1 milliGRAM(s) Oral at bedtime  enoxaparin Injectable 40 milliGRAM(s) SubCutaneous every 24 hours  ferrous    sulfate 325 milliGRAM(s) Oral daily  fluorometholone 0.1% Suspension 1 Drop(s) Both EYES daily  latanoprost 0.005% Ophthalmic Solution 1 Drop(s) Both EYES at bedtime  levothyroxine 75 MICROGram(s) Oral daily  loratadine 10 milliGRAM(s) Oral daily  LORazepam     Tablet 0.5 milliGRAM(s) Oral at bedtime  oxymetazoline 0.05% Nasal Spray 1 Spray(s) Both Nostrils two times a day  sodium chloride 0.65% Nasal 1 Spray(s) Both Nostrils every 8 hours  timolol 0.5% Solution 1 Drop(s) Both EYES every 12 hours    MEDICATIONS  (PRN):  acetaminophen     Tablet .. 650 milliGRAM(s) Oral every 6 hours PRN Mild Pain (1 - 3)  traMADol 50 milliGRAM(s) Oral every 8 hours PRN Moderate Pain (4 - 6)    Vital Signs Last 24 Hrs  T(C): 37.4  T(F): 99.3  HR: 91  BP: 108/64  BP(mean): --  RR: 18  SpO2: 98%    O/E:  Awake, alert, not in distress.  HEENT:  EOMI. Laceration of left temple patched with steri-strips.Left eyr: periorbital ecchymosis and swelling  Chest: clear.  CVS: SIS2 +, no murmur.  P/A: Soft, BS+  CNS: awake, alert  Ext: no edema feet.  All systems reviewed positive findings as above.                            10.3<L>  6.99  )-----------( 159      ( 20 Mar 2023 07:11 )             31.6<L>                        11.5<L>  5.02  )-----------( 153      ( 19 Mar 2023 09:53 )             35.8<L>    03-20    143  |  108  |  33<H>  ----------------------------<  117<H>  4.0   |  23  |  1.5  03-19    143  |  109  |  42<H>  ----------------------------<  128<H>  4.6   |  22  |  1.2    Ca    9.4      20 Mar 2023 07:11  Ca    9.7      19 Mar 2023 09:53    TPro  7.7  /  Alb  4.5  /  TBili  0.5  /  DBili  x   /  AST  23  /  ALT  6   /  AlkPhos  72  03-19          PT/INR - ( 19 Mar 2023 21:52 )   PT: 13.70 sec;   INR: 1.20 ratio         PTT - ( 19 Mar 2023 21:52 )  PTT:42.6 sec              
  ELE BRAR  89y  [facility] [address]      Patient is a 89y old  Female who presents with a chief complaint of 85-year-old female with a past medical history significant for hypothyroidism hypertension hyperlipidemia who presents status post mechanical fall.  Patient states that she was walking with her walker when she tripped and fell on her head. (19 Mar 2023 12:51)      INTERVAL HPI/OVERNIGHT EVENTS:    no acute events overnight     REVIEW OF SYSTEMS:  CONSTITUTIONAL: No fever, weight loss, or fatigue  EYES: No eye pain, visual disturbances, or discharge  ENMT:  No difficulty hearing, tinnitus, vertigo; No sinus or throat pain  NECK: No pain or stiffness  BREASTS: No pain, masses, or nipple discharge  RESPIRATORY: No cough, wheezing, chills or hemoptysis; No shortness of breath  CARDIOVASCULAR: No chest pain, palpitations, dizziness, or leg swelling  GASTROINTESTINAL: No abdominal or epigastric pain. No nausea, vomiting, or hematemesis; No diarrhea or constipation. No melena or hematochezia.  GENITOURINARY: No dysuria, frequency, hematuria, or incontinence  NEUROLOGICAL: No headaches, memory loss, loss of strength, numbness, or tremors  SKIN: No itching, burning, rashes, or lesions   LYMPH NODES: No enlarged glands  ENDOCRINE: No heat or cold intolerance; No hair loss  MUSCULOSKELETAL: No joint pain or swelling; No muscle, back, or extremity pain  PSYCHIATRIC: No depression, anxiety, mood swings, or difficulty sleeping  HEME/LYMPH: No easy bruising, or bleeding gums  ALLERY AND IMMUNOLOGIC: No hives or eczema  FAMILY HISTORY:    T(C): 35.8 (03-22-23 @ 08:00), Max: 36.1 (03-21-23 @ 23:45)  HR: 99 (03-22-23 @ 08:00) (71 - 99)  BP: 148/69 (03-22-23 @ 08:00) (120/56 - 169/78)  RR: 18 (03-22-23 @ 08:00) (18 - 18)  SpO2: 96% (03-21-23 @ 16:05) (96% - 96%)  Wt(kg): --Vital Signs Last 24 Hrs  T(C): 35.8 (22 Mar 2023 08:00), Max: 36.1 (21 Mar 2023 23:45)  T(F): 96.5 (22 Mar 2023 08:00), Max: 97 (21 Mar 2023 23:45)  HR: 99 (22 Mar 2023 08:00) (71 - 99)  BP: 148/69 (22 Mar 2023 08:00) (120/56 - 169/78)  BP(mean): --  RR: 18 (22 Mar 2023 08:00) (18 - 18)  SpO2: 96% (21 Mar 2023 16:05) (96% - 96%)    Parameters below as of 21 Mar 2023 23:45  Patient On (Oxygen Delivery Method): room air        PHYSICAL EXAM:  GENERAL: NAD   EYES: EOMI, PERRLA, conjunctiva and sclera clear  ENMT: No tonsillar erythema, exudates, or enlargement; Moist mucous membranes, Good dentition, No lesions  NECK: Supple, No JVD, Normal thyroid  NERVOUS SYSTEM:  Alert    PULM: Clear to auscultation bilaterally  CARDIAC: Regular rate and rhythm; No murmurs, rubs, or gallops  GI: Soft, Nontender, Nondistended; Bowel sounds present  EXTREMITIES:  2+ Peripheral Pulses, No clubbing, cyanosis, or edema  LYMPH: No lymphadenopathy noted  SKIN: No rashes or lesions    Consultant(s) Notes Reviewed:  [x ] YES  [ ] NO  Care Discussed with Consultants/Other Providers [ x] YES  [ ] NO    LABS:                            12.5   7.47  )-----------( 169      ( 22 Mar 2023 06:55 )             38.4   03-22    140  |  102  |  28<H>  ----------------------------<  113<H>  3.7   |  26  |  1.2    Ca    9.8      22 Mar 2023 06:55  Mg     2.1     03-21              acetaminophen     Tablet .. 650 milliGRAM(s) Oral every 6 hours PRN  amLODIPine   Tablet 5 milliGRAM(s) Oral daily  brimonidine 0.2% Ophthalmic Solution 1 Drop(s) Both EYES three times a day  donepezil 5 milliGRAM(s) Oral at bedtime  doxazosin 1 milliGRAM(s) Oral at bedtime  enoxaparin Injectable 40 milliGRAM(s) SubCutaneous every 24 hours  ferrous    sulfate 325 milliGRAM(s) Oral daily  fluorometholone 0.1% Suspension 1 Drop(s) Both EYES daily  latanoprost 0.005% Ophthalmic Solution 1 Drop(s) Both EYES at bedtime  levothyroxine 75 MICROGram(s) Oral daily  loratadine 10 milliGRAM(s) Oral daily  LORazepam     Tablet 0.5 milliGRAM(s) Oral at bedtime  sodium chloride 0.65% Nasal 1 Spray(s) Both Nostrils every 8 hours  timolol 0.5% Solution 1 Drop(s) Both EYES every 12 hours  traMADol 50 milliGRAM(s) Oral every 8 hours PRN      HEALTH ISSUES - PROBLEM Dx:          Case Discussed with House Staff   Spectra [phone]  
ELE BRAR 89y Female  MRN#: [id number]   Hospital Day: 3d    SUBJECTIVE  Patient is a 89y old Female who presents with a chief complaint of 85-year-old female with a past medical history significant for hypothyroidism hypertension hyperlipidemia who presents status post mechanical fall.  Patient states that she was walking with her walker when she tripped and fell on her head. (19 Mar 2023 12:51)  Currently admitted to medicine with the primary diagnosis of Humerus fracture      INTERVAL HPI AND OVERNIGHT EVENTS:  Patient was examined and seen at bedside. This morning she is resting comfortably in bed. No issues or overnight events.    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    Fall  Multiple falls at home    Hypothyroidism    History of hip replacement, total, right      ALLERGIES:  No Known Allergies    MEDICATIONS:  STANDING MEDICATIONS  amLODIPine   Tablet 5 milliGRAM(s) Oral daily  brimonidine 0.2% Ophthalmic Solution 1 Drop(s) Both EYES three times a day  donepezil 5 milliGRAM(s) Oral at bedtime  doxazosin 1 milliGRAM(s) Oral at bedtime  enoxaparin Injectable 40 milliGRAM(s) SubCutaneous every 24 hours  ferrous    sulfate 325 milliGRAM(s) Oral daily  fluorometholone 0.1% Suspension 1 Drop(s) Both EYES daily  latanoprost 0.005% Ophthalmic Solution 1 Drop(s) Both EYES at bedtime  levothyroxine 75 MICROGram(s) Oral daily  loratadine 10 milliGRAM(s) Oral daily  LORazepam     Tablet 0.5 milliGRAM(s) Oral at bedtime  sodium chloride 0.65% Nasal 1 Spray(s) Both Nostrils every 8 hours  timolol 0.5% Solution 1 Drop(s) Both EYES every 12 hours    PRN MEDICATIONS  acetaminophen     Tablet .. 650 milliGRAM(s) Oral every 6 hours PRN  traMADol 50 milliGRAM(s) Oral every 8 hours PRN      VITAL SIGNS: Last 24 Hours  T(C): 35.8 (22 Mar 2023 08:00), Max: 36.1 (21 Mar 2023 23:45)  T(F): 96.5 (22 Mar 2023 08:00), Max: 97 (21 Mar 2023 23:45)  HR: 99 (22 Mar 2023 08:00) (71 - 99)  BP: 148/69 (22 Mar 2023 08:00) (120/56 - 169/78)  BP(mean): --  RR: 18 (22 Mar 2023 08:00) (18 - 18)  SpO2: 96% (21 Mar 2023 16:05) (96% - 96%)    LABS:                        12.5   7.47  )-----------( 169      ( 22 Mar 2023 06:55 )             38.4     03-22    140  |  102  |  28<H>  ----------------------------<  113<H>  3.7   |  26  |  1.2    Ca    9.8      22 Mar 2023 06:55  Mg     2.1     03-21                Physical Exam:  CONSTITUTIONAL: No acute distress, AxO=3  HEAD: bruise over left orbital and maxillary area  PULMONARY: clear  CARDIOVASCULAR: Regular rate and rhythm  GASTROINTESTINAL: Soft, non-tender, non-distended; bowel sounds present  NEUROLOGY: non-focal  EXT: left humerus fracture  
Patient is a 89y old  Female who presents with a chief complaint of 85-year-old female with a past medical history significant for hypothyroidism hypertension hyperlipidemia who presents status post mechanical fall.  Patient states that she was walking with her walker when she tripped and fell on her head. (19 Mar 2023 12:51)    Patient was seen and examined.  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  Fall, Multiple falls at home  Hypothyroidism  History of hip replacement, total, right    Allergies  No Known Allergies    MEDICATIONS  (STANDING):  amLODIPine   Tablet 5 milliGRAM(s) Oral daily  brimonidine 0.2% Ophthalmic Solution 1 Drop(s) Both EYES three times a day  donepezil 5 milliGRAM(s) Oral at bedtime  doxazosin 1 milliGRAM(s) Oral at bedtime  enoxaparin Injectable 40 milliGRAM(s) SubCutaneous every 24 hours  ferrous    sulfate 325 milliGRAM(s) Oral daily  fluorometholone 0.1% Suspension 1 Drop(s) Both EYES daily  latanoprost 0.005% Ophthalmic Solution 1 Drop(s) Both EYES at bedtime  levothyroxine 75 MICROGram(s) Oral daily  loratadine 10 milliGRAM(s) Oral daily  LORazepam     Tablet 0.5 milliGRAM(s) Oral at bedtime  oxymetazoline 0.05% Nasal Spray 1 Spray(s) Both Nostrils two times a day  sodium chloride 0.65% Nasal 1 Spray(s) Both Nostrils every 8 hours  timolol 0.5% Solution 1 Drop(s) Both EYES every 12 hours    MEDICATIONS  (PRN):  acetaminophen     Tablet .. 650 milliGRAM(s) Oral every 6 hours PRN Mild Pain (1 - 3)  traMADol 50 milliGRAM(s) Oral every 8 hours PRN Moderate Pain (4 - 6)    T(C): 36.7 (03-21-23 @ 07:00), Max: 36.7 (03-21-23 @ 07:00)  HR: 46 (03-21-23 @ 07:00) (46 - 67)  BP: 158/70 (03-21-23 @ 07:00) (107/54 - 158/70)  RR: 18 (03-21-23 @ 07:00) (18 - 18)  SpO2: 95% (03-21-23 @ 07:00) (95% - 100%)    O/E:  Awake, alert, not in distress.  HEENT:  EOMI. Laceration of left temple patched with steri-strips.Left eye, periorbital ecchymosis and swelling  Chest: clear.  CVS: SIS2 +, no murmur.  P/A: Soft, BS+  CNS: awake, alert  Ext: no edema feet.  All systems reviewed positive findings as above.                          10.0<L>  5.92  )-----------( 141      ( 21 Mar 2023 07:39 )             31.1<L>                        10.3<L>  6.99  )-----------( 159      ( 20 Mar 2023 07:11 )             31.6<L>  03-21    140  |  106  |  34<H>  ----------------------------<  107<H>  4.0   |  24  |  1.4  03-20    143  |  108  |  33<H>  ----------------------------<  117<H>  4.0   |  23  |  1.5    Ca    9.5      21 Mar 2023 07:39  Ca    9.4      20 Mar 2023 07:11  Mg     2.1     03-21

## 2023-03-22 NOTE — DISCHARGE NOTE NURSING/CASE MANAGEMENT/SOCIAL WORK - NSDCVIVACCINE_GEN_ALL_CORE_FT
influenza, injectable, quadrivalent, preservative free; 21-Dec-2019 14:12; Felipe Pearson (RN); [employer]; [id number] (Exp. Date: 30-Jun-2020); IntraMuscular; Deltoid Left.; 0.5 milliLiter(s); VIS (VIS Published: 15-Aug-2019, VIS Presented: 21-Dec-2019);   Tdap; 25-Jul-2019 16:09; Marcy Coelho (RN); [employer]; [id number] (Exp. Date: 07-Jun-2021); IntraMuscular; Deltoid Right.; 0.5 milliLiter(s); VIS (VIS Published: 09-May-2013, VIS Presented: 25-Jul-2019);   Tdap; 19-Mar-2023 11:49; Iman Orellana (RN); [employer]; [id number] (Exp. Date: 15-Feb-2025); IntraMuscular; Deltoid Right.; 0.5 milliLiter(s); VIS (VIS Published: 09-May-2013, VIS Presented: 19-Mar-2023);

## 2023-03-22 NOTE — DISCHARGE NOTE NURSING/CASE MANAGEMENT/SOCIAL WORK - PATIENT PORTAL LINK FT
You can access the FollowMyHealth Patient Portal offered by [facility] by registering at the following website: [email or web address]. By joining [facility]’s FollowMyHealth portal, you will also be able to view your health information using other applications (apps) compatible with our system.

## 2023-03-22 NOTE — CONSULT NOTE ADULT - ASSESSMENT
IMPRESSION: Rehab of gait abnormality, left proximal humerus fracture     PRECAUTIONS: [  ] Cardiac  [  ] Respiratory  [  ] Seizures [  ] Contact Isolation  [  ] Droplet Isolation  [ x ] Other: sling LUE    Weight Bearing Status: NWB LUE    RECOMMENDATION:    Out of Bed to Chair     DVT/Decubiti Prophylaxis    REHAB PLAN:     [ x  ] Bedside P/T 3-5 times a week   [   ]   Bedside O/T  2-3 times a week             [   ] No Rehab Therapy Indicated                   [   ]  Speech Therapy   Conditioning/ROM                                    ADL  Bed Mobility                                               Conditioning/ROM  Transfers                                                     Bed Mobility  Sitting /Standing Balance                         Transfers                                        Gait Training                                               Sitting/Standing Balance  Stair Training [   ]Applicable                    Home equipment Eval                                                                        Splinting  [   ] Only      GOALS:   ADL   [  x ]   Independent                    Transfers  [ x  ] Independent                          Ambulation  [ x ] Independent     [ x   ] With device                            [   ]  CG                                                         [   ]  CG                                                                  [   ] CG                            [    ] Min A                                                   [   ] Min A                                                              [   ] Min  A          DISCHARGE PLAN:   [   ]  Good candidate for Intensive Rehabilitation/Hospital based-[facility]A [facility]                                             Will tolerate 3hrs Intensive Rehab Daily                                       [ xx   ]  Short Term Rehab in Skilled Nursing Facility                                       [    ]  Home with Outpatient or  services                                         [    ]  Possible Candidate for Intensive Hospital based Rehab

## 2023-03-26 DIAGNOSIS — S02.40DA MAXILLARY FRACTURE, LEFT SIDE, INITIAL ENCOUNTER FOR CLOSED FRACTURE: ICD-10-CM

## 2023-03-26 DIAGNOSIS — E03.9 HYPOTHYROIDISM, UNSPECIFIED: ICD-10-CM
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
## 2023-03-26 DIAGNOSIS — H40.9 UNSPECIFIED GLAUCOMA: ICD-10-CM

## 2023-03-26 DIAGNOSIS — F03.90 UNSPECIFIED DEMENTIA, UNSPECIFIED SEVERITY, WITHOUT BEHAVIORAL DISTURBANCE, PSYCHOTIC DISTURBANCE, MOOD DISTURBANCE, AND ANXIETY: ICD-10-CM

## 2023-03-26 DIAGNOSIS — S02.842A FRACTURE OF LATERAL ORBITAL WALL, LEFT SIDE, INITIAL ENCOUNTER FOR CLOSED FRACTURE: ICD-10-CM

## 2023-03-26 DIAGNOSIS — E78.00 PURE HYPERCHOLESTEROLEMIA, UNSPECIFIED: ICD-10-CM
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
## 2023-03-26 DIAGNOSIS — N18.30 CHRONIC KIDNEY DISEASE, STAGE 3 UNSPECIFIED: ICD-10-CM
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
## 2023-03-26 DIAGNOSIS — H35.30 UNSPECIFIED MACULAR DEGENERATION: ICD-10-CM

## 2023-03-26 DIAGNOSIS — S42.302A UNSPECIFIED FRACTURE OF SHAFT OF HUMERUS, LEFT ARM, INITIAL ENCOUNTER FOR CLOSED FRACTURE: ICD-10-CM

## 2023-03-26 DIAGNOSIS — I12.9 HYPERTENSIVE CHRONIC KIDNEY DISEASE WITH STAGE 1 THROUGH STAGE 4 CHRONIC KIDNEY DISEASE, OR UNSPECIFIED CHRONIC KIDNEY DISEASE: ICD-10-CM

## 2023-03-27 DIAGNOSIS — S02.842A FRACTURE OF LATERAL ORBITAL WALL, LEFT SIDE, INITIAL ENCOUNTER FOR CLOSED FRACTURE: ICD-10-CM

## 2023-04-11 ENCOUNTER — APPOINTMENT (OUTPATIENT)
Dept: ORTHOPEDIC SURGERY | Facility: ASSISTED LIVING FACILITY | Age: 88
End: 2023-04-11
Payer: MEDICARE

## 2023-04-11 PROCEDURE — 99308 SBSQ NF CARE LOW MDM 20: CPT

## 2023-04-25 ENCOUNTER — APPOINTMENT (OUTPATIENT)
Dept: ORTHOPEDIC SURGERY | Facility: ASSISTED LIVING FACILITY | Age: 88
End: 2023-04-25
Payer: MEDICARE

## 2023-04-25 PROCEDURE — 99308 SBSQ NF CARE LOW MDM 20: CPT

## 2023-05-09 ENCOUNTER — APPOINTMENT (OUTPATIENT)
Dept: ORTHOPEDIC SURGERY | Facility: ASSISTED LIVING FACILITY | Age: 88
End: 2023-05-09
Payer: MEDICARE

## 2023-05-09 PROCEDURE — 99308 SBSQ NF CARE LOW MDM 20: CPT

## 2023-05-27 ENCOUNTER — EMERGENCY (EMERGENCY)
Facility: HOSPITAL | Age: 88
LOS: 0 days | Discharge: HOME | End: 2023-05-27
Attending: EMERGENCY MEDICINE
Payer: MEDICARE

## 2023-05-27 VITALS
TEMPERATURE: 99 F | SYSTOLIC BLOOD PRESSURE: 150 MMHG | DIASTOLIC BLOOD PRESSURE: 67 MMHG | OXYGEN SATURATION: 99 % | HEART RATE: 78 BPM | RESPIRATION RATE: 18 BRPM

## 2023-05-27 DIAGNOSIS — Z96.641 PRESENCE OF RIGHT ARTIFICIAL HIP JOINT: ICD-10-CM

## 2023-05-27 DIAGNOSIS — Z86.69 PERSONAL HISTORY OF OTHER DISEASES OF THE NERVOUS SYSTEM AND SENSE ORGANS: ICD-10-CM

## 2023-05-27 DIAGNOSIS — R45.6 VIOLENT BEHAVIOR: ICD-10-CM

## 2023-05-27 DIAGNOSIS — Z96.641 PRESENCE OF RIGHT ARTIFICIAL HIP JOINT: Chronic | ICD-10-CM

## 2023-05-27 DIAGNOSIS — E78.00 PURE HYPERCHOLESTEROLEMIA, UNSPECIFIED: ICD-10-CM

## 2023-05-27 DIAGNOSIS — E03.9 HYPOTHYROIDISM, UNSPECIFIED: ICD-10-CM

## 2023-05-27 DIAGNOSIS — I10 ESSENTIAL (PRIMARY) HYPERTENSION: ICD-10-CM

## 2023-05-27 LAB
ALBUMIN SERPL ELPH-MCNC: 3.8 G/DL — SIGNIFICANT CHANGE UP (ref 3.5–5.2)
ALP SERPL-CCNC: 88 U/L — SIGNIFICANT CHANGE UP (ref 30–115)
ALT FLD-CCNC: <5 U/L — SIGNIFICANT CHANGE UP (ref 0–41)
AMMONIA BLD-MCNC: 36 UMOL/L — SIGNIFICANT CHANGE UP (ref 11–55)
ANION GAP SERPL CALC-SCNC: 7 MMOL/L — SIGNIFICANT CHANGE UP (ref 7–14)
APPEARANCE UR: CLEAR — SIGNIFICANT CHANGE UP
AST SERPL-CCNC: 15 U/L — SIGNIFICANT CHANGE UP (ref 0–41)
BASOPHILS # BLD AUTO: 0.06 K/UL — SIGNIFICANT CHANGE UP (ref 0–0.2)
BASOPHILS NFR BLD AUTO: 1 % — SIGNIFICANT CHANGE UP (ref 0–1)
BILIRUB SERPL-MCNC: 0.3 MG/DL — SIGNIFICANT CHANGE UP (ref 0.2–1.2)
BILIRUB UR-MCNC: NEGATIVE — SIGNIFICANT CHANGE UP
BUN SERPL-MCNC: 37 MG/DL — HIGH (ref 10–20)
CALCIUM SERPL-MCNC: 9.6 MG/DL — SIGNIFICANT CHANGE UP (ref 8.4–10.5)
CHLORIDE SERPL-SCNC: 99 MMOL/L — SIGNIFICANT CHANGE UP (ref 98–110)
CO2 SERPL-SCNC: 23 MMOL/L — SIGNIFICANT CHANGE UP (ref 17–32)
COLOR SPEC: COLORLESS — SIGNIFICANT CHANGE UP
CREAT SERPL-MCNC: 1.3 MG/DL — SIGNIFICANT CHANGE UP (ref 0.7–1.5)
DIFF PNL FLD: NEGATIVE — SIGNIFICANT CHANGE UP
EGFR: 38 ML/MIN/1.73M2 — LOW
EOSINOPHIL # BLD AUTO: 0.21 K/UL — SIGNIFICANT CHANGE UP (ref 0–0.7)
EOSINOPHIL NFR BLD AUTO: 3.4 % — SIGNIFICANT CHANGE UP (ref 0–8)
GLUCOSE SERPL-MCNC: 99 MG/DL — SIGNIFICANT CHANGE UP (ref 70–99)
GLUCOSE UR QL: NEGATIVE — SIGNIFICANT CHANGE UP
HCT VFR BLD CALC: 31.7 % — LOW (ref 37–47)
HGB BLD-MCNC: 10.1 G/DL — LOW (ref 12–16)
IMM GRANULOCYTES NFR BLD AUTO: 0.6 % — HIGH (ref 0.1–0.3)
KETONES UR-MCNC: NEGATIVE — SIGNIFICANT CHANGE UP
LEUKOCYTE ESTERASE UR-ACNC: ABNORMAL
LYMPHOCYTES # BLD AUTO: 1.33 K/UL — SIGNIFICANT CHANGE UP (ref 1.2–3.4)
LYMPHOCYTES # BLD AUTO: 21.6 % — SIGNIFICANT CHANGE UP (ref 20.5–51.1)
MAGNESIUM SERPL-MCNC: 2.1 MG/DL — SIGNIFICANT CHANGE UP (ref 1.8–2.4)
MCHC RBC-ENTMCNC: 28.1 PG — SIGNIFICANT CHANGE UP (ref 27–31)
MCHC RBC-ENTMCNC: 31.9 G/DL — LOW (ref 32–37)
MCV RBC AUTO: 88.1 FL — SIGNIFICANT CHANGE UP (ref 81–99)
MONOCYTES # BLD AUTO: 0.44 K/UL — SIGNIFICANT CHANGE UP (ref 0.1–0.6)
MONOCYTES NFR BLD AUTO: 7.1 % — SIGNIFICANT CHANGE UP (ref 1.7–9.3)
NEUTROPHILS # BLD AUTO: 4.08 K/UL — SIGNIFICANT CHANGE UP (ref 1.4–6.5)
NEUTROPHILS NFR BLD AUTO: 66.3 % — SIGNIFICANT CHANGE UP (ref 42.2–75.2)
NITRITE UR-MCNC: NEGATIVE — SIGNIFICANT CHANGE UP
NRBC # BLD: 0 /100 WBCS — SIGNIFICANT CHANGE UP (ref 0–0)
PH UR: 7 — SIGNIFICANT CHANGE UP (ref 5–8)
PLATELET # BLD AUTO: 206 K/UL — SIGNIFICANT CHANGE UP (ref 130–400)
PMV BLD: 9.4 FL — SIGNIFICANT CHANGE UP (ref 7.4–10.4)
POTASSIUM SERPL-MCNC: 4.6 MMOL/L — SIGNIFICANT CHANGE UP (ref 3.5–5)
POTASSIUM SERPL-SCNC: 4.6 MMOL/L — SIGNIFICANT CHANGE UP (ref 3.5–5)
PROT SERPL-MCNC: 7 G/DL — SIGNIFICANT CHANGE UP (ref 6–8)
PROT UR-MCNC: NEGATIVE — SIGNIFICANT CHANGE UP
RBC # BLD: 3.6 M/UL — LOW (ref 4.2–5.4)
RBC # FLD: 14.5 % — SIGNIFICANT CHANGE UP (ref 11.5–14.5)
SODIUM SERPL-SCNC: 129 MMOL/L — LOW (ref 135–146)
SP GR SPEC: 1.01 — SIGNIFICANT CHANGE UP (ref 1.01–1.03)
TROPONIN T SERPL-MCNC: <0.01 NG/ML — SIGNIFICANT CHANGE UP
UROBILINOGEN FLD QL: SIGNIFICANT CHANGE UP
WBC # BLD: 6.16 K/UL — SIGNIFICANT CHANGE UP (ref 4.8–10.8)
WBC # FLD AUTO: 6.16 K/UL — SIGNIFICANT CHANGE UP (ref 4.8–10.8)

## 2023-05-27 PROCEDURE — 85025 COMPLETE CBC W/AUTO DIFF WBC: CPT

## 2023-05-27 PROCEDURE — 70450 CT HEAD/BRAIN W/O DYE: CPT | Mod: 26,MA

## 2023-05-27 PROCEDURE — 83735 ASSAY OF MAGNESIUM: CPT

## 2023-05-27 PROCEDURE — 87086 URINE CULTURE/COLONY COUNT: CPT

## 2023-05-27 PROCEDURE — 93010 ELECTROCARDIOGRAM REPORT: CPT

## 2023-05-27 PROCEDURE — 36415 COLL VENOUS BLD VENIPUNCTURE: CPT

## 2023-05-27 PROCEDURE — 84484 ASSAY OF TROPONIN QUANT: CPT

## 2023-05-27 PROCEDURE — 80053 COMPREHEN METABOLIC PANEL: CPT

## 2023-05-27 PROCEDURE — 99285 EMERGENCY DEPT VISIT HI MDM: CPT

## 2023-05-27 PROCEDURE — 99285 EMERGENCY DEPT VISIT HI MDM: CPT | Mod: 25

## 2023-05-27 PROCEDURE — 81001 URINALYSIS AUTO W/SCOPE: CPT

## 2023-05-27 PROCEDURE — 71045 X-RAY EXAM CHEST 1 VIEW: CPT

## 2023-05-27 PROCEDURE — 82140 ASSAY OF AMMONIA: CPT

## 2023-05-27 PROCEDURE — 71045 X-RAY EXAM CHEST 1 VIEW: CPT | Mod: 26

## 2023-05-27 PROCEDURE — 70450 CT HEAD/BRAIN W/O DYE: CPT | Mod: MA

## 2023-05-27 PROCEDURE — 93005 ELECTROCARDIOGRAM TRACING: CPT

## 2023-05-27 RX ORDER — SODIUM CHLORIDE 9 MG/ML
1000 INJECTION, SOLUTION INTRAVENOUS ONCE
Refills: 0 | Status: DISCONTINUED | OUTPATIENT
Start: 2023-05-27 | End: 2023-05-27

## 2023-05-27 NOTE — ED PROVIDER NOTE - OBJECTIVE STATEMENT
89-year-old female with past medical history of hypothyroidism, hypertension, and hyperlipidemia who was sent in from nursing home for combative behavior.  Patient is ANO x2 at her baseline, but does not know why she is in the hospital.  Spoke with nursing home staff and was told that patient has been having intermittent confusion since a week ago and became more combative since 3 days ago.  Patient was treated for UTI and completed antibiotics at her facility, but nursing home still wants patient to be checked out. Patient currently denies any pain or discomfort.

## 2023-05-27 NOTE — ED ADULT TRIAGE NOTE - CHIEF COMPLAINT QUOTE
Pt sent in for aggressive behavior towards nursing staff at nursing home. As per EMS, staff stopped giving her abx for UTI 2 days ago

## 2023-05-27 NOTE — ED PROVIDER NOTE - CONSIDERATION OF ADMISSION OBSERVATION
Consideration of Admission/Observation Patient cooperative in ED, work up negative for evidence of delirium and patient at baseline mental status. No indication for admission at this time.

## 2023-05-27 NOTE — ED PROVIDER NOTE - CLINICAL SUMMARY MEDICAL DECISION MAKING FREE TEXT BOX
Patient presented with reported combative behavior at NH. Otherwise afebrile, HD stable, neurovascularly intact, AAOx2 (baseline) and cooperative in ED. EKG obtained and non-ischemic. Obtained labs which were grossly unremarkable including no significant leukocytosis, anemia, signs of dehydration/SOILA, transaminitis or significant electrolyte abnormalities. Trop negative. UA negative for infection. Chest xray negative for pneumothorax, pneumonia, widened mediastinum, evidence of rib fractures, enlarged cardiac silhouette or any other emergent pathologies. CT head negative for emergent intracranial pathologies. Patient remained cooperative and at baseline throughout ED course. Given the above, will discharge back to NH with outpatient follow up.

## 2023-05-27 NOTE — ED PROVIDER NOTE - PHYSICAL EXAMINATION
CONSTITUTIONAL: Well-appearing; in no apparent distress.   HEAD: Normocephalic; atraumatic.   EYES: Pupils are round and reactive, extra-ocular muscles are intact. Eyelids are normal in appearance without swelling or lesions.   ENT: Hearing is intact with good acuity to spoken voice.  Patient is speaking clearly, not muffled and airway is intact.   RESPIRATORY: No signs of respiratory distress. Lung sounds are clear in all lobes bilaterally without rales, rhonchi, or wheezes.  CARDIOVASCULAR: Regular rate and rhythm.   GI: Abdomen is soft, non-tender, and without distention. Bowel sounds are present and normoactive in all four quadrants. No masses are noted.   NEURO: A & O x 2. No focal deficit.

## 2023-05-27 NOTE — ED PROVIDER NOTE - DIFFERENTIAL DIAGNOSIS
Differential Diagnosis Reported combative behavior from NH - r/o metabolic etiology vs intracranial pathology

## 2023-05-27 NOTE — ED PROVIDER NOTE - PROGRESS NOTE DETAILS
Labs unremarkable.  CT normal.  Chest x-ray normal.  EKG unremarkable.  UA shows no evidence of UTI; culture is already sent.  Patient is stable for discharge.

## 2023-05-27 NOTE — ED PROVIDER NOTE - PATIENT PORTAL LINK FT
You can access the FollowMyHealth Patient Portal offered by [facility] by registering at the following website: [email or web address]. By joining [facility]’s FollowMyHealth portal, you will also be able to view your health information using other applications (apps) compatible with our system.

## 2023-05-28 LAB
CULTURE RESULTS: SIGNIFICANT CHANGE UP
SPECIMEN SOURCE: SIGNIFICANT CHANGE UP

## 2023-06-14 NOTE — ED ADULT NURSE NOTE - PAIN: PRESENCE, MLM
left hip/complains of pain/discomfort O-T Advancement Flap Text: The defect edges were debeveled with a #15 scalpel blade. Given the location of the defect, shape of the defect and the proximity to free margins an O-T advancement flap was deemed most appropriate. Using a sterile surgical marker, an appropriate advancement flap was drawn incorporating the defect and placing the expected incisions within the relaxed skin tension lines where possible. The area thus outlined was incised deep to adipose tissue with a #15 scalpel blade. The skin margins were undermined to an appropriate distance in all directions utilizing iris scissors. Following this, the designed flap was advanced and carried over into the primary defect and sutured into place.

## 2023-06-22 ENCOUNTER — APPOINTMENT (OUTPATIENT)
Dept: OPHTHALMOLOGY | Facility: CLINIC | Age: 88
End: 2023-06-22

## 2023-07-13 ENCOUNTER — FORM ENCOUNTER (OUTPATIENT)
Age: 88
End: 2023-07-13

## 2023-07-26 ENCOUNTER — APPOINTMENT (OUTPATIENT)
Dept: GERIATRICS | Facility: HOME HEALTH | Age: 88
End: 2023-07-26

## 2023-09-02 NOTE — ED ADULT NURSE NOTE - NSFALLRISKASMTTYPE_ED_ALL_ED
Follow-up with primary care physician, urgent care or emergency department 12-14 days for suture removal.    Take Tylenol alternate with Motrin for any pain aches and fevers  Keep the wound clean and dry. Return to the emergency department immediately pus drainage discharge signs infection numbness and tingling weakness in extremities or any worsening symptoms. Initial (On Arrival)

## 2023-10-15 ENCOUNTER — APPOINTMENT (OUTPATIENT)
Dept: GERIATRICS | Facility: HOME HEALTH | Age: 88
End: 2023-10-15
Payer: MEDICARE

## 2023-10-15 VITALS
SYSTOLIC BLOOD PRESSURE: 130 MMHG | HEART RATE: 68 BPM | RESPIRATION RATE: 16 BRPM | OXYGEN SATURATION: 95 % | DIASTOLIC BLOOD PRESSURE: 70 MMHG

## 2023-10-15 DIAGNOSIS — F09 UNSPECIFIED MENTAL DISORDER DUE TO KNOWN PHYSIOLOGICAL CONDITION: ICD-10-CM

## 2023-10-15 DIAGNOSIS — E03.9 HYPOTHYROIDISM, UNSPECIFIED: ICD-10-CM

## 2023-10-15 DIAGNOSIS — Z23 ENCOUNTER FOR IMMUNIZATION: ICD-10-CM

## 2023-10-15 DIAGNOSIS — E78.00 PURE HYPERCHOLESTEROLEMIA, UNSPECIFIED: ICD-10-CM

## 2023-10-15 DIAGNOSIS — I10 ESSENTIAL (PRIMARY) HYPERTENSION: ICD-10-CM

## 2023-10-15 DIAGNOSIS — N18.32 CHRONIC KIDNEY DISEASE, STAGE 3B: ICD-10-CM

## 2023-10-15 PROCEDURE — 99349 HOME/RES VST EST MOD MDM 40: CPT | Mod: GW

## 2024-01-09 NOTE — ED PROCEDURE NOTE - NS ED PROC PERFORMED BY1 FT
Chief Complaint   Patient presents with    Office Visit    Follow-up        SUBJECTIVE:   HISTORY OF PRESENT ILLNESS:  Narayan Mendoza is a 36 year old female who presents today for follow up on rheum visit and colorectal visit.    Still having back pains upon awakening.     Met with chiropractor and had improvement in pain symptoms.     Pain is upon awakening.    PAST MEDICAL HISTORY:  Past Medical History:   Diagnosis Date    Anxiety 2021    Depression     Diverticulitis     Diverticulosis of colon with hemorrhage     History of ovarian cyst 10/07/2015    Transfusion history     no reaction        PAST SURGICAL HISTORY:  Past Surgical History:   Procedure Laterality Date    Abdomen surgery  2016    benign mass removed abdomen    Biopsy of breast, incisional Right 2008    benign     section, low transverse      x2    Colon surgery      6 inch colon resection due to diverticulitis    Hysterectomy      HMB, supracervical (C hyst)       FAMILY HISTORY:  Family History   Problem Relation Age of Onset    Patient is unaware of any medical problems Mother     Patient is unaware of any medical problems Father     Other Daughter          at 4 months due to brain damage    Cancer, Breast Neg Hx     Cancer, Colon Neg Hx     Cancer, Ovarian Neg Hx     Clotting Disorder Neg Hx        SOCIAL HISTORY:  Social History     Tobacco Use    Smoking status: Never     Passive exposure: Never    Smokeless tobacco: Never   Vaping Use    Vaping Use: never used   Substance Use Topics    Alcohol use: Never    Drug use: Never       ALLERGIES:  ALLERGIES:  No Known Allergies    MEDICATIONS:  Current Outpatient Medications   Medication Sig Dispense Refill    Vitamin D, Cholecalciferol, 50 mcg (2,000 units) capsule Take by mouth daily. 3000Iu      Anucort-HC 25 MG suppository UNWRAP AND INSERT 1 SUPPOSITORY RECTALLY TWICE DAILY AS NEEDED FOR HEMORRHOIDS 28 suppository 0    Probiotic Product (Probiotic Advanced) Cap Take  1 capsule by mouth daily.        No current facility-administered medications for this visit.       Review of Systems   Respiratory:  Negative for shortness of breath.    Cardiovascular:  Negative for chest pain.   Musculoskeletal:  Positive for back pain.       OBJECTIVE:   Visit Vitals  /70   Pulse 86   Temp 97 °F (36.1 °C) (Tympanic)   Resp 19   Ht 5' 3\" (1.6 m)   Wt 82.9 kg (182 lb 10.4 oz)   LMP  (LMP Unknown)   SpO2 98%   BMI 32.36 kg/m²       Physical Exam  Vitals reviewed.   Constitutional:       General: She is not in acute distress.     Appearance: Normal appearance. She is normal weight. She is not ill-appearing.   HENT:      Head: Normocephalic and atraumatic.      Right Ear: External ear normal.      Left Ear: External ear normal.      Nose: Nose normal.   Eyes:      Extraocular Movements: Extraocular movements intact.      Conjunctiva/sclera: Conjunctivae normal.   Cardiovascular:      Rate and Rhythm: Normal rate and regular rhythm.      Pulses: Normal pulses.      Heart sounds: Normal heart sounds. No murmur heard.     No friction rub. No gallop.   Pulmonary:      Effort: Pulmonary effort is normal. No respiratory distress.      Breath sounds: Normal breath sounds. No wheezing, rhonchi or rales.   Neurological:      General: No focal deficit present.      Mental Status: She is alert.   Psychiatric:         Mood and Affect: Mood normal.         Behavior: Behavior normal.         Thought Content: Thought content normal.         Judgment: Judgment normal.         DIAGNOSTIC STUDIES:   LAB RESULTS:    No results found for this visit on 01/08/24.       Hemoglobin A1C (%)   Date Value   04/15/2023 5.1   04/04/2023 5.3   03/09/2021 5.2     Fasting Status   Date Value   06/29/2021 4 Hours   03/09/2021 0 Hours   11/15/2018 12 hrs     Glucose (mg/dL)   Date Value   04/04/2023 101 (H)   07/29/2021 102 (H)   07/28/2021 106 (H)     Sodium (mmol/L)   Date Value   04/04/2023 137   07/29/2021 142   07/28/2021  [place] 141     Potassium (mmol/L)   Date Value   04/04/2023 3.6   07/29/2021 3.7   07/28/2021 3.4     Chloride (mmol/L)   Date Value   04/04/2023 108   07/29/2021 112 (H)   07/28/2021 110 (H)     Carbon Dioxide (mmol/L)   Date Value   04/04/2023 26   07/29/2021 22   07/28/2021 25     Anion Gap (mmol/L)   Date Value   04/04/2023 7   07/29/2021 12   07/28/2021 9 (L)     BUN (mg/dL)   Date Value   04/04/2023 9   07/29/2021 7   07/28/2021 4 (L)     Creatinine (mg/dL)   Date Value   04/04/2023 0.54   07/29/2021 0.57   07/28/2021 0.59     GFR Estimate,  (no units)   Date Value   08/02/2020     eGFR results = or >90 mL/min/1.73m2 = Normal kidney function.   08/02/2020 >90   08/01/2020     eGFR results = or >90 mL/min/1.73m2 = Normal kidney function.   08/01/2020 >90     GFR Estimate, Non  (no units)   Date Value   08/02/2020 >90   08/02/2020     eGFR results = or >90 mL/min/1.73m2 = Normal kidney function.   08/01/2020 >90   08/01/2020     eGFR results = or >90 mL/min/1.73m2 = Normal kidney function.     Calcium (mg/dL)   Date Value   04/04/2023 8.5   07/29/2021 7.9 (L)   07/28/2021 8.0 (L)     Bilirubin, Total (mg/dL)   Date Value   04/04/2023 0.4   06/29/2021 0.7   03/09/2021 0.4     GOT/AST (Units/L)   Date Value   04/04/2023 16   06/29/2021 26   03/09/2021 16     GPT/ALT (Units/L)   Date Value   04/04/2023 23   06/29/2021 24   03/09/2021 25     Alkaline Phosphatase (Units/L)   Date Value   04/04/2023 99   06/29/2021 104   03/09/2021 109     Protein, Total (g/dL)   Date Value   04/04/2023 8.0   06/29/2021 8.1   03/09/2021 7.7     Albumin (g/dL)   Date Value   04/04/2023 3.5 (L)   06/29/2021 3.9   03/09/2021 3.6     Globulin (g/dL)   Date Value   04/04/2023 4.5 (H)   06/29/2021 4.2 (H)   03/09/2021 4.1 (H)     A/G Ratio (no units)   Date Value   04/04/2023 0.8 (L)   06/29/2021 0.9 (L)   03/09/2021 0.9 (L)         ASSESSMENT AND PLAN:     Problem List Items Addressed This Visit    None  Visit  Diagnoses       Retrolisthesis    -  Primary    Relevant Orders    SERVICE TO PHYSICAL THERAPY    Encounter for gynecological examination without abnormal finding        Relevant Orders    SERVICE TO OB GYN            #Back pain  #retrolisthesis  - findings on xray reviewed 1mm retrolisthesis L1-L2 possibly contributory to symptoms  - likely MSK given chiropractics improved pain  - can continue with chiropractics, referral to PT and home exercises provided  - patient wanting to avoid medications if possible      Return if symptoms worsen or fail to improve.    Instructions provided as documented in the AVS.    The patient indicated understanding of the diagnosis, agreed with the plan of care and agreed to call or return with any new or worsening symptoms.    Jacob Draper, DO  1/8/2024

## 2024-02-27 NOTE — ED ADULT NURSE NOTE - HIV OFFER
RX PROGRESS NOTE: Vancomycin Therapeutic Drug Monitoring      Indication for therapy: Sepsis    ALLERGIES:  No Known Allergies    Most recent height and weight information:  Weight: 58.8 kg (02/25/24 0355)  Height: 5' 2\" (157.5 cm) (02/21/24 1500)    The Following are the calculated  Current Weights for Sara Banks       Adjusted Ideal    53.6 kg 50.1 kg               Labs:  Serum Creatinine and Creatinine Clearance:  Serum creatinine: 1.11 mg/dL (H) 02/26/24 2044  Estimated creatinine clearance: 28.8 mL/min (A)    Maximum Temperature (last 24 hours)       Value Max    Temp 97.5 °F (36.4 °C)          WBC (K/mcL)   Date/Time Value   02/26/2024 2044 2.3 (L)   02/26/2024 1842 1.7 (L)     Microbiology Results       None              Assessment/Plan:  Briefly, this is a 86 year old female started on vancomycin for Sepsis, with a target serum trough concentration of 10-15 mcg/mL.  Initial dosing regimen will be 1250 mg loading dose once, followed by a maintenance dose of 750 mg every 48 hours..    Pharmacy will monitor levels as appropriate.    Pharmacy will continue to monitor patient (renal function, microbiology data, risk factors for adverse events, appropriate duration of therapy), will order/monitor serum levels as appropriate, and will adjust dose if/when necessary.      Thank you,    Shukri Almaguer, PHARMD  2/26/2024 10:16 PM       Opt out

## 2024-04-14 ENCOUNTER — INPATIENT (INPATIENT)
Facility: HOSPITAL | Age: 89
LOS: 9 days | Discharge: NURSING, REHAB OR LONG-TERM CARE | DRG: 871 | End: 2024-04-24
Attending: INTERNAL MEDICINE | Admitting: INTERNAL MEDICINE
Payer: MEDICARE

## 2024-04-14 VITALS
SYSTOLIC BLOOD PRESSURE: 172 MMHG | DIASTOLIC BLOOD PRESSURE: 74 MMHG | TEMPERATURE: 101 F | HEIGHT: 62 IN | WEIGHT: 119.93 LBS | HEART RATE: 89 BPM | OXYGEN SATURATION: 96 % | RESPIRATION RATE: 18 BRPM

## 2024-04-14 DIAGNOSIS — N39.0 URINARY TRACT INFECTION, SITE NOT SPECIFIED: ICD-10-CM

## 2024-04-14 DIAGNOSIS — Z96.641 PRESENCE OF RIGHT ARTIFICIAL HIP JOINT: Chronic | ICD-10-CM

## 2024-04-14 LAB
ALBUMIN SERPL ELPH-MCNC: 4.1 G/DL — SIGNIFICANT CHANGE UP (ref 3.5–5.2)
ALP SERPL-CCNC: 90 U/L — SIGNIFICANT CHANGE UP (ref 30–115)
ALT FLD-CCNC: 11 U/L — SIGNIFICANT CHANGE UP (ref 0–41)
ANION GAP SERPL CALC-SCNC: 11 MMOL/L — SIGNIFICANT CHANGE UP (ref 7–14)
APPEARANCE UR: ABNORMAL
APTT BLD: 34.6 SEC — SIGNIFICANT CHANGE UP (ref 27–39.2)
AST SERPL-CCNC: 24 U/L — SIGNIFICANT CHANGE UP (ref 0–41)
BASOPHILS # BLD AUTO: 0.04 K/UL — SIGNIFICANT CHANGE UP (ref 0–0.2)
BASOPHILS NFR BLD AUTO: 0.6 % — SIGNIFICANT CHANGE UP (ref 0–1)
BILIRUB SERPL-MCNC: 0.4 MG/DL — SIGNIFICANT CHANGE UP (ref 0.2–1.2)
BILIRUB UR-MCNC: NEGATIVE — SIGNIFICANT CHANGE UP
BUN SERPL-MCNC: 33 MG/DL — HIGH (ref 10–20)
CALCIUM SERPL-MCNC: 9.4 MG/DL — SIGNIFICANT CHANGE UP (ref 8.4–10.5)
CHLORIDE SERPL-SCNC: 109 MMOL/L — SIGNIFICANT CHANGE UP (ref 98–110)
CO2 SERPL-SCNC: 19 MMOL/L — SIGNIFICANT CHANGE UP (ref 17–32)
COLOR SPEC: YELLOW — SIGNIFICANT CHANGE UP
CREAT SERPL-MCNC: 1.4 MG/DL — SIGNIFICANT CHANGE UP (ref 0.7–1.5)
DIFF PNL FLD: ABNORMAL
EGFR: 34 ML/MIN/1.73M2 — LOW
EOSINOPHIL # BLD AUTO: 0.09 K/UL — SIGNIFICANT CHANGE UP (ref 0–0.7)
EOSINOPHIL NFR BLD AUTO: 1.4 % — SIGNIFICANT CHANGE UP (ref 0–8)
GAS PNL BLDV: SIGNIFICANT CHANGE UP
GLUCOSE SERPL-MCNC: 104 MG/DL — HIGH (ref 70–99)
GLUCOSE UR QL: NEGATIVE MG/DL — SIGNIFICANT CHANGE UP
HCT VFR BLD CALC: 31.1 % — LOW (ref 37–47)
HGB BLD-MCNC: 10.2 G/DL — LOW (ref 12–16)
IMM GRANULOCYTES NFR BLD AUTO: 0.5 % — HIGH (ref 0.1–0.3)
INR BLD: 1.15 RATIO — SIGNIFICANT CHANGE UP (ref 0.65–1.3)
KETONES UR-MCNC: NEGATIVE MG/DL — SIGNIFICANT CHANGE UP
LACTATE SERPL-SCNC: 0.9 MMOL/L — SIGNIFICANT CHANGE UP (ref 0.7–2)
LEUKOCYTE ESTERASE UR-ACNC: ABNORMAL
LYMPHOCYTES # BLD AUTO: 1.04 K/UL — LOW (ref 1.2–3.4)
LYMPHOCYTES # BLD AUTO: 16 % — LOW (ref 20.5–51.1)
MCHC RBC-ENTMCNC: 29.7 PG — SIGNIFICANT CHANGE UP (ref 27–31)
MCHC RBC-ENTMCNC: 32.8 G/DL — SIGNIFICANT CHANGE UP (ref 32–37)
MCV RBC AUTO: 90.4 FL — SIGNIFICANT CHANGE UP (ref 81–99)
MONOCYTES # BLD AUTO: 0.63 K/UL — HIGH (ref 0.1–0.6)
MONOCYTES NFR BLD AUTO: 9.7 % — HIGH (ref 1.7–9.3)
NEUTROPHILS # BLD AUTO: 4.65 K/UL — SIGNIFICANT CHANGE UP (ref 1.4–6.5)
NEUTROPHILS NFR BLD AUTO: 71.8 % — SIGNIFICANT CHANGE UP (ref 42.2–75.2)
NITRITE UR-MCNC: NEGATIVE — SIGNIFICANT CHANGE UP
NRBC # BLD: 0 /100 WBCS — SIGNIFICANT CHANGE UP (ref 0–0)
PH UR: 6 — SIGNIFICANT CHANGE UP (ref 5–8)
PLATELET # BLD AUTO: 160 K/UL — SIGNIFICANT CHANGE UP (ref 130–400)
PMV BLD: 10.3 FL — SIGNIFICANT CHANGE UP (ref 7.4–10.4)
POTASSIUM SERPL-MCNC: 4.3 MMOL/L — SIGNIFICANT CHANGE UP (ref 3.5–5)
POTASSIUM SERPL-SCNC: 4.3 MMOL/L — SIGNIFICANT CHANGE UP (ref 3.5–5)
PROT SERPL-MCNC: 7.3 G/DL — SIGNIFICANT CHANGE UP (ref 6–8)
PROT UR-MCNC: 30 MG/DL
PROTHROM AB SERPL-ACNC: 13.1 SEC — HIGH (ref 9.95–12.87)
RBC # BLD: 3.44 M/UL — LOW (ref 4.2–5.4)
RBC # FLD: 14.4 % — SIGNIFICANT CHANGE UP (ref 11.5–14.5)
SODIUM SERPL-SCNC: 139 MMOL/L — SIGNIFICANT CHANGE UP (ref 135–146)
SP GR SPEC: 1.02 — SIGNIFICANT CHANGE UP (ref 1–1.03)
TROPONIN T, HIGH SENSITIVITY RESULT: 52 NG/L — CRITICAL HIGH (ref 6–13)
TROPONIN T, HIGH SENSITIVITY RESULT: 52 NG/L — CRITICAL HIGH (ref 6–13)
UROBILINOGEN FLD QL: 0.2 MG/DL — SIGNIFICANT CHANGE UP (ref 0.2–1)
WBC # BLD: 6.48 K/UL — SIGNIFICANT CHANGE UP (ref 4.8–10.8)
WBC # FLD AUTO: 6.48 K/UL — SIGNIFICANT CHANGE UP (ref 4.8–10.8)

## 2024-04-14 PROCEDURE — 97162 PT EVAL MOD COMPLEX 30 MIN: CPT | Mod: GP

## 2024-04-14 PROCEDURE — 97530 THERAPEUTIC ACTIVITIES: CPT | Mod: GP

## 2024-04-14 PROCEDURE — 36415 COLL VENOUS BLD VENIPUNCTURE: CPT

## 2024-04-14 PROCEDURE — 73564 X-RAY EXAM KNEE 4 OR MORE: CPT | Mod: 26,RT

## 2024-04-14 PROCEDURE — 85025 COMPLETE CBC W/AUTO DIFF WBC: CPT

## 2024-04-14 PROCEDURE — 12031 INTMD RPR S/A/T/EXT 2.5 CM/<: CPT | Mod: GW

## 2024-04-14 PROCEDURE — 99223 1ST HOSP IP/OBS HIGH 75: CPT

## 2024-04-14 PROCEDURE — 83550 IRON BINDING TEST: CPT

## 2024-04-14 PROCEDURE — 97110 THERAPEUTIC EXERCISES: CPT | Mod: GP

## 2024-04-14 PROCEDURE — 80061 LIPID PANEL: CPT

## 2024-04-14 PROCEDURE — 83036 HEMOGLOBIN GLYCOSYLATED A1C: CPT

## 2024-04-14 PROCEDURE — 99291 CRITICAL CARE FIRST HOUR: CPT | Mod: 25,GW

## 2024-04-14 PROCEDURE — 97116 GAIT TRAINING THERAPY: CPT | Mod: GP

## 2024-04-14 PROCEDURE — 71045 X-RAY EXAM CHEST 1 VIEW: CPT | Mod: 26,GW

## 2024-04-14 PROCEDURE — 93005 ELECTROCARDIOGRAM TRACING: CPT

## 2024-04-14 PROCEDURE — 80048 BASIC METABOLIC PNL TOTAL CA: CPT

## 2024-04-14 PROCEDURE — 72125 CT NECK SPINE W/O DYE: CPT | Mod: 26,MC,GW

## 2024-04-14 PROCEDURE — 70450 CT HEAD/BRAIN W/O DYE: CPT | Mod: 26,MC,GW

## 2024-04-14 PROCEDURE — 72170 X-RAY EXAM OF PELVIS: CPT | Mod: 26

## 2024-04-14 PROCEDURE — 82728 ASSAY OF FERRITIN: CPT

## 2024-04-14 PROCEDURE — 83540 ASSAY OF IRON: CPT

## 2024-04-14 PROCEDURE — 80053 COMPREHEN METABOLIC PANEL: CPT

## 2024-04-14 PROCEDURE — 87086 URINE CULTURE/COLONY COUNT: CPT

## 2024-04-14 PROCEDURE — 83735 ASSAY OF MAGNESIUM: CPT

## 2024-04-14 PROCEDURE — 74177 CT ABD & PELVIS W/CONTRAST: CPT | Mod: 26,MC

## 2024-04-14 PROCEDURE — 92610 EVALUATE SWALLOWING FUNCTION: CPT | Mod: GN

## 2024-04-14 PROCEDURE — 93010 ELECTROCARDIOGRAM REPORT: CPT

## 2024-04-14 PROCEDURE — 87040 BLOOD CULTURE FOR BACTERIA: CPT

## 2024-04-14 PROCEDURE — 71260 CT THORAX DX C+: CPT | Mod: 26,MC

## 2024-04-14 PROCEDURE — 85027 COMPLETE CBC AUTOMATED: CPT

## 2024-04-14 PROCEDURE — 84443 ASSAY THYROID STIM HORMONE: CPT

## 2024-04-14 RX ORDER — BRIMONIDINE TARTRATE 2 MG/MG
1 SOLUTION/ DROPS OPHTHALMIC
Qty: 0 | Refills: 0 | DISCHARGE

## 2024-04-14 RX ORDER — AMLODIPINE BESYLATE 2.5 MG/1
1 TABLET ORAL
Qty: 0 | Refills: 0 | DISCHARGE

## 2024-04-14 RX ORDER — HEPARIN SODIUM 5000 [USP'U]/ML
5000 INJECTION INTRAVENOUS; SUBCUTANEOUS EVERY 12 HOURS
Refills: 0 | Status: DISCONTINUED | OUTPATIENT
Start: 2024-04-14 | End: 2024-04-24

## 2024-04-14 RX ORDER — ACETAMINOPHEN 500 MG
650 TABLET ORAL EVERY 6 HOURS
Refills: 0 | Status: DISCONTINUED | OUTPATIENT
Start: 2024-04-14 | End: 2024-04-24

## 2024-04-14 RX ORDER — TRAZODONE HCL 50 MG
0 TABLET ORAL
Refills: 0 | DISCHARGE

## 2024-04-14 RX ORDER — TIMOLOL 0.5 %
1 DROPS OPHTHALMIC (EYE)
Qty: 0 | Refills: 0 | DISCHARGE

## 2024-04-14 RX ORDER — LEVOTHYROXINE SODIUM 125 MCG
75 TABLET ORAL DAILY
Refills: 0 | Status: DISCONTINUED | OUTPATIENT
Start: 2024-04-14 | End: 2024-04-24

## 2024-04-14 RX ORDER — ACETAMINOPHEN 500 MG
650 TABLET ORAL ONCE
Refills: 0 | Status: COMPLETED | OUTPATIENT
Start: 2024-04-14 | End: 2024-04-14

## 2024-04-14 RX ORDER — ACETAMINOPHEN 500 MG
650 TABLET ORAL EVERY 8 HOURS
Refills: 0 | Status: DISCONTINUED | OUTPATIENT
Start: 2024-04-14 | End: 2024-04-24

## 2024-04-14 RX ORDER — PRAZOSIN HCL 2 MG
0 CAPSULE ORAL
Qty: 0 | Refills: 0 | DISCHARGE

## 2024-04-14 RX ORDER — TRAZODONE HCL 50 MG
100 TABLET ORAL AT BEDTIME
Refills: 0 | Status: DISCONTINUED | OUTPATIENT
Start: 2024-04-14 | End: 2024-04-24

## 2024-04-14 RX ORDER — CEFEPIME 1 G/1
2000 INJECTION, POWDER, FOR SOLUTION INTRAMUSCULAR; INTRAVENOUS ONCE
Refills: 0 | Status: COMPLETED | OUTPATIENT
Start: 2024-04-14 | End: 2024-04-14

## 2024-04-14 RX ORDER — INFLUENZA VIRUS VACCINE 15; 15; 15; 15 UG/.5ML; UG/.5ML; UG/.5ML; UG/.5ML
0.7 SUSPENSION INTRAMUSCULAR ONCE
Refills: 0 | Status: DISCONTINUED | OUTPATIENT
Start: 2024-04-14 | End: 2024-04-24

## 2024-04-14 RX ORDER — VANCOMYCIN HCL 1 G
1000 VIAL (EA) INTRAVENOUS ONCE
Refills: 0 | Status: COMPLETED | OUTPATIENT
Start: 2024-04-14 | End: 2024-04-14

## 2024-04-14 RX ORDER — CEFEPIME 1 G/1
INJECTION, POWDER, FOR SOLUTION INTRAMUSCULAR; INTRAVENOUS
Refills: 0 | Status: COMPLETED | OUTPATIENT
Start: 2024-04-14 | End: 2024-04-14

## 2024-04-14 RX ORDER — CEFEPIME 1 G/1
2000 INJECTION, POWDER, FOR SOLUTION INTRAMUSCULAR; INTRAVENOUS EVERY 8 HOURS
Refills: 0 | Status: COMPLETED | OUTPATIENT
Start: 2024-04-14 | End: 2024-04-14

## 2024-04-14 RX ORDER — TETANUS TOXOID, REDUCED DIPHTHERIA TOXOID AND ACELLULAR PERTUSSIS VACCINE, ADSORBED 5; 2.5; 8; 8; 2.5 [IU]/.5ML; [IU]/.5ML; UG/.5ML; UG/.5ML; UG/.5ML
0.5 SUSPENSION INTRAMUSCULAR ONCE
Refills: 0 | Status: COMPLETED | OUTPATIENT
Start: 2024-04-14 | End: 2024-04-14

## 2024-04-14 RX ORDER — LORATADINE 10 MG/1
10 TABLET ORAL DAILY
Refills: 0 | Status: DISCONTINUED | OUTPATIENT
Start: 2024-04-14 | End: 2024-04-24

## 2024-04-14 RX ORDER — FERROUS SULFATE 325(65) MG
325 TABLET ORAL DAILY
Refills: 0 | Status: DISCONTINUED | OUTPATIENT
Start: 2024-04-14 | End: 2024-04-24

## 2024-04-14 RX ORDER — DONEPEZIL HYDROCHLORIDE 10 MG/1
5 TABLET, FILM COATED ORAL AT BEDTIME
Refills: 0 | Status: DISCONTINUED | OUTPATIENT
Start: 2024-04-14 | End: 2024-04-24

## 2024-04-14 RX ADMIN — Medication 30 MILLIGRAM(S): at 17:21

## 2024-04-14 RX ADMIN — CEFEPIME 100 MILLIGRAM(S): 1 INJECTION, POWDER, FOR SOLUTION INTRAMUSCULAR; INTRAVENOUS at 10:05

## 2024-04-14 RX ADMIN — Medication 650 MILLIGRAM(S): at 16:25

## 2024-04-14 RX ADMIN — Medication 1000 MILLIGRAM(S): at 12:51

## 2024-04-14 RX ADMIN — HEPARIN SODIUM 5000 UNIT(S): 5000 INJECTION INTRAVENOUS; SUBCUTANEOUS at 17:21

## 2024-04-14 RX ADMIN — Medication 250 MILLIGRAM(S): at 10:05

## 2024-04-14 RX ADMIN — CEFEPIME 2000 MILLIGRAM(S): 1 INJECTION, POWDER, FOR SOLUTION INTRAMUSCULAR; INTRAVENOUS at 11:05

## 2024-04-14 RX ADMIN — Medication 650 MILLIGRAM(S): at 15:53

## 2024-04-14 RX ADMIN — Medication 650 MILLIGRAM(S): at 14:57

## 2024-04-14 RX ADMIN — TETANUS TOXOID, REDUCED DIPHTHERIA TOXOID AND ACELLULAR PERTUSSIS VACCINE, ADSORBED 0.5 MILLILITER(S): 5; 2.5; 8; 8; 2.5 SUSPENSION INTRAMUSCULAR at 10:05

## 2024-04-14 RX ADMIN — Medication 650 MILLIGRAM(S): at 13:38

## 2024-04-14 RX ADMIN — CEFEPIME 100 MILLIGRAM(S): 1 INJECTION, POWDER, FOR SOLUTION INTRAMUSCULAR; INTRAVENOUS at 17:21

## 2024-04-14 RX ADMIN — Medication 650 MILLIGRAM(S): at 23:52

## 2024-04-14 NOTE — H&P ADULT - NSHPPHYSICALEXAM_GEN_ALL_CORE
LOS:     VITALS:   T(C): 38.3 (04-14-24 @ 13:37), Max: 38.3 (04-14-24 @ 13:37)  HR: 67 (04-14-24 @ 13:37) (67 - 89)  BP: 148/65 (04-14-24 @ 13:37) (148/65 - 172/74)  RR: 16 (04-14-24 @ 13:37) (16 - 18)  SpO2: 97% (04-14-24 @ 13:37) (96% - 97%)    GENERAL: NAD, lying in bed comfortably  HEAD:  Atraumatic, Normocephalic  EYES: EOMI, PERRLA, conjunctiva and sclera clear  ENT: Moist mucous membranes  NECK: Supple, No JVD  CHEST/LUNG: Clear to auscultation bilaterally; No rales, rhonchi, wheezing, or rubs. Unlabored respirations  HEART: Regular rate and rhythm; No murmurs, rubs, or gallops  ABDOMEN: BSx4; Soft, nontender, nondistended  EXTREMITIES:  2+ Peripheral Pulses, brisk capillary refill. No clubbing, cyanosis, or edema  NERVOUS SYSTEM:  A&Ox3, no focal deficits   SKIN: No rashes or lesions LOS:     VITALS:   T(C): 38.3 (04-14-24 @ 13:37), Max: 38.3 (04-14-24 @ 13:37)  HR: 67 (04-14-24 @ 13:37) (67 - 89)  BP: 148/65 (04-14-24 @ 13:37) (148/65 - 172/74)  RR: 16 (04-14-24 @ 13:37) (16 - 18)  SpO2: 97% (04-14-24 @ 13:37) (96% - 97%)    GENERAL: NAD, lying in bed comfortably  HEAD:  Atraumatic, Normocephalic  EYES: EOMI, PERRLA, conjunctiva and sclera clear  ENT: Moist mucous membranes  NECK: Supple, No JVD  Chest: wheezing BL  HEART: Regular rate and rhythm; No murmurs, rubs, or gallops  ABDOMEN: BSx4; Soft, nontender, nondistended  EXTREMITIES:  2+ Peripheral Pulses, brisk capillary refill. No clubbing, cyanosis, or edema  NERVOUS SYSTEM:  A&Ox1-2, no focal deficits   SKIN: No rashes or lesions

## 2024-04-14 NOTE — PATIENT PROFILE ADULT - FALL HARM RISK - RISK INTERVENTIONS
Assistance OOB with selected safe patient handling equipment/Assistance with ambulation/Communicate Fall Risk and Risk Factors to all staff, patient, and family/Discuss with provider need for PT consult/Monitor gait and stability/Reinforce activity limits and safety measures with patient and family/Visual Cue: Yellow wristband/Bed in lowest position, wheels locked, appropriate side rails in place/Call bell, personal items and telephone in reach/Instruct patient to call for assistance before getting out of bed or chair/Non-slip footwear when patient is out of bed/[place] to call system/Physically safe environment - no spills, clutter or unnecessary equipment/Purposeful Proactive Rounding/Room/bathroom lighting operational, light cord in reach

## 2024-04-14 NOTE — H&P ADULT - ATTENDING COMMENTS
female presented from SNF with a fall  VITAL SIGNS (Last 24 hrs):  T(C): 38.3 (04-14-24 @ 13:37), Max: 38.3 (04-14-24 @ 13:37)  HR: 67 (04-14-24 @ 13:37) (67 - 89)  BP: 148/65 (04-14-24 @ 13:37) (148/65 - 172/74)  RR: 16 (04-14-24 @ 13:37) (16 - 18)  SpO2: 97% (04-14-24 @ 13:37) (96% - 97%)  Wt(kg): --  Daily Height in cm: 157.48 (14 Apr 2024 09:08)    Daily     I&O's Summary                        10.2   6.48  )-----------( 160      ( 14 Apr 2024 09:26 )             31.1   04-14    139  |  109  |  33<H>  ----------------------------<  104<H>  4.3   |  19  |  1.4    Ca    9.4      14 Apr 2024 09:26    TPro  7.3  /  Alb  4.1  /  TBili  0.4  /  DBili  x   /  AST  24  /  ALT  11  /  AlkPhos  90  04-14  ekg- rate 67/min NSR  o/e  awake and alert-- though not oriented  scalp staples in place  chest-- b/l air entry  cvs--s1s2n  abd/ gi--soft, bs+   no edema  rt thigh hematoma near knee  Assessment and plan:  # Fall could have been a syncopal episode -- patient said she saw some liquid on floor and slipped on it.  repeat EKG. trauma w/u shows no fracture  # fever-- could be reason for syncope-- she is now coughing -- started on cefepime and continue tamiflu. cough suprresant  cxr normal ,RVP in lab, blood culture in lab.  # UTI-mildly positive no symptoms  # Mild troponin elevation x2-- could be due to demand ischemia-- will not trend at her age.  #  Dementia on trazodone and donepezil  # benzo dependence-- has had b/l hip fractures and lt humeus fracture healing-- avoid BZD--   med rec to be checked with SNF papers. female presented from SNF with a fall  VITAL SIGNS (Last 24 hrs):  T(C): 38.3 (04-14-24 @ 13:37), Max: 38.3 (04-14-24 @ 13:37)  HR: 67 (04-14-24 @ 13:37) (67 - 89)  BP: 148/65 (04-14-24 @ 13:37) (148/65 - 172/74)  RR: 16 (04-14-24 @ 13:37) (16 - 18)  SpO2: 97% (04-14-24 @ 13:37) (96% - 97%)  Wt(kg): --  Daily Height in cm: 157.48 (14 Apr 2024 09:08)    Daily     I&O's Summary                        10.2   6.48  )-----------( 160      ( 14 Apr 2024 09:26 )             31.1   04-14    139  |  109  |  33<H>  ----------------------------<  104<H>  4.3   |  19  |  1.4    Ca    9.4      14 Apr 2024 09:26    TPro  7.3  /  Alb  4.1  /  TBili  0.4  /  DBili  x   /  AST  24  /  ALT  11  /  AlkPhos  90  04-14  ekg- rate 67/min NSR  o/e  awake and alert-- though not oriented  scalp staples in place  chest-- b/l air entry  cvs--s1s2n  abd/ gi--soft, bs+   no edema  rt thigh hematoma near knee  Assessment and plan:  # Fall could have been a syncopal episode -- patient said she saw some liquid on floor and slipped on it.  repeat EKG. trauma w/u shows no fracture  # fever-- could be reason for syncope-- she is now coughing -- started on cefepime and continue tamiflu. cough suprresant  cxr normal ,RVP in lab, blood culture in lab.  # UTI-mildly positive no symptoms  # Mild troponin elevation x2-- could be due to demand ischemia-- will not trend at her age.  #  Dementia on trazodone and donepezil  # benzo dependence-- has had b/l hip fractures and lt humeus fracture healing-- avoid BZD--   med rec to be checked with SNF paper-- ER lost SNF medication sheet  DNR and DNI molst form signed by ER.

## 2024-04-14 NOTE — H&P ADULT - ASSESSMENT
Patient is a 89-year-old female with past medical history of dementia recurrent UTI's CKD anemia hypothyroid hypertension hyperlipidemia recurrent UTI's brought in by ambulance from home s/p mechanical fall. + HT, - LOC, -AC use. Very difficult to take history as pt confused and goes on tangents unrelated.  Pt states she was getting out of bed and slipped subsequently falling back and hitting her head on night table. Denies any pre-syncopal events, cp, sob, abdominal pain, n/v/d or dysuria numbness tingling or weakness. Admits to head pain and right knee pain. Pt found to be febrile in ED and complains of cough. Aid had flu.     .5, 89, 172/74, 96% RA  Labs Hg 10.2 at baseline, trop 52 times 2, creat- 1.4, UA very mildly positive,   CXR- prominent interstitium  CT abd- No acute trauma in the chest, abdomen, or pelvis.  Overall stable findings as described above.  Cystic mass within the pancreatic body, likely unchanged from the prior   exam. If not performed, consider correlation with MRI of the pancreas on   an outpatient basis.  Xray pelvis: Bones are osteopenic. There are chronic fractures of the superior and   inferior pubic rami on the left. Postsurgical changes within the proximal   femurs bilaterally. Healing fractures are noted within the proximal   femurs. Vascular calcifications. No acute fracture.  CT head: Right parietal scalp hematoma. No radiopaque foreign body. No   acute calvarial fracture. No evidence of acute transcortical infarct,   acute intracranial hemorrhage or mass effect.  CT cervical spine: No acute fracture or traumatic subluxation.  In ED received vanc cefepime tylenol     #Fever of unknown source likely URI  - RVP pending, was on Tamiflu at home  - cont tamiflu  - f/u UCx and BCx  - UA very mildly positive, family denies that she had symptoms of UTI  - CTno evidence cystitis or pyelo  - hold off ABx for now    #Dementia  - trazodone and donepezil    #Seasonal allergies  - cont cetirizine    #CKD  - avoid nephrotoxic drugs    #Anemia  - iron studies    #Hypothyroid  - cont levothyroxine    #Diet- per speech  #DVT proph- heparin  #Med rec done with daughter Ethel [phone]

## 2024-04-14 NOTE — ED PROVIDER NOTE - OBJECTIVE STATEMENT
Patient is a 89-year-old female with past medical history of hypothyroid hypertension hyperlipidemia brought in by ambulance from nursing home s/p mechanical fall. + HT, - LOC, -AC use. Pt states she was getting out of bed and slipped subsequently falling back and hitting her head on night table. Denies any pre-syncopal events, cp, sob, abdominal pain, n/v/d or dyuria numbbness tingling or weakness. Admits to head pain and right knee pain. Pt found to be febrile in ED.

## 2024-04-14 NOTE — ED PROVIDER NOTE - CARE PLAN
Principal Discharge DX:	Infection of urinary tract  Secondary Diagnosis:	Fall at home  Secondary Diagnosis:	Laceration of head   1

## 2024-04-14 NOTE — ED PROVIDER NOTE - PHYSICAL EXAMINATION
Vital Signs: I have reviewed the initial vital signs.  Constitutional: WDWN in nad.  HEAD: No signs of basilar skull fracture.  Integumentary: No rash. +laceration ~1c linear laceration to posterior scalp. +minimal bleeding.  + ecchymoses over right knee + mild , swelling.   EYES: No periorbital swelling/ecchymoses. PERRL, EOM intact. No nystagmus. No subconjunctival hemorrhage.   ENT: MMM. No rhinorrhea/otorrhea. No epistaxis,  No septal hematoma. No mastoid ecchymoses. No intraoral bleeding, No loose or cracked teeth, no active bleeding. No malocclusion.    NECK: Supple, non-tender, No palpable shelves or step-offs. Full ROM.   BACK: No spinous tenderness. No palpable shelves or step-offs.  Cardiovascular: RRR, radial pulses 2/4 b/l. dp and pt pulses 2/4/ b/l. No pain to palpation to chest wall.  Respiratory: CTA B/L, no wheezing or crackles, no stridor. No pain to palpation to ribs b/l. No crepitus. No subq emphysema.   Gastrointestinal: Abdomen soft, nd, nt. no r/g.  Musculoskeletal: FROM of all extremities. No bony tenderness + right knee ttp.   Neurologic: GCS 15. CN II-XII intact,  Motor 5/5 and sensation intact throughout upper and lower extremities.

## 2024-04-14 NOTE — H&P ADULT - NSHPLABSRESULTS_GEN_ALL_CORE
LABS:  cret                        10.2   6.48  )-----------( 160      ( 14 Apr 2024 09:26 )             31.1     04-14    139  |  109  |  33<H>  ----------------------------<  104<H>  4.3   |  19  |  1.4    Ca    9.4      14 Apr 2024 09:26    TPro  7.3  /  Alb  4.1  /  TBili  0.4  /  DBili  x   /  AST  24  /  ALT  11  /  AlkPhos  90  04-14    PT/INR - ( 14 Apr 2024 09:26 )   PT: 13.10 sec;   INR: 1.15 ratio         PTT - ( 14 Apr 2024 09:26 )  PTT:34.6 sec

## 2024-04-14 NOTE — ED PROVIDER NOTE - ATTENDING CONTRIBUTION TO CARE
Patient with past medical history of hypothyroid hyperlipidemia presents with mechanical trip and fall positive head trauma no LOC patient ANO x 1 on exam febrile normotensive    Right knee ecchymosis scalp laceration not actively bleeding,    Trauma workup rule out sepsis most likely urine lack repair x-rays

## 2024-04-14 NOTE — ED PROVIDER NOTE - CLINICAL SUMMARY MEDICAL DECISION MAKING FREE TEXT BOX
Patient presented status post unwitnessed fall as documented from nursing home, positive head trauma sustaining laceration to the scalp, but no LOC, not on blood thinners.  Complaining also of generalized weakness. On arrival to ED, patient febrile, but otherwise hemodynamically stable, bleeding controlled prior to arrival.  Neurovascularly intact.  Seen on arrival at which point EKG was obtained and grossly nonischemic.  Pan scan obtained and negative for acute traumatic injury.  Obtained labs which showed elevated troponin, but negative delta and otherwise were grossly unremarkable including no significant leukocytosis, anemia, signs of dehydration/SOILA, transaminitis or significant electrolyte abnormalities.  UA positive for UTI which is likely etiology of patient's fever and generalized weakness.  Laceration was irrigated and repaired in ED without complication, however given the above with weakness, fever, UTI, patient will require admission for further management.  Hemodynamically stable at time of admission

## 2024-04-14 NOTE — H&P ADULT - HISTORY OF PRESENT ILLNESS
Patient is a 89-year-old female with past medical history of CKD anemia hypothyroid hypertension hyperlipidemia brought in by ambulance from nursing home s/p mechanical fall. + HT, - LOC, -AC use. Pt states she was getting out of bed and slipped subsequently falling back and hitting her head on night table. Denies any pre-syncopal events, cp, sob, abdominal pain, n/v/d or dyuria numbbness tingling or weakness. Admits to head pain and right knee pain. Pt found to be febrile in ED. Aid had flu.     .5, 89, 172/74, 96% RA  Labs Hg 10.2 at baseline, trop 52 times 2, creat- 1.4, UA very mildly positive,   CT abd- No acute trauma in the chest, abdomen, or pelvis.  Overall stable findings as described above.  Cystic mass within the pancreatic body, likely unchanged from the prior   exam. If not performed, consider correlation with MRI of the pancreas on   an outpatient basis.  CT head: Right parietal scalp hematoma. No radiopaque foreign body. No   acute calvarial fracture. No evidence of acute transcortical infarct,   acute intracranial hemorrhage or mass effect.  CT cervical spine: No acute fracture or traumatic subluxation.   Patient is a 89-year-old female with past medical history of CKD anemia hypothyroid hypertension hyperlipidemia brought in by ambulance from nursing home s/p mechanical fall. + HT, - LOC, -AC use. Pt states she was getting out of bed and slipped subsequently falling back and hitting her head on night table. Denies any pre-syncopal events, cp, sob, abdominal pain, n/v/d or dyuria numbbness tingling or weakness. Admits to head pain and right knee pain. Pt found to be febrile in ED. Aid had flu.     .5, 89, 172/74, 96% RA  Labs Hg 10.2 at baseline, trop 52 times 2, creat- 1.4, UA very mildly positive,   CXR- prominent interstitium  CT abd- No acute trauma in the chest, abdomen, or pelvis.  Overall stable findings as described above.  Cystic mass within the pancreatic body, likely unchanged from the prior   exam. If not performed, consider correlation with MRI of the pancreas on   an outpatient basis.  Xray pelvis: Bones are osteopenic. There are chronic fractures of the superior and   inferior pubic rami on the left. Postsurgical changes within the proximal   femurs bilaterally. Healing fractures are noted within the proximal   femurs. Vascular calcifications. No acute fracture.  CT head: Right parietal scalp hematoma. No radiopaque foreign body. No   acute calvarial fracture. No evidence of acute transcortical infarct,   acute intracranial hemorrhage or mass effect.  CT cervical spine: No acute fracture or traumatic subluxation.  In ED received vanc cefepime tylenol    Patient is a 89-year-old female with past medical history of dementia CKD anemia hypothyroid hypertension hyperlipidemia brought in by ambulance from home s/p mechanical fall. + HT, - LOC, -AC use. Very difficult to take history as pt confused and goes on tangents unrelated.  Pt states she was getting out of bed and slipped subsequently falling back and hitting her head on night table. Denies any pre-syncopal events, cp, sob, abdominal pain, n/v/d or dysuria numbness tingling or weakness. Admits to head pain and right knee pain. Pt found to be febrile in ED. Aid had flu.     .5, 89, 172/74, 96% RA  Labs Hg 10.2 at baseline, trop 52 times 2, creat- 1.4, UA very mildly positive,   CXR- prominent interstitium  CT abd- No acute trauma in the chest, abdomen, or pelvis.  Overall stable findings as described above.  Cystic mass within the pancreatic body, likely unchanged from the prior   exam. If not performed, consider correlation with MRI of the pancreas on   an outpatient basis.  Xray pelvis: Bones are osteopenic. There are chronic fractures of the superior and   inferior pubic rami on the left. Postsurgical changes within the proximal   femurs bilaterally. Healing fractures are noted within the proximal   femurs. Vascular calcifications. No acute fracture.  CT head: Right parietal scalp hematoma. No radiopaque foreign body. No   acute calvarial fracture. No evidence of acute transcortical infarct,   acute intracranial hemorrhage or mass effect.  CT cervical spine: No acute fracture or traumatic subluxation.  In ED received vanc cefepime tylenol

## 2024-04-14 NOTE — ED PROVIDER NOTE - CONSIDERATION OF ADMISSION OBSERVATION
Patient s/p unwitnessed fall, (+) febrile 2/2 UTI. Will require admission for further management. Consideration of Admission/Observation

## 2024-04-14 NOTE — ED PROVIDER NOTE - DIFFERENTIAL DIAGNOSIS
Differential Diagnosis Fall r/o acute traumatic injury    Also (+) febrile, will search for source of infection

## 2024-04-15 DIAGNOSIS — E03.9 HYPOTHYROIDISM, UNSPECIFIED: ICD-10-CM

## 2024-04-15 DIAGNOSIS — I10 ESSENTIAL (PRIMARY) HYPERTENSION: ICD-10-CM

## 2024-04-15 DIAGNOSIS — F03.90 UNSPECIFIED DEMENTIA WITHOUT BEHAVIORAL DISTURBANCE: ICD-10-CM

## 2024-04-15 DIAGNOSIS — R93.89 ABNORMAL FINDINGS ON DIAGNOSTIC IMAGING OF OTHER SPECIFIED BODY STRUCTURES: ICD-10-CM

## 2024-04-15 DIAGNOSIS — E78.5 HYPERLIPIDEMIA, UNSPECIFIED: ICD-10-CM

## 2024-04-15 DIAGNOSIS — Z79.899 OTHER LONG TERM (CURRENT) DRUG THERAPY: ICD-10-CM
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
## 2024-04-15 DIAGNOSIS — N17.9 ACUTE KIDNEY FAILURE, UNSPECIFIED: ICD-10-CM

## 2024-04-15 DIAGNOSIS — A41.9 SEPSIS, UNSPECIFIED ORGANISM: ICD-10-CM

## 2024-04-15 LAB
A1C WITH ESTIMATED AVERAGE GLUCOSE RESULT: 5.6 % — SIGNIFICANT CHANGE UP (ref 4–5.6)
ANION GAP SERPL CALC-SCNC: 17 MMOL/L — HIGH (ref 7–14)
BUN SERPL-MCNC: 29 MG/DL — HIGH (ref 10–20)
CALCIUM SERPL-MCNC: 9 MG/DL — SIGNIFICANT CHANGE UP (ref 8.4–10.4)
CHLORIDE SERPL-SCNC: 106 MMOL/L — SIGNIFICANT CHANGE UP (ref 98–110)
CHOLEST SERPL-MCNC: 197 MG/DL — SIGNIFICANT CHANGE UP
CO2 SERPL-SCNC: 17 MMOL/L — SIGNIFICANT CHANGE UP (ref 17–32)
CREAT SERPL-MCNC: 1.6 MG/DL — HIGH (ref 0.7–1.5)
EGFR: 29 ML/MIN/1.73M2 — LOW
ESTIMATED AVERAGE GLUCOSE: 114 MG/DL — SIGNIFICANT CHANGE UP (ref 68–114)
FLUAV AG NPH QL: SIGNIFICANT CHANGE UP
FLUBV AG NPH QL: DETECTED
GLUCOSE SERPL-MCNC: 126 MG/DL — HIGH (ref 70–99)
HCT VFR BLD CALC: 33.3 % — LOW (ref 37–47)
HDLC SERPL-MCNC: 46 MG/DL — LOW
HGB BLD-MCNC: 10.8 G/DL — LOW (ref 12–16)
IRON SATN MFR SERPL: 13 UG/DL — LOW (ref 35–150)
IRON SATN MFR SERPL: 8 % — LOW (ref 15–50)
LIPID PNL WITH DIRECT LDL SERPL: 135 MG/DL — HIGH
MAGNESIUM SERPL-MCNC: 1.9 MG/DL — SIGNIFICANT CHANGE UP (ref 1.8–2.4)
MCHC RBC-ENTMCNC: 29.4 PG — SIGNIFICANT CHANGE UP (ref 27–31)
MCHC RBC-ENTMCNC: 32.4 G/DL — SIGNIFICANT CHANGE UP (ref 32–37)
MCV RBC AUTO: 90.7 FL — SIGNIFICANT CHANGE UP (ref 81–99)
NON HDL CHOLESTEROL: 151 MG/DL — HIGH
NRBC # BLD: 0 /100 WBCS — SIGNIFICANT CHANGE UP (ref 0–0)
PLATELET # BLD AUTO: 157 K/UL — SIGNIFICANT CHANGE UP (ref 130–400)
PMV BLD: 10.4 FL — SIGNIFICANT CHANGE UP (ref 7.4–10.4)
POTASSIUM SERPL-MCNC: 4.1 MMOL/L — SIGNIFICANT CHANGE UP (ref 3.5–5)
POTASSIUM SERPL-SCNC: 4.1 MMOL/L — SIGNIFICANT CHANGE UP (ref 3.5–5)
RBC # BLD: 3.67 M/UL — LOW (ref 4.2–5.4)
RBC # FLD: 14.2 % — SIGNIFICANT CHANGE UP (ref 11.5–14.5)
RSV RNA NPH QL NAA+NON-PROBE: SIGNIFICANT CHANGE UP
SARS-COV-2 RNA SPEC QL NAA+PROBE: SIGNIFICANT CHANGE UP
SODIUM SERPL-SCNC: 140 MMOL/L — SIGNIFICANT CHANGE UP (ref 135–146)
TIBC SERPL-MCNC: 165 UG/DL — LOW (ref 220–430)
TRIGL SERPL-MCNC: 82 MG/DL — SIGNIFICANT CHANGE UP
TSH SERPL-MCNC: 4.45 UIU/ML — HIGH (ref 0.27–4.2)
UIBC SERPL-MCNC: 152 UG/DL — SIGNIFICANT CHANGE UP (ref 110–370)
WBC # BLD: 14.51 K/UL — HIGH (ref 4.8–10.8)
WBC # FLD AUTO: 14.51 K/UL — HIGH (ref 4.8–10.8)

## 2024-04-15 PROCEDURE — 99233 SBSQ HOSP IP/OBS HIGH 50: CPT | Mod: GW

## 2024-04-15 RX ORDER — ATORVASTATIN CALCIUM 80 MG/1
10 TABLET, FILM COATED ORAL AT BEDTIME
Refills: 0 | Status: DISCONTINUED | OUTPATIENT
Start: 2024-04-15 | End: 2024-04-24

## 2024-04-15 RX ORDER — SODIUM BICARBONATE 1 MEQ/ML
650 SYRINGE (ML) INTRAVENOUS THREE TIMES A DAY
Refills: 0 | Status: DISCONTINUED | OUTPATIENT
Start: 2024-04-15 | End: 2024-04-20

## 2024-04-15 RX ORDER — SODIUM CHLORIDE 9 MG/ML
1000 INJECTION INTRAMUSCULAR; INTRAVENOUS; SUBCUTANEOUS
Refills: 0 | Status: DISCONTINUED | OUTPATIENT
Start: 2024-04-15 | End: 2024-04-17

## 2024-04-15 RX ADMIN — Medication 30 MILLIGRAM(S): at 17:01

## 2024-04-15 RX ADMIN — HEPARIN SODIUM 5000 UNIT(S): 5000 INJECTION INTRAVENOUS; SUBCUTANEOUS at 05:12

## 2024-04-15 RX ADMIN — SODIUM CHLORIDE 50 MILLILITER(S): 9 INJECTION INTRAMUSCULAR; INTRAVENOUS; SUBCUTANEOUS at 15:05

## 2024-04-15 RX ADMIN — Medication 325 MILLIGRAM(S): at 11:04

## 2024-04-15 RX ADMIN — LORATADINE 10 MILLIGRAM(S): 10 TABLET ORAL at 11:04

## 2024-04-15 RX ADMIN — Medication 600 MILLIGRAM(S): at 17:01

## 2024-04-15 RX ADMIN — Medication 650 MILLIGRAM(S): at 14:52

## 2024-04-15 RX ADMIN — Medication 650 MILLIGRAM(S): at 00:39

## 2024-04-15 RX ADMIN — HEPARIN SODIUM 5000 UNIT(S): 5000 INJECTION INTRAVENOUS; SUBCUTANEOUS at 17:01

## 2024-04-15 RX ADMIN — Medication 75 MICROGRAM(S): at 05:12

## 2024-04-15 RX ADMIN — Medication 30 MILLIGRAM(S): at 05:12

## 2024-04-15 NOTE — SWALLOW BEDSIDE ASSESSMENT ADULT - SLP GENERAL OBSERVATIONS
Pt. received in bed asleep on 3L O2 nasal cannula. Awoke to auditory stimuli. Oriented to person. Confused.

## 2024-04-15 NOTE — PHYSICAL THERAPY INITIAL EVALUATION ADULT - GENERAL OBSERVATIONS, REHAB EVAL
10:00-10:30 pt encountere din bed inNAD, confused, +tele, primafir, + O2 via nasal canula, 2L/min,SPO2 90% +hematome/bruise anterior right knee.  Patient performed bed mobility, transfers, and ambulated with assistance, limited by debility and confused cognition. 10:00-10:30 pt encountered in bed in NAD, confused, +tele, primafit, + O2 via nasal canula, 2L/min,SPO2 90% +hematome/bruise anterior right knee.  Patient performed bed mobility, transfers, and ambulated with assistance, limited by debility and confused cognition.

## 2024-04-15 NOTE — SWALLOW BEDSIDE ASSESSMENT ADULT - SWALLOW EVAL: DIAGNOSIS
Toleration of min trials of puree, minced & moist, with thin liquids w/o overt s/s of penetration/aspiration. Mod oral dysphagia for soft & bite sized.

## 2024-04-15 NOTE — PHYSICAL THERAPY INITIAL EVALUATION ADULT - PERTINENT HX OF CURRENT PROBLEM, REHAB EVAL
Patient is a 89-year-old female with past medical history of dementia CKD anemia hypothyroid hypertension hyperlipidemia brought in by ambulance from home s/p mechanical fall. + HT, - LOC, -AC use. Very difficult to take history as pt confused and goes on tangents unrelated.  Pt states she was getting out of bed and slipped subsequently falling back and hitting her head on night table. Denies any pre-syncopal events, cp, sob, abdominal pain, n/v/d or dysuria numbness tingling or weakness. Admits to head pain and right knee pain. Pt found to be febrile in ED. Aid had flu.

## 2024-04-15 NOTE — PROGRESS NOTE ADULT - ASSESSMENT
89F PMHx dementia, hypothyroidism, HTN, HLD here with fall, found to have sepsis, present on admission, with acute hypoxic resp failure; due to influenza b.

## 2024-04-16 LAB
ANION GAP SERPL CALC-SCNC: 14 MMOL/L — SIGNIFICANT CHANGE UP (ref 7–14)
BUN SERPL-MCNC: 41 MG/DL — HIGH (ref 10–20)
CALCIUM SERPL-MCNC: 8.9 MG/DL — SIGNIFICANT CHANGE UP (ref 8.4–10.5)
CHLORIDE SERPL-SCNC: 112 MMOL/L — HIGH (ref 98–110)
CO2 SERPL-SCNC: 17 MMOL/L — SIGNIFICANT CHANGE UP (ref 17–32)
CREAT SERPL-MCNC: 1.6 MG/DL — HIGH (ref 0.7–1.5)
EGFR: 29 ML/MIN/1.73M2 — LOW
FERRITIN SERPL-MCNC: 1132 NG/ML — HIGH (ref 13–330)
GLUCOSE SERPL-MCNC: 104 MG/DL — HIGH (ref 70–99)
HCT VFR BLD CALC: 37 % — SIGNIFICANT CHANGE UP (ref 37–47)
HGB BLD-MCNC: 11.8 G/DL — LOW (ref 12–16)
MCHC RBC-ENTMCNC: 28.9 PG — SIGNIFICANT CHANGE UP (ref 27–31)
MCHC RBC-ENTMCNC: 31.9 G/DL — LOW (ref 32–37)
MCV RBC AUTO: 90.7 FL — SIGNIFICANT CHANGE UP (ref 81–99)
NRBC # BLD: 0 /100 WBCS — SIGNIFICANT CHANGE UP (ref 0–0)
PLATELET # BLD AUTO: 181 K/UL — SIGNIFICANT CHANGE UP (ref 130–400)
PMV BLD: 10.1 FL — SIGNIFICANT CHANGE UP (ref 7.4–10.4)
POTASSIUM SERPL-MCNC: 4 MMOL/L — SIGNIFICANT CHANGE UP (ref 3.5–5)
POTASSIUM SERPL-SCNC: 4 MMOL/L — SIGNIFICANT CHANGE UP (ref 3.5–5)
RBC # BLD: 4.08 M/UL — LOW (ref 4.2–5.4)
RBC # FLD: 14.5 % — SIGNIFICANT CHANGE UP (ref 11.5–14.5)
SODIUM SERPL-SCNC: 143 MMOL/L — SIGNIFICANT CHANGE UP (ref 135–146)
WBC # BLD: 10.14 K/UL — SIGNIFICANT CHANGE UP (ref 4.8–10.8)
WBC # FLD AUTO: 10.14 K/UL — SIGNIFICANT CHANGE UP (ref 4.8–10.8)

## 2024-04-16 PROCEDURE — 99233 SBSQ HOSP IP/OBS HIGH 50: CPT

## 2024-04-16 RX ORDER — ACETAMINOPHEN 500 MG
1000 TABLET ORAL ONCE
Refills: 0 | Status: COMPLETED | OUTPATIENT
Start: 2024-04-16 | End: 2024-04-16

## 2024-04-16 RX ORDER — CHLORHEXIDINE GLUCONATE 213 G/1000ML
1 SOLUTION TOPICAL DAILY
Refills: 0 | Status: DISCONTINUED | OUTPATIENT
Start: 2024-04-16 | End: 2024-04-24

## 2024-04-16 RX ORDER — ACETAMINOPHEN 500 MG
650 TABLET ORAL ONCE
Refills: 0 | Status: COMPLETED | OUTPATIENT
Start: 2024-04-16 | End: 2024-04-16

## 2024-04-16 RX ORDER — METRONIDAZOLE 500 MG
500 TABLET ORAL EVERY 8 HOURS
Refills: 0 | Status: DISCONTINUED | OUTPATIENT
Start: 2024-04-16 | End: 2024-04-17

## 2024-04-16 RX ORDER — CEFEPIME 1 G/1
500 INJECTION, POWDER, FOR SOLUTION INTRAMUSCULAR; INTRAVENOUS EVERY 12 HOURS
Refills: 0 | Status: DISCONTINUED | OUTPATIENT
Start: 2024-04-16 | End: 2024-04-17

## 2024-04-16 RX ADMIN — Medication 400 MILLIGRAM(S): at 07:00

## 2024-04-16 RX ADMIN — CEFEPIME 100 MILLIGRAM(S): 1 INJECTION, POWDER, FOR SOLUTION INTRAMUSCULAR; INTRAVENOUS at 18:08

## 2024-04-16 RX ADMIN — Medication 100 MILLIGRAM(S): at 13:31

## 2024-04-16 RX ADMIN — ATORVASTATIN CALCIUM 10 MILLIGRAM(S): 80 TABLET, FILM COATED ORAL at 21:08

## 2024-04-16 RX ADMIN — Medication 100 MILLIGRAM(S): at 21:08

## 2024-04-16 RX ADMIN — HEPARIN SODIUM 5000 UNIT(S): 5000 INJECTION INTRAVENOUS; SUBCUTANEOUS at 18:07

## 2024-04-16 RX ADMIN — DONEPEZIL HYDROCHLORIDE 5 MILLIGRAM(S): 10 TABLET, FILM COATED ORAL at 21:08

## 2024-04-16 RX ADMIN — LORATADINE 10 MILLIGRAM(S): 10 TABLET ORAL at 13:39

## 2024-04-16 RX ADMIN — Medication 30 MILLIGRAM(S): at 18:07

## 2024-04-16 RX ADMIN — HEPARIN SODIUM 5000 UNIT(S): 5000 INJECTION INTRAVENOUS; SUBCUTANEOUS at 07:42

## 2024-04-16 RX ADMIN — CHLORHEXIDINE GLUCONATE 1 APPLICATION(S): 213 SOLUTION TOPICAL at 13:44

## 2024-04-16 RX ADMIN — Medication 650 MILLIGRAM(S): at 13:31

## 2024-04-16 RX ADMIN — Medication 650 MILLIGRAM(S): at 21:09

## 2024-04-16 RX ADMIN — Medication 325 MILLIGRAM(S): at 13:31

## 2024-04-16 NOTE — PROGRESS NOTE ADULT - PROBLEM SELECTOR PLAN 2
in setting of sepsis  cth c spine neg  trauma w/u no acute fx  orthostatics  PT
in setting of sepsis  cth c spine neg  trauma w/u no acute fx  orthostatics  PT

## 2024-04-16 NOTE — PROGRESS NOTE ADULT - PROBLEM SELECTOR PLAN 3
presumed pre-renal  c/b pure ag met acidosis  ns 50 cc/hr  cont bicarb 650 tid  ct abd no hydro  trend
presumed pre-renal  c/b pure ag met acidosis  ns 50 cc/hr  start bicarb 650 tid  renal us  trend

## 2024-04-16 NOTE — PROGRESS NOTE ADULT - NSPROGADDITIONALINFOA_GEN_ALL_CORE
#Progress Note Handoff  Pending (specify): ucx, bcx, tamiflu; orthostatics, ongoing hypoxia, geni  Family discussion: d/w pt at bedside  Disposition: snf
#Progress Note Handoff  Pending (specify): ucx, bcx, tamiflu; orthostatics, ongoing hypoxia, geni  Family discussion: d/w pt at bedside  Disposition: snf

## 2024-04-16 NOTE — PROGRESS NOTE ADULT - PROBLEM SELECTOR PLAN 4
ct with stable pancreatic cystic lesion  outpt f/u with mri
ct with stable pancreatic cystic lesion  outpt f/u with mri

## 2024-04-16 NOTE — ED ADULT NURSE REASSESSMENT NOTE - NS ED NURSE REASSESS COMMENT FT1
Patient reassessed. A&O x1. Patient admitted to medicine. VSS. No s/s of pain/discomfort. IVL in place. Safety & comfort measures maintained. Plan of care on going. Report given to Geri. Patient pending transport to [facility].

## 2024-04-16 NOTE — PROGRESS NOTE ADULT - PROBLEM SELECTOR PLAN 1
with acute hypoxic resp failure  due to influenza b  ct chest unrevealing  tamiflu for 5 days  ua mild; f/u ucx  bcx  monitor off abx for now  nc to keep spo2 >92
with acute hypoxic resp failure  due to influenza b +/- uti  +ongoing fevers  ct chest, abd unrevealing  tamiflu for 5 days  ua positive; f/u ucx  bcx ntd; repeat  start cefepime, flagyl  nc to keep spo2 >92

## 2024-04-16 NOTE — PROGRESS NOTE ADULT - ASSESSMENT
89F PMHx dementia, hypothyroidism, HTN, HLD here with fall, found to have sepsis, present on admission, with acute hypoxic resp failure; due to influenza b. SOILA.

## 2024-04-17 LAB
ALBUMIN SERPL ELPH-MCNC: 3.2 G/DL — LOW (ref 3.5–5.2)
ALP SERPL-CCNC: 60 U/L — SIGNIFICANT CHANGE UP (ref 30–115)
ALT FLD-CCNC: 12 U/L — SIGNIFICANT CHANGE UP (ref 0–41)
ANION GAP SERPL CALC-SCNC: 17 MMOL/L — HIGH (ref 7–14)
AST SERPL-CCNC: 66 U/L — HIGH (ref 0–41)
BASOPHILS # BLD AUTO: 0.03 K/UL — SIGNIFICANT CHANGE UP (ref 0–0.2)
BASOPHILS NFR BLD AUTO: 0.4 % — SIGNIFICANT CHANGE UP (ref 0–1)
BILIRUB SERPL-MCNC: 0.4 MG/DL — SIGNIFICANT CHANGE UP (ref 0.2–1.2)
BUN SERPL-MCNC: 46 MG/DL — HIGH (ref 10–20)
CALCIUM SERPL-MCNC: 9.1 MG/DL — SIGNIFICANT CHANGE UP (ref 8.4–10.5)
CHLORIDE SERPL-SCNC: 113 MMOL/L — HIGH (ref 98–110)
CO2 SERPL-SCNC: 16 MMOL/L — LOW (ref 17–32)
CREAT SERPL-MCNC: 2 MG/DL — HIGH (ref 0.7–1.5)
EGFR: 22 ML/MIN/1.73M2 — LOW
EOSINOPHIL # BLD AUTO: 0.01 K/UL — SIGNIFICANT CHANGE UP (ref 0–0.7)
EOSINOPHIL NFR BLD AUTO: 0.1 % — SIGNIFICANT CHANGE UP (ref 0–8)
GLUCOSE SERPL-MCNC: 111 MG/DL — HIGH (ref 70–99)
HCT VFR BLD CALC: 33.2 % — LOW (ref 37–47)
HGB BLD-MCNC: 10.6 G/DL — LOW (ref 12–16)
IMM GRANULOCYTES NFR BLD AUTO: 1.6 % — HIGH (ref 0.1–0.3)
LYMPHOCYTES # BLD AUTO: 0.65 K/UL — LOW (ref 1.2–3.4)
LYMPHOCYTES # BLD AUTO: 8.8 % — LOW (ref 20.5–51.1)
MAGNESIUM SERPL-MCNC: 2.2 MG/DL — SIGNIFICANT CHANGE UP (ref 1.8–2.4)
MCHC RBC-ENTMCNC: 29.2 PG — SIGNIFICANT CHANGE UP (ref 27–31)
MCHC RBC-ENTMCNC: 31.9 G/DL — LOW (ref 32–37)
MCV RBC AUTO: 91.5 FL — SIGNIFICANT CHANGE UP (ref 81–99)
MONOCYTES # BLD AUTO: 0.43 K/UL — SIGNIFICANT CHANGE UP (ref 0.1–0.6)
MONOCYTES NFR BLD AUTO: 5.8 % — SIGNIFICANT CHANGE UP (ref 1.7–9.3)
NEUTROPHILS # BLD AUTO: 6.18 K/UL — SIGNIFICANT CHANGE UP (ref 1.4–6.5)
NEUTROPHILS NFR BLD AUTO: 83.3 % — HIGH (ref 42.2–75.2)
NRBC # BLD: 0 /100 WBCS — SIGNIFICANT CHANGE UP (ref 0–0)
PLATELET # BLD AUTO: 185 K/UL — SIGNIFICANT CHANGE UP (ref 130–400)
PMV BLD: 10.6 FL — HIGH (ref 7.4–10.4)
POTASSIUM SERPL-MCNC: 3.9 MMOL/L — SIGNIFICANT CHANGE UP (ref 3.5–5)
POTASSIUM SERPL-SCNC: 3.9 MMOL/L — SIGNIFICANT CHANGE UP (ref 3.5–5)
PROT SERPL-MCNC: 6.7 G/DL — SIGNIFICANT CHANGE UP (ref 6–8)
RBC # BLD: 3.63 M/UL — LOW (ref 4.2–5.4)
RBC # FLD: 14.6 % — HIGH (ref 11.5–14.5)
SODIUM SERPL-SCNC: 146 MMOL/L — SIGNIFICANT CHANGE UP (ref 135–146)
WBC # BLD: 7.42 K/UL — SIGNIFICANT CHANGE UP (ref 4.8–10.8)
WBC # FLD AUTO: 7.42 K/UL — SIGNIFICANT CHANGE UP (ref 4.8–10.8)

## 2024-04-17 PROCEDURE — 99232 SBSQ HOSP IP/OBS MODERATE 35: CPT | Mod: GC

## 2024-04-17 RX ORDER — SODIUM CHLORIDE 9 MG/ML
1000 INJECTION, SOLUTION INTRAVENOUS
Refills: 0 | Status: DISCONTINUED | OUTPATIENT
Start: 2024-04-17 | End: 2024-04-18

## 2024-04-17 RX ORDER — CEFTRIAXONE 500 MG/1
1000 INJECTION, POWDER, FOR SOLUTION INTRAMUSCULAR; INTRAVENOUS EVERY 24 HOURS
Refills: 0 | Status: DISCONTINUED | OUTPATIENT
Start: 2024-04-17 | End: 2024-04-18

## 2024-04-17 RX ADMIN — Medication 600 MILLIGRAM(S): at 17:44

## 2024-04-17 RX ADMIN — Medication 100 MILLIGRAM(S): at 13:30

## 2024-04-17 RX ADMIN — Medication 600 MILLIGRAM(S): at 06:00

## 2024-04-17 RX ADMIN — CHLORHEXIDINE GLUCONATE 1 APPLICATION(S): 213 SOLUTION TOPICAL at 11:44

## 2024-04-17 RX ADMIN — ATORVASTATIN CALCIUM 10 MILLIGRAM(S): 80 TABLET, FILM COATED ORAL at 21:21

## 2024-04-17 RX ADMIN — SODIUM CHLORIDE 75 MILLILITER(S): 9 INJECTION, SOLUTION INTRAVENOUS at 14:24

## 2024-04-17 RX ADMIN — DONEPEZIL HYDROCHLORIDE 5 MILLIGRAM(S): 10 TABLET, FILM COATED ORAL at 21:21

## 2024-04-17 RX ADMIN — CEFTRIAXONE 100 MILLIGRAM(S): 500 INJECTION, POWDER, FOR SOLUTION INTRAMUSCULAR; INTRAVENOUS at 14:26

## 2024-04-17 RX ADMIN — Medication 100 MILLIGRAM(S): at 06:01

## 2024-04-17 RX ADMIN — Medication 100 MILLIGRAM(S): at 21:21

## 2024-04-17 RX ADMIN — Medication 650 MILLIGRAM(S): at 13:32

## 2024-04-17 RX ADMIN — Medication 325 MILLIGRAM(S): at 11:35

## 2024-04-17 RX ADMIN — Medication 650 MILLIGRAM(S): at 07:50

## 2024-04-17 RX ADMIN — Medication 75 MICROGRAM(S): at 06:34

## 2024-04-17 RX ADMIN — LORATADINE 10 MILLIGRAM(S): 10 TABLET ORAL at 11:35

## 2024-04-17 RX ADMIN — HEPARIN SODIUM 5000 UNIT(S): 5000 INJECTION INTRAVENOUS; SUBCUTANEOUS at 17:44

## 2024-04-17 RX ADMIN — Medication 650 MILLIGRAM(S): at 21:21

## 2024-04-17 RX ADMIN — Medication 30 MILLIGRAM(S): at 17:44

## 2024-04-17 RX ADMIN — CEFEPIME 100 MILLIGRAM(S): 1 INJECTION, POWDER, FOR SOLUTION INTRAMUSCULAR; INTRAVENOUS at 05:59

## 2024-04-17 RX ADMIN — HEPARIN SODIUM 5000 UNIT(S): 5000 INJECTION INTRAVENOUS; SUBCUTANEOUS at 06:00

## 2024-04-17 RX ADMIN — Medication 30 MILLIGRAM(S): at 06:01

## 2024-04-17 RX ADMIN — Medication 650 MILLIGRAM(S): at 06:00

## 2024-04-17 NOTE — PROGRESS NOTE ADULT - ASSESSMENT
Patient is a 89-year-old female with past medical history of dementia recurrent UTI's CKD anemia hypothyroid hypertension hyperlipidemia recurrent UTI's brought in by ambulance from home s/p mechanical fall. + HT, - LOC, -AC use. Very difficult to take history as pt confused and goes on tangents unrelated.  Pt states she was getting out of bed and slipped subsequently falling back and hitting her head on night table. Denies any pre-syncopal events, cp, sob, abdominal pain, n/v/d or dysuria numbness tingling or weakness. Admits to head pain and right knee pain. Pt found to be febrile in ED and complains of cough. Aid had flu.     .5, 89, 172/74, 96% RA  Labs Hg 10.2 at baseline, trop 52 times 2, creat- 1.4, UA very mildly positive,   CXR- prominent interstitium  CT abd- No acute trauma in the chest, abdomen, or pelvis.  Overall stable findings as described above.  Cystic mass within the pancreatic body, likely unchanged from the prior   exam. If not performed, consider correlation with MRI of the pancreas on   an outpatient basis.  Xray pelvis: Bones are osteopenic. There are chronic fractures of the superior and   inferior pubic rami on the left. Postsurgical changes within the proximal   femurs bilaterally. Healing fractures are noted within the proximal   femurs. Vascular calcifications. No acute fracture.  CT head: Right parietal scalp hematoma. No radiopaque foreign body. No   acute calvarial fracture. No evidence of acute transcortical infarct,   acute intracranial hemorrhage or mass effect.  CT cervical spine: No acute fracture or traumatic subluxation.  In ED received vanc cefepime tylenol     #Fever, secondary to Influenza B  - RVP pending, was on Tamiflu at home  - cont tamiflu  - f/u UCx and BCx  - UA very mildly positive, family denies that she had symptoms of UTI  - CTno evidence cystitis or pyelo  - hold off ABx for now  - ID consult     #Dementia  - trazodone and donepezil    #Seasonal allergies  - cont cetirizine    #CKD  - avoid nephrotoxic drugs    #Anemia  - iron studies    #Hypothyroid  - cont levothyroxine    #Diet- minced & moist   #DVT proph- heparin  #Med rec done with daughter Ethel [phone] Patient is a 89-year-old female with past medical history of dementia recurrent UTI's CKD anemia hypothyroid hypertension hyperlipidemia recurrent UTI's brought in by ambulance from home s/p mechanical fall. + HT, - LOC, -AC use. Very difficult to take history as pt confused and goes on tangents unrelated.  Pt states she was getting out of bed and slipped subsequently falling back and hitting her head on night table. Denies any pre-syncopal events, cp, sob, abdominal pain, n/v/d or dysuria numbness tingling or weakness. Admits to head pain and right knee pain. Pt found to be febrile in ED and complains of cough. Aid had flu.     .5, 89, 172/74, 96% RA  Labs Hg 10.2 at baseline, trop 52 times 2, creat- 1.4, UA very mildly positive,   CXR- prominent interstitium  CT abd- No acute trauma in the chest, abdomen, or pelvis. Overall stable findings as described above. Cystic mass within the pancreatic body, likely unchanged from the prior exam. If not performed, consider correlation with MRI of the pancreas on an outpatient basis.  Xray pelvis: Bones are osteopenic. There are chronic fractures of the superior and inferior pubic rami on the left. Postsurgical changes within the proximal femurs bilaterally. Healing fractures are noted within the proximal femurs. Vascular calcifications. No acute fracture.  CT head: Right parietal scalp hematoma. No radiopaque foreign body. No acute calvarial fracture. No evidence of acute transcortical infarct, acute intracranial hemorrhage or mass effect.  CT cervical spine: No acute fracture or traumatic subluxation.  In ED received vanc cefepime tylenol     #Fever, secondary to Influenza B  - RVP pending, was on Tamiflu at home  - cont tamiflu  - f/u UCx and BCx  - UA very mildly positive, family denies that she had symptoms of UTI  - CT no evidence cystitis or pyelo  - c/w ceftriaxone   - ID consult   - on 5L NC this AM     #Dementia  - trazodone and donepezil    #Seasonal allergies  - cont cetirizine    #SOILA on CKD  - avoid nephrotoxic drugs  - get bladder scan  - C/w IVF   - bicarb 650 tid     #Anemia  - iron studies    #Hypothyroid  - cont levothyroxine    #Diet- minced & moist   #DVT proph- heparin  #Med rec done with daughter Ethel [phone]

## 2024-04-18 LAB
ALBUMIN SERPL ELPH-MCNC: 3.3 G/DL — LOW (ref 3.5–5.2)
ALP SERPL-CCNC: 59 U/L — SIGNIFICANT CHANGE UP (ref 30–115)
ALT FLD-CCNC: 11 U/L — SIGNIFICANT CHANGE UP (ref 0–41)
ANION GAP SERPL CALC-SCNC: 15 MMOL/L — HIGH (ref 7–14)
AST SERPL-CCNC: 45 U/L — HIGH (ref 0–41)
BASOPHILS # BLD AUTO: 0.03 K/UL — SIGNIFICANT CHANGE UP (ref 0–0.2)
BASOPHILS NFR BLD AUTO: 0.4 % — SIGNIFICANT CHANGE UP (ref 0–1)
BILIRUB SERPL-MCNC: 0.4 MG/DL — SIGNIFICANT CHANGE UP (ref 0.2–1.2)
BUN SERPL-MCNC: 49 MG/DL — HIGH (ref 10–20)
CALCIUM SERPL-MCNC: 9 MG/DL — SIGNIFICANT CHANGE UP (ref 8.4–10.4)
CHLORIDE SERPL-SCNC: 115 MMOL/L — HIGH (ref 98–110)
CO2 SERPL-SCNC: 20 MMOL/L — SIGNIFICANT CHANGE UP (ref 17–32)
CREAT SERPL-MCNC: 1.9 MG/DL — HIGH (ref 0.7–1.5)
CULTURE RESULTS: NO GROWTH — SIGNIFICANT CHANGE UP
EGFR: 24 ML/MIN/1.73M2 — LOW
EOSINOPHIL # BLD AUTO: 0.05 K/UL — SIGNIFICANT CHANGE UP (ref 0–0.7)
EOSINOPHIL NFR BLD AUTO: 0.7 % — SIGNIFICANT CHANGE UP (ref 0–8)
GLUCOSE SERPL-MCNC: 116 MG/DL — HIGH (ref 70–99)
HCT VFR BLD CALC: 34.6 % — LOW (ref 37–47)
HGB BLD-MCNC: 10.9 G/DL — LOW (ref 12–16)
IMM GRANULOCYTES NFR BLD AUTO: 1.5 % — HIGH (ref 0.1–0.3)
LYMPHOCYTES # BLD AUTO: 0.88 K/UL — LOW (ref 1.2–3.4)
LYMPHOCYTES # BLD AUTO: 12 % — LOW (ref 20.5–51.1)
MAGNESIUM SERPL-MCNC: 2.2 MG/DL — SIGNIFICANT CHANGE UP (ref 1.8–2.4)
MCHC RBC-ENTMCNC: 29.2 PG — SIGNIFICANT CHANGE UP (ref 27–31)
MCHC RBC-ENTMCNC: 31.5 G/DL — LOW (ref 32–37)
MCV RBC AUTO: 92.8 FL — SIGNIFICANT CHANGE UP (ref 81–99)
MONOCYTES # BLD AUTO: 0.74 K/UL — HIGH (ref 0.1–0.6)
MONOCYTES NFR BLD AUTO: 10.1 % — HIGH (ref 1.7–9.3)
NEUTROPHILS # BLD AUTO: 5.51 K/UL — SIGNIFICANT CHANGE UP (ref 1.4–6.5)
NEUTROPHILS NFR BLD AUTO: 75.3 % — HIGH (ref 42.2–75.2)
NRBC # BLD: 0 /100 WBCS — SIGNIFICANT CHANGE UP (ref 0–0)
PLATELET # BLD AUTO: 195 K/UL — SIGNIFICANT CHANGE UP (ref 130–400)
PMV BLD: 9.8 FL — SIGNIFICANT CHANGE UP (ref 7.4–10.4)
POTASSIUM SERPL-MCNC: 3.9 MMOL/L — SIGNIFICANT CHANGE UP (ref 3.5–5)
POTASSIUM SERPL-SCNC: 3.9 MMOL/L — SIGNIFICANT CHANGE UP (ref 3.5–5)
PROT SERPL-MCNC: 6.7 G/DL — SIGNIFICANT CHANGE UP (ref 6–8)
RBC # BLD: 3.73 M/UL — LOW (ref 4.2–5.4)
RBC # FLD: 14.7 % — HIGH (ref 11.5–14.5)
SODIUM SERPL-SCNC: 150 MMOL/L — HIGH (ref 135–146)
SPECIMEN SOURCE: SIGNIFICANT CHANGE UP
WBC # BLD: 7.32 K/UL — SIGNIFICANT CHANGE UP (ref 4.8–10.8)
WBC # FLD AUTO: 7.32 K/UL — SIGNIFICANT CHANGE UP (ref 4.8–10.8)

## 2024-04-18 PROCEDURE — 99232 SBSQ HOSP IP/OBS MODERATE 35: CPT

## 2024-04-18 RX ORDER — SODIUM CHLORIDE 9 MG/ML
1000 INJECTION, SOLUTION INTRAVENOUS
Refills: 0 | Status: DISCONTINUED | OUTPATIENT
Start: 2024-04-18 | End: 2024-04-20

## 2024-04-18 RX ADMIN — Medication 325 MILLIGRAM(S): at 13:01

## 2024-04-18 RX ADMIN — DONEPEZIL HYDROCHLORIDE 5 MILLIGRAM(S): 10 TABLET, FILM COATED ORAL at 22:03

## 2024-04-18 RX ADMIN — HEPARIN SODIUM 5000 UNIT(S): 5000 INJECTION INTRAVENOUS; SUBCUTANEOUS at 18:47

## 2024-04-18 RX ADMIN — Medication 650 MILLIGRAM(S): at 13:02

## 2024-04-18 RX ADMIN — Medication 650 MILLIGRAM(S): at 05:08

## 2024-04-18 RX ADMIN — Medication 30 MILLIGRAM(S): at 05:08

## 2024-04-18 RX ADMIN — ATORVASTATIN CALCIUM 10 MILLIGRAM(S): 80 TABLET, FILM COATED ORAL at 22:03

## 2024-04-18 RX ADMIN — LORATADINE 10 MILLIGRAM(S): 10 TABLET ORAL at 13:01

## 2024-04-18 RX ADMIN — HEPARIN SODIUM 5000 UNIT(S): 5000 INJECTION INTRAVENOUS; SUBCUTANEOUS at 05:09

## 2024-04-18 RX ADMIN — Medication 600 MILLIGRAM(S): at 18:47

## 2024-04-18 RX ADMIN — Medication 650 MILLIGRAM(S): at 22:03

## 2024-04-18 RX ADMIN — Medication 75 MICROGRAM(S): at 05:08

## 2024-04-18 RX ADMIN — Medication 30 MILLIGRAM(S): at 18:48

## 2024-04-18 RX ADMIN — Medication 100 MILLIGRAM(S): at 22:03

## 2024-04-18 RX ADMIN — Medication 600 MILLIGRAM(S): at 05:08

## 2024-04-18 RX ADMIN — CHLORHEXIDINE GLUCONATE 1 APPLICATION(S): 213 SOLUTION TOPICAL at 12:27

## 2024-04-18 NOTE — PROGRESS NOTE ADULT - ASSESSMENT
Patient is a 89-year-old female with past medical history of dementia recurrent UTI's CKD anemia hypothyroid hypertension hyperlipidemia recurrent UTI's brought in by ambulance from home s/p mechanical fall. + HT, - LOC, -AC use. Very difficult to take history as pt confused and goes on tangents unrelated.  Pt states she was getting out of bed and slipped subsequently falling back and hitting her head on night table. Denies any pre-syncopal events, cp, sob, abdominal pain, n/v/d or dysuria numbness tingling or weakness. Admits to head pain and right knee pain. Pt found to be febrile in ED and complains of cough. Aid had flu.     .5, 89, 172/74, 96% RA  Labs Hg 10.2 at baseline, trop 52 times 2, creat- 1.4, UA very mildly positive,   CXR- prominent interstitium  CT abd- No acute trauma in the chest, abdomen, or pelvis. Overall stable findings as described above. Cystic mass within the pancreatic body, likely unchanged from the prior exam. If not performed, consider correlation with MRI of the pancreas on an outpatient basis.  Xray pelvis: Bones are osteopenic. There are chronic fractures of the superior and inferior pubic rami on the left. Postsurgical changes within the proximal femurs bilaterally. Healing fractures are noted within the proximal femurs. Vascular calcifications. No acute fracture.  CT head: Right parietal scalp hematoma. No radiopaque foreign body. No acute calvarial fracture. No evidence of acute transcortical infarct, acute intracranial hemorrhage or mass effect.  CT cervical spine: No acute fracture or traumatic subluxation.  In ED received vanc cefepime tylenol     #Fever, secondary to Influenza B  - RVP pending, was on Tamiflu at home  - cont tamiflu for 5d duration   - UCx and BCx neg  - UA very mildly positive, family denies that she had symptoms of UTI  - CT no evidence cystitis or pyelo  - s/p ceftriaxone   - ID consult appreciated   - on 4L NC this AM     #Dementia  - trazodone and donepezil    #Seasonal allergies  - cont cetirizine    #SOILA on CKD  #Hypernatremia  - avoid nephrotoxic drugs  - get bladder scan  - Switched IVF to D5 d/t hypernatremia   - bicarb 650 tid     #Anemia  - iron studies    #Hypothyroid  - cont levothyroxine    #Diet- minced & moist, 1:1 feeds  #DVT proph- heparin  #Med rec done with daughter Ethel [phone] Patient is a 89-year-old female with past medical history of dementia recurrent UTI's CKD anemia hypothyroid hypertension hyperlipidemia recurrent UTI's brought in by ambulance from home s/p mechanical fall. + HT, - LOC, -AC use. Very difficult to take history as pt confused and goes on tangents unrelated.  Pt states she was getting out of bed and slipped subsequently falling back and hitting her head on night table. Denies any pre-syncopal events, cp, sob, abdominal pain, n/v/d or dysuria numbness tingling or weakness. Admits to head pain and right knee pain. Pt found to be febrile in ED and complains of cough. Aid had flu.     .5, 89, 172/74, 96% RA  Labs Hg 10.2 at baseline, trop 52 times 2, creat- 1.4, UA very mildly positive,   CXR- prominent interstitium  CT abd- No acute trauma in the chest, abdomen, or pelvis. Overall stable findings as described above. Cystic mass within the pancreatic body, likely unchanged from the prior exam. If not performed, consider correlation with MRI of the pancreas on an outpatient basis.  Xray pelvis: Bones are osteopenic. There are chronic fractures of the superior and inferior pubic rami on the left. Postsurgical changes within the proximal femurs bilaterally. Healing fractures are noted within the proximal femurs. Vascular calcifications. No acute fracture.  CT head: Right parietal scalp hematoma. No radiopaque foreign body. No acute calvarial fracture. No evidence of acute transcortical infarct, acute intracranial hemorrhage or mass effect.  CT cervical spine: No acute fracture or traumatic subluxation.  In ED received vanc cefepime tylenol     #Fever, secondary to Influenza B  - RVP pending, was on Tamiflu at home  - cont tamiflu for 5d duration   - UCx and BCx neg  - UA very mildly positive, family denies that she had symptoms of UTI  - CT no evidence cystitis or pyelo  - s/p ceftriaxone   - ID consult appreciated   - on 4L NC this AM     #Dementia  - trazodone and donepezil    #Seasonal allergies  - cont cetirizine    #SOILA on CKD  #Hypernatremia  - avoid nephrotoxic drugs  - get bladder scan  - Switched IVF to D5 d/t hypernatremia   - bicarb 650 tid     #Anemia  - iron studies    #Hypothyroid  - cont levothyroxine    #Diet- minced & moist, 1:1 feeds  #DVT proph- heparin  #Med rec done with daughter Ethel [phone] Patient is a 89-year-old female with past medical history of dementia recurrent UTI's CKD anemia hypothyroid hypertension hyperlipidemia recurrent UTI's brought in by ambulance from home s/p mechanical fall. + HT, - LOC, -AC use. Very difficult to take history as pt confused and goes on tangents unrelated.  Pt states she was getting out of bed and slipped subsequently falling back and hitting her head on night table. Denies any pre-syncopal events, cp, sob, abdominal pain, n/v/d or dysuria numbness tingling or weakness. Admits to head pain and right knee pain. Pt found to be febrile in ED and complains of cough. Aid had flu.     .5, 89, 172/74, 96% RA  Labs Hg 10.2 at baseline, trop 52 times 2, creat- 1.4, UA very mildly positive,   CXR- prominent interstitium  CT abd- No acute trauma in the chest, abdomen, or pelvis. Overall stable findings as described above. Cystic mass within the pancreatic body, likely unchanged from the prior exam. If not performed, consider correlation with MRI of the pancreas on an outpatient basis.  Xray pelvis: Bones are osteopenic. There are chronic fractures of the superior and inferior pubic rami on the left. Postsurgical changes within the proximal femurs bilaterally. Healing fractures are noted within the proximal femurs. Vascular calcifications. No acute fracture.  CT head: Right parietal scalp hematoma. No radiopaque foreign body. No acute calvarial fracture. No evidence of acute transcortical infarct, acute intracranial hemorrhage or mass effect.  CT cervical spine: No acute fracture or traumatic subluxation.  In ED received vanc cefepime tylenol     #Fever, secondary to Influenza B  - RVP pending, was on Tamiflu at home  - cont tamiflu for 5d duration   - UCx and BCx neg  - UA very mildly positive, family denies that she had symptoms of UTI  - CT no evidence cystitis or pyelo  - s/p ceftriaxone   - ID consult appreciated   - on 4L NC this AM     #Dementia  - trazodone and donepezil  - first noticed 2years ago, has gotten worse in past 6 months  - at baseline walks with a walker but has been falling. Has an aid 7hrs a day    #Seasonal allergies  - cont cetirizine    #SOILA on CKD  #Hypernatremia  - avoid nephrotoxic drugs  - get bladder scan  - Switched IVF to D5 d/t hypernatremia   - bicarb 650 tid     #Anemia  - iron studies    #Hypothyroid  - cont levothyroxine    #Diet- minced & moist, 1:1 feeds  #DVT proph- heparin  #Med rec done with daughter Ethel [phone]

## 2024-04-18 NOTE — CONSULT NOTE ADULT - SUBJECTIVE AND OBJECTIVE BOX
ZONIA ELE  89y, Female  Allergy: No Known Allergies      All historical available data reviewed.    HPI:  Patient is a 89-year-old female with past medical history of dementia CKD anemia hypothyroid hypertension hyperlipidemia brought in by ambulance from home s/p mechanical fall. + HT, - LOC, -AC use. Very difficult to take history as pt confused and goes on tangents unrelated.  Pt states she was getting out of bed and slipped subsequently falling back and hitting her head on night table. Denies any pre-syncopal events, cp, sob, abdominal pain, n/v/d or dysuria numbness tingling or weakness. Admits to head pain and right knee pain. Pt found to be febrile in ED. Aid had flu.     .5, 89, 172/74, 96% RA  Labs Hg 10.2 at baseline, trop 52 times 2, creat- 1.4, UA very mildly positive,   CXR- prominent interstitium  CT abd- No acute trauma in the chest, abdomen, or pelvis.  Overall stable findings as described above.  Cystic mass within the pancreatic body, likely unchanged from the prior   exam. If not performed, consider correlation with MRI of the pancreas on   an outpatient basis.  Xray pelvis: Bones are osteopenic. There are chronic fractures of the superior and   inferior pubic rami on the left. Postsurgical changes within the proximal   femurs bilaterally. Healing fractures are noted within the proximal   femurs. Vascular calcifications. No acute fracture.  CT head: Right parietal scalp hematoma. No radiopaque foreign body. No   acute calvarial fracture. No evidence of acute transcortical infarct,   acute intracranial hemorrhage or mass effect.  CT cervical spine: No acute fracture or traumatic subluxation.  In ED received vanc cefepime tylenol    (14 Apr 2024 14:06)    FAMILY HISTORY:    PAST MEDICAL & SURGICAL HISTORY:  Hypertension, unspecified type      High cholesterol      Chronic primary angle-closure glaucoma of left eye, severe stage      Exudative age-related macular degeneration of left eye with inactive choroidal neovascularization      Fall  Multiple falls at home      Hypothyroidism      History of hip replacement, total, right            VITALS:  T(F): 98.3, Max: 98.4 (04-17-24 @ 22:59)  HR: 94  BP: 115/65  RR: 18Vital Signs Last 24 Hrs  T(C): 36.8 (18 Apr 2024 07:45), Max: 36.9 (17 Apr 2024 22:59)  T(F): 98.3 (18 Apr 2024 07:45), Max: 98.4 (17 Apr 2024 22:59)  HR: 94 (18 Apr 2024 07:45) (66 - 94)  BP: 115/65 (18 Apr 2024 07:45) (115/65 - 164/67)  BP(mean): --  RR: 18 (18 Apr 2024 07:45) (18 - 18)  SpO2: 94% (18 Apr 2024 07:45) (94% - 97%)    Parameters below as of 18 Apr 2024 07:45  Patient On (Oxygen Delivery Method): nasal cannula        TESTS & MEASUREMENTS:                        10.6   7.42  )-----------( 185      ( 17 Apr 2024 06:55 )             33.2     04-17    146  |  113<H>  |  46<H>  ----------------------------<  111<H>  3.9   |  16<L>  |  2.0<H>    Ca    9.1      17 Apr 2024 06:55  Mg     2.2     04-17    TPro  6.7  /  Alb  3.2<L>  /  TBili  0.4  /  DBili  x   /  AST  66<H>  /  ALT  12  /  AlkPhos  60  04-17    LIVER FUNCTIONS - ( 17 Apr 2024 06:55 )  Alb: 3.2 g/dL / Pro: 6.7 g/dL / ALK PHOS: 60 U/L / ALT: 12 U/L / AST: 66 U/L / GGT: x             Culture - Urine (collected 04-16-24 @ 14:19)  Source: Clean Catch Clean Catch (Midstream)  Final Report (04-18-24 @ 07:33):    No growth    Culture - Blood (collected 04-14-24 @ 09:26)  Source: .Blood Blood-Peripheral  Preliminary Report (04-17-24 @ 18:01):    No growth at 72 Hours    Culture - Blood (collected 04-14-24 @ 09:26)  Source: .Blood Blood-Peripheral  Preliminary Report (04-17-24 @ 18:01):    No growth at 72 Hours      Urinalysis Basic - ( 17 Apr 2024 06:55 )    Color: x / Appearance: x / SG: x / pH: x  Gluc: 111 mg/dL / Ketone: x  / Bili: x / Urobili: x   Blood: x / Protein: x / Nitrite: x   Leuk Esterase: x / RBC: x / WBC x   Sq Epi: x / Non Sq Epi: x / Bacteria: x          RADIOLOGY & ADDITIONAL TESTS:  Personal review of radiological diagnostics performed  Echo and EKG results noted when applicable.     MEDICATIONS:  acetaminophen     Tablet .. 650 milliGRAM(s) Oral every 6 hours PRN  acetaminophen  Suppository .. 650 milliGRAM(s) Rectal every 8 hours PRN  atorvastatin 10 milliGRAM(s) Oral at bedtime  cefTRIAXone   IVPB 1000 milliGRAM(s) IV Intermittent every 24 hours  chlorhexidine 2% Cloths 1 Application(s) Topical daily  dextrose 5% + sodium chloride 0.45%. 1000 milliLiter(s) IV Continuous <Continuous>  donepezil 5 milliGRAM(s) Oral at bedtime  ferrous    sulfate 325 milliGRAM(s) Oral daily  guaiFENesin  milliGRAM(s) Oral every 12 hours  heparin   Injectable 5000 Unit(s) SubCutaneous every 12 hours  influenza  Vaccine (HIGH DOSE) 0.7 milliLiter(s) IntraMuscular once  levothyroxine 75 MICROGram(s) Oral daily  loratadine 10 milliGRAM(s) Oral daily  oseltamivir 30 milliGRAM(s) Oral two times a day  sodium bicarbonate 650 milliGRAM(s) Oral three times a day  traZODone 100 milliGRAM(s) Oral at bedtime      ANTIBIOTICS:  cefTRIAXone   IVPB 1000 milliGRAM(s) IV Intermittent every 24 hours  oseltamivir 30 milliGRAM(s) Oral two times a day

## 2024-04-18 NOTE — CONSULT NOTE ADULT - ASSESSMENT
89-year-old female with past medical history of dementia CKD anemia hypothyroid hypertension hyperlipidemia brought in by ambulance from home s/p mechanical fall. + HT, - LOC, -AC use. Very difficult to take history as pt confused and goes on tangents unrelated.  Pt states she was getting out of bed and slipped subsequently falling back and hitting her head on night table. Denies any pre-syncopal events, cp, sob, abdominal pain, n/v/d or dysuria numbness tingling or weakness. Admits to head pain and right knee pain. Pt found to be febrile in ED. Aid had flu.     .5, 89, 172/74, 96% RA    CT abd- No acute trauma in the chest, abdomen, or pelvis.  Cystic mass within the pancreatic body, likely unchanged from the prior   exam.    IMPRESSION/RECOMMENDATIONS  Influenza B with SIRS  No bacterial PNA  No pyelonephritis  CT with cystic mass pancreatic body    -Tamiflu 30 mg q12h for 5 days  -d/c other ABx  -f/u with GI for pancreatic mass  -Off loading to prevent pressure sores and preventive measures to avoid aspiration     Please do not hesitate to recall ID if any questions arise either through [employer] or through [employer]

## 2024-04-19 ENCOUNTER — TRANSCRIPTION ENCOUNTER (OUTPATIENT)
Age: 89
End: 2024-04-19

## 2024-04-19 LAB
ALBUMIN SERPL ELPH-MCNC: 3.3 G/DL — LOW (ref 3.5–5.2)
ALP SERPL-CCNC: 55 U/L — SIGNIFICANT CHANGE UP (ref 30–115)
ALT FLD-CCNC: 9 U/L — SIGNIFICANT CHANGE UP (ref 0–41)
ANION GAP SERPL CALC-SCNC: 15 MMOL/L — HIGH (ref 7–14)
AST SERPL-CCNC: 34 U/L — SIGNIFICANT CHANGE UP (ref 0–41)
BASOPHILS # BLD AUTO: 0.04 K/UL — SIGNIFICANT CHANGE UP (ref 0–0.2)
BASOPHILS NFR BLD AUTO: 0.6 % — SIGNIFICANT CHANGE UP (ref 0–1)
BILIRUB SERPL-MCNC: 0.4 MG/DL — SIGNIFICANT CHANGE UP (ref 0.2–1.2)
BUN SERPL-MCNC: 47 MG/DL — HIGH (ref 10–20)
CALCIUM SERPL-MCNC: 9.1 MG/DL — SIGNIFICANT CHANGE UP (ref 8.4–10.4)
CHLORIDE SERPL-SCNC: 110 MMOL/L — SIGNIFICANT CHANGE UP (ref 98–110)
CO2 SERPL-SCNC: 22 MMOL/L — SIGNIFICANT CHANGE UP (ref 17–32)
CREAT SERPL-MCNC: 1.6 MG/DL — HIGH (ref 0.7–1.5)
CULTURE RESULTS: SIGNIFICANT CHANGE UP
CULTURE RESULTS: SIGNIFICANT CHANGE UP
EGFR: 29 ML/MIN/1.73M2 — LOW
EOSINOPHIL # BLD AUTO: 0.21 K/UL — SIGNIFICANT CHANGE UP (ref 0–0.7)
EOSINOPHIL NFR BLD AUTO: 3.1 % — SIGNIFICANT CHANGE UP (ref 0–8)
GLUCOSE SERPL-MCNC: 105 MG/DL — HIGH (ref 70–99)
HCT VFR BLD CALC: 34.3 % — LOW (ref 37–47)
HGB BLD-MCNC: 10.5 G/DL — LOW (ref 12–16)
IMM GRANULOCYTES NFR BLD AUTO: 2.4 % — HIGH (ref 0.1–0.3)
LYMPHOCYTES # BLD AUTO: 1.17 K/UL — LOW (ref 1.2–3.4)
LYMPHOCYTES # BLD AUTO: 17.5 % — LOW (ref 20.5–51.1)
MAGNESIUM SERPL-MCNC: 2.1 MG/DL — SIGNIFICANT CHANGE UP (ref 1.8–2.4)
MCHC RBC-ENTMCNC: 28.8 PG — SIGNIFICANT CHANGE UP (ref 27–31)
MCHC RBC-ENTMCNC: 30.6 G/DL — LOW (ref 32–37)
MCV RBC AUTO: 94 FL — SIGNIFICANT CHANGE UP (ref 81–99)
MONOCYTES # BLD AUTO: 0.61 K/UL — HIGH (ref 0.1–0.6)
MONOCYTES NFR BLD AUTO: 9.1 % — SIGNIFICANT CHANGE UP (ref 1.7–9.3)
NEUTROPHILS # BLD AUTO: 4.5 K/UL — SIGNIFICANT CHANGE UP (ref 1.4–6.5)
NEUTROPHILS NFR BLD AUTO: 67.3 % — SIGNIFICANT CHANGE UP (ref 42.2–75.2)
NRBC # BLD: 0 /100 WBCS — SIGNIFICANT CHANGE UP (ref 0–0)
PLATELET # BLD AUTO: 201 K/UL — SIGNIFICANT CHANGE UP (ref 130–400)
PMV BLD: 9.6 FL — SIGNIFICANT CHANGE UP (ref 7.4–10.4)
POTASSIUM SERPL-MCNC: 3.6 MMOL/L — SIGNIFICANT CHANGE UP (ref 3.5–5)
POTASSIUM SERPL-SCNC: 3.6 MMOL/L — SIGNIFICANT CHANGE UP (ref 3.5–5)
PROT SERPL-MCNC: 6.6 G/DL — SIGNIFICANT CHANGE UP (ref 6–8)
RBC # BLD: 3.65 M/UL — LOW (ref 4.2–5.4)
RBC # FLD: 14.7 % — HIGH (ref 11.5–14.5)
SODIUM SERPL-SCNC: 147 MMOL/L — HIGH (ref 135–146)
SPECIMEN SOURCE: SIGNIFICANT CHANGE UP
SPECIMEN SOURCE: SIGNIFICANT CHANGE UP
WBC # BLD: 6.69 K/UL — SIGNIFICANT CHANGE UP (ref 4.8–10.8)
WBC # FLD AUTO: 6.69 K/UL — SIGNIFICANT CHANGE UP (ref 4.8–10.8)

## 2024-04-19 PROCEDURE — 99232 SBSQ HOSP IP/OBS MODERATE 35: CPT | Mod: GC

## 2024-04-19 RX ADMIN — DONEPEZIL HYDROCHLORIDE 5 MILLIGRAM(S): 10 TABLET, FILM COATED ORAL at 22:44

## 2024-04-19 RX ADMIN — ATORVASTATIN CALCIUM 10 MILLIGRAM(S): 80 TABLET, FILM COATED ORAL at 22:43

## 2024-04-19 RX ADMIN — Medication 650 MILLIGRAM(S): at 22:43

## 2024-04-19 RX ADMIN — Medication 650 MILLIGRAM(S): at 05:21

## 2024-04-19 RX ADMIN — HEPARIN SODIUM 5000 UNIT(S): 5000 INJECTION INTRAVENOUS; SUBCUTANEOUS at 18:22

## 2024-04-19 RX ADMIN — LORATADINE 10 MILLIGRAM(S): 10 TABLET ORAL at 11:55

## 2024-04-19 RX ADMIN — Medication 30 MILLIGRAM(S): at 05:21

## 2024-04-19 RX ADMIN — Medication 600 MILLIGRAM(S): at 05:21

## 2024-04-19 RX ADMIN — Medication 30 MILLIGRAM(S): at 22:44

## 2024-04-19 RX ADMIN — HEPARIN SODIUM 5000 UNIT(S): 5000 INJECTION INTRAVENOUS; SUBCUTANEOUS at 05:21

## 2024-04-19 RX ADMIN — Medication 650 MILLIGRAM(S): at 14:40

## 2024-04-19 RX ADMIN — Medication 100 MILLIGRAM(S): at 22:44

## 2024-04-19 RX ADMIN — Medication 325 MILLIGRAM(S): at 11:55

## 2024-04-19 RX ADMIN — Medication 75 MICROGRAM(S): at 05:21

## 2024-04-19 RX ADMIN — CHLORHEXIDINE GLUCONATE 1 APPLICATION(S): 213 SOLUTION TOPICAL at 12:02

## 2024-04-19 NOTE — DISCHARGE NOTE PROVIDER - NSDCFUADDAPPT_GEN_ALL_CORE_FT
follow up with your PCP and specialists as scheduled . go to Ed in case of any new ort worsening symptoms

## 2024-04-19 NOTE — DISCHARGE NOTE PROVIDER - NSDCMRMEDTOKEN_GEN_ALL_CORE_FT
acetaminophen 325 mg oral tablet: 2 tab(s) orally every 6 hours, As needed, Mild Pain (1 - 3)  donepezil 5 mg oral tablet: 1 tab(s) orally once a day (at bedtime)  ferrous sulfate 325 mg (65 mg elemental iron) oral tablet: 1 tab(s) orally once a day  levothyroxine 75 mcg (0.075 mg) oral tablet: 1 tab(s) orally once a day  traZODone 100 mg oral tablet: orally once a day (at bedtime)  ZyrTEC 10 mg oral tablet: 1 tab(s) orally once a day (at bedtime)   acetaminophen 325 mg oral tablet: 2 tab(s) orally every 6 hours, As needed, Mild Pain (1 - 3)  benzonatate 100 mg oral capsule: 1 cap(s) orally 3 times a day  dextromethorphan-guaifenesin 10 mg-100 mg/10 mL oral liquid: 10 milliliter(s) orally every 8 hours as needed for  cough  donepezil 5 mg oral tablet: 1 tab(s) orally once a day (at bedtime)  ferrous sulfate 325 mg (65 mg elemental iron) oral tablet: 1 tab(s) orally once a day  levothyroxine 75 mcg (0.075 mg) oral tablet: 1 tab(s) orally once a day  traZODone 100 mg oral tablet: orally once a day (at bedtime)  ZyrTEC 10 mg oral tablet: 1 tab(s) orally once a day (at bedtime)   acetaminophen 325 mg oral tablet: 2 tab(s) orally every 6 hours, As needed, Mild Pain (1 - 3)  benzonatate 100 mg oral capsule: 1 cap(s) orally 3 times a day as needed for  cough  dextromethorphan-guaifenesin 10 mg-100 mg/10 mL oral liquid: 10 milliliter(s) orally every 8 hours as needed for  cough  donepezil 5 mg oral tablet: 1 tab(s) orally once a day (at bedtime)  ferrous sulfate 325 mg (65 mg elemental iron) oral tablet: 1 tab(s) orally once a day  levothyroxine 75 mcg (0.075 mg) oral tablet: 1 tab(s) orally once a day  traZODone 100 mg oral tablet: orally once a day (at bedtime)

## 2024-04-19 NOTE — DISCHARGE NOTE PROVIDER - NSDCCPCAREPLAN_GEN_ALL_CORE_FT
PRINCIPAL DISCHARGE DIAGNOSIS  Diagnosis: Influenza B  Assessment and Plan of Treatment: You came to the hospital on 4/14/24 with fever and sepsis. You were found to test positive for influenza B and completed 5-day course of tamiflu.      SECONDARY DISCHARGE DIAGNOSES  Diagnosis: Fall at home  Assessment and Plan of Treatment: You came to the hospital after falls. On imaging, there were no acute fractures.    Diagnosis: Laceration of head  Assessment and Plan of Treatment:     Diagnosis: SOILA (acute kidney injury)  Assessment and Plan of Treatment: During your hospitalization, you were found to have acute kidney injury  and hypernatremia and which was treated with IV fluids.    Diagnosis: Pancreatic lesion  Assessment and Plan of Treatment: During your hospitalization, a stable pancreatic cystic lesion was seen on CT scan. It is recommended that you follow up outpatient for an MRI to better visualize the pancreatic lesion.     PRINCIPAL DISCHARGE DIAGNOSIS  Diagnosis: Influenza B  Assessment and Plan of Treatment: You came to the hospital on 4/14/24 with fever and sepsis. You were found to test positive for influenza B and completed 5-day course of tamiflu.      SECONDARY DISCHARGE DIAGNOSES  Diagnosis: Fall at home  Assessment and Plan of Treatment: You came to the hospital after falls. On imaging, there were no acute fractures.    Diagnosis: Laceration of head  Assessment and Plan of Treatment: you had staples placed on your head; you need to remove that by 4/24    Diagnosis: SOILA (acute kidney injury)  Assessment and Plan of Treatment: During your hospitalization, you were found to have acute kidney injury  and hypernatremia and which was treated with IV fluids.    Diagnosis: Pancreatic lesion  Assessment and Plan of Treatment: During your hospitalization, a stable pancreatic cystic lesion was seen on CT scan. It is recommended that you follow up outpatient for an MRI to better visualize the pancreatic lesion.     PRINCIPAL DISCHARGE DIAGNOSIS  Diagnosis: Influenza B  Assessment and Plan of Treatment: You came to the hospital on 4/14/24 with fever and sepsis. You were found to test positive for influenza B and completed 5-day course of tamiflu.      SECONDARY DISCHARGE DIAGNOSES  Diagnosis: Fall at home  Assessment and Plan of Treatment: You came to the hospital after falls. On imaging, there were no acute fractures.    Diagnosis: Laceration of head  Assessment and Plan of Treatment: you had staples placed on your head; removed on 4/24    Diagnosis: SOILA (acute kidney injury)  Assessment and Plan of Treatment: During your hospitalization, you were found to have acute kidney injury  and hypernatremia and which was treated with IV fluids.    Diagnosis: Pancreatic lesion  Assessment and Plan of Treatment: During your hospitalization, a stable pancreatic cystic lesion was seen on CT scan. It is recommended that you follow up outpatient for an MRI to better visualize the pancreatic lesion.

## 2024-04-19 NOTE — DISCHARGE NOTE PROVIDER - CARE PROVIDERS DIRECT ADDRESSES
[email or web address]

## 2024-04-19 NOTE — DISCHARGE NOTE PROVIDER - ATTENDING DISCHARGE PHYSICAL EXAMINATION:
patient seen at bedside. waiting placement. available today. will be discharged to facility. family aware and agreement with plan.

## 2024-04-19 NOTE — DISCHARGE NOTE PROVIDER - CARE PROVIDER_API CALL
Mary Minor  Geriatric Medicine  [address] [place] [address]  Phone: [phone]  Fax: [phone]  Follow Up Time: 2 weeks

## 2024-04-19 NOTE — DISCHARGE NOTE PROVIDER - HOSPITAL COURSE
Patient is a 89-year-old female with past medical history of dementia CKD anemia hypothyroid hypertension hyperlipidemia brought in by ambulance from home s/p mechanical fall. + HT, - LOC, -AC use. Very difficult to take history as pt confused and goes on tangents unrelated.  Pt states she was getting out of bed and slipped subsequently falling back and hitting her head on night table. Denies any pre-syncopal events, cp, sob, abdominal pain, n/v/d or dysuria numbness tingling or weakness. Admits to head pain and right knee pain. Pt found to be febrile in ED. Aid had flu.     .5, 89, 172/74, 96% RA  Labs Hg 10.2 at baseline, trop 52 times 2, creat- 1.4, UA very mildly positive,   CXR- prominent interstitium  CT abd- No acute trauma in the chest, abdomen, or pelvis.  Overall stable findings as described above.  Cystic mass within the pancreatic body, likely unchanged from the prior   exam. If not performed, consider correlation with MRI of the pancreas on   an outpatient basis.  Xray pelvis: Bones are osteopenic. There are chronic fractures of the superior and   inferior pubic rami on the left. Postsurgical changes within the proximal   femurs bilaterally. Healing fractures are noted within the proximal   femurs. Vascular calcifications. No acute fracture.  CT head: Right parietal scalp hematoma. No radiopaque foreign body. No   acute calvarial fracture. No evidence of acute transcortical infarct,   acute intracranial hemorrhage or mass effect.  CT cervical spine: No acute fracture or traumatic subluxation.  In ED received vanc cefepime tylenol     #Hypoxic respiratory failure, secondary to Influenza B  - RVP pending, was on Tamiflu at home  - completed 5-day course of tamiflu   - UCx and BCx neg  - UA very mildly positive, family denies that she had symptoms of UTI  - CT no evidence cystitis or pyelo  - s/p ceftriaxone   - ID consult appreciated     #Dementia  - trazodone and donepezil  - first noticed 2years ago, has gotten worse in past 6 months  - at baseline walks with a walker but has been falling. Has an aid 7hrs a day    #Seasonal allergies  - cont cetirizine    #SOILA on CKD, improving  #Hypernatremia, improving   - avoid nephrotoxic drugs  - got IVF during admission  - bicarb 650 tid     #Anemia  - iron studies    #Hypothyroid  - cont levothyroxine    Patient is medically stable and ready for discharge. Patient is a 89-year-old female with past medical history of dementia CKD anemia hypothyroid hypertension hyperlipidemia brought in by ambulance from home s/p mechanical fall. + HT, - LOC, -AC use. Very difficult to take history as pt confused and goes on tangents unrelated.  Pt states she was getting out of bed and slipped subsequently falling back and hitting her head on night table. Denies any pre-syncopal events, cp, sob, abdominal pain, n/v/d or dysuria numbness tingling or weakness. Admits to head pain and right knee pain. Pt found to be febrile in ED. Aid had flu.     .5, 89, 172/74, 96% RA  Labs Hg 10.2 at baseline, trop 52 times 2, creat- 1.4, UA very mildly positive,   CXR- prominent interstitium  CT abd- No acute trauma in the chest, abdomen, or pelvis.  Overall stable findings as described above.  Cystic mass within the pancreatic body, likely unchanged from the prior   exam. If not performed, consider correlation with MRI of the pancreas on   an outpatient basis.  Xray pelvis: Bones are osteopenic. There are chronic fractures of the superior and   inferior pubic rami on the left. Postsurgical changes within the proximal   femurs bilaterally. Healing fractures are noted within the proximal   femurs. Vascular calcifications. No acute fracture.  CT head: Right parietal scalp hematoma. No radiopaque foreign body. No   acute calvarial fracture. No evidence of acute transcortical infarct,   acute intracranial hemorrhage or mass effect.  CT cervical spine: No acute fracture or traumatic subluxation.  In ED received vanc cefepime tylenol     Diagnosis    # Sepsis- due to influenza infection- resolved  # acute hypoxic resp failure- improved; weaned off oxygen  #  UTI- ruled out  ct chest, abd - no acute process  tamiflu for 5 days- s/p completed  urine and blood cultures- no growth    # patient has staples in scalp placed on 4/14 for lacerated wound; - assess prior to discharge and remove if healing; if not by 4/24 to assess again for removal    #Fall  cth c spine neg  trauma w/u no acute fx    #  SOILA (acute kidney injury).  presumed pre-renal- improved  # hypernatremia- resolved    # Abnormal CAT scan. -  stable pancreatic cystic lesion  outpt f/u with mri.    # Dementia.   donepezil 5  trazodone 100 qhs.    # Hypothyroidism-continue synthroid 75.  #hypertension   # hyperlipidemia            Patient is medically stable and ready for discharge. Patient is a 89-year-old female with past medical history of dementia CKD anemia hypothyroid hypertension hyperlipidemia brought in by ambulance from home s/p mechanical fall. + HT, - LOC, -AC use. Very difficult to take history as pt confused and goes on tangents unrelated.  Pt states she was getting out of bed and slipped subsequently falling back and is    # Sepsis- due to influenza infection- resolved  # acute hypoxic resp failure- improved; weaned off oxygen  #  UTI- ruled out  ct chest, abd - no acute process  tamiflu for 5 days- s/p completed  urine and blood cultures- no growth    # patient has staples in scalp placed on 4/14 for lacerated wound; OP follow up with PCP     #Fall  cth c spine neg  trauma w/u no acute fx    #  SOILA (acute kidney injury).  presumed pre-renal- improved  # hypernatremia- resolved    # Abnormal CAT scan. -  stable pancreatic cystic lesion  outpt f/u with mri.    # Dementia.   donepezil 5  trazodone 100 qhs.    # Hypothyroidism-continue synthroid 75.  #hypertension   # hyperlipidemia            Patient is medically stable and ready for discharge.

## 2024-04-19 NOTE — PROGRESS NOTE ADULT - ASSESSMENT
Patient is a 89-year-old female with past medical history of dementia recurrent UTI's CKD anemia hypothyroid hypertension hyperlipidemia recurrent UTI's brought in by ambulance from home s/p mechanical fall. + HT, - LOC, -AC use. Very difficult to take history as pt confused and goes on tangents unrelated.  Pt states she was getting out of bed and slipped subsequently falling back and hitting her head on night table. Denies any pre-syncopal events, cp, sob, abdominal pain, n/v/d or dysuria numbness tingling or weakness. Admits to head pain and right knee pain. Pt found to be febrile in ED and complains of cough. Aid had flu.     .5, 89, 172/74, 96% RA  Labs Hg 10.2 at baseline, trop 52 times 2, creat- 1.4, UA very mildly positive,   CXR- prominent interstitium  CT abd- No acute trauma in the chest, abdomen, or pelvis. Overall stable findings as described above. Cystic mass within the pancreatic body, likely unchanged from the prior exam. If not performed, consider correlation with MRI of the pancreas on an outpatient basis.  Xray pelvis: Bones are osteopenic. There are chronic fractures of the superior and inferior pubic rami on the left. Postsurgical changes within the proximal femurs bilaterally. Healing fractures are noted within the proximal femurs. Vascular calcifications. No acute fracture.  CT head: Right parietal scalp hematoma. No radiopaque foreign body. No acute calvarial fracture. No evidence of acute transcortical infarct, acute intracranial hemorrhage or mass effect.  CT cervical spine: No acute fracture or traumatic subluxation.  In ED received vanc cefepime tylenol     #Hypoxic respiratory failure, secondary to Influenza B  - RVP pending, was on Tamiflu at home  - cont tamiflu for 5d duration   - UCx and BCx neg  - UA very mildly positive, family denies that she had symptoms of UTI  - CT no evidence cystitis or pyelo  - s/p ceftriaxone   - ID consult appreciated   - on 4L NC this AM     #Dementia  - trazodone and donepezil  - first noticed 2years ago, has gotten worse in past 6 months  - at baseline walks with a walker but has been falling. Has an aid 7hrs a day    #Seasonal allergies  - cont cetirizine    #SOILA on CKD  #Hypernatremia  - avoid nephrotoxic drugs  - get bladder scan  - Switched IVF to D5 d/t hypernatremia   - bicarb 650 tid     #Anemia  - iron studies    #Hypothyroid  - cont levothyroxine    #Diet- minced & moist, 1:1 feeds  #DVT proph- heparin  #Med rec done with daughter Ethel [phone]

## 2024-04-20 LAB
ALBUMIN SERPL ELPH-MCNC: 3.1 G/DL — LOW (ref 3.5–5.2)
ALP SERPL-CCNC: 56 U/L — SIGNIFICANT CHANGE UP (ref 30–115)
ALT FLD-CCNC: 9 U/L — SIGNIFICANT CHANGE UP (ref 0–41)
ANION GAP SERPL CALC-SCNC: 12 MMOL/L — SIGNIFICANT CHANGE UP (ref 7–14)
AST SERPL-CCNC: 30 U/L — SIGNIFICANT CHANGE UP (ref 0–41)
BASOPHILS # BLD AUTO: 0.04 K/UL — SIGNIFICANT CHANGE UP (ref 0–0.2)
BASOPHILS NFR BLD AUTO: 0.6 % — SIGNIFICANT CHANGE UP (ref 0–1)
BILIRUB SERPL-MCNC: 0.4 MG/DL — SIGNIFICANT CHANGE UP (ref 0.2–1.2)
BUN SERPL-MCNC: 39 MG/DL — HIGH (ref 10–20)
CALCIUM SERPL-MCNC: 8.8 MG/DL — SIGNIFICANT CHANGE UP (ref 8.4–10.5)
CHLORIDE SERPL-SCNC: 105 MMOL/L — SIGNIFICANT CHANGE UP (ref 98–110)
CO2 SERPL-SCNC: 23 MMOL/L — SIGNIFICANT CHANGE UP (ref 17–32)
CREAT SERPL-MCNC: 1.5 MG/DL — SIGNIFICANT CHANGE UP (ref 0.7–1.5)
EGFR: 32 ML/MIN/1.73M2 — LOW
EOSINOPHIL # BLD AUTO: 0.3 K/UL — SIGNIFICANT CHANGE UP (ref 0–0.7)
EOSINOPHIL NFR BLD AUTO: 4.6 % — SIGNIFICANT CHANGE UP (ref 0–8)
GLUCOSE SERPL-MCNC: 121 MG/DL — HIGH (ref 70–99)
HCT VFR BLD CALC: 33.4 % — LOW (ref 37–47)
HGB BLD-MCNC: 10.3 G/DL — LOW (ref 12–16)
IMM GRANULOCYTES NFR BLD AUTO: 5.6 % — HIGH (ref 0.1–0.3)
LYMPHOCYTES # BLD AUTO: 1.33 K/UL — SIGNIFICANT CHANGE UP (ref 1.2–3.4)
LYMPHOCYTES # BLD AUTO: 20.2 % — LOW (ref 20.5–51.1)
MAGNESIUM SERPL-MCNC: 1.9 MG/DL — SIGNIFICANT CHANGE UP (ref 1.8–2.4)
MCHC RBC-ENTMCNC: 28.5 PG — SIGNIFICANT CHANGE UP (ref 27–31)
MCHC RBC-ENTMCNC: 30.8 G/DL — LOW (ref 32–37)
MCV RBC AUTO: 92.5 FL — SIGNIFICANT CHANGE UP (ref 81–99)
MONOCYTES # BLD AUTO: 0.68 K/UL — HIGH (ref 0.1–0.6)
MONOCYTES NFR BLD AUTO: 10.4 % — HIGH (ref 1.7–9.3)
NEUTROPHILS # BLD AUTO: 3.85 K/UL — SIGNIFICANT CHANGE UP (ref 1.4–6.5)
NEUTROPHILS NFR BLD AUTO: 58.6 % — SIGNIFICANT CHANGE UP (ref 42.2–75.2)
NRBC # BLD: 0 /100 WBCS — SIGNIFICANT CHANGE UP (ref 0–0)
PLATELET # BLD AUTO: 223 K/UL — SIGNIFICANT CHANGE UP (ref 130–400)
PMV BLD: 10.1 FL — SIGNIFICANT CHANGE UP (ref 7.4–10.4)
POTASSIUM SERPL-MCNC: 3.5 MMOL/L — SIGNIFICANT CHANGE UP (ref 3.5–5)
POTASSIUM SERPL-SCNC: 3.5 MMOL/L — SIGNIFICANT CHANGE UP (ref 3.5–5)
PROT SERPL-MCNC: 6.3 G/DL — SIGNIFICANT CHANGE UP (ref 6–8)
RBC # BLD: 3.61 M/UL — LOW (ref 4.2–5.4)
RBC # FLD: 14.6 % — HIGH (ref 11.5–14.5)
SODIUM SERPL-SCNC: 140 MMOL/L — SIGNIFICANT CHANGE UP (ref 135–146)
WBC # BLD: 6.57 K/UL — SIGNIFICANT CHANGE UP (ref 4.8–10.8)
WBC # FLD AUTO: 6.57 K/UL — SIGNIFICANT CHANGE UP (ref 4.8–10.8)

## 2024-04-20 PROCEDURE — 99232 SBSQ HOSP IP/OBS MODERATE 35: CPT

## 2024-04-20 RX ADMIN — Medication 100 MILLIGRAM(S): at 22:03

## 2024-04-20 RX ADMIN — LORATADINE 10 MILLIGRAM(S): 10 TABLET ORAL at 12:37

## 2024-04-20 RX ADMIN — Medication 325 MILLIGRAM(S): at 12:37

## 2024-04-20 RX ADMIN — ATORVASTATIN CALCIUM 10 MILLIGRAM(S): 80 TABLET, FILM COATED ORAL at 22:03

## 2024-04-20 RX ADMIN — HEPARIN SODIUM 5000 UNIT(S): 5000 INJECTION INTRAVENOUS; SUBCUTANEOUS at 17:22

## 2024-04-20 RX ADMIN — Medication 75 MICROGRAM(S): at 05:45

## 2024-04-20 RX ADMIN — Medication 600 MILLIGRAM(S): at 05:45

## 2024-04-20 RX ADMIN — HEPARIN SODIUM 5000 UNIT(S): 5000 INJECTION INTRAVENOUS; SUBCUTANEOUS at 05:45

## 2024-04-20 RX ADMIN — DONEPEZIL HYDROCHLORIDE 5 MILLIGRAM(S): 10 TABLET, FILM COATED ORAL at 22:03

## 2024-04-20 RX ADMIN — Medication 600 MILLIGRAM(S): at 17:23

## 2024-04-20 RX ADMIN — Medication 650 MILLIGRAM(S): at 05:45

## 2024-04-20 RX ADMIN — CHLORHEXIDINE GLUCONATE 1 APPLICATION(S): 213 SOLUTION TOPICAL at 12:51

## 2024-04-20 RX ADMIN — Medication 30 MILLIGRAM(S): at 05:45

## 2024-04-20 NOTE — PROGRESS NOTE ADULT - ASSESSMENT
89F PMHx dementia, hypothyroidism, HTN, HLD here with fall, found to have sepsis, present on admission, with acute hypoxic resp failure; due to influenza b. SOILA.    # Sepsis- due to influenza infection  # acute hypoxic resp failure- improved; weaned off oxygen  #  UTI- ruled out  ct chest, abd - no acute process  tamiflu for 5 days- s/p completed  urine and blood cultures- no growth  monitor o2 sat    #Fall.   in setting of sepsis  cth c spine neg  trauma w/u no acute fx  orthostatics  PT evalauted    #  SOILA (acute kidney injury).  presumed pre-renal- improved  # ag met acidosis with hyperchloremic acidosis  # hypernatremia- resolved  s/c IV fluids  discont sod bicarb     # Abnormal CAT scan. -  stable pancreatic cystic lesion  outpt f/u with mri.    # Dementia.   donepezil 5  trazodone 100 qhs.    # Hypothyroidism-continue synthroid 75.  #hypertension   # hyperlipidemia    DVT prophylaxis.  subq hep.  DNR and DNI  TIme spent 35 mnts .     Discharge plan d/w case management  Pending: home aide and hospice arrangement; possibly by Monday

## 2024-04-21 PROCEDURE — 99232 SBSQ HOSP IP/OBS MODERATE 35: CPT | Mod: GW

## 2024-04-21 RX ADMIN — Medication 600 MILLIGRAM(S): at 18:43

## 2024-04-21 RX ADMIN — Medication 100 MILLIGRAM(S): at 06:15

## 2024-04-21 RX ADMIN — ATORVASTATIN CALCIUM 10 MILLIGRAM(S): 80 TABLET, FILM COATED ORAL at 22:13

## 2024-04-21 RX ADMIN — Medication 100 MILLIGRAM(S): at 22:13

## 2024-04-21 RX ADMIN — CHLORHEXIDINE GLUCONATE 1 APPLICATION(S): 213 SOLUTION TOPICAL at 12:57

## 2024-04-21 RX ADMIN — Medication 325 MILLIGRAM(S): at 12:47

## 2024-04-21 RX ADMIN — Medication 100 MILLIGRAM(S): at 14:54

## 2024-04-21 RX ADMIN — HEPARIN SODIUM 5000 UNIT(S): 5000 INJECTION INTRAVENOUS; SUBCUTANEOUS at 06:15

## 2024-04-21 RX ADMIN — DONEPEZIL HYDROCHLORIDE 5 MILLIGRAM(S): 10 TABLET, FILM COATED ORAL at 22:13

## 2024-04-21 RX ADMIN — Medication 600 MILLIGRAM(S): at 06:15

## 2024-04-21 RX ADMIN — Medication 75 MICROGRAM(S): at 06:15

## 2024-04-21 RX ADMIN — HEPARIN SODIUM 5000 UNIT(S): 5000 INJECTION INTRAVENOUS; SUBCUTANEOUS at 18:44

## 2024-04-21 RX ADMIN — LORATADINE 10 MILLIGRAM(S): 10 TABLET ORAL at 12:47

## 2024-04-21 NOTE — PROGRESS NOTE ADULT - ASSESSMENT
89F PMHx dementia, hypothyroidism, HTN, HLD here with fall, found to have sepsis, present on admission, with acute hypoxic resp failure; due to influenza b. SOILA.    # Sepsis- due to influenza infection- resolved  # acute hypoxic resp failure- improved; weaned off oxygen  #  UTI- ruled out  ct chest, abd - no acute process  tamiflu for 5 days- s/p completed  urine and blood cultures- no growth  monitor o2 sat    # patient has staples in scalp placed on 4/14 for lacerated wound; - assess prior to discharge and remove if healing; if not by 4/24 to assess again for removal    #Fall.   in setting of sepsis  cth c spine neg  trauma w/u no acute fx  orthostatics  PT evalauted    #  SOILA (acute kidney injury).  presumed pre-renal- improved  # hypernatremia- resolved    # Abnormal CAT scan. -  stable pancreatic cystic lesion  outpt f/u with mri.    # Dementia.   donepezil 5  trazodone 100 qhs.    # Hypothyroidism-continue synthroid 75.  #hypertension   # hyperlipidemia    DVT prophylaxis.  subq hep.  DNR and DNI  TIme spent 35 mnts .     Discharge plan d/w case management  Pending: home aide and hospice arrangement; possibly by Monday   Plan of care d/w daughter at bedside in detail  TIme spent 35 mnts

## 2024-04-21 NOTE — PROGRESS NOTE ADULT - ASSESSMENT
Patient is a 89-year-old female with past medical history of dementia recurrent UTI's CKD anemia hypothyroid hypertension hyperlipidemia recurrent UTI's brought in by ambulance from home s/p mechanical fall. + HT, - LOC, -AC use. Very difficult to take history as pt confused and goes on tangents unrelated.  Pt states she was getting out of bed and slipped subsequently falling back and hitting her head on night table. Denies any pre-syncopal events, cp, sob, abdominal pain, n/v/d or dysuria numbness tingling or weakness. Admits to head pain and right knee pain. Pt found to be febrile in ED and complains of cough. Aid had flu.     .5, 89, 172/74, 96% RA  Labs Hg 10.2 at baseline, trop 52 times 2, creat- 1.4, UA very mildly positive,   CXR- prominent interstitium  CT abd- No acute trauma in the chest, abdomen, or pelvis. Overall stable findings as described above. Cystic mass within the pancreatic body, likely unchanged from the prior exam. If not performed, consider correlation with MRI of the pancreas on an outpatient basis.  Xray pelvis: Bones are osteopenic. There are chronic fractures of the superior and inferior pubic rami on the left. Postsurgical changes within the proximal femurs bilaterally. Healing fractures are noted within the proximal femurs. Vascular calcifications. No acute fracture.  CT head: Right parietal scalp hematoma. No radiopaque foreign body. No acute calvarial fracture. No evidence of acute transcortical infarct, acute intracranial hemorrhage or mass effect.  CT cervical spine: No acute fracture or traumatic subluxation.  In ED received vanc cefepime tylenol     #Hypoxic respiratory failure, secondary to Influenza B  - was on Tamiflu at home  - finished 5-day course of tamiflu   - UCx and BCx neg  - UA very mildly positive, family denies that she had symptoms of UTI  - CT no evidence cystitis or pyelo  - s/p ceftriaxone     #Dementia  - trazodone and donepezil  - first noticed 2years ago, has gotten worse in past 6 months  - at baseline walks with a walker but has been falling. Has an aid 7hrs a day    #Seasonal allergies  - cont cetirizine    #SOILA on CKD  #Hypernatremia  - avoid nephrotoxic drugs  - get bladder scan  - Switched IVF to D5 d/t hypernatremia   - bicarb 650 tid     #Anemia  - iron studies    #Hypothyroid  - cont levothyroxine    #Diet- minced & moist, 1:1 feeds  #DVT proph- heparin  #Med rec done with daughter Ethel [phone]

## 2024-04-22 LAB
CULTURE RESULTS: SIGNIFICANT CHANGE UP
CULTURE RESULTS: SIGNIFICANT CHANGE UP
SPECIMEN SOURCE: SIGNIFICANT CHANGE UP
SPECIMEN SOURCE: SIGNIFICANT CHANGE UP

## 2024-04-22 PROCEDURE — 99231 SBSQ HOSP IP/OBS SF/LOW 25: CPT | Mod: GC

## 2024-04-22 RX ORDER — GUAIFENESIN/DEXTROMETHORPHAN 600MG-30MG
10 TABLET, EXTENDED RELEASE 12 HR ORAL
Qty: 150 | Refills: 0
Start: 2024-04-22 | End: 2024-04-26

## 2024-04-22 RX ADMIN — Medication 100 MILLIGRAM(S): at 21:38

## 2024-04-22 RX ADMIN — Medication 600 MILLIGRAM(S): at 17:39

## 2024-04-22 RX ADMIN — DONEPEZIL HYDROCHLORIDE 5 MILLIGRAM(S): 10 TABLET, FILM COATED ORAL at 21:39

## 2024-04-22 RX ADMIN — LORATADINE 10 MILLIGRAM(S): 10 TABLET ORAL at 11:12

## 2024-04-22 RX ADMIN — Medication 100 MILLIGRAM(S): at 06:03

## 2024-04-22 RX ADMIN — ATORVASTATIN CALCIUM 10 MILLIGRAM(S): 80 TABLET, FILM COATED ORAL at 21:38

## 2024-04-22 RX ADMIN — Medication 75 MICROGRAM(S): at 06:03

## 2024-04-22 RX ADMIN — CHLORHEXIDINE GLUCONATE 1 APPLICATION(S): 213 SOLUTION TOPICAL at 12:58

## 2024-04-22 RX ADMIN — Medication 325 MILLIGRAM(S): at 11:13

## 2024-04-22 RX ADMIN — Medication 600 MILLIGRAM(S): at 06:04

## 2024-04-22 RX ADMIN — Medication 100 MILLIGRAM(S): at 12:59

## 2024-04-22 RX ADMIN — HEPARIN SODIUM 5000 UNIT(S): 5000 INJECTION INTRAVENOUS; SUBCUTANEOUS at 17:39

## 2024-04-22 RX ADMIN — HEPARIN SODIUM 5000 UNIT(S): 5000 INJECTION INTRAVENOUS; SUBCUTANEOUS at 06:03

## 2024-04-22 NOTE — PROGRESS NOTE ADULT - ASSESSMENT
89F PMHx dementia, hypothyroidism, HTN, HLD, CKD3a here with fall, found to have sepsis, present on admission, with acute hypoxic resp failure; due to influenza b. SOILA.    # Sepsis- due to influenza infection- resolved  # acute hypoxic resp failure- improved; weaned off oxygen  #  UTI- ruled out  ct chest, abd - no acute process  tamiflu for 5 days- completed  urine and blood cultures- no growth  on room air    # patient has staples in scalp placed on 4/14 for lacerated wound; - assess prior to discharge and remove if healing; if not by 4/24 to assess again for removal    #Fall.   in setting of sepsis  cth c spine neg  trauma w/u no acute fx  orthostatics  PT evaluated: Home PT with 24 hr support or sub acute rehab  R thigh hematoma- stable    #  SOILA (acute kidney injury).  pre-renal- improved  # hypernatremia- resolved    # Abnormal CAT scan. -  stable pancreatic cystic lesion  outpt f/u with mri.    # Dementia.   donepezil 5  trazodone 100 qhs.    # Hypothyroidism-continue synthroid 75.  #hypertension   # hyperlipidemia    DVT prophylaxis.  subq hep.  DNR and DNI    Discharge plan d/w case management  Pending: home aide and hospice arrangement; medically cleared for DC  Plan of care d/w daughter at bedside in detail

## 2024-04-22 NOTE — PROGRESS NOTE ADULT - ASSESSMENT
89F PMHx dementia, hypothyroidism, HTN, HLD here with fall, found to have sepsis, present on admission, with acute hypoxic resp failure; due to influenza b. SOILA.    # Sepsis- due to influenza infection- resolved  # acute hypoxic resp failure- improved; weaned off oxygen  #  UTI- ruled out  ct chest, abd - no acute process  tamiflu for 5 days- s/p completed  urine and blood cultures- no growth  monitor o2 sat    # patient has staples in scalp placed on 4/14 for lacerated wound; - assess prior to discharge and remove if healing; if not by 4/24 to assess again for removal    #Fall.   in setting of sepsis  cth c spine neg  trauma w/u no acute fx  orthostatics  PT evalauted    #  SOILA (acute kidney injury).  presumed pre-renal- improved  # hypernatremia- resolved    # Abnormal CAT scan. -  stable pancreatic cystic lesion  outpt f/u with mri.    # Dementia.   donepezil 5  trazodone 100 qhs.    # Hypothyroidism-continue synthroid 75.  #hypertension   # hyperlipidemia    DVT prophylaxis.  subq hep.  DNR and DNI  TIme spent 35 mnts .     Discharge plan d/w case management  Pending: home aide and hospice arrangement; discharge plan

## 2024-04-23 PROCEDURE — 99231 SBSQ HOSP IP/OBS SF/LOW 25: CPT

## 2024-04-23 RX ADMIN — LORATADINE 10 MILLIGRAM(S): 10 TABLET ORAL at 11:26

## 2024-04-23 RX ADMIN — DONEPEZIL HYDROCHLORIDE 5 MILLIGRAM(S): 10 TABLET, FILM COATED ORAL at 21:52

## 2024-04-23 RX ADMIN — Medication 100 MILLIGRAM(S): at 21:48

## 2024-04-23 RX ADMIN — CHLORHEXIDINE GLUCONATE 1 APPLICATION(S): 213 SOLUTION TOPICAL at 11:27

## 2024-04-23 RX ADMIN — Medication 325 MILLIGRAM(S): at 11:26

## 2024-04-23 RX ADMIN — HEPARIN SODIUM 5000 UNIT(S): 5000 INJECTION INTRAVENOUS; SUBCUTANEOUS at 17:27

## 2024-04-23 RX ADMIN — Medication 75 MICROGRAM(S): at 05:24

## 2024-04-23 RX ADMIN — Medication 600 MILLIGRAM(S): at 05:24

## 2024-04-23 RX ADMIN — Medication 600 MILLIGRAM(S): at 17:28

## 2024-04-23 RX ADMIN — HEPARIN SODIUM 5000 UNIT(S): 5000 INJECTION INTRAVENOUS; SUBCUTANEOUS at 05:28

## 2024-04-23 RX ADMIN — ATORVASTATIN CALCIUM 10 MILLIGRAM(S): 80 TABLET, FILM COATED ORAL at 21:48

## 2024-04-23 NOTE — PROGRESS NOTE ADULT - ASSESSMENT
89F PMHx dementia, hypothyroidism, HTN, HLD, CKD3a here with fall, found to have sepsis, present on admission, with acute hypoxic resp failure; due to influenza b. SOILA.    # Sepsis- due to influenza infection- resolved  # acute hypoxic resp failure- improved; weaned off oxygen  #  UTI- ruled out  ct chest, abd - no acute process  tamiflu for 5 days- completed  urine and blood cultures- no growth  on room air    # patient has staples in scalp placed on 4/14 for lacerated wound; - assess prior to discharge and remove if healing; if not by 4/24 to assess again for removal    #Fall.   in setting of sepsis  cth c spine neg  trauma w/u no acute fx  orthostatics negative  PT evaluated: Home PT with 24 hr support or sub acute rehab  R thigh hematoma- stable    #  SOILA (acute kidney injury).  pre-renal- improved  # hypernatremia- resolved    # Abnormal CAT scan. -  stable pancreatic cystic lesion  outpt f/u with mri.    # Dementia.   donepezil 5  trazodone 100 qhs.    # Hypothyroidism-continue synthroid 75.  #hypertension   # hyperlipidemia    DVT prophylaxis.  subq hep.  DNR and DNI    Discharge plan d/w case management  Pending: hospice arrangement; medically cleared for DC

## 2024-04-23 NOTE — PROGRESS NOTE ADULT - REASON FOR ADMISSION
fall and fever of unknown source

## 2024-04-23 NOTE — PROGRESS NOTE ADULT - ATTENDING COMMENTS
89F PMHx dementia, hypothyroidism, HTN, HLD here with fall, found to have sepsis, present on admission, with acute hypoxic resp failure; due to influenza b. SOILA.    # Sepsis- due to influenza infection- resolved  # acute hypoxic resp failure- improved; weaned off oxygen  #  UTI- ruled out  ct chest, abd - no acute process  tamiflu for 5 days- s/p completed  urine and blood cultures- no growth  monitor o2 sat    # patient has staples in scalp placed on 4/14 for lacerated wound; - assess prior to discharge and remove if healing; if not by 4/24 to assess again for removal    #Fall.   in setting of sepsis  cth c spine neg  trauma w/u no acute fx  orthostatics  PT evalauted    #  SOILA (acute kidney injury).  presumed pre-renal- improved  # hypernatremia- resolved    # Abnormal CAT scan. -  stable pancreatic cystic lesion  outpt f/u with mri.    # Dementia.   donepezil 5  trazodone 100 qhs.    # Hypothyroidism-continue synthroid 75.  #hypertension   # hyperlipidemia    DVT prophylaxis.  subq hep.  DNR and DNI  TIme spent 35 mnts .     Discharge plan d/w case management  Pending: home aide and hospice arrangement; discharge plan
89F PMHx dementia, hypothyroidism, HTN, HLD here with fall, found to have sepsis, present on admission, with acute hypoxic resp failure; due to influenza b. SIOLA.    MEDICATIONS  (STANDING):  atorvastatin 10 milliGRAM(s) Oral at bedtime  chlorhexidine 2% Cloths 1 Application(s) Topical daily  dextrose 5%. 1000 milliLiter(s) (75 mL/Hr) IV Continuous <Continuous>  donepezil 5 milliGRAM(s) Oral at bedtime  ferrous    sulfate 325 milliGRAM(s) Oral daily  guaiFENesin  milliGRAM(s) Oral every 12 hours  heparin   Injectable 5000 Unit(s) SubCutaneous every 12 hours  influenza  Vaccine (HIGH DOSE) 0.7 milliLiter(s) IntraMuscular once  levothyroxine 75 MICROGram(s) Oral daily  loratadine 10 milliGRAM(s) Oral daily  oseltamivir 30 milliGRAM(s) Oral two times a day  sodium bicarbonate 650 milliGRAM(s) Oral three times a day  traZODone 100 milliGRAM(s) Oral at bedtime    MEDICATIONS  (PRN):  acetaminophen     Tablet .. 650 milliGRAM(s) Oral every 6 hours PRN Temp greater or equal to 38C (100.4F), Mild Pain (1 - 3)  acetaminophen  Suppository .. 650 milliGRAM(s) Rectal every 8 hours PRN Temp greater or equal to 38.5C (101.3F)    Diagnosis    # Sepsis- due to influenza infection  # acute hypoxic resp failure- improved; weaned off oxygen  #  UTI- ruled out  ct chest, abd - no acute process  tamiflu for 5 days- will complete today  urine and blood cultures- no growth  monitor o2 sat    #Fall.   in setting of sepsis  cth c spine neg  trauma w/u no acute fx  orthostatics  PT evalauted    #  SOILA (acute kidney injury).  presumed pre-renal- improved  # ag met acidosis with hyperchloremic acidosis  # hypernatremia  change fluids to D5W, monitor BMP  cont bicarb 650 tid  monitor bladder scan    # Abnormal CAT scan. -  stable pancreatic cystic lesion  outpt f/u with mri.    # Dementia.   donepezil 5  trazodone 100 qhs.    # Hypothyroidism-continue synthroid 75.  #hypertension - monitor  # hyperlipidemia    DVT prophylaxis.  subq hep.  DNR and DNI  TIme spent 35 mnts .   paln of care d/w daughter; discharge plan to home possibly tomorrow
89F PMHx dementia, hypothyroidism, HTN, HLD here with fall, found to have sepsis, present on admission, with acute hypoxic resp failure; due to influenza b. SOILA.    Diagnosis    # Sepsis- possibly due to influenza infection  # acute hypoxic resp failure  #  UTI- ruled out  ct chest, abd - no acute process  tamiflu for 5 days  urine and blood cultures- no growth  - ID consulted- appreciate input, ab d/c'ed  nc to keep spo2 >92.    #Fall.   in setting of sepsis  cth c spine neg  trauma w/u no acute fx  orthostatics  PT    #  SOILA (acute kidney injury).  presumed pre-renal  # ag met acidosis with hyperchloremic acidosis  # hypernatremia  change fluids to D5W, monitor BMP  cont bicarb 650 tid  monitor bladder scan    # Abnormal CAT scan. -  stable pancreatic cystic lesion  outpt f/u with mri.    # Dementia.   donepezil 5  trazodone 100 qhs.    # : Hypothyroidism, adult.   continue synthroid 75.    #(hypertension). - monitor    # (hyperlipidemia).     DVT prophylaxis.  subq hep.  DNR and DNI  TIme spent 35 mnts .
89F PMHx dementia, hypothyroidism, HTN, HLD here with fall, found to have sepsis, present on admission, with acute hypoxic resp failure; due to influenza b. SOILA.    MEDICATIONS  (STANDING):  atorvastatin 10 milliGRAM(s) Oral at bedtime  cefepime   IVPB 500 milliGRAM(s) IV Intermittent every 12 hours  chlorhexidine 2% Cloths 1 Application(s) Topical daily  donepezil 5 milliGRAM(s) Oral at bedtime  ferrous    sulfate 325 milliGRAM(s) Oral daily  guaiFENesin  milliGRAM(s) Oral every 12 hours  heparin   Injectable 5000 Unit(s) SubCutaneous every 12 hours  influenza  Vaccine (HIGH DOSE) 0.7 milliLiter(s) IntraMuscular once  levothyroxine 75 MICROGram(s) Oral daily  loratadine 10 milliGRAM(s) Oral daily  metroNIDAZOLE  IVPB 500 milliGRAM(s) IV Intermittent every 8 hours  oseltamivir 30 milliGRAM(s) Oral two times a day  sodium bicarbonate 650 milliGRAM(s) Oral three times a day  sodium chloride 0.9%. 1000 milliLiter(s) (70 mL/Hr) IV Continuous <Continuous>  traZODone 100 milliGRAM(s) Oral at bedtime    MEDICATIONS  (PRN):  acetaminophen     Tablet .. 650 milliGRAM(s) Oral every 6 hours PRN Temp greater or equal to 38C (100.4F), Mild Pain (1 - 3)  acetaminophen  Suppository .. 650 milliGRAM(s) Rectal every 8 hours PRN Temp greater or equal to 38.5C (101.3F)      Diagnosis    # Sepsis- possibly due to influenza infection  # acute hypoxic resp failure  # possible UTI  +ongoing fevers  ct chest, abd - no acut process  tamiflu for 5 days  ua positive; f/u ucx  bcx ntd; r  - antibiotics changed to ceftriaxone; possible fevers related to flu infection; no leucocytosis  - ID consult  nc to keep spo2 >92.    #Fall.   : in setting of sepsis  cth c spine neg  trauma w/u no acute fx  orthostatics  PT.    #  SOILA (acute kidney injury).  presumed pre-renal  # ag met acidosis with hyperchloremic acidosis  chagne fluids to D51/2 NS  cont bicarb 650 tid  monitor bladder scan    # Abnormal CAT scan. -  stable pancreatic cystic lesion  outpt f/u with mri.    # Dementia.   donepezil 5  trazodone 100 qhs.    # : Hypothyroidism, adult.   continue synthroid 75.    #(hypertension). - monitor    # (hyperlipidemia).     (DVT) prophylaxis.  subq hep.  DNR and DNI  TIme spent 35 mnts

## 2024-04-23 NOTE — PROGRESS NOTE ADULT - SUBJECTIVE AND OBJECTIVE BOX
Subjective:    Today is hospital day 9d. This morning patient was seen and examined at bedside, resting comfortably in bed.    No acute or major events overnight.      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    Fall  Multiple falls at home    Hypothyroidism    History of hip replacement, total, right      ALLERGIES:  No Known Allergies    MEDICATIONS:  STANDING MEDICATIONS  atorvastatin 10 milliGRAM(s) Oral at bedtime  chlorhexidine 2% Cloths 1 Application(s) Topical daily  donepezil 5 milliGRAM(s) Oral at bedtime  ferrous    sulfate 325 milliGRAM(s) Oral daily  guaiFENesin  milliGRAM(s) Oral every 12 hours  heparin   Injectable 5000 Unit(s) SubCutaneous every 12 hours  influenza  Vaccine (HIGH DOSE) 0.7 milliLiter(s) IntraMuscular once  levothyroxine 75 MICROGram(s) Oral daily  loratadine 10 milliGRAM(s) Oral daily  traZODone 100 milliGRAM(s) Oral at bedtime    PRN MEDICATIONS  acetaminophen     Tablet .. 650 milliGRAM(s) Oral every 6 hours PRN  acetaminophen  Suppository .. 650 milliGRAM(s) Rectal every 8 hours PRN      Objective:  VITALS:   Vital Signs Last 24 Hrs  T(C): 35.7 (23 Apr 2024 07:46), Max: 36.2 (22 Apr 2024 15:00)  T(F): 96.3 (23 Apr 2024 07:46), Max: 97.2 (23 Apr 2024 00:10)  HR: 75 (23 Apr 2024 07:46) (74 - 98)  BP: 155/67 (23 Apr 2024 07:46) (116/58 - 155/67)  RR: 18 (23 Apr 2024 07:46) (18 - 18)    PHYSICAL EXAM:  Unchanged compared to yesterday      LABS:  LABS FOR NEXT DAY : Yes (  ) No (  x)                        
HPI  Patient is a 89y old Female who presents with a chief complaint of fall and fever of unknown source (21 Apr 2024 00:45)    Currently admitted to medicine with the primary diagnosis of Influenza B       Today is hospital day 7d.     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    Fall  Multiple falls at home    Hypothyroidism    History of hip replacement, total, right      ALLERGIES  No Known Allergies    MEDICATIONS  STANDING MEDICATIONS  atorvastatin 10 milliGRAM(s) Oral at bedtime  benzonatate 100 milliGRAM(s) Oral three times a day  chlorhexidine 2% Cloths 1 Application(s) Topical daily  donepezil 5 milliGRAM(s) Oral at bedtime  ferrous    sulfate 325 milliGRAM(s) Oral daily  guaiFENesin  milliGRAM(s) Oral every 12 hours  heparin   Injectable 5000 Unit(s) SubCutaneous every 12 hours  influenza  Vaccine (HIGH DOSE) 0.7 milliLiter(s) IntraMuscular once  levothyroxine 75 MICROGram(s) Oral daily  loratadine 10 milliGRAM(s) Oral daily  traZODone 100 milliGRAM(s) Oral at bedtime    PRN MEDICATIONS  acetaminophen     Tablet .. 650 milliGRAM(s) Oral every 6 hours PRN  acetaminophen  Suppository .. 650 milliGRAM(s) Rectal every 8 hours PRN    VITALS:  T(F): 97.4  HR: 86  BP: 112/58  RR: 18  SpO2: 96%    PHYSICAL EXAM  GEN: no distress, comfortable  scalp staples with scab wound  PULM: BS heard b/l equal, No wheezing  CVS: S1S2 present, no rubs or gallops  ABD: Soft, non-distended, no guarding; non-tender  skin: right thigh hematoma  NEURO: Awake and alert; orietned to self    LABS                        10.3   6.57  )-----------( 223      ( 20 Apr 2024 08:29 )             33.4     04-20    140  |  105  |  39<H>  ----------------------------<  121<H>  3.5   |  23  |  1.5    Ca    8.8      20 Apr 2024 08:29  Mg     1.9     04-20    TPro  6.3  /  Alb  3.1<L>  /  TBili  0.4  /  DBili  x   /  AST  30  /  ALT  9   /  AlkPhos  56  04-20      Urinalysis Basic - ( 20 Apr 2024 08:29 )    Color: x / Appearance: x / SG: x / pH: x  Gluc: 121 mg/dL / Ketone: x  / Bili: x / Urobili: x   Blood: x / Protein: x / Nitrite: x   Leuk Esterase: x / RBC: x / WBC x   Sq Epi: x / Non Sq Epi: x / Bacteria: x                RADIOLOGY    
INTERVAL HPI/OVERNIGHT EVENTS:    SUBJECTIVE: Patient seen and examined at bedside.     no cp, sob, abd pain, fever  no ha, dizziness, lightheadedness, syncope    OBJECTIVE:    VITAL SIGNS:  Vital Signs Last 24 Hrs  T(C): 36.2 (15 Apr 2024 07:27), Max: 39.3 (14 Apr 2024 23:30)  T(F): 97.2 (15 Apr 2024 07:27), Max: 102.7 (14 Apr 2024 23:30)  HR: 75 (15 Apr 2024 07:27) (71 - 100)  BP: 109/55 (15 Apr 2024 07:27) (100/50 - 168/72)  BP(mean): --  RR: 18 (15 Apr 2024 07:27) (18 - 18)  SpO2: 95% (15 Apr 2024 07:27) (94% - 96%)    Parameters below as of 15 Apr 2024 07:27  Patient On (Oxygen Delivery Method): nasal cannula          PHYSICAL EXAM:    General: NAD  HEENT: NC/AT; PERRL, clear conjunctiva  Neck: supple  Respiratory: CTA b/l  Cardiovascular: +S1/S2; RRR  Abdomen: soft, NT/ND; +BS x4  Extremities: WWP, 2+ peripheral pulses b/l; no LE edema  Skin: normal color and turgor; no rash  Neurological:    MEDICATIONS:  MEDICATIONS  (STANDING):  donepezil 5 milliGRAM(s) Oral at bedtime  ferrous    sulfate 325 milliGRAM(s) Oral daily  heparin   Injectable 5000 Unit(s) SubCutaneous every 12 hours  influenza  Vaccine (HIGH DOSE) 0.7 milliLiter(s) IntraMuscular once  levothyroxine 75 MICROGram(s) Oral daily  loratadine 10 milliGRAM(s) Oral daily  oseltamivir 30 milliGRAM(s) Oral two times a day  sodium bicarbonate 650 milliGRAM(s) Oral three times a day  sodium chloride 0.9%. 1000 milliLiter(s) (50 mL/Hr) IV Continuous <Continuous>  traZODone 100 milliGRAM(s) Oral at bedtime    MEDICATIONS  (PRN):  acetaminophen     Tablet .. 650 milliGRAM(s) Oral every 6 hours PRN Temp greater or equal to 38C (100.4F), Mild Pain (1 - 3)  acetaminophen  Suppository .. 650 milliGRAM(s) Rectal every 8 hours PRN Temp greater or equal to 38.5C (101.3F)      ALLERGIES:  Allergies    No Known Allergies    Intolerances        LABS:                        10.8   14.51 )-----------( 157      ( 15 Apr 2024 08:00 )             33.3     Hemoglobin: 10.8 g/dL (04-15 @ 08:00)  Hemoglobin: 10.2 g/dL (04-14 @ 09:26)    CBC Full  -  ( 15 Apr 2024 08:00 )  WBC Count : 14.51 K/uL  RBC Count : 3.67 M/uL  Hemoglobin : 10.8 g/dL  Hematocrit : 33.3 %  Platelet Count - Automated : 157 K/uL  Mean Cell Volume : 90.7 fL  Mean Cell Hemoglobin : 29.4 pg  Mean Cell Hemoglobin Concentration : 32.4 g/dL  Auto Neutrophil # : x  Auto Lymphocyte # : x  Auto Monocyte # : x  Auto Eosinophil # : x  Auto Basophil # : x  Auto Neutrophil % : x  Auto Lymphocyte % : x  Auto Monocyte % : x  Auto Eosinophil % : x  Auto Basophil % : x    04-15    140  |  106  |  29<H>  ----------------------------<  126<H>  4.1   |  17  |  1.6<H>    Ca    9.0      15 Apr 2024 08:00  Mg     1.9     04-15    TPro  7.3  /  Alb  4.1  /  TBili  0.4  /  DBili  x   /  AST  24  /  ALT  11  /  AlkPhos  90  04-14    Creatinine Trend: 1.6<--, 1.4<--  LIVER FUNCTIONS - ( 14 Apr 2024 09:26 )  Alb: 4.1 g/dL / Pro: 7.3 g/dL / ALK PHOS: 90 U/L / ALT: 11 U/L / AST: 24 U/L / GGT: x           PT/INR - ( 14 Apr 2024 09:26 )   PT: 13.10 sec;   INR: 1.15 ratio         PTT - ( 14 Apr 2024 09:26 )  PTT:34.6 sec    hs Troponin:                52 <<== 04-14-24 @ 12:20                52 <<== 04-14-24 @ 09:26            Urinalysis Basic - ( 15 Apr 2024 08:00 )    Color: x / Appearance: x / SG: x / pH: x  Gluc: 126 mg/dL / Ketone: x  / Bili: x / Urobili: x   Blood: x / Protein: x / Nitrite: x   Leuk Esterase: x / RBC: x / WBC x   Sq Epi: x / Non Sq Epi: x / Bacteria: x      CSF:                      EKG:   MICROBIOLOGY:    IMAGING:      Labs, imaging, EKG personally reviewed    RADIOLOGY & ADDITIONAL TESTS: Reviewed.
Subjective:    Today is hospital day 8d. This morning patient was seen and examined at bedside, resting comfortably in bed.    No acute or major events overnight.      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    Fall  Multiple falls at home    Hypothyroidism    History of hip replacement, total, right    ALLERGIES:  No Known Allergies    MEDICATIONS:  STANDING MEDICATIONS  atorvastatin 10 milliGRAM(s) Oral at bedtime  chlorhexidine 2% Cloths 1 Application(s) Topical daily  donepezil 5 milliGRAM(s) Oral at bedtime  ferrous    sulfate 325 milliGRAM(s) Oral daily  guaiFENesin  milliGRAM(s) Oral every 12 hours  heparin   Injectable 5000 Unit(s) SubCutaneous every 12 hours  influenza  Vaccine (HIGH DOSE) 0.7 milliLiter(s) IntraMuscular once  levothyroxine 75 MICROGram(s) Oral daily  loratadine 10 milliGRAM(s) Oral daily  traZODone 100 milliGRAM(s) Oral at bedtime    PRN MEDICATIONS  acetaminophen     Tablet .. 650 milliGRAM(s) Oral every 6 hours PRN  acetaminophen  Suppository .. 650 milliGRAM(s) Rectal every 8 hours PRN      Objective:  VITALS:   Vital Signs Last 24 Hrs  T(C): 36.2 (22 Apr 2024 15:00), Max: 36.2 (22 Apr 2024 15:00)  T(F): 97.1 (22 Apr 2024 15:00), Max: 97.1 (22 Apr 2024 15:00)  HR: 98 (22 Apr 2024 15:00) (73 - 105)  BP: 126/66 (22 Apr 2024 15:00) (98/55 - 126/66)  RR: 18 (22 Apr 15:00) (18 - 18)          PHYSICAL EXAM:  GENERAL:   (  x) NAD, lying in bed comfortably     (  ) obtunded     (  ) lethargic     (  ) somnolent    HEART:  Rate -->     (  x) normal rate     (  ) bradycardic     (  ) tachycardic  Rhythm -->     ( x ) regular     (  ) regularly irregular     (  ) irregularly irregular  Murmurs -->     (x  ) normal s1s2     (  ) systolic murmur     (  ) diastolic murmur     (  ) continuous murmur      (  ) S3 present     (  ) S4 present    LUNGS:   (x  )Unlabored respirations     (  ) tachypnea  ( x ) B/L air entry     (  ) decreased breath sounds in:  (location     )    (  x) no adventitious sound     (  ) crackles     (  ) wheezing      (  ) rhonchi      (specify location:       )  (  ) chest wall tenderness (specify location:       )    ABDOMEN:   (  x) Soft     (  ) tense   |   ( x ) nondistended     (  ) distended   |   (  ) +BS     (  ) hypoactive bowel sounds     (  ) hyperactive bowel sounds  ( x ) nontender     (  ) RUQ tenderness     (  ) RLQ tenderness     (  ) LLQ tenderness     (  ) epigastric tenderness     (  ) diffuse tenderness  (  ) Splenomegaly      (  ) Hepatomegaly      (  ) Jaundice     (  ) ecchymosis     EXTREMITIES: (x ) no LLE  (  ) Normal     (  ) Rash     (  ) ecchymosis     (  ) varicose veins      (  ) pitting edema     (  ) non-pitting edema   (  ) ulceration     (  ) gangrene:     (location:     )            
24H events:    Patient is a 89y old Female who presents with a chief complaint of fall and fever of unknown source (16 Apr 2024 11:00)    Primary diagnosis of Infection of urinary tract    Today is hospital day 3d. This morning patient was seen and examined at bedside, resting comfortably in bed.    No acute or major events overnight.    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    Fall  Multiple falls at home    Hypothyroidism    History of hip replacement, total, right      SOCIAL HISTORY:  Social History:      ALLERGIES:  No Known Allergies    MEDICATIONS:  STANDING MEDICATIONS  atorvastatin 10 milliGRAM(s) Oral at bedtime  cefepime   IVPB 500 milliGRAM(s) IV Intermittent every 12 hours  chlorhexidine 2% Cloths 1 Application(s) Topical daily  donepezil 5 milliGRAM(s) Oral at bedtime  ferrous    sulfate 325 milliGRAM(s) Oral daily  guaiFENesin  milliGRAM(s) Oral every 12 hours  heparin   Injectable 5000 Unit(s) SubCutaneous every 12 hours  influenza  Vaccine (HIGH DOSE) 0.7 milliLiter(s) IntraMuscular once  levothyroxine 75 MICROGram(s) Oral daily  loratadine 10 milliGRAM(s) Oral daily  metroNIDAZOLE  IVPB 500 milliGRAM(s) IV Intermittent every 8 hours  oseltamivir 30 milliGRAM(s) Oral two times a day  sodium bicarbonate 650 milliGRAM(s) Oral three times a day  sodium chloride 0.9%. 1000 milliLiter(s) IV Continuous <Continuous>  traZODone 100 milliGRAM(s) Oral at bedtime    PRN MEDICATIONS  acetaminophen     Tablet .. 650 milliGRAM(s) Oral every 6 hours PRN  acetaminophen  Suppository .. 650 milliGRAM(s) Rectal every 8 hours PRN    VITALS:   T(F): 98.1  HR: 108  BP: 101/55  RR: 18  SpO2: 97%    PHYSICAL EXAM:  GENERAL:   (  ) NAD, lying in bed comfortably     (  ) obtunded     (  ) lethargic     (  ) somnolent       HEAD:   (  ) Atraumatic     (  ) hematoma     (  ) laceration (specify location:       )     NECK:  (  ) Supple     (  ) neck stiffness     (  ) nuchal rigidity     (  )  no JVD     (  ) JVD present ( -- cm)    HEART:  Rate -->     ( x ) normal rate     (  ) bradycardic     (  ) tachycardic  Rhythm -->     (  )x regular     (  ) regularly irregular     (  ) irregularly irregular  Murmurs -->     (  ) normal s1s2     (  ) systolic murmur     (  ) diastolic murmur     (  ) continuous murmur      (  ) S3 present     (  ) S4 present    LUNGS:   (x  )Unlabored respirations     (  ) tachypnea  (  ) B/L air entry     (  ) decreased breath sounds in:  (location     )    (  ) no adventitious sound     (  ) crackles     (  ) wheezing      (  ) rhonchi      (specify location:       )  (  ) chest wall tenderness (specify location:       )    ABDOMEN:   (x  ) Soft     (  ) tense   |   (  ) nondistended     (  ) distended   |   (  ) +BS     (  ) hypoactive bowel sounds     (  ) hyperactive bowel sounds  (  ) nontender     (  ) RUQ tenderness     (  ) RLQ tenderness     (  ) LLQ tenderness     (  ) epigastric tenderness     (  ) diffuse tenderness  (  ) Splenomegaly      (  ) Hepatomegaly      (  ) Jaundice     (  ) ecchymosis     EXTREMITIES:  (  ) Normal     (  ) Rash     (  ) ecchymosis     (  ) varicose veins      (  ) pitting edema     (  ) non-pitting edema   (  ) ulceration     (  ) gangrene:     (location:     )    NERVOUS SYSTEM:    ( x ) A&Ox2     (  ) confused     (  ) lethargic  CN II-XII:     (  ) Intact     (  ) deficits found     (Specify:     )   Upper extremities:     (  ) no sensorimotor deficits     (  ) weakness     (  ) loss of proprioception/vibration     (  ) loss of touch/temperature (specify:    )  Lower extremities:     (  ) no sensorimotor deficits     (  ) weakness     (  ) loss of proprioception/vibration     (  ) loss of touch/temperature (specify:    )    SKIN:   (  ) No rashes or lesions     (  ) maculopapular rash     (  ) pustules     (  ) vesicles     (  ) ulcer     (  ) ecchymosis     (specify location:     )    LABS:                        10.6   7.42  )-----------( 185      ( 17 Apr 2024 06:55 )             33.2     04-17    146  |  113<H>  |  46<H>  ----------------------------<  111<H>  3.9   |  16<L>  |  2.0<H>    Ca    9.1      17 Apr 2024 06:55  Mg     2.2     04-17    TPro  6.7  /  Alb  3.2<L>  /  TBili  0.4  /  DBili  x   /  AST  66<H>  /  ALT  12  /  AlkPhos  60  04-17      Urinalysis Basic - ( 17 Apr 2024 06:55 )    Color: x / Appearance: x / SG: x / pH: x  Gluc: 111 mg/dL / Ketone: x  / Bili: x / Urobili: x   Blood: x / Protein: x / Nitrite: x   Leuk Esterase: x / RBC: x / WBC x   Sq Epi: x / Non Sq Epi: x / Bacteria: x
24H events:    Patient is a 89y old Female who presents with a chief complaint of fall and fever of unknown source (16 Apr 2024 11:00)    Primary diagnosis of Infection of urinary tract    Today is hospital day 3d. This morning patient was seen and examined at bedside, resting comfortably in bed.    No acute or major events overnight.    She appears confused this morning.     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    Fall  Multiple falls at home    Hypothyroidism    History of hip replacement, total, right      SOCIAL HISTORY:  Social History:      ALLERGIES:  No Known Allergies    MEDICATIONS:  STANDING MEDICATIONS  atorvastatin 10 milliGRAM(s) Oral at bedtime  cefepime   IVPB 500 milliGRAM(s) IV Intermittent every 12 hours  chlorhexidine 2% Cloths 1 Application(s) Topical daily  donepezil 5 milliGRAM(s) Oral at bedtime  ferrous    sulfate 325 milliGRAM(s) Oral daily  guaiFENesin  milliGRAM(s) Oral every 12 hours  heparin   Injectable 5000 Unit(s) SubCutaneous every 12 hours  influenza  Vaccine (HIGH DOSE) 0.7 milliLiter(s) IntraMuscular once  levothyroxine 75 MICROGram(s) Oral daily  loratadine 10 milliGRAM(s) Oral daily  metroNIDAZOLE  IVPB 500 milliGRAM(s) IV Intermittent every 8 hours  oseltamivir 30 milliGRAM(s) Oral two times a day  sodium bicarbonate 650 milliGRAM(s) Oral three times a day  sodium chloride 0.9%. 1000 milliLiter(s) IV Continuous <Continuous>  traZODone 100 milliGRAM(s) Oral at bedtime    PRN MEDICATIONS  acetaminophen     Tablet .. 650 milliGRAM(s) Oral every 6 hours PRN  acetaminophen  Suppository .. 650 milliGRAM(s) Rectal every 8 hours PRN    VITALS:   T(F): 100.7  HR: 108  BP: 101/55  RR: 18  SpO2: 97%    PHYSICAL EXAM:  GENERAL:   (  ) NAD, lying in bed comfortably     (  ) obtunded     (  ) lethargic     (  ) somnolent       HEAD:   (  ) Atraumatic     (  ) hematoma     (  ) laceration (specify location:       )     NECK:  (  ) Supple     (  ) neck stiffness     (  ) nuchal rigidity     (  )  no JVD     (  ) JVD present ( -- cm)    HEART:  Rate -->     ( x ) normal rate     (  ) bradycardic     (  ) tachycardic  Rhythm -->     (  )x regular     (  ) regularly irregular     (  ) irregularly irregular  Murmurs -->     (  ) normal s1s2     (  ) systolic murmur     (  ) diastolic murmur     (  ) continuous murmur      (  ) S3 present     (  ) S4 present    LUNGS:   (x  )Unlabored respirations     (  ) tachypnea  (  ) B/L air entry     (  ) decreased breath sounds in:  (location     )    (  ) no adventitious sound     (  ) crackles     (  ) wheezing      (  ) rhonchi      (specify location:       )  (  ) chest wall tenderness (specify location:       )    ABDOMEN:   (x  ) Soft     (  ) tense   |   (  ) nondistended     (  ) distended   |   (  ) +BS     (  ) hypoactive bowel sounds     (  ) hyperactive bowel sounds  (  ) nontender     (  ) RUQ tenderness     (  ) RLQ tenderness     (  ) LLQ tenderness     (  ) epigastric tenderness     (  ) diffuse tenderness  (  ) Splenomegaly      (  ) Hepatomegaly      (  ) Jaundice     (  ) ecchymosis     EXTREMITIES:  (  ) Normal     (  ) Rash     (  ) ecchymosis     (  ) varicose veins      (  ) pitting edema     (  ) non-pitting edema   (  ) ulceration     (  ) gangrene:     (location:     )    NERVOUS SYSTEM:    ( x ) A&Ox1     ( x ) confused     (  ) lethargic  CN II-XII:     (  ) Intact     (  ) deficits found     (Specify:     )   Upper extremities:     (  ) no sensorimotor deficits     (  ) weakness     (  ) loss of proprioception/vibration     (  ) loss of touch/temperature (specify:    )  Lower extremities:     (  ) no sensorimotor deficits     (  ) weakness     (  ) loss of proprioception/vibration     (  ) loss of touch/temperature (specify:    )    SKIN:   (  ) No rashes or lesions     (  ) maculopapular rash     (  ) pustules     (  ) vesicles     (  ) ulcer     (  ) ecchymosis     (specify location:     )    LABS:                        10.6   7.42  )-----------( 185      ( 17 Apr 2024 06:55 )             33.2     04-17    146  |  113<H>  |  46<H>  ----------------------------<  111<H>  3.9   |  16<L>  |  2.0<H>    Ca    9.1      17 Apr 2024 06:55  Mg     2.2     04-17    TPro  6.7  /  Alb  3.2<L>  /  TBili  0.4  /  DBili  x   /  AST  66<H>  /  ALT  12  /  AlkPhos  60  04-17      Urinalysis Basic - ( 17 Apr 2024 06:55 )    Color: x / Appearance: x / SG: x / pH: x  Gluc: 111 mg/dL / Ketone: x  / Bili: x / Urobili: x   Blood: x / Protein: x / Nitrite: x   Leuk Esterase: x / RBC: x / WBC x   Sq Epi: x / Non Sq Epi: x / Bacteria: x  
24H events:    Patient is a 89y old Female who presents with a chief complaint of fall and fever of unknown source (16 Apr 2024 11:00)    Primary diagnosis of Infection of urinary tract    Today is hospital day 3d. This morning patient was seen and examined at bedside, resting comfortably in bed.    No acute or major events overnight.    She appears confused this morning. She is oriented to her name only.     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    Fall  Multiple falls at home    Hypothyroidism    History of hip replacement, total, right      SOCIAL HISTORY:  Social History:      ALLERGIES:  No Known Allergies    MEDICATIONS:  STANDING MEDICATIONS  atorvastatin 10 milliGRAM(s) Oral at bedtime  cefepime   IVPB 500 milliGRAM(s) IV Intermittent every 12 hours  chlorhexidine 2% Cloths 1 Application(s) Topical daily  donepezil 5 milliGRAM(s) Oral at bedtime  ferrous    sulfate 325 milliGRAM(s) Oral daily  guaiFENesin  milliGRAM(s) Oral every 12 hours  heparin   Injectable 5000 Unit(s) SubCutaneous every 12 hours  influenza  Vaccine (HIGH DOSE) 0.7 milliLiter(s) IntraMuscular once  levothyroxine 75 MICROGram(s) Oral daily  loratadine 10 milliGRAM(s) Oral daily  metroNIDAZOLE  IVPB 500 milliGRAM(s) IV Intermittent every 8 hours  oseltamivir 30 milliGRAM(s) Oral two times a day  sodium bicarbonate 650 milliGRAM(s) Oral three times a day  sodium chloride 0.9%. 1000 milliLiter(s) IV Continuous <Continuous>  traZODone 100 milliGRAM(s) Oral at bedtime    PRN MEDICATIONS  acetaminophen     Tablet .. 650 milliGRAM(s) Oral every 6 hours PRN  acetaminophen  Suppository .. 650 milliGRAM(s) Rectal every 8 hours PRN    VITALS:   T(F): 100.7  HR: 108  BP: 101/55  RR: 18  SpO2: 97%    PHYSICAL EXAM:  GENERAL:   (  ) NAD, lying in bed comfortably     (  ) obtunded     (  ) lethargic     (  ) somnolent       HEAD:   (  ) Atraumatic     (  ) hematoma     (  ) laceration (specify location:       )     NECK:  (  ) Supple     (  ) neck stiffness     (  ) nuchal rigidity     (  )  no JVD     (  ) JVD present ( -- cm)    HEART:  Rate -->     ( x ) normal rate     (  ) bradycardic     (  ) tachycardic  Rhythm -->     (  )x regular     (  ) regularly irregular     (  ) irregularly irregular  Murmurs -->     (  ) normal s1s2     (  ) systolic murmur     (  ) diastolic murmur     (  ) continuous murmur      (  ) S3 present     (  ) S4 present    LUNGS:   (x  )Unlabored respirations     (  ) tachypnea  (  ) B/L air entry     (  ) decreased breath sounds in:  (location     )    (  ) no adventitious sound     (  ) crackles     (  ) wheezing      (  ) rhonchi      (specify location:       )  (  ) chest wall tenderness (specify location:       )    ABDOMEN:   (x  ) Soft     (  ) tense   |   (  ) nondistended     (  ) distended   |   (  ) +BS     (  ) hypoactive bowel sounds     (  ) hyperactive bowel sounds  (  ) nontender     (  ) RUQ tenderness     (  ) RLQ tenderness     (  ) LLQ tenderness     (  ) epigastric tenderness     (  ) diffuse tenderness  (  ) Splenomegaly      (  ) Hepatomegaly      (  ) Jaundice     (  ) ecchymosis     EXTREMITIES:  (  ) Normal     (  ) Rash     (  ) ecchymosis     (  ) varicose veins      (  ) pitting edema     (  ) non-pitting edema   (  ) ulceration     (  ) gangrene:     (location:     )    NERVOUS SYSTEM:    ( x ) A&Ox1     ( x ) confused     (  ) lethargic  CN II-XII:     (  ) Intact     (  ) deficits found     (Specify:     )   Upper extremities:     (  ) no sensorimotor deficits     (  ) weakness     (  ) loss of proprioception/vibration     (  ) loss of touch/temperature (specify:    )  Lower extremities:     (  ) no sensorimotor deficits     (  ) weakness     (  ) loss of proprioception/vibration     (  ) loss of touch/temperature (specify:    )    SKIN:   (  ) No rashes or lesions     (  ) maculopapular rash     (  ) pustules     (  ) vesicles     (  ) ulcer     (  ) ecchymosis     (specify location:     )    LABS:                        10.6   7.42  )-----------( 185      ( 17 Apr 2024 06:55 )             33.2     04-17    146  |  113<H>  |  46<H>  ----------------------------<  111<H>  3.9   |  16<L>  |  2.0<H>    Ca    9.1      17 Apr 2024 06:55  Mg     2.2     04-17    TPro  6.7  /  Alb  3.2<L>  /  TBili  0.4  /  DBili  x   /  AST  66<H>  /  ALT  12  /  AlkPhos  60  04-17      Urinalysis Basic - ( 17 Apr 2024 06:55 )    Color: x / Appearance: x / SG: x / pH: x  Gluc: 111 mg/dL / Ketone: x  / Bili: x / Urobili: x   Blood: x / Protein: x / Nitrite: x   Leuk Esterase: x / RBC: x / WBC x   Sq Epi: x / Non Sq Epi: x / Bacteria: x                RADIOLOGY:          
24H events:    Patient is a 89y old Female who presents with a chief complaint of fall and fever of unknown source (16 Apr 2024 11:00)    Primary diagnosis of Infection of urinary tract    Today is hospital day 5d. This morning patient was seen and examined at bedside, resting comfortably in bed.    No acute or major events overnight.    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    Fall  Multiple falls at home    Hypothyroidism    History of hip replacement, total, right      SOCIAL HISTORY:  Social History:      ALLERGIES:  No Known Allergies    MEDICATIONS:  STANDING MEDICATIONS  atorvastatin 10 milliGRAM(s) Oral at bedtime  cefepime   IVPB 500 milliGRAM(s) IV Intermittent every 12 hours  chlorhexidine 2% Cloths 1 Application(s) Topical daily  donepezil 5 milliGRAM(s) Oral at bedtime  ferrous    sulfate 325 milliGRAM(s) Oral daily  guaiFENesin  milliGRAM(s) Oral every 12 hours  heparin   Injectable 5000 Unit(s) SubCutaneous every 12 hours  influenza  Vaccine (HIGH DOSE) 0.7 milliLiter(s) IntraMuscular once  levothyroxine 75 MICROGram(s) Oral daily  loratadine 10 milliGRAM(s) Oral daily  metroNIDAZOLE  IVPB 500 milliGRAM(s) IV Intermittent every 8 hours  oseltamivir 30 milliGRAM(s) Oral two times a day  sodium bicarbonate 650 milliGRAM(s) Oral three times a day  sodium chloride 0.9%. 1000 milliLiter(s) IV Continuous <Continuous>  traZODone 100 milliGRAM(s) Oral at bedtime    PRN MEDICATIONS  acetaminophen     Tablet .. 650 milliGRAM(s) Oral every 6 hours PRN  acetaminophen  Suppository .. 650 milliGRAM(s) Rectal every 8 hours PRN    VITALS:   T(F): 98.1  HR: 108  BP: 101/55  RR: 18  SpO2: 97%    PHYSICAL EXAM:  GENERAL:   (  ) NAD, lying in bed comfortably     (  ) obtunded     (  ) lethargic     (  ) somnolent       HEAD:   (  ) Atraumatic     (  ) hematoma     (  ) laceration (specify location:       )     NECK:  (  ) Supple     (  ) neck stiffness     (  ) nuchal rigidity     (  )  no JVD     (  ) JVD present ( -- cm)    HEART:  Rate -->     ( x ) normal rate     (  ) bradycardic     (  ) tachycardic  Rhythm -->     (  )x regular     (  ) regularly irregular     (  ) irregularly irregular  Murmurs -->     (  ) normal s1s2     (  ) systolic murmur     (  ) diastolic murmur     (  ) continuous murmur      (  ) S3 present     (  ) S4 present    LUNGS:   (x  )Unlabored respirations     (  ) tachypnea  (  ) B/L air entry     (  ) decreased breath sounds in:  (location     )    (  ) no adventitious sound     (  ) crackles     (  ) wheezing      (  ) rhonchi      (specify location:       )  (  ) chest wall tenderness (specify location:       )    ABDOMEN:   (x  ) Soft     (  ) tense   |   (  ) nondistended     (  ) distended   |   (  ) +BS     (  ) hypoactive bowel sounds     (  ) hyperactive bowel sounds  (  ) nontender     (  ) RUQ tenderness     (  ) RLQ tenderness     (  ) LLQ tenderness     (  ) epigastric tenderness     (  ) diffuse tenderness  (  ) Splenomegaly      (  ) Hepatomegaly      (  ) Jaundice     (  ) ecchymosis     EXTREMITIES:  (  ) Normal     (  ) Rash     (  ) ecchymosis     (  ) varicose veins      (  ) pitting edema     (  ) non-pitting edema   (  ) ulceration     (  ) gangrene:     (location:     )    NERVOUS SYSTEM:    ( x ) A&Ox2     (  ) confused     (  ) lethargic  CN II-XII:     (  ) Intact     (  ) deficits found     (Specify:     )   Upper extremities:     (  ) no sensorimotor deficits     (  ) weakness     (  ) loss of proprioception/vibration     (  ) loss of touch/temperature (specify:    )  Lower extremities:     (  ) no sensorimotor deficits     (  ) weakness     (  ) loss of proprioception/vibration     (  ) loss of touch/temperature (specify:    )    SKIN:   (  ) No rashes or lesions     (  ) maculopapular rash     (  ) pustules     (  ) vesicles     (  ) ulcer     (  ) ecchymosis     (specify location:     )    LABS:                        10.6   7.42  )-----------( 185      ( 17 Apr 2024 06:55 )             33.2     04-17    146  |  113<H>  |  46<H>  ----------------------------<  111<H>  3.9   |  16<L>  |  2.0<H>    Ca    9.1      17 Apr 2024 06:55  Mg     2.2     04-17    TPro  6.7  /  Alb  3.2<L>  /  TBili  0.4  /  DBili  x   /  AST  66<H>  /  ALT  12  /  AlkPhos  60  04-17      Urinalysis Basic - ( 17 Apr 2024 06:55 )    Color: x / Appearance: x / SG: x / pH: x  Gluc: 111 mg/dL / Ketone: x  / Bili: x / Urobili: x   Blood: x / Protein: x / Nitrite: x   Leuk Esterase: x / RBC: x / WBC x   Sq Epi: x / Non Sq Epi: x / Bacteria: x
HPI  Patient is a 89y old Female who presents with a chief complaint of fall and fever of unknown source (19 Apr 2024 15:17)    Currently admitted to medicine with the primary diagnosis of Influenza B       Today is hospital day 6d.     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    Fall  Multiple falls at home    Hypothyroidism    History of hip replacement, total, right      ALLERGIES  No Known Allergies    MEDICATIONS  STANDING MEDICATIONS  atorvastatin 10 milliGRAM(s) Oral at bedtime  chlorhexidine 2% Cloths 1 Application(s) Topical daily  donepezil 5 milliGRAM(s) Oral at bedtime  ferrous    sulfate 325 milliGRAM(s) Oral daily  guaiFENesin  milliGRAM(s) Oral every 12 hours  heparin   Injectable 5000 Unit(s) SubCutaneous every 12 hours  influenza  Vaccine (HIGH DOSE) 0.7 milliLiter(s) IntraMuscular once  levothyroxine 75 MICROGram(s) Oral daily  loratadine 10 milliGRAM(s) Oral daily  traZODone 100 milliGRAM(s) Oral at bedtime    PRN MEDICATIONS  acetaminophen     Tablet .. 650 milliGRAM(s) Oral every 6 hours PRN  acetaminophen  Suppository .. 650 milliGRAM(s) Rectal every 8 hours PRN    VITALS:  T(F): 97  HR: 75  BP: 117/68  RR: 18  SpO2: 98%    PHYSICAL EXAM  GEN: no distress, comfortable  PULM: BS heard b/l equal, No wheezing  CVS: S1S2 present, no rubs or gallops  ABD: Soft, non-distended, no guarding; non-tender  EXT: No lower extremity edema  NEURO: A&Ox1,    LABS                        10.3   6.57  )-----------( 223      ( 20 Apr 2024 08:29 )             33.4     04-20    140  |  105  |  39<H>  ----------------------------<  121<H>  3.5   |  23  |  1.5    Ca    8.8      20 Apr 2024 08:29  Mg     1.9     04-20    TPro  6.3  /  Alb  3.1<L>  /  TBili  0.4  /  DBili  x   /  AST  30  /  ALT  9   /  AlkPhos  56  04-20      Urinalysis Basic - ( 20 Apr 2024 08:29 )    Color: x / Appearance: x / SG: x / pH: x  Gluc: 121 mg/dL / Ketone: x  / Bili: x / Urobili: x   Blood: x / Protein: x / Nitrite: x   Leuk Esterase: x / RBC: x / WBC x   Sq Epi: x / Non Sq Epi: x / Bacteria: x                RADIOLOGY    
HPI  Patient is a 89y old Female who presents with a chief complaint of fall and fever of unknown source (21 Apr 2024 16:38)    Currently admitted to medicine with the primary diagnosis of Influenza B       Today is hospital day 8d.     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    Fall  Multiple falls at home    Hypothyroidism    History of hip replacement, total, right      ALLERGIES  No Known Allergies    MEDICATIONS  STANDING MEDICATIONS  atorvastatin 10 milliGRAM(s) Oral at bedtime  chlorhexidine 2% Cloths 1 Application(s) Topical daily  donepezil 5 milliGRAM(s) Oral at bedtime  ferrous    sulfate 325 milliGRAM(s) Oral daily  guaiFENesin  milliGRAM(s) Oral every 12 hours  heparin   Injectable 5000 Unit(s) SubCutaneous every 12 hours  influenza  Vaccine (HIGH DOSE) 0.7 milliLiter(s) IntraMuscular once  levothyroxine 75 MICROGram(s) Oral daily  loratadine 10 milliGRAM(s) Oral daily  traZODone 100 milliGRAM(s) Oral at bedtime    PRN MEDICATIONS  acetaminophen     Tablet .. 650 milliGRAM(s) Oral every 6 hours PRN  acetaminophen  Suppository .. 650 milliGRAM(s) Rectal every 8 hours PRN    VITALS:  T(F): 96.9  HR: 105  BP: 121/69  RR: 18  SpO2: 96%    PHYSICAL EXAM  GEN: no distress, comfortable  PULM: BS heard b/l equal, No wheezing  CVS: S1S2 present, no rubs or gallops  ABD: Soft, non-distended, no guarding; non-tender  EXT: No lower extremity edema  NEURO: A&Ox1 moving all extremities    LABS                        RADIOLOGY    
INTERVAL HPI/OVERNIGHT EVENTS:    SUBJECTIVE: Patient seen and examined at bedside.     no cp, sob, abd pain, fever  no sob, orthopnea, pnd, cough    OBJECTIVE:    VITAL SIGNS:  Vital Signs Last 24 Hrs  T(C): 35.9 (16 Apr 2024 10:46), Max: 38.7 (16 Apr 2024 05:57)  T(F): 96.7 (16 Apr 2024 10:46), Max: 101.7 (16 Apr 2024 05:57)  HR: 86 (16 Apr 2024 10:46) (86 - 101)  BP: 115/58 (16 Apr 2024 10:46) (115/58 - 136/88)  BP(mean): 70 (15 Apr 2024 19:50) (70 - 70)  RR: 18 (16 Apr 2024 10:46) (18 - 19)  SpO2: 93% (16 Apr 2024 10:46) (93% - 95%)    Parameters below as of 16 Apr 2024 10:46  Patient On (Oxygen Delivery Method): nasal cannula          PHYSICAL EXAM:    General: NAD  HEENT: NC/AT; PERRL, clear conjunctiva  Neck: supple  Respiratory: CTA b/l  Cardiovascular: +S1/S2; RRR  Abdomen: soft, NT/ND; +BS x4  Extremities: WWP, 2+ peripheral pulses b/l; no LE edema  Skin: normal color and turgor; no rash  Neurological:    MEDICATIONS:  MEDICATIONS  (STANDING):  atorvastatin 10 milliGRAM(s) Oral at bedtime  cefepime   IVPB 500 milliGRAM(s) IV Intermittent every 12 hours  chlorhexidine 2% Cloths 1 Application(s) Topical daily  donepezil 5 milliGRAM(s) Oral at bedtime  ferrous    sulfate 325 milliGRAM(s) Oral daily  guaiFENesin  milliGRAM(s) Oral every 12 hours  heparin   Injectable 5000 Unit(s) SubCutaneous every 12 hours  influenza  Vaccine (HIGH DOSE) 0.7 milliLiter(s) IntraMuscular once  levothyroxine 75 MICROGram(s) Oral daily  loratadine 10 milliGRAM(s) Oral daily  metroNIDAZOLE  IVPB 500 milliGRAM(s) IV Intermittent every 8 hours  oseltamivir 30 milliGRAM(s) Oral two times a day  sodium bicarbonate 650 milliGRAM(s) Oral three times a day  sodium chloride 0.9%. 1000 milliLiter(s) (50 mL/Hr) IV Continuous <Continuous>  traZODone 100 milliGRAM(s) Oral at bedtime    MEDICATIONS  (PRN):  acetaminophen     Tablet .. 650 milliGRAM(s) Oral every 6 hours PRN Temp greater or equal to 38C (100.4F), Mild Pain (1 - 3)  acetaminophen  Suppository .. 650 milliGRAM(s) Rectal every 8 hours PRN Temp greater or equal to 38.5C (101.3F)      ALLERGIES:  Allergies    No Known Allergies    Intolerances        LABS:                        10.8   14.51 )-----------( 157      ( 15 Apr 2024 08:00 )             33.3     Hemoglobin: 10.8 g/dL (04-15 @ 08:00)  Hemoglobin: 10.2 g/dL (04-14 @ 09:26)    CBC Full  -  ( 15 Apr 2024 08:00 )  WBC Count : 14.51 K/uL  RBC Count : 3.67 M/uL  Hemoglobin : 10.8 g/dL  Hematocrit : 33.3 %  Platelet Count - Automated : 157 K/uL  Mean Cell Volume : 90.7 fL  Mean Cell Hemoglobin : 29.4 pg  Mean Cell Hemoglobin Concentration : 32.4 g/dL  Auto Neutrophil # : x  Auto Lymphocyte # : x  Auto Monocyte # : x  Auto Eosinophil # : x  Auto Basophil # : x  Auto Neutrophil % : x  Auto Lymphocyte % : x  Auto Monocyte % : x  Auto Eosinophil % : x  Auto Basophil % : x    04-15    140  |  106  |  29<H>  ----------------------------<  126<H>  4.1   |  17  |  1.6<H>    Ca    9.0      15 Apr 2024 08:00  Mg     1.9     04-15      Creatinine Trend: 1.6<--, 1.4<--        hs Troponin:                52 <<== 04-14-24 @ 12:20            Urinalysis Basic - ( 15 Apr 2024 08:00 )    Color: x / Appearance: x / SG: x / pH: x  Gluc: 126 mg/dL / Ketone: x  / Bili: x / Urobili: x   Blood: x / Protein: x / Nitrite: x   Leuk Esterase: x / RBC: x / WBC x   Sq Epi: x / Non Sq Epi: x / Bacteria: x      CSF:                      EKG:   MICROBIOLOGY:    Culture - Blood (collected 14 Apr 2024 09:26)  Source: .Blood Blood-Peripheral  Preliminary Report (15 Apr 2024 18:01):    No growth at 24 hours    Culture - Blood (collected 14 Apr 2024 09:26)  Source: .Blood Blood-Peripheral  Preliminary Report (15 Apr 2024 18:01):    No growth at 24 hours      IMAGING:      Labs, imaging, EKG personally reviewed    RADIOLOGY & ADDITIONAL TESTS: Reviewed.

## 2024-04-24 ENCOUNTER — TRANSCRIPTION ENCOUNTER (OUTPATIENT)
Age: 89
End: 2024-04-24

## 2024-04-24 VITALS
HEART RATE: 73 BPM | RESPIRATION RATE: 18 BRPM | SYSTOLIC BLOOD PRESSURE: 125 MMHG | TEMPERATURE: 98 F | OXYGEN SATURATION: 98 % | DIASTOLIC BLOOD PRESSURE: 58 MMHG

## 2024-04-24 PROCEDURE — 99239 HOSP IP/OBS DSCHRG MGMT >30: CPT

## 2024-04-24 RX ORDER — CETIRIZINE HYDROCHLORIDE 10 MG/1
1 TABLET ORAL
Qty: 0 | Refills: 0 | DISCHARGE

## 2024-04-24 RX ADMIN — Medication 600 MILLIGRAM(S): at 17:41

## 2024-04-24 RX ADMIN — HEPARIN SODIUM 5000 UNIT(S): 5000 INJECTION INTRAVENOUS; SUBCUTANEOUS at 17:41

## 2024-04-24 RX ADMIN — CHLORHEXIDINE GLUCONATE 1 APPLICATION(S): 213 SOLUTION TOPICAL at 11:12

## 2024-04-24 RX ADMIN — HEPARIN SODIUM 5000 UNIT(S): 5000 INJECTION INTRAVENOUS; SUBCUTANEOUS at 06:37

## 2024-04-24 RX ADMIN — Medication 600 MILLIGRAM(S): at 06:37

## 2024-04-24 RX ADMIN — Medication 325 MILLIGRAM(S): at 11:11

## 2024-04-24 RX ADMIN — LORATADINE 10 MILLIGRAM(S): 10 TABLET ORAL at 11:11

## 2024-04-24 RX ADMIN — Medication 75 MICROGRAM(S): at 06:37

## 2024-04-24 NOTE — DISCHARGE NOTE NURSING/CASE MANAGEMENT/SOCIAL WORK - NSDCVIVACCINE_GEN_ALL_CORE_FT
influenza, injectable, quadrivalent, preservative free; 21-Dec-2019 14:12; Felipe Pearson (RN); [employer]; [id number] (Exp. Date: 30-Jun-2020); IntraMuscular; Deltoid Left.; 0.5 milliLiter(s); VIS (VIS Published: 15-Aug-2019, VIS Presented: 21-Dec-2019);   Tdap; 25-Jul-2019 16:09; Marcy Coelho (RN); [employer]; [id number] (Exp. Date: 07-Jun-2021); IntraMuscular; Deltoid Right.; 0.5 milliLiter(s); VIS (VIS Published: 09-May-2013, VIS Presented: 25-Jul-2019);   Tdap; 19-Mar-2023 11:49; Iman Orellana (RN); [employer]; [id number] (Exp. Date: 15-Feb-2025); IntraMuscular; Deltoid Right.; 0.5 milliLiter(s); VIS (VIS Published: 09-May-2013, VIS Presented: 19-Mar-2023);   Tdap; 14-Apr-2024 10:05; Nelly Sorensen (RN); [employer]; [id number] (Exp. Date: 06-Dec-2025); IntraMuscular; Deltoid Left.; 0.5 milliLiter(s); VIS (VIS Published: 09-May-2013, VIS Presented: 14-Apr-2024);

## 2024-04-24 NOTE — DISCHARGE NOTE NURSING/CASE MANAGEMENT/SOCIAL WORK - NSDCPEFALRISK_GEN_ALL_CORE
For information on Fall & Injury Prevention, visit: [email or web address] OR  [email or web address] OR  https://www.cdc.gov/steadi/patient.html

## 2024-04-24 NOTE — DISCHARGE NOTE NURSING/CASE MANAGEMENT/SOCIAL WORK - PATIENT PORTAL LINK FT
You can access the FollowMyHealth Patient Portal offered by [facility] by registering at the following website: [email or web address]. By joining [employer]’s FollowMyHealth portal, you will also be able to view your health information using other applications (apps) compatible with our system.

## 2024-05-09 DIAGNOSIS — R55 SYNCOPE AND COLLAPSE: ICD-10-CM

## 2024-05-09 DIAGNOSIS — S80.01XA CONTUSION OF RIGHT KNEE, INITIAL ENCOUNTER: ICD-10-CM

## 2024-05-09 DIAGNOSIS — R50.9 FEVER, UNSPECIFIED: ICD-10-CM

## 2024-05-09 DIAGNOSIS — S01.01XA LACERATION WITHOUT FOREIGN BODY OF SCALP, INITIAL ENCOUNTER: ICD-10-CM

## 2024-05-09 DIAGNOSIS — R64 CACHEXIA: ICD-10-CM

## 2024-05-09 DIAGNOSIS — Z87.440 PERSONAL HISTORY OF URINARY (TRACT) INFECTIONS: ICD-10-CM

## 2024-05-09 DIAGNOSIS — F13.20 SEDATIVE, HYPNOTIC OR ANXIOLYTIC DEPENDENCE, UNCOMPLICATED: ICD-10-CM
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
## 2024-05-09 DIAGNOSIS — R79.89 OTHER SPECIFIED ABNORMAL FINDINGS OF BLOOD CHEMISTRY: ICD-10-CM
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
## 2024-05-09 DIAGNOSIS — F03.90 UNSPECIFIED DEMENTIA WITHOUT BEHAVIORAL DISTURBANCE: ICD-10-CM

## 2024-05-09 DIAGNOSIS — Z96.641 PRESENCE OF RIGHT ARTIFICIAL HIP JOINT: ICD-10-CM

## 2024-05-09 DIAGNOSIS — E87.20 ACIDOSIS, UNSPECIFIED: ICD-10-CM

## 2024-05-09 DIAGNOSIS — H35.3222 EXUDATIVE AGE-RELATED MACULAR DEGENERATION, LEFT EYE, WITH INACTIVE CHOROIDAL NEOVASCULARIZATION: ICD-10-CM

## 2024-05-09 DIAGNOSIS — J96.01 ACUTE RESPIRATORY FAILURE WITH HYPOXIA: ICD-10-CM
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
## 2024-05-09 DIAGNOSIS — Z66 DO NOT RESUSCITATE: ICD-10-CM

## 2024-05-09 DIAGNOSIS — A41.89 OTHER SPECIFIED SEPSIS: ICD-10-CM

## 2024-05-09 DIAGNOSIS — E03.9 HYPOTHYROIDISM, UNSPECIFIED: ICD-10-CM

## 2024-05-09 DIAGNOSIS — N17.9 ACUTE KIDNEY FAILURE, UNSPECIFIED: ICD-10-CM

## 2024-05-09 DIAGNOSIS — K86.2 CYST OF PANCREAS: ICD-10-CM

## 2024-05-09 DIAGNOSIS — I12.9 HYPERTENSIVE CHRONIC KIDNEY DISEASE WITH STAGE 1 THROUGH STAGE 4 CHRONIC KIDNEY DISEASE, OR UNSPECIFIED CHRONIC KIDNEY DISEASE: ICD-10-CM

## 2024-05-09 DIAGNOSIS — H40.2223 CHRONIC ANGLE-CLOSURE GLAUCOMA, LEFT EYE, SEVERE STAGE: ICD-10-CM

## 2024-05-09 DIAGNOSIS — E87.0 HYPEROSMOLALITY AND HYPERNATREMIA: ICD-10-CM

## 2024-05-09 DIAGNOSIS — R29.6 REPEATED FALLS: ICD-10-CM

## 2024-05-09 DIAGNOSIS — R93.89 ABNORMAL FINDINGS ON DIAGNOSTIC IMAGING OF OTHER SPECIFIED BODY STRUCTURES: ICD-10-CM

## 2024-05-09 DIAGNOSIS — E78.00 PURE HYPERCHOLESTEROLEMIA, UNSPECIFIED: ICD-10-CM

## 2024-05-09 DIAGNOSIS — J10.1 INFLUENZA DUE TO OTHER IDENTIFIED INFLUENZA VIRUS WITH OTHER RESPIRATORY MANIFESTATIONS: ICD-10-CM

## 2024-05-09 DIAGNOSIS — N18.9 CHRONIC KIDNEY DISEASE, UNSPECIFIED: ICD-10-CM

## 2024-05-09 DIAGNOSIS — D50.9 IRON DEFICIENCY ANEMIA, UNSPECIFIED: ICD-10-CM

## 2024-05-09 DIAGNOSIS — R65.20 SEVERE SEPSIS WITHOUT SEPTIC SHOCK: ICD-10-CM

## 2024-06-12 NOTE — ED PROVIDER NOTE - TEMPLATE, MLM
"Date of Service: 6/13/2024  Patient: Eduarda Foss  MRN: [id number]  Referring Provider: No ref. provider found    HPI  Ms. Foss is a 63 y.o. year old female with 14 years of formal education and PMX of hypothyroidism, migraines who is presenting today with chief complaint of cognitive changes. She has been noticing cognitive changes for about 2 years. Onset was gradual. Has the following cognitive symptoms:  -Impaired concentration, especially in a noisy environment  -Short term memory: Difficulty remembering why she went into a room, forgetting where she placed thing  -Word finding difficulty  -Difficulty remembering book titles after she reads them    Insight into cognitive changes? Yes  Family has not commented on memory issues, but her daughters have joked that she has \"ADHD.\"    Mood: Some anxiety, \"pretty good\"    Appetite: Good    No change in personality, social disinhibition, change in dietary preferences, loss of empathy, stereotyped or repetitive behaviors.     No visual hallucinations, fluctuating cognition, tremors, REM sleep behavior disorder, falls.     Stretches and goes for walks regularly  Reads regularly    Functional changes:   Sleep: Rarely sleeps more than 5 hours at time. Typically 3-4 hours. Difficulty staying asleep. Sometimes takes an afternoon nap  Current living situation: Lives with   Driving: No issues  Finances: No issues  Cooking: Has to read a recipe more than once   ADLS: No issues   Medications: No issues     Neuropsychiatric symptoms:   Depression - denies depression. Appetite ok. Sleeping fine. No worthlessness/helplessness. No suicidal thoughts, intent or plans.   Anxiety - Denies.   Psychosis - Denies.   Desi - Denies.   Suicidality - Denies.     Prior Work-up:  None    Past Neuropsychiatric History:  Handedness: right.  History of traumatic brain injury: None.   History of seizures: None  History of stroke: None.   Past psychiatric history: None    Mother with memory " issues in her 70s. No formal diagnosis.     Past Medical History:   Diagnosis Date    Allergic     Disease of thyroid gland 1997    GERD (gastroesophageal reflux disease)     Migraine     Nasal congestion 04/06/2021     Past Surgical History:   Procedure Laterality Date    WISDOM TOOTH EXTRACTION  1986     Social History     Tobacco Use    Smoking status: Former     Current packs/day: 0.00     Types: Cigarettes    Smokeless tobacco: Never   Substance Use Topics    Alcohol use: Yes     Alcohol/week: 18.0 standard drinks of alcohol     Types: 2 Glasses of wine, 1 Cans of beer, 6 Shots of liquor, 9 Standard drinks or equivalent per week     Worked as      No Known Allergies    Visit Vitals  Smoking Status Former     Objective      Exam:    NEUROLOGICAL EXAMINATION    Cranial nerves:  CN II: visual fields full to confrontation  CN III, IV, VI: Pupils round, reactive to light and accommodation.  Lids symmetric.  No ptosis.  Extra-occular muscles are intact with normal alignment.  No nystagmus  CN V: Facial sensation intact bilaterally.    CN VII: Normal and symmetric facial strength.  Nasolabial folds symmetric  CN VIII: Hearing intact to finger rub  CN IX: Palate elevates symmetrically.  CN XI: Normal shoulder shrug and neck turning  CN XII: Tongue midline, with normal bulk and strength, no fasciculations      Motor:  Muscle bulk was normal in all extremities.  5/5 strength in all extremities  Normal finger taps and hand opening  Normal tone  No abnormal movements    Reflexes:   Right UE     LEFT UE  BR: 2          BR: 2  Biceps: 2    Biceps: 2  Triceps: 2   Triceps: 2    RIGHT LE     LEFT LE  Knee: 2        Knee: 2  Ankle: 2       Ankle: 2    Negative Diana's reflex  No frontal release signs    Coordination:  Normal finger to nose testing and rapid alternating movements    Gait:  Able to stand from seated position with arms across chest  Stable gait    Sensory:  Negative Romberg's  sign    Results:  Lab Results   Component Value Date    HGBA1C 5.9 (H) 05/21/2024     TSH 0.24, FT4 1.01    CBC:   Lab Results   Component Value Date    WBC 5.8 05/21/2024    HGB 13.1 05/21/2024    HCT 40.4 05/21/2024     05/21/2024     BMP:   Lab Results   Component Value Date     05/21/2024    K 4.2 05/21/2024     05/21/2024    CO2 27 05/21/2024    BUN 21 05/21/2024    CREATININE 0.67 05/21/2024    CALCIUM 10.0 05/21/2024     Impression:  Eduarda Foss is a 63 y.o. who presents for evaluation of memory concerns. She reports gradual worsening in memory over the past 2 years.  Reports misplacing objects and forgetting why she went into her room. MOCA today was 28/30.  She has a history of hypothyroidism and recent TSH was 0.24 with thyroxine level 1.01.  Will check vitamin B12.  She does report poor sleep with frequent awakenings at night which may be contributing.  We discussed sleep hygiene including limited screen time before bed, no caffeine in the afternoon, limit afternoon naps.  We also reviewed tips for brain health as listed below.    At this juncture, we discussed continuing monitoring and reevaluating in 6 months vs neuropsychological testing for more thorough evaluation of memory concerns.  She plans to check with her insurance whether the neuropsychological testing will be covered and then will decide how to proceed.     Plan:  -Vitamin B12 level  -She will consider neuropsychological testing after checking with her insurance.  -She was provided with a handout for the MIND diet.    Tips for brain health were reviewed:  1. Mediterranean diet: green leafy veggies, berries, fish, olive oil. Minimize fried foods or processed sugars. Limit alcohol intake.  2. Brain exercises such as Lumosity, word puzzles, word games, card games.  3. Keep notepad to write things down. Make lists and follow them.  4.Engage in 20-30 minutes daily physical exercise: Walking, light weights, swimming, etc.  5.  Get at least 7-9 hours sleep nightly.  6. Social engagement and having a community and support system will help keep your mind active and healthy.  7. If you feel depressed or down, reach out and get help.     Reviewed and approved by CARRIE DAVIS on 6/12/24 at 12:23 PM.    I personally spent 60 minutes on the day of the visit completing the review of the medical record and outside records, obtaining history and performing an appropriate physical exam, patient care, counseling and education, placing orders, independently reviewing results, communicating with the patient/family and other providers, coordinating care and performing appropriate clinical documentation.   Cardiac

## 2024-10-07 NOTE — PATIENT PROFILE ADULT - PRO INTERPRETER NEED 2
English
Render Post-Care Instructions In Note?: no
Number Of Freeze-Thaw Cycles: 1 freeze-thaw cycle
Application Tool (Optional): Liquid Nitrogen Sprayer
Duration Of Freeze Thaw-Cycle (Seconds): 5
Show Applicator Variable?: Yes
Detail Level: Detailed
Consent: The patient's consent was obtained including but not limited to risks of crusting, scabbing, blistering, scarring, darker or lighter pigmentary change, recurrence, incomplete removal and infection.
Post-Care Instructions: I reviewed with the patient in detail post-care instructions. Patient is to wear sunprotection, and avoid picking at any of the treated lesions. Pt may apply Vaseline to crusted or scabbing areas.

## 2024-11-04 NOTE — SWALLOW BEDSIDE ASSESSMENT ADULT - ORAL PHASE
Decreased anterior-posterior movement of the bolus/Delayed oral transit time
REQUIRED- Click to run Sepsis Recognition Calculator
